# Patient Record
Sex: MALE | Race: BLACK OR AFRICAN AMERICAN | Employment: UNEMPLOYED | URBAN - METROPOLITAN AREA
[De-identification: names, ages, dates, MRNs, and addresses within clinical notes are randomized per-mention and may not be internally consistent; named-entity substitution may affect disease eponyms.]

---

## 2022-10-01 ENCOUNTER — ANESTHESIA EVENT (OUTPATIENT)
Dept: SURGERY | Age: 62
DRG: 710 | End: 2022-10-01
Payer: MEDICAID

## 2022-10-01 ENCOUNTER — APPOINTMENT (OUTPATIENT)
Dept: CT IMAGING | Age: 62
DRG: 710 | End: 2022-10-01
Attending: EMERGENCY MEDICINE
Payer: MEDICAID

## 2022-10-01 ENCOUNTER — HOSPITAL ENCOUNTER (INPATIENT)
Age: 62
LOS: 4 days | Discharge: HOME HEALTH | DRG: 710 | End: 2022-10-05
Attending: EMERGENCY MEDICINE | Admitting: INTERNAL MEDICINE
Payer: MEDICAID

## 2022-10-01 ENCOUNTER — APPOINTMENT (OUTPATIENT)
Dept: GENERAL RADIOLOGY | Age: 62
DRG: 710 | End: 2022-10-01
Attending: INTERNAL MEDICINE
Payer: MEDICAID

## 2022-10-01 ENCOUNTER — ANESTHESIA (OUTPATIENT)
Dept: SURGERY | Age: 62
DRG: 710 | End: 2022-10-01
Payer: MEDICAID

## 2022-10-01 DIAGNOSIS — I48.91 ATRIAL FIBRILLATION, UNSPECIFIED TYPE (HCC): ICD-10-CM

## 2022-10-01 DIAGNOSIS — L03.317 CELLULITIS OF RIGHT BUTTOCK: ICD-10-CM

## 2022-10-01 DIAGNOSIS — E87.1 HYPONATREMIA: ICD-10-CM

## 2022-10-01 DIAGNOSIS — K61.1 PERIRECTAL ABSCESS: Primary | ICD-10-CM

## 2022-10-01 PROBLEM — A41.9 SEPSIS (HCC): Status: ACTIVE | Noted: 2022-10-01

## 2022-10-01 PROBLEM — R79.89 ELEVATED LFTS: Status: ACTIVE | Noted: 2022-10-01

## 2022-10-01 PROBLEM — N17.9 AKI (ACUTE KIDNEY INJURY) (HCC): Status: ACTIVE | Noted: 2022-10-01

## 2022-10-01 PROBLEM — L02.31 CELLULITIS AND ABSCESS OF BUTTOCK: Status: ACTIVE | Noted: 2022-10-01

## 2022-10-01 LAB
ALBUMIN SERPL-MCNC: 2.4 G/DL (ref 3.4–5)
ALBUMIN/GLOB SERPL: 0.4 {RATIO} (ref 0.8–1.7)
ALP SERPL-CCNC: 76 U/L (ref 45–117)
ALT SERPL-CCNC: 69 U/L (ref 16–61)
ANION GAP SERPL CALC-SCNC: 6 MMOL/L (ref 3–18)
ANION GAP SERPL CALC-SCNC: 8 MMOL/L (ref 3–18)
APTT PPP: 29.9 SEC (ref 23–36.4)
AST SERPL-CCNC: 91 U/L (ref 10–38)
BASOPHILS # BLD: 0 K/UL (ref 0–0.1)
BASOPHILS NFR BLD: 0 % (ref 0–2)
BILIRUB SERPL-MCNC: 0.8 MG/DL (ref 0.2–1)
BUN SERPL-MCNC: 13 MG/DL (ref 7–18)
BUN SERPL-MCNC: 15 MG/DL (ref 7–18)
BUN/CREAT SERPL: 12 (ref 12–20)
BUN/CREAT SERPL: 9 (ref 12–20)
CALCIUM SERPL-MCNC: 7.6 MG/DL (ref 8.5–10.1)
CALCIUM SERPL-MCNC: 9.1 MG/DL (ref 8.5–10.1)
CHLORIDE SERPL-SCNC: 107 MMOL/L (ref 100–111)
CHLORIDE SERPL-SCNC: 96 MMOL/L (ref 100–111)
CO2 SERPL-SCNC: 22 MMOL/L (ref 21–32)
CO2 SERPL-SCNC: 25 MMOL/L (ref 21–32)
COVID-19 RAPID TEST, COVR: NOT DETECTED
CREAT SERPL-MCNC: 1.3 MG/DL (ref 0.6–1.3)
CREAT SERPL-MCNC: 1.4 MG/DL (ref 0.6–1.3)
DIFFERENTIAL METHOD BLD: ABNORMAL
EOSINOPHIL # BLD: 0 K/UL (ref 0–0.4)
EOSINOPHIL NFR BLD: 0 % (ref 0–5)
ERYTHROCYTE [DISTWIDTH] IN BLOOD BY AUTOMATED COUNT: 13.3 % (ref 11.6–14.5)
EST. AVERAGE GLUCOSE BLD GHB EST-MCNC: 120 MG/DL
GLOBULIN SER CALC-MCNC: 5.5 G/DL (ref 2–4)
GLUCOSE SERPL-MCNC: 110 MG/DL (ref 74–99)
GLUCOSE SERPL-MCNC: 115 MG/DL (ref 74–99)
HBA1C MFR BLD: 5.8 % (ref 4.2–5.6)
HCT VFR BLD AUTO: 41.1 % (ref 36–48)
HGB BLD-MCNC: 13.4 G/DL (ref 13–16)
IMM GRANULOCYTES # BLD AUTO: 0 K/UL (ref 0–0.04)
IMM GRANULOCYTES NFR BLD AUTO: 0 % (ref 0–0.5)
INR PPP: 1.2 (ref 0.8–1.2)
LACTATE BLD-SCNC: 1.37 MMOL/L (ref 0.4–2)
LYMPHOCYTES # BLD: 0.5 K/UL (ref 0.9–3.6)
LYMPHOCYTES NFR BLD: 3 % (ref 21–52)
MCH RBC QN AUTO: 26.5 PG (ref 24–34)
MCHC RBC AUTO-ENTMCNC: 32.6 G/DL (ref 31–37)
MCV RBC AUTO: 81.2 FL (ref 78–100)
MONOCYTES # BLD: 0.9 K/UL (ref 0.05–1.2)
MONOCYTES NFR BLD: 5 % (ref 3–10)
NEUTS BAND NFR BLD MANUAL: 5 %
NEUTS SEG # BLD: 15.9 K/UL (ref 1.8–8)
NEUTS SEG NFR BLD: 87 % (ref 40–73)
NRBC # BLD: 0 K/UL (ref 0–0.01)
NRBC BLD-RTO: 0 PER 100 WBC
PLATELET # BLD AUTO: 384 K/UL (ref 135–420)
PLATELET COMMENTS,PCOM: ABNORMAL
PMV BLD AUTO: 8.9 FL (ref 9.2–11.8)
POTASSIUM SERPL-SCNC: 3.6 MMOL/L (ref 3.5–5.5)
POTASSIUM SERPL-SCNC: 4.4 MMOL/L (ref 3.5–5.5)
PROCALCITONIN SERPL-MCNC: 2.61 NG/ML
PROT SERPL-MCNC: 7.9 G/DL (ref 6.4–8.2)
PROTHROMBIN TIME: 15.3 SEC (ref 11.5–15.2)
RBC # BLD AUTO: 5.06 M/UL (ref 4.35–5.65)
RBC MORPH BLD: ABNORMAL
SODIUM SERPL-SCNC: 129 MMOL/L (ref 136–145)
SODIUM SERPL-SCNC: 135 MMOL/L (ref 136–145)
SOURCE, COVRS: NORMAL
TSH SERPL DL<=0.05 MIU/L-ACNC: 0.77 UIU/ML (ref 0.36–3.74)
WBC # BLD AUTO: 17.3 K/UL (ref 4.6–13.2)

## 2022-10-01 PROCEDURE — 87635 SARS-COV-2 COVID-19 AMP PRB: CPT

## 2022-10-01 PROCEDURE — 87186 SC STD MICRODIL/AGAR DIL: CPT

## 2022-10-01 PROCEDURE — 74011250637 HC RX REV CODE- 250/637: Performed by: INTERNAL MEDICINE

## 2022-10-01 PROCEDURE — 74011000636 HC RX REV CODE- 636: Performed by: EMERGENCY MEDICINE

## 2022-10-01 PROCEDURE — 85610 PROTHROMBIN TIME: CPT

## 2022-10-01 PROCEDURE — 74011000258 HC RX REV CODE- 258: Performed by: INTERNAL MEDICINE

## 2022-10-01 PROCEDURE — 71046 X-RAY EXAM CHEST 2 VIEWS: CPT

## 2022-10-01 PROCEDURE — 85025 COMPLETE CBC W/AUTO DIFF WBC: CPT

## 2022-10-01 PROCEDURE — 99222 1ST HOSP IP/OBS MODERATE 55: CPT | Performed by: INTERNAL MEDICINE

## 2022-10-01 PROCEDURE — 83605 ASSAY OF LACTIC ACID: CPT

## 2022-10-01 PROCEDURE — 77030010509 HC AIRWY LMA MSK TELE -A: Performed by: ANESTHESIOLOGY

## 2022-10-01 PROCEDURE — 76210000016 HC OR PH I REC 1 TO 1.5 HR: Performed by: SURGERY

## 2022-10-01 PROCEDURE — 46040 I&D ISCHIORCT&/PERIRCT ABSC: CPT | Performed by: SURGERY

## 2022-10-01 PROCEDURE — 87150 DNA/RNA AMPLIFIED PROBE: CPT

## 2022-10-01 PROCEDURE — 87185 SC STD ENZYME DETCJ PER NZM: CPT

## 2022-10-01 PROCEDURE — 87075 CULTR BACTERIA EXCEPT BLOOD: CPT

## 2022-10-01 PROCEDURE — 74011000250 HC RX REV CODE- 250: Performed by: EMERGENCY MEDICINE

## 2022-10-01 PROCEDURE — 99285 EMERGENCY DEPT VISIT HI MDM: CPT

## 2022-10-01 PROCEDURE — 94762 N-INVAS EAR/PLS OXIMTRY CONT: CPT

## 2022-10-01 PROCEDURE — 74011000258 HC RX REV CODE- 258: Performed by: EMERGENCY MEDICINE

## 2022-10-01 PROCEDURE — 00902 ANES ANORECTAL PX: CPT | Performed by: NURSE ANESTHETIST, CERTIFIED REGISTERED

## 2022-10-01 PROCEDURE — 77030018836 HC SOL IRR NACL ICUM -A: Performed by: SURGERY

## 2022-10-01 PROCEDURE — 77030002916 HC SUT ETHLN J&J -A: Performed by: SURGERY

## 2022-10-01 PROCEDURE — 76060000032 HC ANESTHESIA 0.5 TO 1 HR: Performed by: SURGERY

## 2022-10-01 PROCEDURE — 85730 THROMBOPLASTIN TIME PARTIAL: CPT

## 2022-10-01 PROCEDURE — 74011250636 HC RX REV CODE- 250/636

## 2022-10-01 PROCEDURE — 00902 ANES ANORECTAL PX: CPT | Performed by: ANESTHESIOLOGY

## 2022-10-01 PROCEDURE — 36415 COLL VENOUS BLD VENIPUNCTURE: CPT

## 2022-10-01 PROCEDURE — 87040 BLOOD CULTURE FOR BACTERIA: CPT

## 2022-10-01 PROCEDURE — 83036 HEMOGLOBIN GLYCOSYLATED A1C: CPT

## 2022-10-01 PROCEDURE — 65270000046 HC RM TELEMETRY

## 2022-10-01 PROCEDURE — 76010000138 HC OR TIME 0.5 TO 1 HR: Performed by: SURGERY

## 2022-10-01 PROCEDURE — 2709999900 HC NON-CHARGEABLE SUPPLY: Performed by: SURGERY

## 2022-10-01 PROCEDURE — C1729 CATH, DRAINAGE: HCPCS | Performed by: SURGERY

## 2022-10-01 PROCEDURE — 0D9P0ZZ DRAINAGE OF RECTUM, OPEN APPROACH: ICD-10-PCS | Performed by: SURGERY

## 2022-10-01 PROCEDURE — 74011250636 HC RX REV CODE- 250/636: Performed by: INTERNAL MEDICINE

## 2022-10-01 PROCEDURE — 80053 COMPREHEN METABOLIC PANEL: CPT

## 2022-10-01 PROCEDURE — 74011000250 HC RX REV CODE- 250: Performed by: INTERNAL MEDICINE

## 2022-10-01 PROCEDURE — 84145 PROCALCITONIN (PCT): CPT

## 2022-10-01 PROCEDURE — 77030038692 HC WND DEB SYS IRMX -B: Performed by: SURGERY

## 2022-10-01 PROCEDURE — 74011250636 HC RX REV CODE- 250/636: Performed by: NURSE ANESTHETIST, CERTIFIED REGISTERED

## 2022-10-01 PROCEDURE — 77030010813: Performed by: SURGERY

## 2022-10-01 PROCEDURE — 99140 ANES COMP EMERGENCY COND: CPT | Performed by: NURSE ANESTHETIST, CERTIFIED REGISTERED

## 2022-10-01 PROCEDURE — 74011250636 HC RX REV CODE- 250/636: Performed by: EMERGENCY MEDICINE

## 2022-10-01 PROCEDURE — 87205 SMEAR GRAM STAIN: CPT

## 2022-10-01 PROCEDURE — 96375 TX/PRO/DX INJ NEW DRUG ADDON: CPT

## 2022-10-01 PROCEDURE — 2709999900 HC NON-CHARGEABLE SUPPLY

## 2022-10-01 PROCEDURE — 74011000250 HC RX REV CODE- 250: Performed by: NURSE ANESTHETIST, CERTIFIED REGISTERED

## 2022-10-01 PROCEDURE — 87077 CULTURE AEROBIC IDENTIFY: CPT

## 2022-10-01 PROCEDURE — 84443 ASSAY THYROID STIM HORMONE: CPT

## 2022-10-01 PROCEDURE — 99140 ANES COMP EMERGENCY COND: CPT | Performed by: ANESTHESIOLOGY

## 2022-10-01 PROCEDURE — 72193 CT PELVIS W/DYE: CPT

## 2022-10-01 PROCEDURE — 96374 THER/PROPH/DIAG INJ IV PUSH: CPT

## 2022-10-01 PROCEDURE — 99221 1ST HOSP IP/OBS SF/LOW 40: CPT | Performed by: SURGERY

## 2022-10-01 RX ORDER — SODIUM CHLORIDE 0.9 % (FLUSH) 0.9 %
5-40 SYRINGE (ML) INJECTION EVERY 8 HOURS
Status: DISCONTINUED | OUTPATIENT
Start: 2022-10-01 | End: 2022-10-05 | Stop reason: HOSPADM

## 2022-10-01 RX ORDER — PROPOFOL 10 MG/ML
INJECTION, EMULSION INTRAVENOUS AS NEEDED
Status: DISCONTINUED | OUTPATIENT
Start: 2022-10-01 | End: 2022-10-01 | Stop reason: HOSPADM

## 2022-10-01 RX ORDER — DEXAMETHASONE SODIUM PHOSPHATE 4 MG/ML
INJECTION, SOLUTION INTRA-ARTICULAR; INTRALESIONAL; INTRAMUSCULAR; INTRAVENOUS; SOFT TISSUE AS NEEDED
Status: DISCONTINUED | OUTPATIENT
Start: 2022-10-01 | End: 2022-10-01 | Stop reason: HOSPADM

## 2022-10-01 RX ORDER — OXYCODONE AND ACETAMINOPHEN 5; 325 MG/1; MG/1
1 TABLET ORAL
Status: DISCONTINUED | OUTPATIENT
Start: 2022-10-01 | End: 2022-10-05 | Stop reason: HOSPADM

## 2022-10-01 RX ORDER — HYDROMORPHONE HYDROCHLORIDE 1 MG/ML
1 INJECTION, SOLUTION INTRAMUSCULAR; INTRAVENOUS; SUBCUTANEOUS ONCE
Status: COMPLETED | OUTPATIENT
Start: 2022-10-01 | End: 2022-10-01

## 2022-10-01 RX ORDER — FENTANYL CITRATE 50 UG/ML
INJECTION, SOLUTION INTRAMUSCULAR; INTRAVENOUS AS NEEDED
Status: DISCONTINUED | OUTPATIENT
Start: 2022-10-01 | End: 2022-10-01 | Stop reason: HOSPADM

## 2022-10-01 RX ORDER — LIDOCAINE HYDROCHLORIDE 20 MG/ML
INJECTION, SOLUTION EPIDURAL; INFILTRATION; INTRACAUDAL; PERINEURAL AS NEEDED
Status: DISCONTINUED | OUTPATIENT
Start: 2022-10-01 | End: 2022-10-01 | Stop reason: HOSPADM

## 2022-10-01 RX ORDER — HYDROMORPHONE HYDROCHLORIDE 1 MG/ML
0.5 INJECTION, SOLUTION INTRAMUSCULAR; INTRAVENOUS; SUBCUTANEOUS AS NEEDED
Status: DISCONTINUED | OUTPATIENT
Start: 2022-10-01 | End: 2022-10-01 | Stop reason: HOSPADM

## 2022-10-01 RX ORDER — ROCURONIUM BROMIDE 10 MG/ML
INJECTION, SOLUTION INTRAVENOUS AS NEEDED
Status: DISCONTINUED | OUTPATIENT
Start: 2022-10-01 | End: 2022-10-01 | Stop reason: HOSPADM

## 2022-10-01 RX ORDER — ENOXAPARIN SODIUM 100 MG/ML
40 INJECTION SUBCUTANEOUS DAILY
Status: DISCONTINUED | OUTPATIENT
Start: 2022-10-02 | End: 2022-10-05

## 2022-10-01 RX ORDER — SODIUM CHLORIDE 0.9 % (FLUSH) 0.9 %
5-40 SYRINGE (ML) INJECTION AS NEEDED
Status: DISCONTINUED | OUTPATIENT
Start: 2022-10-01 | End: 2022-10-05 | Stop reason: HOSPADM

## 2022-10-01 RX ORDER — DIPHENHYDRAMINE HYDROCHLORIDE 50 MG/ML
12.5 INJECTION, SOLUTION INTRAMUSCULAR; INTRAVENOUS
Status: DISCONTINUED | OUTPATIENT
Start: 2022-10-01 | End: 2022-10-01 | Stop reason: HOSPADM

## 2022-10-01 RX ORDER — MIDAZOLAM HYDROCHLORIDE 1 MG/ML
INJECTION, SOLUTION INTRAMUSCULAR; INTRAVENOUS AS NEEDED
Status: DISCONTINUED | OUTPATIENT
Start: 2022-10-01 | End: 2022-10-01 | Stop reason: HOSPADM

## 2022-10-01 RX ORDER — HYDRALAZINE HYDROCHLORIDE 20 MG/ML
10 INJECTION INTRAMUSCULAR; INTRAVENOUS
Status: DISCONTINUED | OUTPATIENT
Start: 2022-10-01 | End: 2022-10-05 | Stop reason: HOSPADM

## 2022-10-01 RX ORDER — SUCCINYLCHOLINE CHLORIDE 20 MG/ML
INJECTION INTRAMUSCULAR; INTRAVENOUS AS NEEDED
Status: DISCONTINUED | OUTPATIENT
Start: 2022-10-01 | End: 2022-10-01 | Stop reason: HOSPADM

## 2022-10-01 RX ORDER — ACETAMINOPHEN 325 MG/1
650 TABLET ORAL
Status: DISCONTINUED | OUTPATIENT
Start: 2022-10-01 | End: 2022-10-05 | Stop reason: HOSPADM

## 2022-10-01 RX ORDER — PROMETHAZINE HYDROCHLORIDE 12.5 MG/1
12.5 TABLET ORAL
Status: DISCONTINUED | OUTPATIENT
Start: 2022-10-01 | End: 2022-10-05 | Stop reason: HOSPADM

## 2022-10-01 RX ORDER — ONDANSETRON 2 MG/ML
4 INJECTION INTRAMUSCULAR; INTRAVENOUS
Status: DISCONTINUED | OUTPATIENT
Start: 2022-10-01 | End: 2022-10-05 | Stop reason: HOSPADM

## 2022-10-01 RX ORDER — ONDANSETRON 2 MG/ML
4 INJECTION INTRAMUSCULAR; INTRAVENOUS ONCE
Status: DISCONTINUED | OUTPATIENT
Start: 2022-10-01 | End: 2022-10-01 | Stop reason: HOSPADM

## 2022-10-01 RX ORDER — ONDANSETRON 2 MG/ML
INJECTION INTRAMUSCULAR; INTRAVENOUS AS NEEDED
Status: DISCONTINUED | OUTPATIENT
Start: 2022-10-01 | End: 2022-10-01 | Stop reason: HOSPADM

## 2022-10-01 RX ORDER — SODIUM CHLORIDE, SODIUM LACTATE, POTASSIUM CHLORIDE, CALCIUM CHLORIDE 600; 310; 30; 20 MG/100ML; MG/100ML; MG/100ML; MG/100ML
75 INJECTION, SOLUTION INTRAVENOUS CONTINUOUS
Status: DISCONTINUED | OUTPATIENT
Start: 2022-10-01 | End: 2022-10-01 | Stop reason: HOSPADM

## 2022-10-01 RX ORDER — POLYETHYLENE GLYCOL 3350 17 G/17G
17 POWDER, FOR SOLUTION ORAL DAILY PRN
Status: DISCONTINUED | OUTPATIENT
Start: 2022-10-01 | End: 2022-10-05 | Stop reason: HOSPADM

## 2022-10-01 RX ORDER — VANCOMYCIN 2 GRAM/500 ML IN 0.9 % SODIUM CHLORIDE INTRAVENOUS
2000 ONCE
Status: COMPLETED | OUTPATIENT
Start: 2022-10-01 | End: 2022-10-01

## 2022-10-01 RX ORDER — VANCOMYCIN 2 GRAM/500 ML IN 0.9 % SODIUM CHLORIDE INTRAVENOUS
2000 ONCE
Status: DISCONTINUED | OUTPATIENT
Start: 2022-10-01 | End: 2022-10-01

## 2022-10-01 RX ORDER — ONDANSETRON 2 MG/ML
4 INJECTION INTRAMUSCULAR; INTRAVENOUS
Status: COMPLETED | OUTPATIENT
Start: 2022-10-01 | End: 2022-10-01

## 2022-10-01 RX ORDER — NALBUPHINE HYDROCHLORIDE 20 MG/ML
5 INJECTION, SOLUTION INTRAMUSCULAR; INTRAVENOUS; SUBCUTANEOUS
Status: DISCONTINUED | OUTPATIENT
Start: 2022-10-01 | End: 2022-10-01 | Stop reason: HOSPADM

## 2022-10-01 RX ORDER — SODIUM CHLORIDE 0.9 % (FLUSH) 0.9 %
5-40 SYRINGE (ML) INJECTION EVERY 8 HOURS
Status: DISCONTINUED | OUTPATIENT
Start: 2022-10-01 | End: 2022-10-01 | Stop reason: HOSPADM

## 2022-10-01 RX ORDER — SODIUM CHLORIDE 0.9 % (FLUSH) 0.9 %
5-40 SYRINGE (ML) INJECTION AS NEEDED
Status: DISCONTINUED | OUTPATIENT
Start: 2022-10-01 | End: 2022-10-01 | Stop reason: HOSPADM

## 2022-10-01 RX ADMIN — FAMOTIDINE 20 MG: 10 INJECTION, SOLUTION INTRAVENOUS at 21:18

## 2022-10-01 RX ADMIN — VANCOMYCIN HYDROCHLORIDE 2000 MG: 500 INJECTION, POWDER, LYOPHILIZED, FOR SOLUTION INTRAVENOUS at 20:28

## 2022-10-01 RX ADMIN — SODIUM CHLORIDE 1000 ML: 9 INJECTION, SOLUTION INTRAVENOUS at 10:59

## 2022-10-01 RX ADMIN — SODIUM CHLORIDE, PRESERVATIVE FREE 10 ML: 5 INJECTION INTRAVENOUS at 21:22

## 2022-10-01 RX ADMIN — SODIUM CHLORIDE, PRESERVATIVE FREE 10 ML: 5 INJECTION INTRAVENOUS at 15:33

## 2022-10-01 RX ADMIN — ONDANSETRON 4 MG: 2 INJECTION INTRAMUSCULAR; INTRAVENOUS at 21:18

## 2022-10-01 RX ADMIN — PROPOFOL 200 MG: 10 INJECTION, EMULSION INTRAVENOUS at 16:54

## 2022-10-01 RX ADMIN — PIPERACILLIN AND TAZOBACTAM 3.38 G: 3; .375 INJECTION, POWDER, FOR SOLUTION INTRAVENOUS at 21:17

## 2022-10-01 RX ADMIN — ONDANSETRON 4 MG: 2 INJECTION INTRAMUSCULAR; INTRAVENOUS at 10:59

## 2022-10-01 RX ADMIN — PIPERACILLIN AND TAZOBACTAM 4.5 G: 4; .5 INJECTION, POWDER, FOR SOLUTION INTRAVENOUS at 15:33

## 2022-10-01 RX ADMIN — HYDROMORPHONE HYDROCHLORIDE 1 MG: 1 INJECTION, SOLUTION INTRAMUSCULAR; INTRAVENOUS; SUBCUTANEOUS at 10:59

## 2022-10-01 RX ADMIN — IOPAMIDOL 80 ML: 612 INJECTION, SOLUTION INTRAVENOUS at 11:37

## 2022-10-01 RX ADMIN — LIDOCAINE HYDROCHLORIDE 20 MG: 20 INJECTION, SOLUTION EPIDURAL; INFILTRATION; INTRACAUDAL; PERINEURAL at 16:54

## 2022-10-01 RX ADMIN — MIDAZOLAM HYDROCHLORIDE 2 MG: 2 INJECTION, SOLUTION INTRAMUSCULAR; INTRAVENOUS at 16:48

## 2022-10-01 RX ADMIN — ACETAMINOPHEN 650 MG: 325 TABLET, FILM COATED ORAL at 18:06

## 2022-10-01 RX ADMIN — MEPERIDINE HYDROCHLORIDE 12.5 MG: 25 INJECTION INTRAMUSCULAR; INTRAVENOUS; SUBCUTANEOUS at 18:20

## 2022-10-01 RX ADMIN — SUCCINYLCHOLINE CHLORIDE 100 MG: 20 INJECTION, SOLUTION INTRAMUSCULAR; INTRAVENOUS at 16:54

## 2022-10-01 RX ADMIN — ONDANSETRON 4 MG: 2 INJECTION INTRAMUSCULAR; INTRAVENOUS at 16:54

## 2022-10-01 RX ADMIN — FENTANYL CITRATE 100 MCG: 50 INJECTION, SOLUTION INTRAMUSCULAR; INTRAVENOUS at 16:54

## 2022-10-01 RX ADMIN — CLINDAMYCIN PHOSPHATE 600 MG: 150 INJECTION, SOLUTION INTRAVENOUS at 12:15

## 2022-10-01 RX ADMIN — CLINDAMYCIN PHOSPHATE 600 MG: 150 INJECTION, SOLUTION INTRAVENOUS at 22:53

## 2022-10-01 RX ADMIN — SODIUM CHLORIDE 1000 ML: 9 INJECTION, SOLUTION INTRAVENOUS at 15:34

## 2022-10-01 RX ADMIN — DEXAMETHASONE SODIUM PHOSPHATE 4 MG: 4 INJECTION, SOLUTION INTRAMUSCULAR; INTRAVENOUS at 16:54

## 2022-10-01 RX ADMIN — FAMOTIDINE 20 MG: 10 INJECTION, SOLUTION INTRAVENOUS at 15:33

## 2022-10-01 RX ADMIN — ROCURONIUM BROMIDE 10 MG: 50 INJECTION INTRAVENOUS at 16:54

## 2022-10-01 NOTE — PROGRESS NOTES
TRANSFER - IN REPORT:    Verbal report received from Daniela RN(name) on Dimple Flatten  being received from PACU(unit) for routine progression of care      Report consisted of patients Situation, Background, Assessment and   Recommendations(SBAR). Information from the following report(s) SBAR, Kardex, Intake/Output, MAR, and Recent Results was reviewed with the receiving nurse. Opportunity for questions and clarification was provided. Assessment completed upon patients arrival to unit and care assumed.

## 2022-10-01 NOTE — CONSULTS
History & Physical    Date: 10/1/2022    Referring Physician: ER    Assessment:        Active Problems:    Cellulitis and abscess of buttock (10/1/2022)      Hyponatremia (10/1/2022)      Roma-rectal abscess (10/1/2022)      Sepsis ([facility] [address] (10/1/2022)      DANIA (acute kidney injury) ([facility] [address] (10/1/2022)      Elevated LFTs (10/1/2022)        Plan:   I have discussed the above findings with . Ortega Orellana and personally reviewed his CT scan of the abdomen and pelvis demonstrating a very large perirectal abscess. Recommend incision and drainage in the operating room today. Will obtain cultures, broad spectrum antibiotics. NPO. The risks and benefits of the procedure were reviewed with the patient including infection, bleeding, need for repeat procedure, injury to surrounding structures including sphincter muscles. Questions were answered and consent was obtained. History of Present Illness:  Mr. Tammy Che is a 63y.o. year old male who presented with pain and mass in the right buttock for the last week with associated fevers and chills. He states pain is 10/10 and denies any similar problems in the past. He is having difficulty moving his bowels secondary to pain. ER noted some cellulitis but the abscess was deeper and very large- unable to be drained in emergency room. History:  Past Medical History:   Diagnosis Date    History of tobacco abuse     Hypertension      No past surgical history on file.   Family History   Problem Relation Age of Onset    Heart Attack Mother     Heart Disease Mother     Hypertension Father     Heart Disease Sister     Heart Attack Sister      Social History     Socioeconomic History    Marital status: SINGLE     Spouse name: Not on file    Number of children: Not on file    Years of education: Not on file    Highest education level: Not on file   Occupational History    Not on file   Tobacco Use    Smoking status: Former     Types: Cigarettes    Smokeless tobacco: Never   Substance and Sexual Activity    Alcohol use: Not Currently    Drug use: Not Currently    Sexual activity: Not on file   Other Topics Concern    Not on file   Social History Narrative    Not on file     Social Determinants of Health     Financial Resource Strain: Not on file   Food Insecurity: Not on file   Transportation Needs: Not on file   Physical Activity: Not on file   Stress: Not on file   Social Connections: Not on file   Intimate Partner Violence: Not on file   Housing Stability: Not on file     No Known Allergies    Medications:  No current facility-administered medications on file prior to encounter. No current outpatient medications on file prior to encounter. Review of Systems:  Review of Systems   Constitutional:  Positive for chills and fever. Negative for fatigue. Gastrointestinal:  Positive for constipation and rectal pain. Negative for anal bleeding and blood in stool. Musculoskeletal:  Negative for arthralgias. Skin:  Positive for color change. Negative for rash and wound. Hematological:  Negative for adenopathy. Does not bruise/bleed easily. Physical Exam:  Patient Vitals for the past 24 hrs:   Temp Pulse Resp SpO2 Height Weight   10/01/22 1355 -- -- -- 99 % -- --   10/01/22 0933 98.9 °F (37.2 °C) (!) 101 18 98 % 5' 11\" (1.803 m) 102.1 kg (225 lb)       Physical Exam  Constitutional:       Appearance: Normal appearance. Cardiovascular:      Rate and Rhythm: Tachycardia present. Pulmonary:      Effort: Pulmonary effort is normal.   Genitourinary:     Comments: Rectal exam deferred- patient being taken to operating room for rectal exam under anesthesia and drainage of perirectal abscess  Neurological:      General: No focal deficit present. Mental Status: He is alert and oriented to person, place, and time. Mental status is at baseline. Psychiatric:         Mood and Affect: Mood normal.         Behavior: Behavior normal.         Thought Content:  Thought content normal. Judgment: Judgment normal.       Laboratory Data:  Recent Results (from the past 2016 hour(s))   PROCALCITONIN    Collection Time: 10/01/22 10:00 AM   Result Value Ref Range    Procalcitonin 2.61 ng/mL   HEMOGLOBIN A1C WITH EAG    Collection Time: 10/01/22 10:00 AM   Result Value Ref Range    Hemoglobin A1c 5.8 (H) 4.2 - 5.6 %    Est. average glucose 120 mg/dL   TSH 3RD GENERATION    Collection Time: 10/01/22 10:00 AM   Result Value Ref Range    TSH 0.77 0.36 - 3.74 uIU/mL   PROTHROMBIN TIME + INR    Collection Time: 10/01/22 10:00 AM   Result Value Ref Range    Prothrombin time 15.3 (H) 11.5 - 15.2 sec    INR 1.2 0.8 - 1.2     PTT    Collection Time: 10/01/22 10:00 AM   Result Value Ref Range    aPTT 29.9 23.0 - 50.3 SEC   METABOLIC PANEL, COMPREHENSIVE    Collection Time: 10/01/22 10:49 AM   Result Value Ref Range    Sodium 129 (L) 136 - 145 mmol/L    Potassium 3.6 3.5 - 5.5 mmol/L    Chloride 96 (L) 100 - 111 mmol/L    CO2 25 21 - 32 mmol/L    Anion gap 8 3.0 - 18 mmol/L    Glucose 110 (H) 74 - 99 mg/dL    BUN 13 7.0 - 18 MG/DL    Creatinine 1.40 (H) 0.6 - 1.3 MG/DL    BUN/Creatinine ratio 9 (L) 12 - 20      GFR est AA >60 >60 ml/min/1.73m2    GFR est non-AA 52 (L) >60 ml/min/1.73m2    Calcium 9.1 8.5 - 10.1 MG/DL    Bilirubin, total 0.8 0.2 - 1.0 MG/DL    ALT (SGPT) 69 (H) 16 - 61 U/L    AST (SGOT) 91 (H) 10 - 38 U/L    Alk.  phosphatase 76 45 - 117 U/L    Protein, total 7.9 6.4 - 8.2 g/dL    Albumin 2.4 (L) 3.4 - 5.0 g/dL    Globulin 5.5 (H) 2.0 - 4.0 g/dL    A-G Ratio 0.4 (L) 0.8 - 1.7     CBC WITH AUTOMATED DIFF    Collection Time: 10/01/22 10:49 AM   Result Value Ref Range    WBC 17.3 (H) 4.6 - 13.2 K/uL    RBC 5.06 4.35 - 5.65 M/uL    HGB 13.4 13.0 - 16.0 g/dL    HCT 41.1 36.0 - 48.0 %    MCV 81.2 78.0 - 100.0 FL    MCH 26.5 24.0 - 34.0 PG    MCHC 32.6 31.0 - 37.0 g/dL    RDW 13.3 11.6 - 14.5 %    PLATELET 627 501 - 695 K/uL    MPV 8.9 (L) 9.2 - 11.8 FL    NRBC 0.0 0  WBC    ABSOLUTE NRBC 0.00 0.00 - 0.01 K/uL    NEUTROPHILS 87 (H) 40 - 73 %    BAND NEUTROPHILS 5 %    LYMPHOCYTES 3 (L) 21 - 52 %    MONOCYTES 5 3 - 10 %    EOSINOPHILS 0 0 - 5 %    BASOPHILS 0 0 - 2 %    IMMATURE GRANULOCYTES 0 0.0 - 0.5 %    ABS. NEUTROPHILS 15.9 (H) 1.8 - 8.0 K/UL    ABS. LYMPHOCYTES 0.5 (L) 0.9 - 3.6 K/UL    ABS. MONOCYTES 0.9 0.05 - 1.2 K/UL    ABS. EOSINOPHILS 0.0 0.0 - 0.4 K/UL    ABS. BASOPHILS 0.0 0.0 - 0.1 K/UL    ABS. IMM. GRANS. 0.0 0.00 - 0.04 K/UL    DF MANUAL      PLATELET COMMENTS ADEQUATE PLATELETS      RBC COMMENTS NORMOCYTIC, NORMOCHROMIC     POC LACTIC ACID    Collection Time: 10/01/22 12:22 PM   Result Value Ref Range    Lactic Acid (POC) 1.37 0.40 - 2.00 mmol/L   COVID-19 RAPID TEST    Collection Time: 10/01/22  3:02 PM   Result Value Ref Range    Specimen source Nasopharyngeal      COVID-19 rapid test Not detected NOTD         Diagnostic Studies:  CT PELV W CONT: Patient Communication     Add Comments   Not seen     Addendum    Addendum: Findings communicated to Dr. Dolores Robles by telephone. Addended by Brett Morales MD on 10/1/2022  1:30 PM     Study Result    Narrative & Impression   CT PELVIS WITH INTRAVENOUS CONTRAST. COMPARISON: None available. INDICATIONS: Rectal pain. Concern for rectal abscess. TECHNIQUE: 5 mm axial cuts were performed through the chest, abdomen and pelvis  after administration of 100cc of Omnipaque 350 and oral contrast material.     All CT scans at this facility are performed using dose optimization technique as  appropriate to a performed exam, to include automated exposure control,  adjustment of the mA and/or KV according to patient's size (including  appropriate matching for site-specific examinations), or use of iterative  reconstruction technique.      FINDINGS:     CT PELVIS:  There is a large gas containing perianal and perirectal fluid collection (13 x 6  x 9 cm) extending from the right gluteal cleft superiorly into the right,  greater than left, levator ani muscle complex. Both superior components of the  collection appear to communicate with each other (400:53). The thickened rectum  and pelvic floor muscles are displaced to the right. No defect is visualized in  the rectal wall, although there is a possible tiny locule of gas in the right  perirectal fat on axial image 33. No extraluminal enteric contrast. Fat  stranding adjacent to the urinary bladder is likely reactive. Right inguinal and  pelvic lymphadenopathy, likely reactive. No definite osseous erosions. Bilateral  pars defects at L5-S1 with grade 1 anterolisthesis. IMPRESSION  1.  Large gas containing perianal and perirectal fluid collection/abscess  extending from the right gluteal cleft into the right, greater than left,  levator ani muscle complex. No defect is observed in the thickened rectal wall  and there is no enteric contrast leakage, but there is a questioned tiny locule  of gas in the right perirectal fat on axial image 33.   2.  Bilateral L5-S1 spondylolysis with spondylolisthesis. 3.  Thickening of the bladder wall and inguinal/pelvic lymphadenopathy, likely  reactive.          Cathy Heard, [address]

## 2022-10-01 NOTE — ED PROVIDER NOTES
HPI   82-year-old -American male presents with a chief complaint of tender indurated mass in his right medial gluteal area which has been increasing in size for 1 week. He reports past medical history of hypertension but denies any history of diabetes, STDs, cancer or homosexual contact. He currently rates the pain 10/10 in intensity. Past Medical History:   Diagnosis Date    History of tobacco abuse     Hypertension        History reviewed. No pertinent surgical history. Family History:   Problem Relation Age of Onset    Heart Attack Mother     Heart Disease Mother     Hypertension Father     Heart Disease Sister     Heart Attack Sister        Social History     Socioeconomic History    Marital status: SINGLE     Spouse name: Not on file    Number of children: Not on file    Years of education: Not on file    Highest education level: Not on file   Occupational History    Not on file   Tobacco Use    Smoking status: Former     Types: Cigarettes    Smokeless tobacco: Never   Substance and Sexual Activity    Alcohol use: Not Currently    Drug use: Not Currently    Sexual activity: Not on file   Other Topics Concern    Not on file   Social History Narrative    Not on file     Social Determinants of Health     Financial Resource Strain: Not on file   Food Insecurity: Not on file   Transportation Needs: Not on file   Physical Activity: Not on file   Stress: Not on file   Social Connections: Not on file   Intimate Partner Violence: Not on file   Housing Stability: Not on file         ALLERGIES: Patient has no known allergies. Review of Systems   Constitutional:  Negative for appetite change, chills, diaphoresis, fatigue and fever. HENT:  Negative for congestion, dental problem, ear discharge, ear pain, hearing loss, nosebleeds, rhinorrhea, sinus pressure, sore throat and tinnitus. Eyes:  Negative for photophobia, pain, discharge and redness.    Respiratory:  Negative for cough, choking, chest tightness, shortness of breath, wheezing and stridor. Cardiovascular:  Negative for chest pain, palpitations and leg swelling. Gastrointestinal:  Negative for abdominal distention, abdominal pain, anal bleeding, blood in stool, constipation, diarrhea, nausea and vomiting. Endocrine: Negative for polydipsia, polyphagia and polyuria. Genitourinary:  Negative for decreased urine volume, difficulty urinating, dysuria, flank pain, frequency, genital sores, hematuria, penile discharge, penile pain, penile swelling, scrotal swelling, testicular pain and urgency. Musculoskeletal:  Negative for back pain, neck pain and neck stiffness. Skin:  Positive for color change. Negative for pallor. Allergic/Immunologic: Negative for food allergies and immunocompromised state. Neurological:  Negative for tremors, seizures, syncope, speech difficulty, weakness, light-headedness and headaches. Hematological:  Negative for adenopathy. Does not bruise/bleed easily. Psychiatric/Behavioral:  Negative for agitation, behavioral problems, confusion, hallucinations, self-injury, sleep disturbance and suicidal ideas. The patient is not nervous/anxious and is not hyperactive. Vitals:    10/01/22 0933 10/01/22 1355   Pulse: (!) 101    Resp: 18    Temp: 98.9 °F (37.2 °C)    SpO2: 98% 99%   Weight: 102.1 kg (225 lb)    Height: 5' 11\" (1.803 m)             Physical Exam  Vitals and nursing note reviewed. Constitutional:       General: He is not in acute distress. Appearance: He is well-developed. He is not diaphoretic. HENT:      Head: Normocephalic and atraumatic. Right Ear: External ear normal.      Left Ear: External ear normal.      Nose: Nose normal.      Mouth/Throat:      Mouth: Mucous membranes are moist.      Pharynx: Oropharynx is clear. No oropharyngeal exudate. Eyes:      General: No scleral icterus. Right eye: No discharge. Left eye: No discharge.       Conjunctiva/sclera: Conjunctivae normal.      Pupils: Pupils are equal, round, and reactive to light. Neck:      Thyroid: No thyromegaly. Vascular: No JVD. Trachea: No tracheal deviation. Cardiovascular:      Rate and Rhythm: Normal rate and regular rhythm. Heart sounds: Normal heart sounds. No murmur heard. No friction rub. No gallop. Pulmonary:      Effort: Pulmonary effort is normal. No respiratory distress. Breath sounds: Normal breath sounds. No stridor. No wheezing or rales. Chest:      Chest wall: No tenderness. Abdominal:      General: Bowel sounds are normal. There is no distension. Palpations: Abdomen is soft. There is no mass. Tenderness: There is no abdominal tenderness. There is no guarding or rebound. Genitourinary:     Penis: Normal.       Comments: 5 cm x 5 cm tender indurated area in the right gluteal area. There is no area of fluctuance noted. Musculoskeletal:         General: No tenderness. Normal range of motion. Cervical back: Normal range of motion and neck supple. Lymphadenopathy:      Cervical: No cervical adenopathy. Skin:     General: Skin is warm and dry. Coloration: Skin is not pale. Findings: No erythema or rash. Neurological:      Mental Status: He is alert and oriented to person, place, and time. Cranial Nerves: No cranial nerve deficit. Motor: No abnormal muscle tone. Coordination: Coordination normal.      Deep Tendon Reflexes: Reflexes are normal and symmetric. Psychiatric:         Behavior: Behavior normal.         Thought Content:  Thought content normal.         Judgment: Judgment normal.        MDM  Number of Diagnoses or Management Options  Diagnosis management comments: Differential diagnosis includes: Cellulitis, abscess       Amount and/or Complexity of Data Reviewed  Clinical lab tests: ordered and reviewed  Tests in the radiology section of CPT®: ordered and reviewed  Tests in the medicine section of CPT®: ordered and reviewed  Review and summarize past medical records: yes  Independent visualization of images, tracings, or specimens: yes    Risk of Complications, Morbidity, and/or Mortality  Presenting problems: high  Diagnostic procedures: high  Management options: high           Procedures          Orders Placed This Encounter    MECHANICAL PROPHYLAXIS IS CONTRAINDICATED Other, please document Already on Anticoagulation     Already on Anticoagulation     Standing Status:   Standing     Number of Occurrences:   1     Order Specific Question:   Please Indicate Reason     Answer: Other, please document    CULTURE, BLOOD     Standing Status:   Standing     Number of Occurrences:   1    CULTURE, BLOOD     Standing Status:   Standing     Number of Occurrences:   1    COVID-19 WITH INFLUENZA A/B     Standing Status:   Standing     Number of Occurrences:   1     Order Specific Question:   Is this test for diagnosis or screening? Answer:   Diagnosis of ill patient     Order Specific Question:   Symptomatic for COVID-19 as defined by CDC? Answer:   Yes     Order Specific Question:   Date of Symptom Onset     Answer:   9/30/2022     Order Specific Question:   Hospitalized for COVID-19? Answer:   No     Order Specific Question:   Admitted to ICU for COVID-19? Answer:   No     Order Specific Question:   Employed in healthcare setting? Answer:   No     Order Specific Question:   Resident in a congregate (group) care setting? Answer:   No     Order Specific Question:   Previously tested for COVID-19? Answer:   No    COVID-19 RAPID TEST     Standing Status:   Standing     Number of Occurrences:   1     Order Specific Question:   Is this test for diagnosis or screening? Answer:   Screening     Order Specific Question:   Symptomatic for COVID-19 as defined by CDC? Answer:   No     Order Specific Question:   Hospitalized for COVID-19?      Answer:   No     Order Specific Question:   Admitted to ICU for COVID-19? Answer:   No     Order Specific Question:   Employed in healthcare setting? Answer:   Unknown     Order Specific Question:   Resident in a congregate (group) care setting? Answer:   Unknown     Order Specific Question:   Previously tested for COVID-19? Answer:   Unknown    CULTURE, MRSA     Standing Status:   Standing     Number of Occurrences:   1    CT PELV W CONT     Standing Status:   Standing     Number of Occurrences:   1     Order Specific Question:   Transport     Answer:   Stretcher [5]     Order Specific Question:   Reason for Exam     Answer:   right gluteal cellulitis, ? abscess     Order Specific Question: This order utilizes IV contrast.  What additional contrast is needed? Answer:   None     Order Specific Question:   Does patient have history of Renal Disease?      Answer:   No     Order Specific Question:   Decision Support Exception     Answer:   Emergency Medical Condition (MA) [1]    XR CHEST PA LAT     Standing Status:   Standing     Number of Occurrences:   1     Order Specific Question:   Transport     Answer:   BED [2]     Order Specific Question:   Reason for Exam     Answer:   sepsis    COMPREHENSIVE METABOLIC PANEL     Standing Status:   Standing     Number of Occurrences:   1    CBC WITH AUTOMATED DIFF     Standing Status:   Standing     Number of Occurrences:   1    METABOLIC PANEL, BASIC     Standing Status:   Standing     Number of Occurrences:   3    MAGNESIUM     Standing Status:   Standing     Number of Occurrences:   3    CBC W/O DIFF     Standing Status:   Standing     Number of Occurrences:   3    PROCALCITONIN     Standing Status:   Standing     Number of Occurrences:   1    HEMOGLOBIN A1C WITH EAG     Standing Status:   Standing     Number of Occurrences:   1    TSH 3RD GENERATION     Standing Status:   Standing     Number of Occurrences:   1    PROTHROMBIN TIME + INR     Standing Status:   Standing     Number of Occurrences:   1    PTT Standing Status:   Standing     Number of Occurrences:   1    METABOLIC PANEL, BASIC     Standing Status:   Standing     Number of Occurrences:   1    DIET NPO     Standing Status:   Standing     Number of Occurrences:   1    VITAL SIGNS     Per unit routine     Standing Status:   Standing     Number of Occurrences:   1    CARDIAC MONITORING     Standing Status:   Standing     Number of Occurrences:   1     Order Specific Question:   Type: Answer:   Bedside     Order Specific Question:   Patient may go off unit without monitor     Answer:   No    NOTIFY PROVIDER: SPECIFY Notify physician for pulse less than 50 or greater than 120, respiratory rate less than 12 or greater than 25, oral temperature greater than 101.3 F (55.1 C), systolic BP less than 90 or greater than 974, diastolic BP less. .. Notify physician for pulse less than 50 or greater than 120, respiratory rate less than 12 or greater than 25, oral temperature greater than 101.3 F (81.0 C), systolic BP less than 90 or greater than 271, diastolic BP less than 50 or greater than 100.      Standing Status:   Standing     Number of Occurrences:   1    UP AD NADIA     Standing Status:   Standing     Number of Occurrences:   1    WEIGH PATIENT     Standing Status:   Standing     Number of Occurrences:   1    INTAKE AND OUTPUT     Call for urine ouput less than 120ml in 4 hours     Standing Status:   Standing     Number of Occurrences:   1    NURSING-MISCELLANEOUS: Palpate, scan or straight cath and document bladder residual as needed CONTINUOUS     Standing Status:   Standing     Number of Occurrences:   1     Order Specific Question:   Description of Order:     Answer:   Palpate, scan or straight cath and document bladder residual as needed    INCENTIVE SPIROMETRY     Standing Status:   Standing     Number of Occurrences:   1    VERIFY CONSENT HAS BEEN OBTAINED incision and drainage of perirectal abscess ONE TIME STAT     Standing Status:   Standing Number of Occurrences:   1     Order Specific Question:   Please describe the test or procedure you would like to order. Answer:   incision and drainage of perirectal abscess    FULL CODE     Standing Status:   Standing     Number of Occurrences:   1    IP CONSULT TO INFECTIOUS DISEASES     Standing Status:   Standing     Number of Occurrences:   1     Order Specific Question:   Reason for Consult: Answer:   sepsis, rectal abscess     Order Specific Question:   Did you call or speak to the consulting provider?      Answer:   Yes    PT--EVAL, DEVISE PLAN OF CARE AND TREAT     Standing Status:   Standing     Number of Occurrences:   1    POC LACTIC ACID     Standing Status:   Standing     Number of Occurrences:   1    SALINE LOCK IV ONE TIME STAT     Standing Status:   Standing     Number of Occurrences:   1    sodium chloride 0.9 % bolus infusion 1,000 mL    clindamycin phosphate (CLEOCIN) 600 mg in 0.9% sodium chloride 50 mL IVPB     Order Specific Question:   Antibiotic Indications     Answer:   Skin and Soft Tissue Infection    HYDROmorphone (DILAUDID) injection 1 mg    ondansetron (ZOFRAN) injection 4 mg    iopamidoL (ISOVUE 300) 61 % contrast injection  mL    piperacillin-tazobactam (ZOSYN) 3.375 g in 0.9% sodium chloride (MBP/ADV) 100 mL MBP     Order Specific Question:   Antibiotic Indications     Answer:   Sepsis of Unknown Etiology    DISCONTD: clindamycin phosphate (CLEOCIN) 600 mg in 0.9% sodium chloride (MBP/ADV) 100 mL MBP     Order Specific Question:   Antibiotic Indications     Answer:   Skin and Soft Tissue Infection     Order Specific Question:   Antibiotic Indications     Answer:   Sepsis of Unknown Etiology     Order Specific Question:   Skin duration of therapy     Answer:   7 days    sodium chloride 0.9 % bolus infusion 1,000 mL    famotidine (PF) (PEPCID) 20 mg in 0.9% sodium chloride 10 mL injection     Order Specific Question:   H2RA INDICATION     Answer:   Symptomatic GERD piperacillin-tazobactam (ZOSYN) 4.5 g in 0.9% sodium chloride (MBP/ADV) 100 mL MBP     Order Specific Question:   Antibiotic Indications     Answer:   Sepsis of Unknown Etiology    sodium chloride (NS) flush 5-40 mL    sodium chloride (NS) flush 5-40 mL    OR Linked Order Group     acetaminophen (TYLENOL) tablet 650 mg     acetaminophen (TYLENOL) suppository 650 mg    polyethylene glycol (MIRALAX) packet 17 g    OR Linked Order Group     promethazine (PHENERGAN) tablet 12.5 mg     ondansetron (ZOFRAN) injection 4 mg    enoxaparin (LOVENOX) injection 40 mg    clindamycin phosphate 600 mg in 0.9% sodium chloride 50 mL IVPB    vancomycin (VANCOCIN) 2000 mg in  ml infusion     Order Specific Question:   Antibiotic Indications     Answer:   Sepsis of Unknown Etiology    vancomycin (VANCOCIN) 750 mg in 0.9% sodium chloride 250 mL (VIAL-MATE)     Order Specific Question:   Antibiotic Indications     Answer:   Sepsis of Unknown Etiology    hydrALAZINE (APRESOLINE) 20 mg/mL injection 10 mg    INITIAL PHYSICIAN ORDER: INPATIENT Surgical; 3. Patient receiving treatment that can only be provided in an inpatient setting (further clarification in H&P documentation)     Standing Status:   Standing     Number of Occurrences:   1     Order Specific Question:   Status: Answer:   INPATIENT [101]     Order Specific Question:   Type of Bed     Answer:   Surgical [18]     Order Specific Question:   Inpatient Hospitalization Certified Necessary for the Following Reasons     Answer:   3. Patient receiving treatment that can only be provided in an inpatient setting (further clarification in H&P documentation)     Order Specific Question:   Admitting Diagnosis     Answer:   Roma-rectal abscess [899851]     Order Specific Question:   Admitting Diagnosis     Answer:   Hyponatremia [244994]     Order Specific Question:   Admitting Diagnosis     Answer:   Cellulitis and abscess of buttock [555. 5. ICD-9-CM]     Order Specific Question: Admitting Physician     Answer:   Trever Troy     Order Specific Question:   Attending Physician     Answer:   Trever Troy     Order Specific Question:   Estimated Length of Stay     Answer:   2 Midnights     Order Specific Question:   Discharge Plan:     Answer:   Home with Office Follow-up    IP CONSULT TO Venu Lau Jeremy will order the necessary lab work, if needed, to complete the dosing and ongoing monitoring of requested drug therapy. Details will be updated within the patients Progress Notes.       Standing Status:   Standing     Number of Occurrences:   1     Order Specific Question:   Antibiotic Indications     Answer:   Sepsis of Unknown Etiology     Recent Results (from the past 12 hour(s))   PROCALCITONIN    Collection Time: 10/01/22 10:00 AM   Result Value Ref Range    Procalcitonin 2.61 ng/mL   HEMOGLOBIN A1C WITH EAG    Collection Time: 10/01/22 10:00 AM   Result Value Ref Range    Hemoglobin A1c 5.8 (H) 4.2 - 5.6 %    Est. average glucose 120 mg/dL   TSH 3RD GENERATION    Collection Time: 10/01/22 10:00 AM   Result Value Ref Range    TSH 0.77 0.36 - 3.74 uIU/mL   PROTHROMBIN TIME + INR    Collection Time: 10/01/22 10:00 AM   Result Value Ref Range    Prothrombin time 15.3 (H) 11.5 - 15.2 sec    INR 1.2 0.8 - 1.2     PTT    Collection Time: 10/01/22 10:00 AM   Result Value Ref Range    aPTT 29.9 23.0 - 55.7 SEC   METABOLIC PANEL, COMPREHENSIVE    Collection Time: 10/01/22 10:49 AM   Result Value Ref Range    Sodium 129 (L) 136 - 145 mmol/L    Potassium 3.6 3.5 - 5.5 mmol/L    Chloride 96 (L) 100 - 111 mmol/L    CO2 25 21 - 32 mmol/L    Anion gap 8 3.0 - 18 mmol/L    Glucose 110 (H) 74 - 99 mg/dL    BUN 13 7.0 - 18 MG/DL    Creatinine 1.40 (H) 0.6 - 1.3 MG/DL    BUN/Creatinine ratio 9 (L) 12 - 20      GFR est AA >60 >60 ml/min/1.73m2    GFR est non-AA 52 (L) >60 ml/min/1.73m2    Calcium 9.1 8.5 - 10.1 MG/DL    Bilirubin, total 0.8 0.2 - 1.0 MG/DL    ALT (SGPT) 69 (H) 16 - 61 U/L    AST (SGOT) 91 (H) 10 - 38 U/L    Alk. phosphatase 76 45 - 117 U/L    Protein, total 7.9 6.4 - 8.2 g/dL    Albumin 2.4 (L) 3.4 - 5.0 g/dL    Globulin 5.5 (H) 2.0 - 4.0 g/dL    A-G Ratio 0.4 (L) 0.8 - 1.7     CBC WITH AUTOMATED DIFF    Collection Time: 10/01/22 10:49 AM   Result Value Ref Range    WBC 17.3 (H) 4.6 - 13.2 K/uL    RBC 5.06 4.35 - 5.65 M/uL    HGB 13.4 13.0 - 16.0 g/dL    HCT 41.1 36.0 - 48.0 %    MCV 81.2 78.0 - 100.0 FL    MCH 26.5 24.0 - 34.0 PG    MCHC 32.6 31.0 - 37.0 g/dL    RDW 13.3 11.6 - 14.5 %    PLATELET 583 715 - 187 K/uL    MPV 8.9 (L) 9.2 - 11.8 FL    NRBC 0.0 0  WBC    ABSOLUTE NRBC 0.00 0.00 - 0.01 K/uL    NEUTROPHILS 87 (H) 40 - 73 %    BAND NEUTROPHILS 5 %    LYMPHOCYTES 3 (L) 21 - 52 %    MONOCYTES 5 3 - 10 %    EOSINOPHILS 0 0 - 5 %    BASOPHILS 0 0 - 2 %    IMMATURE GRANULOCYTES 0 0.0 - 0.5 %    ABS. NEUTROPHILS 15.9 (H) 1.8 - 8.0 K/UL    ABS. LYMPHOCYTES 0.5 (L) 0.9 - 3.6 K/UL    ABS. MONOCYTES 0.9 0.05 - 1.2 K/UL    ABS. EOSINOPHILS 0.0 0.0 - 0.4 K/UL    ABS. BASOPHILS 0.0 0.0 - 0.1 K/UL    ABS. IMM. GRANS. 0.0 0.00 - 0.04 K/UL    DF MANUAL      PLATELET COMMENTS ADEQUATE PLATELETS      RBC COMMENTS NORMOCYTIC, NORMOCHROMIC     POC LACTIC ACID    Collection Time: 10/01/22 12:22 PM   Result Value Ref Range    Lactic Acid (POC) 1.37 0.40 - 2.00 mmol/L   COVID-19 RAPID TEST    Collection Time: 10/01/22  3:02 PM   Result Value Ref Range    Specimen source Nasopharyngeal      COVID-19 rapid test Not detected NOTD       XR CHEST PA LAT   Final Result   No acute cardiopulmonary process. Small nodular opacity in the right midlung overlying the anterior right fifth   rib. Recommended nonemergent follow-up with dedicated chest CT. CT PELV W CONT   Final Result   Addendum (preliminary) 1 of 1   Addendum: Findings communicated to Dr. Nupur Juarez by telephone. Final   1.   Large gas containing perianal and perirectal fluid collection/abscess   extending from the right gluteal cleft into the right, greater than left,   levator ani muscle complex. No defect is observed in the thickened rectal wall   and there is no enteric contrast leakage, but there is a questioned tiny locule   of gas in the right perirectal fat on axial image 33.    2.  Bilateral L5-S1 spondylolysis with spondylolisthesis. 3.  Thickening of the bladder wall and inguinal/pelvic lymphadenopathy, likely   reactive. Case discussed with general surgery  Rafael Joseph who agrees with plans for admission and requests admission to the hospitalist service. She will see the patient in consultation. Case discussed with the hospitalist Dr. Nabil Mejia who agrees with plan for admission and will see the patient in consultation. Diagnosis:   1. Perirectal abscess    2. Cellulitis of right buttock    3. Hyponatremia          Disposition: Admit to Dr. Ugo Montanez service    Follow-up Information    None         There are no discharge medications for this patient.

## 2022-10-01 NOTE — Clinical Note
Status[de-identified] INPATIENT [101]   Type of Bed: Surgical [18]   Inpatient Hospitalization Certified Necessary for the Following Reasons: 3. Patient receiving treatment that can only be provided in an inpatient setting (further clarification in H&P documentation)   Admitting Diagnosis: Roma-rectal abscess [796192]   Admitting Diagnosis: Hyponatremia [603580]   Admitting Diagnosis: Cellulitis and abscess of buttock [755. 5. ICD-9-CM]   Admitting Physician: Claudeen Clark [[id number]]   Attending Physician: Julio C Stauffer   Estimated Length of Stay: 2 Midnights   Discharge Plan[de-identified] Home with Office Follow-up

## 2022-10-01 NOTE — PROGRESS NOTES
Pharmacy Note     Zosyn 3.375gm q8h ordered for treatment of sepsis. Per Loren Logan 10 will be changed to 4.5 gm x1 followed by 3.375gm q8h extended infusion. Estimated Creatinine Clearance: Estimated Creatinine Clearance: 68.3 mL/min (A) (based on SCr of 1.4 mg/dL (H)). Dialysis Status, DANIA, CKD: -    BMI:  Body mass index is 31.38 kg/m². Rationale for Adjustment:  [facility] B-Lactam extended infusion policy    Pharmacy will continue to monitor and adjust dose as necessary. Please call with any questions.     Thank you,  Herrera Morris, [facility]

## 2022-10-01 NOTE — PERIOP NOTES
TRANSFER - OUT REPORT:    Telephone given to [facility]) on Tasha Batista  being transferred to [facility](unit) for routine post - op       Report consisted of patients Situation, Background, Assessment and   Recommendations(SBAR). Information from the following report(s) MAR was reviewed with the receiving nurse. Lines:   Peripheral IV 10/01/22 Right Antecubital (Active)   Site Assessment Clean, dry, & intact 10/01/22 1615   Phlebitis Assessment 0 10/01/22 1615   Infiltration Assessment 0 10/01/22 1615   Dressing Status Clean, dry, & intact 10/01/22 1615   Dressing Type Tape;Transparent 10/01/22 1615   Hub Color/Line Status Green; Infusing 10/01/22 1615       Peripheral IV 10/01/22 Left Antecubital (Active)   Site Assessment Clean, dry, & intact 10/01/22 1615   Phlebitis Assessment 0 10/01/22 1615   Infiltration Assessment 0 10/01/22 1615   Dressing Status Clean, dry, & intact 10/01/22 1615   Dressing Type Tape;Transparent 10/01/22 1615   Hub Color/Line Status Pink; Infusing 10/01/22 1615        Opportunity for questions and clarification was provided.       Patient transported with:   O2 @ 3 liters  Tech

## 2022-10-01 NOTE — ED TRIAGE NOTES
Pt here for rectal abscesses that started one week ago. Hx noted, Pt reports he has been out of his BP medication for 90 days.  BP in triage 168/153

## 2022-10-01 NOTE — ED NOTES
Purposeful rounding completed:    Side rails up x 1:  YES  Bed in low position and wheels locked: YES  Call bell within reach: YES  Comfort addressed: YES    Toileting needs addressed: YES  Plan of care reviewed/updated with patient and or family members: YES  IV site assessed: YES  Pain assessed and addressed: YES, 8    Patient laying in bed, rectum has 8/10 pain, achy. Refuses pain medications at this time. No respiratory distress at this time.

## 2022-10-01 NOTE — ANESTHESIA PREPROCEDURE EVALUATION
Relevant Problems   RENAL FAILURE   (+) DANIA (acute kidney injury) ([address]       Anesthetic History   No history of anesthetic complications            Review of Systems / Medical History  Patient summary reviewed and pertinent labs reviewed    Pulmonary  Within defined limits                 Neuro/Psych   Within defined limits           Cardiovascular    Hypertension: well controlled              Exercise tolerance: >4 METS     GI/Hepatic/Renal  Within defined limits              Endo/Other  Within defined limits           Other Findings   Comments: Roma rectal abscess           Physical Exam    Airway  Mallampati: II  TM Distance: 4 - 6 cm  Neck ROM: normal range of motion   Mouth opening: Normal     Cardiovascular  Regular rate and rhythm,  S1 and S2 normal,  no murmur, click, rub, or gallop             Dental  No notable dental hx       Pulmonary  Breath sounds clear to auscultation               Abdominal  GI exam deferred       Other Findings            Anesthetic Plan    ASA: 2, emergent  Anesthesia type: general          Induction: Intravenous  Anesthetic plan and risks discussed with: Patient

## 2022-10-01 NOTE — PROGRESS NOTES
Bedside and Verbal shift change report given to Juan Antonio Artis (oncoming nurse) by Catrina Rocha RN (offgoing nurse). Report included the following information SBAR, Kardex, Intake/Output, MAR, and Recent Results.

## 2022-10-01 NOTE — H&P
History & Physical    Patient: Kristi Moffett MRN: [id number]  [facility]: [id number]    YOB: 1960  Age: 63 y.o. Sex: male      DOA: 10/1/2022  CC: butt pain    PCP: None       HPI:     Kristi Moffett is a 63 y.o. male with medical co-morbidities including Hypertension, who came to the ER with right butt pain and swelling. He reported that his right buttock was swollen, red, tender about 5 to 7 days ago. He denied any trauma to the area. He experienced fever and chill at home over the past few days. He experienced lack of appetite but has been taking fluid to keep himself hydrated. He has no bowel movement in the last few days. He denied pain in his testicle. 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 No other complaint. He has history of high blood pressure that he has not been taking medication. At baseline he is able to ambulate and do significant heavy duty work without any shortness of breath or chest pain. In the ED, he was found to have tachycardia , WBC 17.3, band neutrophils 5% with left shift, BMP with sodium of 129, chloride 96, creatinine 1.4 with GFR preserved. ALT 69, AST 91. Pro-Leonides 2.61. He was given clindamycin for right gluteal cellulitis. He was given IV fluid. CT of the pelvic with contrast showed large gas containing perianal and perirectal fluid collection/abscess extending from the right gluteal cleft into the right, greater than left levator anus muscle complex. .  He has thickening of the bladder wall with pelvic lymphadenopathy. Review of Systems  GENERAL: + Fever, + chill, + malaise   HEENT: No change in vision, no sore throat or sinus congestion. NECK: No pain or stiffness. PULMONARY: No shortness of breath, no cough or wheeze. Cardiovascular: no pnd / orthopnea, no Chest Pain  GASTROINTESTINAL: No abd pain, No nausea/vomiting, No diarrhea, No melena or bright red blood per rectum.    GENITOURINARY: No urinary frequency, No urgency or pain with urination. MUSCULOSKELETAL: No joint or muscle pain, no back pain, no recent trauma. DERMATOLOGIC: + Rash, no itching, + lesions. + Right buttocks pain and swelling  ENDOCRINE: No polyuria, polydipsia,  No recent change in weight. HEMATOLOGICAL: No easy bruising or bleeding. NEUROLOGIC: No headache, No seizures, + generalized weakness         Past Medical History:   Diagnosis Date    History of tobacco abuse     Hypertension        No past surgical history on file. H/o right axillary skin repair       Family History   Problem Relation Age of Onset    Heart Attack Mother     Heart Disease Mother     Hypertension Father     Heart Disease Sister     Heart Attack Sister        Social History     Socioeconomic History    Marital status: SINGLE   Tobacco Use    Smoking status: Former     Types: Cigarettes    Smokeless tobacco: Never   Substance and Sexual Activity    Alcohol use: Not Currently    Drug use: Not Currently       Prior to Admission medications    Not on File       No Known Allergies           Physical Exam:      Visit Vitals  Pulse (!) 101   Temp 98.9 °F (37.2 °C)   Resp 18   Ht 5' 11\" (1.803 m)   Wt 102.1 kg (225 lb)   SpO2 99%   BMI 31.38 kg/m²       Physical Exam:  Tele: Sinus tachycardia  General:  Cooperative, Not in acute distress, speaks in full sentence while in bed  HEENT: PERRL, EOMI, supple neck, no JVD, dry oral mucosa  Cardiovascular: S1S2 tachycardia, no rub/gallop   Pulmonary: air entry bilaterally, no wheezing, no crackle  GI:  Soft, non tender, non distended, +bs, no guarding   Extremities:  No pedal edema, +distal pulses appreciated   Neuro: AOx3, moving all extremities, no gross deficit. Skin: Right gluteal cellulitis with underlying abscess, indurated skin finding, warm/tender/red. No drainage. No testicular swelling.     Lab/Data Review:  Labs: Results:       Chemistry Recent Labs     10/01/22  1049   *   *   K 3.6   CL 96* CO2 25   BUN 13   CREA 1.40*   CA 9.1   AGAP 8   BUCR 9*   AP 76   TP 7.9   ALB 2.4*   GLOB 5.5*   AGRAT 0.4*      CBC w/Diff Recent Labs     10/01/22  1049   WBC 17.3*   RBC 5.06   HGB 13.4   HCT 41.1      GRANS 87*   LYMPH 3*   EOS 0      Coagulation Recent Labs     10/01/22  1000   PTP 15.3*   INR 1.2   APTT 29.9       Iron/Ferritin No results for input(s): IRON in the last 72 hours. No lab exists for component: TIBCCALC   BNP No results for input(s): BNPP in the last 72 hours. Cardiac Enzymes No results for input(s): CPK, CKND1, NATHALY in the last 72 hours. No lab exists for component: CKRMB, TROIP   Liver Enzymes Recent Labs     10/01/22  1049   TP 7.9   ALB 2.4*   AP 76      Thyroid Studies Lab Results   Component Value Date/Time    TSH 0.77 10/01/2022 10:00 AM          All Micro Results       Procedure Component Value Units Date/Time    COVID-19 RAPID TEST [[id number]]     Order Status: Sent     COVID-19 WITH INFLUENZA A/B [[id number]]     Order Status: Sent Specimen: Nasopharyngeal     CULTURE, BLOOD [[id number]] Collected: 10/01/22 1045    Order Status: Sent Specimen: Blood Updated: 10/01/22 1051    CULTURE, BLOOD [[id number]] Collected: 10/01/22 1000    Order Status: Sent Specimen: Blood Updated: 10/01/22 1049            Imaging Reviewed:  CT Results (most recent):  Results from Hospital Encounter encounter on 10/01/22    CT PELV W CONT    Addendum 10/1/2022  1:30 PM  Addendum: Findings communicated to Dr. Sukhdev Humphries by telephone. Narrative  CT PELVIS WITH INTRAVENOUS CONTRAST. COMPARISON: None available. INDICATIONS: Rectal pain. Concern for rectal abscess.     TECHNIQUE: 5 mm axial cuts were performed through the chest, abdomen and pelvis  after administration of 100cc of Omnipaque 350 and oral contrast material.    All CT scans at this facility are performed using dose optimization technique as  appropriate to a performed exam, to include automated exposure control,  adjustment of the mA and/or KV according to patient's size (including  appropriate matching for site-specific examinations), or use of iterative  reconstruction technique. FINDINGS:    CT PELVIS:  There is a large gas containing perianal and perirectal fluid collection (13 x 6  x 9 cm) extending from the right gluteal cleft superiorly into the right,  greater than left, levator ani muscle complex. Both superior components of the  collection appear to communicate with each other (400:53). The thickened rectum  and pelvic floor muscles are displaced to the right. No defect is visualized in  the rectal wall, although there is a possible tiny locule of gas in the right  perirectal fat on axial image 33. No extraluminal enteric contrast. Fat  stranding adjacent to the urinary bladder is likely reactive. Right inguinal and  pelvic lymphadenopathy, likely reactive. No definite osseous erosions. Bilateral  pars defects at L5-S1 with grade 1 anterolisthesis. Impression  1. Large gas containing perianal and perirectal fluid collection/abscess  extending from the right gluteal cleft into the right, greater than left,  levator ani muscle complex. No defect is observed in the thickened rectal wall  and there is no enteric contrast leakage, but there is a questioned tiny locule  of gas in the right perirectal fat on axial image 33.  2.  Bilateral L5-S1 spondylolysis with spondylolisthesis. 3.  Thickening of the bladder wall and inguinal/pelvic lymphadenopathy, likely  reactive. Assessment:   Active Problems:    Sepsis POA (, WBC 17, + cellulitis right buttock)  Right buttock cellulitis with underlying abscess, noting the perianal and perirectal fluid abscess.   Perianal, perirectal abscess per CT pelvic  DANIA associated with sepsis  Hyponatremia, hypovolemia  Elevated LFT  Obesity BMI 31  History of tobacco smoke, currently none  MRSA risk factor with recent incarceration  Functional status >4 METS, he presents class I RCRI risk factor. Plan:     Admit to [facility] floor to continue IV antibiotics including Zosyn, vancomycin, clindamycin. Blood culture follow-up for further risk assessment. On-call infectious disease specialist consulted. Surgical team has been consulted for further debridement of the infected area. We will follow-up on intraoperative wound culture for further antibiotic selection  Check COVID, check INR, check chest x-rays, check MRSA status  Check hemoglobin A1c  Continue IV fluid per sepsis protocol as tolerated. PPI  ICS  Hydralazine as needed for blood pressure greater than 165  Recheck BMP at 1600-hour to evaluate hyponatremia.         Risk of deterioration:  []Low    [x]Moderate  []High     Prophylaxis:  [x]Lovenox  []Coumadin  []Hep SQ  []SCDs  [x]H2B/PPI     Disposition:  []Home w/ Family   [x] PT,OT,RN   []SNF/LTC   []SAH/Rehab     Discussed Code Status:         []Full Code      []DNR         ___________________________________________________     Care Plan discussed with:    [x]Patient   []Family    []ED Care Manager  [x]ED Doc   [x]Specialist :  Total Time Coordinating Admission: 60   minutes    []Total Critical Care Time:       Davion Jasso MD  10/1/2022, 2:45 PM

## 2022-10-01 NOTE — ANESTHESIA POSTPROCEDURE EVALUATION
Procedure(s):  PERIRECTAL ABSCESS EXCISION AND DRAINAGE.     general    Anesthesia Post Evaluation      Multimodal analgesia: multimodal analgesia used between 6 hours prior to anesthesia start to PACU discharge  Patient location during evaluation: bedside  Patient participation: complete - patient participated  Level of consciousness: awake  Pain score: 4  Pain management: adequate  Airway patency: patent  Anesthetic complications: no  Cardiovascular status: stable  Respiratory status: acceptable  Hydration status: acceptable  Post anesthesia nausea and vomiting:  controlled  Final Post Anesthesia Temperature Assessment:  Normothermia (36.0-37.5 degrees C)      INITIAL Post-op Vital signs:   Vitals Value Taken Time   /62 10/01/22 1727   Temp 37.3 °C (99.2 °F) 10/01/22 1727   Pulse 99 10/01/22 1727   Resp 18 10/01/22 1727   SpO2 97 % 10/01/22 1727

## 2022-10-01 NOTE — PERIOP NOTES
Dr. Miller Carolina called and made aware of temp 102.5 and that patient received tylenol and he will receive Vancomycin on the floor.

## 2022-10-01 NOTE — OP NOTES
Incision and drainage of deep complicated perirectal abscess, wound irrigation , placement of mallencot drain    Patient Name: Rich García     SURGERY DATE: 10/01/22     : 1960     AGE: 63 y.o. Anesthesiologist: Anesthesiologist: Bismark Danielle MD  CRNA: Sukhdev Marley CRNA     Surgeon: Silverio Watkins DO    Anesthesia: General     Surgical assist: Circ-1: Litzy Diss  Scrub Tech-1: Rabia MUÑOZ  Surg Asst-1: Ivone Cole    PreOp DX: PERIRECTAL ABSCESS     PostOp DX: same    Procedure: Incision and drainage of deep complicated perirectal abscess, wound irrigation , placement of mallencot drain    Procedure Details: After informed consent was obtained the patient was taken to the operating room and placed in the supine position. General anesthesia was administered by the anesthetist to titrate to effect. The patient was placed in lithotomy position. The rectum and buttock were prepped and draped in the usual sterile fashion and a time out procedure was performed. Next bovie was used to make a vertical incision on the skin where it was most fluctuant. Significant foul smelling draining immediately exploded from this wound. Wound cultures were obtained. The abscess cavity was massive and extended above the levator muscles. It was copious irrigated and suctioned dry with irricept solution. The cavity was so deep there was concern for inadequate drainage with just wound care packing so a 26 f mallencot drain was placed in deepest portion of the cavity approximately 15cm from the skin and exited via a separate stab incision. This was secured to the skin with nylon suture. The wound was then packed open with betadine soaked kerlex. ABD pads were applied. The patient tolerated this procedure well and was extubated and sent to recovery in stable condition.      Implants: * No implants in log *    Estimated Blood Loss:  10cc    Specimens: wound cultures           Complications: None Disposition: extubated, tolerated procedure well            Condition: Stable    Vilma Rosas DO

## 2022-10-02 LAB
ANION GAP SERPL CALC-SCNC: 6 MMOL/L (ref 3–18)
BUN SERPL-MCNC: 17 MG/DL (ref 7–18)
BUN/CREAT SERPL: 14 (ref 12–20)
CALCIUM SERPL-MCNC: 8 MG/DL (ref 8.5–10.1)
CHLORIDE SERPL-SCNC: 106 MMOL/L (ref 100–111)
CO2 SERPL-SCNC: 22 MMOL/L (ref 21–32)
CREAT SERPL-MCNC: 1.22 MG/DL (ref 0.6–1.3)
ERYTHROCYTE [DISTWIDTH] IN BLOOD BY AUTOMATED COUNT: 13.6 % (ref 11.6–14.5)
FLUAV RNA SPEC QL NAA+PROBE: NOT DETECTED
FLUBV RNA SPEC QL NAA+PROBE: NOT DETECTED
GLUCOSE SERPL-MCNC: 118 MG/DL (ref 74–99)
HCT VFR BLD AUTO: 35.8 % (ref 36–48)
HGB BLD-MCNC: 11.4 G/DL (ref 13–16)
MAGNESIUM SERPL-MCNC: 2.4 MG/DL (ref 1.6–2.6)
MCH RBC QN AUTO: 26.6 PG (ref 24–34)
MCHC RBC AUTO-ENTMCNC: 31.8 G/DL (ref 31–37)
MCV RBC AUTO: 83.6 FL (ref 78–100)
NRBC # BLD: 0 K/UL (ref 0–0.01)
NRBC BLD-RTO: 0 PER 100 WBC
PLATELET # BLD AUTO: 359 K/UL (ref 135–420)
PMV BLD AUTO: 9 FL (ref 9.2–11.8)
POTASSIUM SERPL-SCNC: 4.1 MMOL/L (ref 3.5–5.5)
RBC # BLD AUTO: 4.28 M/UL (ref 4.35–5.65)
SARS-COV-2, COV2: NOT DETECTED
SODIUM SERPL-SCNC: 134 MMOL/L (ref 136–145)
WBC # BLD AUTO: 19.3 K/UL (ref 4.6–13.2)

## 2022-10-02 PROCEDURE — 83735 ASSAY OF MAGNESIUM: CPT

## 2022-10-02 PROCEDURE — 74011000258 HC RX REV CODE- 258: Performed by: INTERNAL MEDICINE

## 2022-10-02 PROCEDURE — 2709999900 HC NON-CHARGEABLE SUPPLY

## 2022-10-02 PROCEDURE — 74011000250 HC RX REV CODE- 250: Performed by: INTERNAL MEDICINE

## 2022-10-02 PROCEDURE — 74011250636 HC RX REV CODE- 250/636: Performed by: HOSPITALIST

## 2022-10-02 PROCEDURE — 74011250637 HC RX REV CODE- 250/637: Performed by: SURGERY

## 2022-10-02 PROCEDURE — 99232 SBSQ HOSP IP/OBS MODERATE 35: CPT | Performed by: HOSPITALIST

## 2022-10-02 PROCEDURE — 80048 BASIC METABOLIC PNL TOTAL CA: CPT

## 2022-10-02 PROCEDURE — 65270000046 HC RM TELEMETRY

## 2022-10-02 PROCEDURE — 85027 COMPLETE CBC AUTOMATED: CPT

## 2022-10-02 PROCEDURE — 87636 SARSCOV2 & INF A&B AMP PRB: CPT

## 2022-10-02 PROCEDURE — 36415 COLL VENOUS BLD VENIPUNCTURE: CPT

## 2022-10-02 PROCEDURE — 74011250636 HC RX REV CODE- 250/636: Performed by: INTERNAL MEDICINE

## 2022-10-02 RX ORDER — MORPHINE SULFATE 2 MG/ML
2 INJECTION, SOLUTION INTRAMUSCULAR; INTRAVENOUS
Status: DISCONTINUED | OUTPATIENT
Start: 2022-10-02 | End: 2022-10-05 | Stop reason: HOSPADM

## 2022-10-02 RX ADMIN — PIPERACILLIN AND TAZOBACTAM 3.38 G: 3; .375 INJECTION, POWDER, FOR SOLUTION INTRAVENOUS at 05:41

## 2022-10-02 RX ADMIN — SODIUM CHLORIDE, PRESERVATIVE FREE 10 ML: 5 INJECTION INTRAVENOUS at 06:05

## 2022-10-02 RX ADMIN — OXYCODONE HYDROCHLORIDE AND ACETAMINOPHEN 1 TABLET: 5; 325 TABLET ORAL at 22:33

## 2022-10-02 RX ADMIN — VANCOMYCIN HYDROCHLORIDE 750 MG: 750 INJECTION, POWDER, LYOPHILIZED, FOR SOLUTION INTRAVENOUS at 09:09

## 2022-10-02 RX ADMIN — PIPERACILLIN AND TAZOBACTAM 3.38 G: 3; .375 INJECTION, POWDER, FOR SOLUTION INTRAVENOUS at 22:14

## 2022-10-02 RX ADMIN — OXYCODONE HYDROCHLORIDE AND ACETAMINOPHEN 1 TABLET: 5; 325 TABLET ORAL at 05:41

## 2022-10-02 RX ADMIN — MORPHINE SULFATE 2 MG: 2 INJECTION, SOLUTION INTRAMUSCULAR; INTRAVENOUS at 11:51

## 2022-10-02 RX ADMIN — FAMOTIDINE 20 MG: 10 INJECTION, SOLUTION INTRAVENOUS at 22:13

## 2022-10-02 RX ADMIN — SODIUM CHLORIDE, PRESERVATIVE FREE 10 ML: 5 INJECTION INTRAVENOUS at 13:21

## 2022-10-02 RX ADMIN — PIPERACILLIN AND TAZOBACTAM 3.38 G: 3; .375 INJECTION, POWDER, FOR SOLUTION INTRAVENOUS at 13:21

## 2022-10-02 RX ADMIN — SODIUM CHLORIDE, PRESERVATIVE FREE 10 ML: 5 INJECTION INTRAVENOUS at 22:14

## 2022-10-02 RX ADMIN — VANCOMYCIN HYDROCHLORIDE 750 MG: 750 INJECTION, POWDER, LYOPHILIZED, FOR SOLUTION INTRAVENOUS at 22:13

## 2022-10-02 RX ADMIN — FAMOTIDINE 20 MG: 10 INJECTION, SOLUTION INTRAVENOUS at 09:10

## 2022-10-02 RX ADMIN — CLINDAMYCIN PHOSPHATE 600 MG: 150 INJECTION, SOLUTION INTRAVENOUS at 09:10

## 2022-10-02 RX ADMIN — OXYCODONE HYDROCHLORIDE AND ACETAMINOPHEN 1 TABLET: 5; 325 TABLET ORAL at 17:29

## 2022-10-02 RX ADMIN — CLINDAMYCIN PHOSPHATE 600 MG: 150 INJECTION, SOLUTION INTRAVENOUS at 05:01

## 2022-10-02 NOTE — PROGRESS NOTES
[facility] Hospitalist Group  Progress Note    Patient: Yesenia Villatoro Age: 63 y.o. : 1960 MR#: [id number] SSN: [id number]  Date/Time: 10/2/2022     Subjective:     Patient seen and evaluated, lying in bed, no acute distress. 29-year-old male with a history of hypertension presented to the emergency room secondary to pain in the right buttock with swelling. Patient noted to have right-sided perirectal abscess status post I&D by general surgery. Assessment/Plan:     Sepsis secondary to perirectal abscess right side status post I&D by general surgery-continue broad-spectrum IV antibiotics, local wound care. ID on board. Leukocytosis secondary to #1-continue IV antibiotics. Elevated LFTs-continue to monitor  DVT prophylaxis-SCDs  Full code                Lucy Tinsley MD  10/02/22      Case discussed with:  []Patient  []Family  []Nursing  []Case Management  DVT Prophylaxis:  []Lovenox  []Hep SQ  []SCDs  []Coumadin   []On Heparin gtt    Objective:   VS: Visit Vitals  /75 (BP 1 Location: Left upper arm, BP Patient Position: At rest)   Pulse 95   Temp 97.5 °F (36.4 °C)   Resp 18   Ht 5' 11\" (1.803 m)   Wt 102.1 kg (225 lb)   SpO2 93%   BMI 31.38 kg/m²      Tmax/24hrs: Temp (24hrs), Av.9 °F (37.7 °C), Min:97.5 °F (36.4 °C), Max:103 °F (39.4 °C)  IOBRIEF  Intake/Output Summary (Last 24 hours) at 10/2/2022 1254  Last data filed at 10/2/2022 0951  Gross per 24 hour   Intake 1020 ml   Output 1905 ml   Net -885 ml       General:  Alert, cooperative, no acute distress    Pulmonary:  CTA Bilaterally. No Wheezing/Rhonchi/Rales. Cardiovascular: Regular rate and Rhythm. GI:  Soft, Non distended, Non tender. + Bowel sounds. Extremities:  No edema, cyanosis, clubbing. No calf tenderness. Neurologic: Alert and oriented X 3. No acute neuro deficits.   Additional:    Medications:   Current Facility-Administered Medications   Medication Dose Route Frequency    morphine injection 2 mg  2 mg IntraVENous Q4H PRN    piperacillin-tazobactam (ZOSYN) 3.375 g in 0.9% sodium chloride (MBP/ADV) 100 mL MBP  3.375 g IntraVENous Q8H    famotidine (PF) (PEPCID) 20 mg in 0.9% sodium chloride 10 mL injection  20 mg IntraVENous Q12H    sodium chloride (NS) flush 5-40 mL  5-40 mL IntraVENous Q8H    sodium chloride (NS) flush 5-40 mL  5-40 mL IntraVENous PRN    acetaminophen (TYLENOL) tablet 650 mg  650 mg Oral Q6H PRN    Or    acetaminophen (TYLENOL) suppository 650 mg  650 mg Rectal Q6H PRN    polyethylene glycol (MIRALAX) packet 17 g  17 g Oral DAILY PRN    promethazine (PHENERGAN) tablet 12.5 mg  12.5 mg Oral Q6H PRN    Or    ondansetron (ZOFRAN) injection 4 mg  4 mg IntraVENous Q6H PRN    [Held by provider] enoxaparin (LOVENOX) injection 40 mg  40 mg SubCUTAneous DAILY    clindamycin phosphate 600 mg in 0.9% sodium chloride 50 mL IVPB  600 mg IntraVENous Q6H    vancomycin (VANCOCIN) 750 mg in 0.9% sodium chloride 250 mL (VIAL-MATE)  750 mg IntraVENous Q12H    hydrALAZINE (APRESOLINE) 20 mg/mL injection 10 mg  10 mg IntraVENous Q6H PRN    oxyCODONE-acetaminophen (PERCOCET) 5-325 mg per tablet 1 Tablet  1 Tablet Oral Q4H PRN       Imaging:   XR Results (most recent):  Results from Hospital Encounter encounter on 10/01/22    XR CHEST PA LAT    Narrative  EXAM:  XR CHEST PA LAT    INDICATION: sepsis    COMPARISON: none    FINDINGS:  Tiny nodular opacity in the right mid lung overlying the anterior right fifth  rib. The lungs are otherwise clear. No focal consolidation, pneumothorax, or  pulmonary edema. Costophrenic angles are sharp. Cardiomediastinal silhouettes within normal limits. No acute osseous findings. Impression  No acute cardiopulmonary process. Small nodular opacity in the right midlung overlying the anterior right fifth  rib. Recommended nonemergent follow-up with dedicated chest CT.        CT Results (most recent):  Results from Hospital Encounter encounter on 10/01/22    CT PELV W CONT    Addendum 10/1/2022  1:30 PM  Addendum: Findings communicated to Dr. Tima Tena by telephone. Narrative  CT PELVIS WITH INTRAVENOUS CONTRAST. COMPARISON: None available. INDICATIONS: Rectal pain. Concern for rectal abscess. TECHNIQUE: 5 mm axial cuts were performed through the chest, abdomen and pelvis  after administration of 100cc of Omnipaque 350 and oral contrast material.    All CT scans at this facility are performed using dose optimization technique as  appropriate to a performed exam, to include automated exposure control,  adjustment of the mA and/or KV according to patient's size (including  appropriate matching for site-specific examinations), or use of iterative  reconstruction technique. FINDINGS:    CT PELVIS:  There is a large gas containing perianal and perirectal fluid collection (13 x 6  x 9 cm) extending from the right gluteal cleft superiorly into the right,  greater than left, levator ani muscle complex. Both superior components of the  collection appear to communicate with each other (400:53). The thickened rectum  and pelvic floor muscles are displaced to the right. No defect is visualized in  the rectal wall, although there is a possible tiny locule of gas in the right  perirectal fat on axial image 33. No extraluminal enteric contrast. Fat  stranding adjacent to the urinary bladder is likely reactive. Right inguinal and  pelvic lymphadenopathy, likely reactive. No definite osseous erosions. Bilateral  pars defects at L5-S1 with grade 1 anterolisthesis. Impression  1. Large gas containing perianal and perirectal fluid collection/abscess  extending from the right gluteal cleft into the right, greater than left,  levator ani muscle complex.  No defect is observed in the thickened rectal wall  and there is no enteric contrast leakage, but there is a questioned tiny locule  of gas in the right perirectal fat on axial image 33.  2.  Bilateral L5-S1 spondylolysis with spondylolisthesis. 3.  Thickening of the bladder wall and inguinal/pelvic lymphadenopathy, likely  reactive. MRI Results (most recent):  No results found for this or any previous visit. Labs:    Recent Results (from the past 48 hour(s))   CULTURE, BLOOD    Collection Time: 10/01/22 10:00 AM    Specimen: Blood   Result Value Ref Range    Special Requests: RAC     Culture result: NO GROWTH AFTER 19 HOURS     PROCALCITONIN    Collection Time: 10/01/22 10:00 AM   Result Value Ref Range    Procalcitonin 2.61 ng/mL   HEMOGLOBIN A1C WITH EAG    Collection Time: 10/01/22 10:00 AM   Result Value Ref Range    Hemoglobin A1c 5.8 (H) 4.2 - 5.6 %    Est. average glucose 120 mg/dL   TSH 3RD GENERATION    Collection Time: 10/01/22 10:00 AM   Result Value Ref Range    TSH 0.77 0.36 - 3.74 uIU/mL   PROTHROMBIN TIME + INR    Collection Time: 10/01/22 10:00 AM   Result Value Ref Range    Prothrombin time 15.3 (H) 11.5 - 15.2 sec    INR 1.2 0.8 - 1.2     PTT    Collection Time: 10/01/22 10:00 AM   Result Value Ref Range    aPTT 29.9 23.0 - 36.4 SEC   CULTURE, BLOOD    Collection Time: 10/01/22 10:45 AM    Specimen: Blood   Result Value Ref Range    Special Requests: NO SPECIAL REQUESTS      Culture result: NO GROWTH AFTER 19 HOURS     METABOLIC PANEL, COMPREHENSIVE    Collection Time: 10/01/22 10:49 AM   Result Value Ref Range    Sodium 129 (L) 136 - 145 mmol/L    Potassium 3.6 3.5 - 5.5 mmol/L    Chloride 96 (L) 100 - 111 mmol/L    CO2 25 21 - 32 mmol/L    Anion gap 8 3.0 - 18 mmol/L    Glucose 110 (H) 74 - 99 mg/dL    BUN 13 7.0 - 18 MG/DL    Creatinine 1.40 (H) 0.6 - 1.3 MG/DL    BUN/Creatinine ratio 9 (L) 12 - 20      GFR est AA >60 >60 ml/min/1.73m2    GFR est non-AA 52 (L) >60 ml/min/1.73m2    Calcium 9.1 8.5 - 10.1 MG/DL    Bilirubin, total 0.8 0.2 - 1.0 MG/DL    ALT (SGPT) 69 (H) 16 - 61 U/L    AST (SGOT) 91 (H) 10 - 38 U/L    Alk.  phosphatase 76 45 - 117 U/L    Protein, total 7.9 6.4 - 8.2 g/dL    Albumin 2.4 (L) 3.4 - 5.0 g/dL    Globulin 5.5 (H) 2.0 - 4.0 g/dL    A-G Ratio 0.4 (L) 0.8 - 1.7     CBC WITH AUTOMATED DIFF    Collection Time: 10/01/22 10:49 AM   Result Value Ref Range    WBC 17.3 (H) 4.6 - 13.2 K/uL    RBC 5.06 4.35 - 5.65 M/uL    HGB 13.4 13.0 - 16.0 g/dL    HCT 41.1 36.0 - 48.0 %    MCV 81.2 78.0 - 100.0 FL    MCH 26.5 24.0 - 34.0 PG    MCHC 32.6 31.0 - 37.0 g/dL    RDW 13.3 11.6 - 14.5 %    PLATELET 821 557 - 143 K/uL    MPV 8.9 (L) 9.2 - 11.8 FL    NRBC 0.0 0  WBC    ABSOLUTE NRBC 0.00 0.00 - 0.01 K/uL    NEUTROPHILS 87 (H) 40 - 73 %    BAND NEUTROPHILS 5 %    LYMPHOCYTES 3 (L) 21 - 52 %    MONOCYTES 5 3 - 10 %    EOSINOPHILS 0 0 - 5 %    BASOPHILS 0 0 - 2 %    IMMATURE GRANULOCYTES 0 0.0 - 0.5 %    ABS. NEUTROPHILS 15.9 (H) 1.8 - 8.0 K/UL    ABS. LYMPHOCYTES 0.5 (L) 0.9 - 3.6 K/UL    ABS. MONOCYTES 0.9 0.05 - 1.2 K/UL    ABS. EOSINOPHILS 0.0 0.0 - 0.4 K/UL    ABS. BASOPHILS 0.0 0.0 - 0.1 K/UL    ABS. IMM.  GRANS. 0.0 0.00 - 0.04 K/UL    DF MANUAL      PLATELET COMMENTS ADEQUATE PLATELETS      RBC COMMENTS NORMOCYTIC, NORMOCHROMIC     POC LACTIC ACID    Collection Time: 10/01/22 12:22 PM   Result Value Ref Range    Lactic Acid (POC) 1.37 0.40 - 2.00 mmol/L   COVID-19 RAPID TEST    Collection Time: 10/01/22  3:02 PM   Result Value Ref Range    Specimen source Nasopharyngeal      COVID-19 rapid test Not detected NOTD     CULTURE, WOUND Nancy Hacking STAIN    Collection Time: 10/01/22  5:05 PM    Specimen: Rectal   Result Value Ref Range    Special Requests: TERESITA RECTAL     GRAM STAIN MODERATE GRAM POSITIVE COCCI IN PAIRS      GRAM STAIN MODERATE GRAM NEGATIVE RODS      Culture result: PENDING    METABOLIC PANEL, BASIC    Collection Time: 10/01/22  8:16 PM   Result Value Ref Range    Sodium 135 (L) 136 - 145 mmol/L    Potassium 4.4 3.5 - 5.5 mmol/L    Chloride 107 100 - 111 mmol/L    CO2 22 21 - 32 mmol/L    Anion gap 6 3.0 - 18 mmol/L    Glucose 115 (H) 74 - 99 mg/dL    BUN 15 7.0 - 18 MG/DL    Creatinine 1. 30 0.6 - 1.3 MG/DL    BUN/Creatinine ratio 12 12 - 20      GFR est AA >60 >60 ml/min/1.73m2    GFR est non-AA 56 (L) >60 ml/min/1.73m2    Calcium 7.6 (L) 8.5 - 30.9 MG/DL   METABOLIC PANEL, BASIC    Collection Time: 10/02/22 12:47 AM   Result Value Ref Range    Sodium 134 (L) 136 - 145 mmol/L    Potassium 4.1 3.5 - 5.5 mmol/L    Chloride 106 100 - 111 mmol/L    CO2 22 21 - 32 mmol/L    Anion gap 6 3.0 - 18 mmol/L    Glucose 118 (H) 74 - 99 mg/dL    BUN 17 7.0 - 18 MG/DL    Creatinine 1.22 0.6 - 1.3 MG/DL    BUN/Creatinine ratio 14 12 - 20      GFR est AA >60 >60 ml/min/1.73m2    GFR est non-AA >60 >60 ml/min/1.73m2    Calcium 8.0 (L) 8.5 - 10.1 MG/DL   MAGNESIUM    Collection Time: 10/02/22 12:47 AM   Result Value Ref Range    Magnesium 2.4 1.6 - 2.6 mg/dL   CBC W/O DIFF    Collection Time: 10/02/22 12:47 AM   Result Value Ref Range    WBC 19.3 (H) 4.6 - 13.2 K/uL    RBC 4.28 (L) 4.35 - 5.65 M/uL    HGB 11.4 (L) 13.0 - 16.0 g/dL    HCT 35.8 (L) 36.0 - 48.0 %    MCV 83.6 78.0 - 100.0 FL    MCH 26.6 24.0 - 34.0 PG    MCHC 31.8 31.0 - 37.0 g/dL    RDW 13.6 11.6 - 14.5 %    PLATELET 339 675 - 131 K/uL    MPV 9.0 (L) 9.2 - 11.8 FL    NRBC 0.0 0  WBC    ABSOLUTE NRBC 0.00 0.00 - 0.01 K/uL   COVID-19 WITH INFLUENZA A/B    Collection Time: 10/02/22 11:12 AM   Result Value Ref Range    SARS-CoV-2 by PCR Not detected NOTD      Influenza A by PCR Not detected NOTD      Influenza B by PCR Not detected NOTD         Signed By: Fran Kim MD     October 2, 2022      I spent 25 minutes with the patient in face-to-face consultation, of which greater than 50% was spent in counseling and coordination of care as described above    Disclaimer: Sections of this note are dictated using utilizing voice recognition software. Minor typographical errors may be present. If questions arise, please do not hesitate to contact me or call our department.

## 2022-10-02 NOTE — CONSULTS
Consult received. Clinical presentation c/w sepsis (POA) due to perirectal abscess  S/p I&D. Intra op findings noted. Intra op cultures pending  Rec:  Continue zosyn, vancomycin. D/c clindamycin  F/u intra op cultures, blood cultures   F/u surgery recommendations regarding wound care  D/w dr. Sumi Yoon. Full dictation to follow.  thanks

## 2022-10-02 NOTE — PROGRESS NOTES
POD# 1    Admit Date: 10/1/2022    Assessment    Ofelia Herrera is a 63 y.o. male POD 1 s/p incision and drainage of perirectal abscess     Patient Active Problem List   Diagnosis Code    Cellulitis and abscess of buttock L02.31, L03.317    Hyponatremia E87.1    Roma-rectal abscess K61.1    Sepsis ([facility] [address] A41.9    DANIA (acute kidney injury) ([facility] [address] N17.9    Elevated LFTs R79.89       Plan  -wound cultures pending  -wound care orders have been written for nursing to perform daily - patient will need home health- drain can be removed in one week. -diet as tolerated  -ongoing management per primary team    Subjective    Overnight events: Patient was febrile overnight. Patient reports significant improvement in pain. Objective    Physical Exam:   Visit Vitals  BP 99/65 (BP 1 Location: Left upper arm, BP Patient Position: Lying right side)   Pulse (!) 58   Temp 97.6 °F (36.4 °C)   Resp 18   Ht 5' 11\" (1.803 m)   Wt 102.1 kg (225 lb)   SpO2 100%   BMI 31.38 kg/m²       Intake/Output Summary (Last 24 hours) at 10/2/2022 0510  Last data filed at 10/1/2022 1727  Gross per 24 hour   Intake 800 ml   Output 330 ml   Net 470 ml     Physical Exam  Skin:     General: Skin is warm and dry.       Comments: Drain with bloody output in gravity bag, wound packed with betadine packing, moderate improvement in soft tissue swelling and pain             Sadaf Hill DO  Phone: [phone]

## 2022-10-02 NOTE — ROUTINE PROCESS
Bedside and verbal report received from [address] (offgoing nurse).  Report included the following information; SBAR, MAR, LABS, Intake/output, Kardex, and summary of care

## 2022-10-02 NOTE — PROGRESS NOTES
[address] [facility] Pharmacokinetic Monitoring Service - Vancomycin    Consulting Provider: Dr. Yulissa Rome   Indication: Sepsis of Unknown Etiology  Target Concentration: Goal AUC/JAZZY 400-600 mg*hr/L  Day of Therapy: 2  Additional Antimicrobials: Piperacillin/Tazobactam    Pertinent Laboratory Values:   Temp: 97.5 °F (36.4 °C)  Weight: 102.1 kg (225 lb)  Recent Labs     10/02/22  0047 10/01/22  2016 10/01/22  1049 10/01/22  1049 10/01/22  1000   CREA 1.22 1.30   < > 1.40*  --    BUN 17 15   < > 13  --    WBC 19.3*  --   --  17.3*  --    PCT  --   --   --   --  2.61    < > = values in this interval not displayed. Estimated Creatinine Clearance: 78.3 mL/min (based on SCr of 1.22 mg/dL). Pertinent Cultures:  Culture Date Source Results   10/1 Blood  NGTD   10/1 Wound (rectal) GPC, GNR   MRSA Nasal Swab: Ordered by provider, awaiting results    Plan:  Current dosing regimen is therapeutic  Continue current dose of Vancomycin 750 mg q12h  Renal labs as indicated   Vancomycin concentration ordered for 10/3 @ 0400  Pharmacy will continue to monitor patient and adjust therapy as indicated    Thank you for the consult,  Cristobal Davila.  HOANG Flores  10/2/2022

## 2022-10-02 NOTE — PROGRESS NOTES
Problem: Falls - Risk of  Goal: *Absence of Falls  Description: Document Damari Baldomero Fall Risk and appropriate interventions in the flowsheet.   Outcome: Progressing Towards Goal  Note: Fall Risk Interventions:  Mobility Interventions: Bed/chair exit alarm, Patient to call before getting OOB, Strengthening exercises (ROM-active/passive)         Medication Interventions: Bed/chair exit alarm, Evaluate medications/consider consulting pharmacy, Patient to call before getting OOB, Teach patient to arise slowly

## 2022-10-02 NOTE — PROGRESS NOTES
PT eval order received and chart reviewed. Spoke with patient who reports they are at functional baseline for mobility. Patient no decline with independence in functional mobility and and that they have no further need for assist or AD/DME. Will sign off per protocol and educated patient that if their functional mobility needs should change that they may have MD re-order PT at any time.  Thank you for this referral.     Angelina Sanabria, PT, DPT

## 2022-10-03 ENCOUNTER — APPOINTMENT (OUTPATIENT)
Dept: NON INVASIVE DIAGNOSTICS | Age: 62
DRG: 710 | End: 2022-10-03
Attending: HOSPITALIST
Payer: MEDICAID

## 2022-10-03 ENCOUNTER — APPOINTMENT (OUTPATIENT)
Dept: GENERAL RADIOLOGY | Age: 62
DRG: 710 | End: 2022-10-03
Attending: SURGERY
Payer: MEDICAID

## 2022-10-03 LAB
ACC. NO. FROM MICRO ORDER, ACCP: NORMAL
ACINETOBACTER CALCOACETICUS-BAUMANII COMPLEX, ACBCX: NOT DETECTED
ANION GAP SERPL CALC-SCNC: 4 MMOL/L (ref 3–18)
ATRIAL RATE: 129 BPM
BACTERIA SPEC CULT: NORMAL
BACTERIA SPEC CULT: NORMAL
BACTEROIDES FRAGILIS, BFRA: NOT DETECTED
BIOFIRE COMMENT, BCIDPF: NORMAL
BUN SERPL-MCNC: 29 MG/DL (ref 7–18)
BUN/CREAT SERPL: 24 (ref 12–20)
C GLABRATA DNA VAG QL NAA+PROBE: NOT DETECTED
CALCIUM SERPL-MCNC: 8.2 MG/DL (ref 8.5–10.1)
CALCULATED R AXIS, ECG10: 16 DEGREES
CALCULATED T AXIS, ECG11: 32 DEGREES
CANDIDA ALBICANS: NOT DETECTED
CANDIDA AURIS, CAAU: NOT DETECTED
CANDIDA KRUSEI, CKRP: NOT DETECTED
CANDIDA PARAPSILOSIS, CPAUP: NOT DETECTED
CANDIDA TROPICALIS, CTROP: NOT DETECTED
CHLORIDE SERPL-SCNC: 109 MMOL/L (ref 100–111)
CO2 SERPL-SCNC: 22 MMOL/L (ref 21–32)
CREAT SERPL-MCNC: 1.2 MG/DL (ref 0.6–1.3)
CRYPTO NEOFORMANS/GATTII, CRYNEG: NOT DETECTED
DIAGNOSIS, 93000: NORMAL
ECHO AO ASC DIAM: 3.7 CM
ECHO AO ASCENDING AORTA INDEX: 1.67 CM/M2
ECHO AO ROOT DIAM: 3.7 CM
ECHO AO ROOT INDEX: 1.67 CM/M2
ECHO LA VOL 2C: 55 ML (ref 18–58)
ECHO LA VOL 4C: 61 ML (ref 18–58)
ECHO LA VOLUME AREA LENGTH: 62 ML
ECHO LA VOLUME INDEX A2C: 25 ML/M2 (ref 16–34)
ECHO LA VOLUME INDEX A4C: 27 ML/M2 (ref 16–34)
ECHO LA VOLUME INDEX AREA LENGTH: 28 ML/M2 (ref 16–34)
ECHO LV FRACTIONAL SHORTENING: 39 % (ref 28–44)
ECHO LV INTERNAL DIMENSION DIASTOLE INDEX: 2.07 CM/M2
ECHO LV INTERNAL DIMENSION DIASTOLIC: 4.6 CM (ref 4.2–5.9)
ECHO LV INTERNAL DIMENSION SYSTOLIC INDEX: 1.26 CM/M2
ECHO LV INTERNAL DIMENSION SYSTOLIC: 2.8 CM
ECHO LV IVSD: 1 CM (ref 0.6–1)
ECHO LV MASS 2D: 181.2 G (ref 88–224)
ECHO LV MASS INDEX 2D: 81.6 G/M2 (ref 49–115)
ECHO LV POSTERIOR WALL DIASTOLIC: 1.2 CM (ref 0.6–1)
ECHO LV RELATIVE WALL THICKNESS RATIO: 0.52
ECHO RV FREE WALL PEAK S': 9 CM/S
ECHO RV TAPSE: 1.4 CM (ref 1.7–?)
ENTEROBACTER CLOACAE COMPLEX, ECCP: NOT DETECTED
ENTEROBACTERALES SP. , ENBLS: NOT DETECTED
ENTEROCOCCUS FAECALIS, ENFA: NOT DETECTED
ENTEROCOCCUS FAECIUM, ENFAM: NOT DETECTED
ERYTHROCYTE [DISTWIDTH] IN BLOOD BY AUTOMATED COUNT: 13.4 % (ref 11.6–14.5)
ESCHERICHIA COLI: NOT DETECTED
GLUCOSE SERPL-MCNC: 119 MG/DL (ref 74–99)
HAEMOPHILUS INFLUENZAE, HMI: NOT DETECTED
HCT VFR BLD AUTO: 38.8 % (ref 36–48)
HGB BLD-MCNC: 12.6 G/DL (ref 13–16)
KLEBSIELLA AEROGENES, KLAE: NOT DETECTED
KLEBSIELLA OXYTOCA: NOT DETECTED
KLEBSIELLA PNEUMONIAE GROUP, KPPG: NOT DETECTED
LISTERIA MONOCYTOGENES, LMONP: NOT DETECTED
MAGNESIUM SERPL-MCNC: 2.7 MG/DL (ref 1.6–2.6)
MCH RBC QN AUTO: 26.7 PG (ref 24–34)
MCHC RBC AUTO-ENTMCNC: 32.5 G/DL (ref 31–37)
MCV RBC AUTO: 82.2 FL (ref 78–100)
NEISSERIA MENINGITIDIS, NMNI: NOT DETECTED
NRBC # BLD: 0 K/UL (ref 0–0.01)
NRBC BLD-RTO: 0 PER 100 WBC
PLATELET # BLD AUTO: 371 K/UL (ref 135–420)
PMV BLD AUTO: 9.6 FL (ref 9.2–11.8)
POTASSIUM SERPL-SCNC: 4.2 MMOL/L (ref 3.5–5.5)
PROTEUS, PRP: NOT DETECTED
PSEUDOMONAS AERUGINOSA: NOT DETECTED
Q-T INTERVAL, ECG07: 278 MS
QRS DURATION, ECG06: 84 MS
QTC CALCULATION (BEZET), ECG08: 407 MS
RBC # BLD AUTO: 4.72 M/UL (ref 4.35–5.65)
RESISTANT GENE SPACE, REGENE: NORMAL
SALMONELLA, SALMO: NOT DETECTED
SERRATIA MARCESCENS: NOT DETECTED
SERVICE CMNT-IMP: NORMAL
SODIUM SERPL-SCNC: 135 MMOL/L (ref 136–145)
STAPH EPIDERMIDIS, STEP: NOT DETECTED
STAPH LUGDUNENSIS, STALUG: NOT DETECTED
STAPHYLOCOCCUS AUREUS: NOT DETECTED
STAPHYLOCOCCUS, STAPP: NOT DETECTED
STENO MALTOPHILIA, STMA: NOT DETECTED
STREPTOCOCCUS , STPSP: NOT DETECTED
STREPTOCOCCUS AGALACTIAE (GROUP B): NOT DETECTED
STREPTOCOCCUS PNEUMONIAE , SPNP: NOT DETECTED
STREPTOCOCCUS PYOGENES (GROUP A), SPYOP: NOT DETECTED
VANCOMYCIN SERPL-MCNC: 17.5 UG/ML (ref 5–40)
VENTRICULAR RATE, ECG03: 129 BPM
WBC # BLD AUTO: 19.5 K/UL (ref 4.6–13.2)

## 2022-10-03 PROCEDURE — 74011000250 HC RX REV CODE- 250: Performed by: INTERNAL MEDICINE

## 2022-10-03 PROCEDURE — 80048 BASIC METABOLIC PNL TOTAL CA: CPT

## 2022-10-03 PROCEDURE — 85027 COMPLETE CBC AUTOMATED: CPT

## 2022-10-03 PROCEDURE — 65270000046 HC RM TELEMETRY

## 2022-10-03 PROCEDURE — 83735 ASSAY OF MAGNESIUM: CPT

## 2022-10-03 PROCEDURE — 99232 SBSQ HOSP IP/OBS MODERATE 35: CPT | Performed by: HOSPITALIST

## 2022-10-03 PROCEDURE — 74011000636 HC RX REV CODE- 636: Performed by: SURGERY

## 2022-10-03 PROCEDURE — 93005 ELECTROCARDIOGRAM TRACING: CPT

## 2022-10-03 PROCEDURE — 74011000258 HC RX REV CODE- 258: Performed by: INTERNAL MEDICINE

## 2022-10-03 PROCEDURE — 93306 TTE W/DOPPLER COMPLETE: CPT

## 2022-10-03 PROCEDURE — 74011250637 HC RX REV CODE- 250/637: Performed by: SURGERY

## 2022-10-03 PROCEDURE — 36415 COLL VENOUS BLD VENIPUNCTURE: CPT

## 2022-10-03 PROCEDURE — 74011250636 HC RX REV CODE- 250/636: Performed by: INTERNAL MEDICINE

## 2022-10-03 PROCEDURE — 74011250636 HC RX REV CODE- 250/636: Performed by: HOSPITALIST

## 2022-10-03 PROCEDURE — 2709999900 HC NON-CHARGEABLE SUPPLY

## 2022-10-03 PROCEDURE — 80202 ASSAY OF VANCOMYCIN: CPT

## 2022-10-03 PROCEDURE — 74011000250 HC RX REV CODE- 250: Performed by: HOSPITALIST

## 2022-10-03 PROCEDURE — 74011250637 HC RX REV CODE- 250/637: Performed by: PHYSICIAN ASSISTANT

## 2022-10-03 PROCEDURE — 99223 1ST HOSP IP/OBS HIGH 75: CPT | Performed by: INTERNAL MEDICINE

## 2022-10-03 PROCEDURE — 74250 X-RAY XM SM INT 1CNTRST STD: CPT

## 2022-10-03 PROCEDURE — 74011250637 HC RX REV CODE- 250/637: Performed by: HOSPITALIST

## 2022-10-03 PROCEDURE — 87040 BLOOD CULTURE FOR BACTERIA: CPT

## 2022-10-03 RX ORDER — METOPROLOL TARTRATE 5 MG/5ML
2.5 INJECTION INTRAVENOUS
Status: COMPLETED | OUTPATIENT
Start: 2022-10-03 | End: 2022-10-03

## 2022-10-03 RX ORDER — METOPROLOL TARTRATE 25 MG/1
25 TABLET, FILM COATED ORAL 2 TIMES DAILY
Status: DISCONTINUED | OUTPATIENT
Start: 2022-10-03 | End: 2022-10-04

## 2022-10-03 RX ORDER — METOPROLOL TARTRATE 5 MG/5ML
INJECTION INTRAVENOUS
Status: DISPENSED
Start: 2022-10-03 | End: 2022-10-03

## 2022-10-03 RX ORDER — FAMOTIDINE 20 MG/1
20 TABLET, FILM COATED ORAL 2 TIMES DAILY
Status: DISCONTINUED | OUTPATIENT
Start: 2022-10-03 | End: 2022-10-05 | Stop reason: HOSPADM

## 2022-10-03 RX ADMIN — PIPERACILLIN AND TAZOBACTAM 3.38 G: 3; .375 INJECTION, POWDER, FOR SOLUTION INTRAVENOUS at 05:16

## 2022-10-03 RX ADMIN — MORPHINE SULFATE 2 MG: 2 INJECTION, SOLUTION INTRAMUSCULAR; INTRAVENOUS at 18:11

## 2022-10-03 RX ADMIN — PIPERACILLIN AND TAZOBACTAM 3.38 G: 3; .375 INJECTION, POWDER, FOR SOLUTION INTRAVENOUS at 13:04

## 2022-10-03 RX ADMIN — METOPROLOL TARTRATE 25 MG: 25 TABLET, FILM COATED ORAL at 17:29

## 2022-10-03 RX ADMIN — OXYCODONE HYDROCHLORIDE AND ACETAMINOPHEN 1 TABLET: 5; 325 TABLET ORAL at 09:33

## 2022-10-03 RX ADMIN — METOPROLOL TARTRATE 25 MG: 25 TABLET, FILM COATED ORAL at 13:02

## 2022-10-03 RX ADMIN — PIPERACILLIN AND TAZOBACTAM 3.38 G: 3; .375 INJECTION, POWDER, FOR SOLUTION INTRAVENOUS at 21:50

## 2022-10-03 RX ADMIN — FAMOTIDINE 20 MG: 10 INJECTION, SOLUTION INTRAVENOUS at 09:06

## 2022-10-03 RX ADMIN — VANCOMYCIN HYDROCHLORIDE 750 MG: 750 INJECTION, POWDER, LYOPHILIZED, FOR SOLUTION INTRAVENOUS at 09:32

## 2022-10-03 RX ADMIN — SODIUM CHLORIDE, PRESERVATIVE FREE 10 ML: 5 INJECTION INTRAVENOUS at 13:07

## 2022-10-03 RX ADMIN — DIATRIZOATE MEGLUMINE AND DIATRIZOATE SODIUM 240 ML: 660; 100 LIQUID ORAL; RECTAL at 07:59

## 2022-10-03 RX ADMIN — OXYCODONE HYDROCHLORIDE AND ACETAMINOPHEN 1 TABLET: 5; 325 TABLET ORAL at 14:53

## 2022-10-03 RX ADMIN — FAMOTIDINE 20 MG: 20 TABLET ORAL at 17:29

## 2022-10-03 RX ADMIN — METOPROLOL TARTRATE 2.5 MG: 5 INJECTION, SOLUTION INTRAVENOUS at 10:14

## 2022-10-03 RX ADMIN — SODIUM CHLORIDE, PRESERVATIVE FREE 10 ML: 5 INJECTION INTRAVENOUS at 06:09

## 2022-10-03 RX ADMIN — OXYCODONE HYDROCHLORIDE AND ACETAMINOPHEN 1 TABLET: 5; 325 TABLET ORAL at 21:50

## 2022-10-03 RX ADMIN — SODIUM CHLORIDE, PRESERVATIVE FREE 10 ML: 5 INJECTION INTRAVENOUS at 21:55

## 2022-10-03 NOTE — PROGRESS NOTES
[facility]  Progress Note    Patient: Jessie Eller Age: 63 y.o. : 1960 MR#: [id number] SSN: [id number]  Date/Time: 10/3/2022     Subjective:     Patient seen and evaluated, lying in bed, no acute distress. 45-year-old male with a history of hypertension presented to the emergency room secondary to pain in the right buttock with swelling. Patient noted to have right-sided perirectal abscess status post I&D by general surgery. 10/3-nurse paged to state that patient had an elevated heart rate, twelve-lead EKG done, shows new onset A. fib with RVR. IV Lopressor 2.5 mg given x1. Cardiology consulted. Per cardiology consult continue p.o. Lopressor. Continue aspirin 81 mg p.o. daily  Will follow        Assessment/Plan:     Sepsis secondary to perirectal abscess right side status post I&D by general surgery-continue broad-spectrum IV antibiotics, local wound care. ID on board. New onset A. fib with RVR-continue beta-blockers for rate control, OAC with aspirin 81 mg p.o. daily  Leukocytosis secondary to #1-continue IV antibiotics. Elevated LFTs-continue to monitor  DVT prophylaxis-SCDs  Full code    Anticipate discharge home in 1 to 2 days.           Isha Acevedo MD  10/03/22      Case discussed with:  []Patient  []Family  []Nursing  []Case Management  DVT Prophylaxis:  []Lovenox  []Hep SQ  []SCDs  []Coumadin   []On Heparin gtt    Objective:   VS: Visit Vitals  /80 (BP 1 Location: Right upper arm, BP Patient Position: At rest;Reclining)   Pulse 91   Temp 97.7 °F (36.5 °C)   Resp 18   Ht 5' 11\" (1.803 m)   Wt 102.1 kg (225 lb)   SpO2 96%   BMI 31.38 kg/m²        Tmax/24hrs: Temp (24hrs), Av.5 °F (36.4 °C), Min:97.4 °F (36.3 °C), Max:97.7 °F (36.5 °C)  IOBRIEF  Intake/Output Summary (Last 24 hours) at 10/3/2022 131  Last data filed at 10/3/2022 0516  Gross per 24 hour   Intake 540 ml   Output 1760 ml   Net -1220 ml         General:  Alert, cooperative, no acute distress    Pulmonary:  CTA Bilaterally. No Wheezing/Rhonchi/Rales. Cardiovascular: Regular rate and Rhythm. GI:  Soft, Non distended, Non tender. + Bowel sounds. Extremities:  No edema, cyanosis, clubbing. No calf tenderness. Neurologic: Alert and oriented X 3. No acute neuro deficits. Additional:    Medications:   Current Facility-Administered Medications   Medication Dose Route Frequency    metoprolol (LOPRESSOR) 5 mg/5 mL injection        metoprolol tartrate (LOPRESSOR) tablet 25 mg  25 mg Oral BID    morphine injection 2 mg  2 mg IntraVENous Q4H PRN    piperacillin-tazobactam (ZOSYN) 3.375 g in 0.9% sodium chloride (MBP/ADV) 100 mL MBP  3.375 g IntraVENous Q8H    famotidine (PF) (PEPCID) 20 mg in 0.9% sodium chloride 10 mL injection  20 mg IntraVENous Q12H    sodium chloride (NS) flush 5-40 mL  5-40 mL IntraVENous Q8H    sodium chloride (NS) flush 5-40 mL  5-40 mL IntraVENous PRN    acetaminophen (TYLENOL) tablet 650 mg  650 mg Oral Q6H PRN    Or    acetaminophen (TYLENOL) suppository 650 mg  650 mg Rectal Q6H PRN    polyethylene glycol (MIRALAX) packet 17 g  17 g Oral DAILY PRN    promethazine (PHENERGAN) tablet 12.5 mg  12.5 mg Oral Q6H PRN    Or    ondansetron (ZOFRAN) injection 4 mg  4 mg IntraVENous Q6H PRN    [Held by provider] enoxaparin (LOVENOX) injection 40 mg  40 mg SubCUTAneous DAILY    vancomycin (VANCOCIN) 750 mg in 0.9% sodium chloride 250 mL (VIAL-MATE)  750 mg IntraVENous Q12H    hydrALAZINE (APRESOLINE) 20 mg/mL injection 10 mg  10 mg IntraVENous Q6H PRN    oxyCODONE-acetaminophen (PERCOCET) 5-325 mg per tablet 1 Tablet  1 Tablet Oral Q4H PRN       Imaging:   XR Results (most recent):  Results from Hospital Encounter encounter on 10/01/22    XR GASTROGRAFFIN SMALL BOWEL    Narrative  EXAM: Small Bowel Follow Through    INDICATION: Evaluation for small bowel obstruction. Status post incision and  drainage of perirectal abscess.     TECHNIQUE: Gastrografin small bowel follow-through    COMPARISON: None    FINDINGS:    The  radiograph of the abdomen gaseous distention of the colon. There is  no evidence of free air or organomegaly. A drain is seen in the right pelvis. Gastrografin contrast was self-administered by the patient. Initial post film  obtained demonstrates: contrast in the antrum of the stomach and small bowel. Subsequent images were obtained until contrast reached the colon. Limited evaluation of mucosal detail secondary to use of water-soluble contrast.  Initial post film obtained demonstrates contrast in the stomach and small  bowel. Small bowel follow-through examination demonstrates contrast flowing  from the stomach to cecum between 20 minutes to 40 minutes. No focal suspicious  mucosal abnormality, narrowing, or outpouching identified within limits of exam.  Small bowel loops are within normal limits of caliber. No evidence of  obstruction. Fluoroscopy time: 0 minutes    Fluoroscopy Images: 0    Impression  No evidence for high-grade small bowel obstruction during exam.       CT Results (most recent):  Results from Hospital Encounter encounter on 10/01/22    CT PELV W CONT    Addendum 10/1/2022  1:30 PM  Addendum: Findings communicated to Dr. Patricia Monge by telephone. Narrative  CT PELVIS WITH INTRAVENOUS CONTRAST. COMPARISON: None available. INDICATIONS: Rectal pain. Concern for rectal abscess. TECHNIQUE: 5 mm axial cuts were performed through the chest, abdomen and pelvis  after administration of 100cc of Omnipaque 350 and oral contrast material.    All CT scans at this facility are performed using dose optimization technique as  appropriate to a performed exam, to include automated exposure control,  adjustment of the mA and/or KV according to patient's size (including  appropriate matching for site-specific examinations), or use of iterative  reconstruction technique.     FINDINGS:    CT PELVIS:  There is a large gas containing perianal and perirectal fluid collection (13 x 6  x 9 cm) extending from the right gluteal cleft superiorly into the right,  greater than left, levator ani muscle complex. Both superior components of the  collection appear to communicate with each other (400:53). The thickened rectum  and pelvic floor muscles are displaced to the right. No defect is visualized in  the rectal wall, although there is a possible tiny locule of gas in the right  perirectal fat on axial image 33. No extraluminal enteric contrast. Fat  stranding adjacent to the urinary bladder is likely reactive. Right inguinal and  pelvic lymphadenopathy, likely reactive. No definite osseous erosions. Bilateral  pars defects at L5-S1 with grade 1 anterolisthesis. Impression  1. Large gas containing perianal and perirectal fluid collection/abscess  extending from the right gluteal cleft into the right, greater than left,  levator ani muscle complex. No defect is observed in the thickened rectal wall  and there is no enteric contrast leakage, but there is a questioned tiny locule  of gas in the right perirectal fat on axial image 33.  2.  Bilateral L5-S1 spondylolysis with spondylolisthesis. 3.  Thickening of the bladder wall and inguinal/pelvic lymphadenopathy, likely  reactive. MRI Results (most recent):  No results found for this or any previous visit.         Labs:    Recent Results (from the past 48 hour(s))   COVID-19 RAPID TEST    Collection Time: 10/01/22  3:02 PM   Result Value Ref Range    Specimen source Nasopharyngeal      COVID-19 rapid test Not detected NOTD     CULTURE, WOUND Laurian Pierre STAIN    Collection Time: 10/01/22  5:05 PM    Specimen: Rectal   Result Value Ref Range    Special Requests: TERESITA RECTAL     GRAM STAIN MODERATE GRAM POSITIVE COCCI IN PAIRS      GRAM STAIN MODERATE GRAM NEGATIVE RODS      Culture result: MODERATE GRAM NEGATIVE RODS (A)      Culture result: LIGHT MIXED SKIN RHIANNA ISOLATED     CULTURE, ANAEROBIC Collection Time: 10/01/22  5:05 PM    Specimen: Rectal   Result Value Ref Range    Special Requests: TERESITA RECTAL     Culture result: NO ANAEROBES ISOLATED THUS FAR     METABOLIC PANEL, BASIC    Collection Time: 10/01/22  8:16 PM   Result Value Ref Range    Sodium 135 (L) 136 - 145 mmol/L    Potassium 4.4 3.5 - 5.5 mmol/L    Chloride 107 100 - 111 mmol/L    CO2 22 21 - 32 mmol/L    Anion gap 6 3.0 - 18 mmol/L    Glucose 115 (H) 74 - 99 mg/dL    BUN 15 7.0 - 18 MG/DL    Creatinine 1.30 0.6 - 1.3 MG/DL    BUN/Creatinine ratio 12 12 - 20      GFR est AA >60 >60 ml/min/1.73m2    GFR est non-AA 56 (L) >60 ml/min/1.73m2    Calcium 7.6 (L) 8.5 - 26.6 MG/DL   METABOLIC PANEL, BASIC    Collection Time: 10/02/22 12:47 AM   Result Value Ref Range    Sodium 134 (L) 136 - 145 mmol/L    Potassium 4.1 3.5 - 5.5 mmol/L    Chloride 106 100 - 111 mmol/L    CO2 22 21 - 32 mmol/L    Anion gap 6 3.0 - 18 mmol/L    Glucose 118 (H) 74 - 99 mg/dL    BUN 17 7.0 - 18 MG/DL    Creatinine 1.22 0.6 - 1.3 MG/DL    BUN/Creatinine ratio 14 12 - 20      GFR est AA >60 >60 ml/min/1.73m2    GFR est non-AA >60 >60 ml/min/1.73m2    Calcium 8.0 (L) 8.5 - 10.1 MG/DL   MAGNESIUM    Collection Time: 10/02/22 12:47 AM   Result Value Ref Range    Magnesium 2.4 1.6 - 2.6 mg/dL   CBC W/O DIFF    Collection Time: 10/02/22 12:47 AM   Result Value Ref Range    WBC 19.3 (H) 4.6 - 13.2 K/uL    RBC 4.28 (L) 4.35 - 5.65 M/uL    HGB 11.4 (L) 13.0 - 16.0 g/dL    HCT 35.8 (L) 36.0 - 48.0 %    MCV 83.6 78.0 - 100.0 FL    MCH 26.6 24.0 - 34.0 PG    MCHC 31.8 31.0 - 37.0 g/dL    RDW 13.6 11.6 - 14.5 %    PLATELET 001 045 - 386 K/uL    MPV 9.0 (L) 9.2 - 11.8 FL    NRBC 0.0 0  WBC    ABSOLUTE NRBC 0.00 0.00 - 0.01 K/uL   COVID-19 WITH INFLUENZA A/B    Collection Time: 10/02/22 11:12 AM   Result Value Ref Range    SARS-CoV-2 by PCR Not detected NOTD      Influenza A by PCR Not detected NOTD      Influenza B by PCR Not detected NOTD     METABOLIC PANEL, BASIC Collection Time: 10/03/22  1:08 AM   Result Value Ref Range    Sodium 135 (L) 136 - 145 mmol/L    Potassium 4.2 3.5 - 5.5 mmol/L    Chloride 109 100 - 111 mmol/L    CO2 22 21 - 32 mmol/L    Anion gap 4 3.0 - 18 mmol/L    Glucose 119 (H) 74 - 99 mg/dL    BUN 29 (H) 7.0 - 18 MG/DL    Creatinine 1.20 0.6 - 1.3 MG/DL    BUN/Creatinine ratio 24 (H) 12 - 20      GFR est AA >60 >60 ml/min/1.73m2    GFR est non-AA >60 >60 ml/min/1.73m2    Calcium 8.2 (L) 8.5 - 10.1 MG/DL   MAGNESIUM    Collection Time: 10/03/22  1:08 AM   Result Value Ref Range    Magnesium 2.7 (H) 1.6 - 2.6 mg/dL   CBC W/O DIFF    Collection Time: 10/03/22  1:08 AM   Result Value Ref Range    WBC 19.5 (H) 4.6 - 13.2 K/uL    RBC 4.72 4.35 - 5.65 M/uL    HGB 12.6 (L) 13.0 - 16.0 g/dL    HCT 38.8 36.0 - 48.0 %    MCV 82.2 78.0 - 100.0 FL    MCH 26.7 24.0 - 34.0 PG    MCHC 32.5 31.0 - 37.0 g/dL    RDW 13.4 11.6 - 14.5 %    PLATELET 658 460 - 384 K/uL    MPV 9.6 9.2 - 11.8 FL    NRBC 0.0 0  WBC    ABSOLUTE NRBC 0.00 0.00 - 0.01 K/uL   VANCOMYCIN, RANDOM    Collection Time: 10/03/22  1:08 AM   Result Value Ref Range    Vancomycin, random 17.5 5.0 - 40.0 UG/ML   EKG, 12 LEAD, SUBSEQUENT    Collection Time: 10/03/22 10:03 AM   Result Value Ref Range    Ventricular Rate 129 BPM    Atrial Rate 129 BPM    QRS Duration 84 ms    Q-T Interval 278 ms    QTC Calculation (Bezet) 407 ms    Calculated R Axis 16 degrees    Calculated T Axis 32 degrees    Diagnosis       Atrial fibrillation with rapid ventricular response  Abnormal ECG  No previous ECGs available  Confirmed by Maria Luz Irvin MD, [facility] ([id number]) on 10/3/2022 10:42:04 AM         Signed By: Isha Acevedo MD     October 3, 2022      I spent 25 minutes with the patient in face-to-face consultation, of which greater than 50% was spent in counseling and coordination of care as described above    Disclaimer: Sections of this note are dictated using utilizing voice recognition software.   Minor typographical errors may be present. If questions arise, please do not hesitate to contact me or call our department.

## 2022-10-03 NOTE — PROGRESS NOTES
[facility]

## 2022-10-03 NOTE — CONSULTS
Infectious Disease Consultation Note        Reason:sepsis, perirectal abscess    Current abx Prior abx   Zosyn, vancomycin since 10/1/2022 Clindamycin     Lines:       Assessment :  63 y.o. male with medical co-morbidities including hypertension, who came to the ER on 10/1/22 with right gluteal pain and swelling. Clinical presentation c/w sepsis (POA) due to gram-negative bloodstream infection, perirectal abscess s/p I&D on 10/1/22. Intra op findings noted. Wound cultures 10/1-moderate gram-negative rods, mixed skin vero    Most likely source of gram-negative bloodstream infection is perirectal abscess    Recommendations:    Continue Zosyn. Discontinue vancomycin  Follow-up identification and susceptibility of gram-negative zena in blood culture. Modify antibiotics as needed  Repeat blood culture today  Follow-up wound cultures 10/1  Follow-up surgery recommendations regarding wound care      Thank you for consultation request. Above plan was discussed in details with patient,  and dr Elfego Beyer. Please call me if any further questions or concerns. Will continue to participate in the care of this patient. HPI:    63 y.o. male with medical co-morbidities including hypertension, who came to the ER on 10/1/22 with right gluteal pain and swelling. He reported that his right buttock was swollen, red, tender about 5 to 7 days ago. He experienced fever and chill at home over the past few days PTA. In the ED, he was found to have tachycardia , WBC 17.3, band neutrophils 5% with left shift, BMP with sodium of 129, chloride 96, creatinine 1.4 with GFR preserved. ALT 69, AST 91. Pro-Leonides 2.61. He was given clindamycin for right gluteal cellulitis. CT of the pelvic with contrast showed large gas containing perianal and perirectal fluid collection/abscess extending from the right gluteal cleft into the right, greater than left levator anus muscle complex. .  He has thickening of the bladder wall with pelvic lymphadenopathy. s/p I&D perirectal abscess on 10/1/22. Patient was initiated on Zosyn, vancomycin, clindamycin on admission. I was consulted for further recommendations. I recommended to discontinue clindamycin on 10/2 since no evidence of necrotizing infection. Blood cultures now positive for gram-negative rods. Patient states that he feels better today compared to day of admission. He denies prior history of perirectal abscess, MRSA infection. Past Medical History:   Diagnosis Date    History of tobacco abuse     Hypertension        History reviewed. No pertinent surgical history. There are no discharge medications for this patient. Current Facility-Administered Medications   Medication Dose Route Frequency    morphine injection 2 mg  2 mg IntraVENous Q4H PRN    piperacillin-tazobactam (ZOSYN) 3.375 g in 0.9% sodium chloride (MBP/ADV) 100 mL MBP  3.375 g IntraVENous Q8H    famotidine (PF) (PEPCID) 20 mg in 0.9% sodium chloride 10 mL injection  20 mg IntraVENous Q12H    sodium chloride (NS) flush 5-40 mL  5-40 mL IntraVENous Q8H    sodium chloride (NS) flush 5-40 mL  5-40 mL IntraVENous PRN    acetaminophen (TYLENOL) tablet 650 mg  650 mg Oral Q6H PRN    Or    acetaminophen (TYLENOL) suppository 650 mg  650 mg Rectal Q6H PRN    polyethylene glycol (MIRALAX) packet 17 g  17 g Oral DAILY PRN    promethazine (PHENERGAN) tablet 12.5 mg  12.5 mg Oral Q6H PRN    Or    ondansetron (ZOFRAN) injection 4 mg  4 mg IntraVENous Q6H PRN    [Held by provider] enoxaparin (LOVENOX) injection 40 mg  40 mg SubCUTAneous DAILY    vancomycin (VANCOCIN) 750 mg in 0.9% sodium chloride 250 mL (VIAL-MATE)  750 mg IntraVENous Q12H    hydrALAZINE (APRESOLINE) 20 mg/mL injection 10 mg  10 mg IntraVENous Q6H PRN    oxyCODONE-acetaminophen (PERCOCET) 5-325 mg per tablet 1 Tablet  1 Tablet Oral Q4H PRN       Allergies: Patient has no known allergies.     Family History   Problem Relation Age of Onset    Heart Attack Mother     Heart Disease Mother     Hypertension Father     Heart Disease Sister     Heart Attack Sister      Social History     Socioeconomic History    Marital status: SINGLE     Spouse name: Not on file    Number of children: Not on file    Years of education: Not on file    Highest education level: Not on file   Occupational History    Not on file   Tobacco Use    Smoking status: Former     Types: Cigarettes    Smokeless tobacco: Never   Substance and Sexual Activity    Alcohol use: Not Currently    Drug use: Not Currently    Sexual activity: Not on file   Other Topics Concern    Not on file   Social History Narrative    Not on file     Social Determinants of Health     Financial Resource Strain: Not on file   Food Insecurity: Not on file   Transportation Needs: Not on file   Physical Activity: Not on file   Stress: Not on file   Social Connections: Not on file   Intimate Partner Violence: Not on file   Housing Stability: Not on file     Social History     Tobacco Use   Smoking Status Former    Types: Cigarettes   Smokeless Tobacco Never        Temp (24hrs), Av.5 °F (36.4 °C), Min:97.4 °F (36.3 °C), Max:97.6 °F (36.4 °C)    Visit Vitals  /76 (BP 1 Location: Left upper arm, BP Patient Position: Lying)   Pulse 62   Temp 97.4 °F (36.3 °C)   Resp 18   Ht 5' 11\" (1.803 m)   Wt 102.1 kg (225 lb)   SpO2 99%   BMI 31.38 kg/m²       ROS: 12 point ROS obtained in details. Pertinent positives as mentioned in HPI,   otherwise negative    Physical Exam:    General:  Cooperative, Not in acute distress  HEENT: EOMI, supple neck, no JVD, dry oral mucosa  Cardiovascular: S1S2 regular, no rub/gallop   Pulmonary: air entry bilaterally, no wheezing, no crackle  GI:  Soft, non tender, non distended, +bs, no guarding   Extremities:  No pedal edema, +distal pulses appreciated   Neuro: AOx3, moving all extremities, no gross deficit.     Skin: surgical dressing right gluteal region not opened  Psychiatry: Appropriate mood and affect    Labs: Results:   Chemistry Recent Labs     10/03/22  0108 10/02/22  0047 10/01/22  2016 10/01/22  1049   * 118* 115* 110*   * 134* 135* 129*   K 4.2 4.1 4.4 3.6    106 107 96*   CO2 22 22 22 25   BUN 29* 17 15 13   CREA 1.20 1.22 1.30 1.40*   CA 8.2* 8.0* 7.6* 9.1   AGAP 4 6 6 8   BUCR 24* 14 12 9*   AP  --   --   --  76   TP  --   --   --  7.9   ALB  --   --   --  2.4*   GLOB  --   --   --  5.5*   AGRAT  --   --   --  0.4*      CBC w/Diff Recent Labs     10/03/22  0108 10/02/22  0047 10/01/22  1049   WBC 19.5* 19.3* 17.3*   RBC 4.72 4.28* 5.06   HGB 12.6* 11.4* 13.4   HCT 38.8 35.8* 41.1    359 384   GRANS  --   --  87*   LYMPH  --   --  3*   EOS  --   --  0      Microbiology Recent Labs     10/01/22  1705 10/01/22  1045 10/01/22  1000   CULT PENDING CULTURE IN PROGRESS,FURTHER UPDATES TO FOLLOW  Sent to [facility] for ID/Susceptibility if indicated. NO GROWTH AFTER 19 HOURS          RADIOLOGY:    All available imaging studies/reports in [facility] for this admission were reviewed      Disclaimer: Sections of this note are dictated utilizing voice recognition software, which may have resulted in some phonetic based errors in grammar and contents. Even though attempts were made to correct all the mistakes, some may have been missed, and remained in the body of the document. If questions arise, please contact our department.     Dr. Jenny Ceja, Infectious Disease Specialist  [phone]  October 3, 2022  7:10 AM

## 2022-10-03 NOTE — CONSULTS
Cardiology Initial Patient Referral Note    Cardiology referral request from Dr. Eh Guthrie for evaluation and management/treatment of AF RVR    Date of  Admission: 10/1/2022  9:36 AM   Primary Care Physician:  None    Attending Cardiologist: Dr. Cervantes Patch:     -Sepsis, 2/2 perirectal abscess, s/p I&D  -AF with RVR, new dx this admission. -HTN, no outpatient medications. Atrial fibrillation CHADSVASC2 score stroke risk:   63 y.o.                                        <65        + 0   male                                         Male     [de-identified]  CHF hx                                           No    + 0  HTN hx                                           Yes    +1  Stroke/TIA/Thromboembolism       No    +0  Vascular disease hx                       No    + 0  Diabetes Mellitus                            No    + 0   CHADSVASC2 score                    1      Annual Stroke Risk 0.6% - low-moderate risk        No Primary cardiologist, consult by Dr. Tyson Mon:     POD #2   New AF RVR today, AF rates improved s/p IV Lopressor. Would continue po lopressor and titrate as needed. Echo pending. Given chadsvasc score of 1, would not start patient on [address] at this time. Would continue ASA only if okay with all teams. This was d/w the patient who agrees to the plan. Further recommendations pending Echo results. ---------------------------  Patient has new atrial fibrillation with RVR in the setting of sepsis. [id number] score of 1. Plan beta-blocker, follow-up echocardiogram.    I saw, examined, and evaluated this patient and performed the substantive portion of the encounter for > 50% of the time including extensive history, physical exam, and medical decision making as discussed with patient and next-of-kin as needed. I personally reviewed the patient's labs, tests, vitals, orders, medications, updated history, and other providers assessments as well.   I personally agree with the findings as stated and the plan as documented. Yordan Holloway MD       History of Present Illness: This is a 63 y.o. male admitted for Roma-rectal abscess [K61.1]  Hyponatremia [E87.1]  Cellulitis and abscess of buttock [L02.31, L03.317]. Patient complains of: perirectal abscess  Yesenia Villatoro is a 63 y.o. male, pmhx as stated above, who we are seeing for new AF. Patient was admitted for sepsis 2/2 perirectal abscess, s/p I&D. He was found to be in new AF with RVR on 10/3. Patient reports slight palpitations. Denies prior h/o AF, CP, SOB, dizziness, near syncope or syncope. Cardiac risk factors: male gender, hypertension      Review of Symptoms:  Except as stated above include:  Constitutional:  negative  Respiratory:  negative  Cardiovascular:  negative  Gastrointestinal: negative  Genitourinary:  negative  Musculoskeletal:  Negative  Neurological:  Negative  Dermatological:  Negative  Endocrinological: Negative  Psychological:  Negative    A comprehensive review of systems was negative except for that written in the HPI.      Past Medical History:     Past Medical History:   Diagnosis Date    History of tobacco abuse     Hypertension          Social History:     Social History     Socioeconomic History    Marital status: SINGLE   Tobacco Use    Smoking status: Former     Types: Cigarettes    Smokeless tobacco: Never   Substance and Sexual Activity    Alcohol use: Not Currently    Drug use: Not Currently        Family History:     Family History   Problem Relation Age of Onset    Heart Attack Mother     Heart Disease Mother     Hypertension Father     Heart Disease Sister     Heart Attack Sister         Medications:   No Known Allergies     Current Facility-Administered Medications   Medication Dose Route Frequency    metoprolol (LOPRESSOR) 5 mg/5 mL injection        dilTIAZem (CARDIZEM) 100 mg in 0.9% sodium chloride (MBP/ADV) 100 mL infusion  5 mg/hr IntraVENous CONTINUOUS    morphine injection 2 mg 2 mg IntraVENous Q4H PRN    piperacillin-tazobactam (ZOSYN) 3.375 g in 0.9% sodium chloride (MBP/ADV) 100 mL MBP  3.375 g IntraVENous Q8H    famotidine (PF) (PEPCID) 20 mg in 0.9% sodium chloride 10 mL injection  20 mg IntraVENous Q12H    sodium chloride (NS) flush 5-40 mL  5-40 mL IntraVENous Q8H    sodium chloride (NS) flush 5-40 mL  5-40 mL IntraVENous PRN    acetaminophen (TYLENOL) tablet 650 mg  650 mg Oral Q6H PRN    Or    acetaminophen (TYLENOL) suppository 650 mg  650 mg Rectal Q6H PRN    polyethylene glycol (MIRALAX) packet 17 g  17 g Oral DAILY PRN    promethazine (PHENERGAN) tablet 12.5 mg  12.5 mg Oral Q6H PRN    Or    ondansetron (ZOFRAN) injection 4 mg  4 mg IntraVENous Q6H PRN    [Held by provider] enoxaparin (LOVENOX) injection 40 mg  40 mg SubCUTAneous DAILY    vancomycin (VANCOCIN) 750 mg in 0.9% sodium chloride 250 mL (VIAL-MATE)  750 mg IntraVENous Q12H    hydrALAZINE (APRESOLINE) 20 mg/mL injection 10 mg  10 mg IntraVENous Q6H PRN    oxyCODONE-acetaminophen (PERCOCET) 5-325 mg per tablet 1 Tablet  1 Tablet Oral Q4H PRN         Physical Exam:   Visit Vitals  /80 (BP 1 Location: Right upper arm, BP Patient Position: At rest;Reclining)   Pulse 91   Temp 97.7 °F (36.5 °C)   Resp 18   Ht 5' 11\" (1.803 m)   Wt 102.1 kg (225 lb)   SpO2 96%   BMI 31.38 kg/m²       TELE: AFIB    BP Readings from Last 3 Encounters:   10/03/22 124/80     Pulse Readings from Last 3 Encounters:   10/03/22 91     Wt Readings from Last 3 Encounters:   10/01/22 102.1 kg (225 lb)       General:  alert, cooperative, no distress, appears stated age  Neck:  no JVD  Lungs:  clear to auscultation bilaterally  Heart:  irregularly irregular rhythm  Abdomen:  abdomen is soft without significant tenderness, masses, organomegaly or guarding  Extremities:  extremities normal, atraumatic, no cyanosis or edema  Skin: Warm and dry.  no hyperpigmentation, vitiligo, or suspicious lesions  Neuro: alert, oriented x3, affect appropriate  Psych: non focal     Data Review:     Recent Labs     10/03/22  0108 10/02/22  0047 10/01/22  1049   WBC 19.5* 19.3* 17.3*   HGB 12.6* 11.4* 13.4   HCT 38.8 35.8* 41.1    359 384     Recent Labs     10/03/22  0108 10/02/22  0047 10/01/22  2016 10/01/22  1049 10/01/22  1049 10/01/22  1000   * 134* 135*   < > 129*  --    K 4.2 4.1 4.4   < > 3.6  --     106 107   < > 96*  --    CO2 22 22 22   < > 25  --    * 118* 115*   < > 110*  --    BUN 29* 17 15   < > 13  --    CREA 1.20 1.22 1.30   < > 1.40*  --    CA 8.2* 8.0* 7.6*   < > 9.1  --    MG 2.7* 2.4  --   --   --   --    ALB  --   --   --   --  2.4*  --    ALT  --   --   --   --  69*  --    INR  --   --   --   --   --  1.2    < > = values in this interval not displayed. No results found for this or any previous visit. All Cardiac Markers in the last 24 hours:  No results found for: CPK, CK, CKMMB, CKMB, RCK3, CKMBT, CKNDX, CKND1, NATHALY, TROPT, TROIQ, MANJULA, TROPT, TNIPOC, BNP, BNPP    Last Lipid:  No results found for: CHOL, CHOLX, CHLST, CHOLV, HDL, HDLP, LDL, LDLC, DLDLP, TGLX, TRIGL, TRIGP, CHHD, CHHDX    Cardiographics:     EKG Results       Procedure 720 Value Units Date/Time    EKG, 12 LEAD, SUBSEQUENT [[id number]] Collected: 10/03/22 1003    Order Status: Completed Updated: 10/03/22 1042     Ventricular Rate 129 BPM      Atrial Rate 129 BPM      QRS Duration 84 ms      Q-T Interval 278 ms      QTC Calculation (Bezet) 407 ms      Calculated R Axis 16 degrees      Calculated T Axis 32 degrees      Diagnosis --     Atrial fibrillation with rapid ventricular response  Abnormal ECG  No previous ECGs available  Confirmed by Chio Horn MD, Presley Mccrary ([id number]) on 10/3/2022 10:42:04 AM                      XR Results (most recent):  Results from Hospital Encounter encounter on 10/01/22    XR GASTROGRAFFIN SMALL BOWEL    Narrative  EXAM: Small Bowel Follow Through    INDICATION: Evaluation for small bowel obstruction.   Status post incision and  drainage of perirectal abscess. TECHNIQUE: Gastrografin small bowel follow-through    COMPARISON: None    FINDINGS:    The  radiograph of the abdomen gaseous distention of the colon. There is  no evidence of free air or organomegaly. A drain is seen in the right pelvis. Gastrografin contrast was self-administered by the patient. Initial post film  obtained demonstrates: contrast in the antrum of the stomach and small bowel. Subsequent images were obtained until contrast reached the colon. Limited evaluation of mucosal detail secondary to use of water-soluble contrast.  Initial post film obtained demonstrates contrast in the stomach and small  bowel. Small bowel follow-through examination demonstrates contrast flowing  from the stomach to cecum between 20 minutes to 40 minutes. No focal suspicious  mucosal abnormality, narrowing, or outpouching identified within limits of exam.  Small bowel loops are within normal limits of caliber. No evidence of  obstruction.     Fluoroscopy time: 0 minutes    Fluoroscopy Images: 0    Impression  No evidence for high-grade small bowel obstruction during exam.        Signed By: Sloan Ruby PA-C     October 3, 2022

## 2022-10-03 NOTE — PROGRESS NOTES
conducted an initial consultation and Spiritual Assessment for Krista Shi, who is a 63 y.o.,male. Patients Primary Language is: [place]. According to the patients EMR Adventism Affiliation is: No preference. The reason the Patient came to the hospital is:   Patient Active Problem List    Diagnosis Date Noted    Cellulitis and abscess of buttock 10/01/2022    Hyponatremia 10/01/2022    Roma-rectal abscess 10/01/2022    Sepsis ([facility] [address] 10/01/2022    DANIA (acute kidney injury) ([facility] [address] 10/01/2022    Elevated LFTs 10/01/2022        The  provided the following Interventions:  Initiated a relationship of care and support. Explored issues of jose, belief, spirituality and Shinto/ritual needs while hospitalized. Listened empathically. Provided information about Spiritual Care Services. Chart reviewed. The following outcomes where achieved:  Patient is not interested at this time in completing an Advance Medical Directive.  confirmed Patient's Adventism Affiliation. Patient expressed gratitude for 's visit. Assessment:  Strong support from family. Patient does not have any Shinto/cultural needs that will affect patients preferences in health care. There are no spiritual or Shinto issues which require intervention at this time. Plan:  Chaplains will continue to follow and will provide pastoral care on an as needed/requested basis.  recommends bedside caregivers page  on duty if patient shows signs of acute spiritual or emotional distress.     [address]  [phone])

## 2022-10-03 NOTE — PROGRESS NOTES
GNR on anaerobic bottle collected [email or web address] reported by [facility], lab. Dr. Juan F Estrada made aware of the result. No orders at this time. Primary RN made aware.

## 2022-10-03 NOTE — PROGRESS NOTES
[address] [facility] Pharmacokinetic Monitoring Service - Vancomycin    Consulting Provider: Calin Maharaj MD  Indication: Sepsis of Unknown Etiology  Target Concentration: Goal AUC/JAZZY 400-600 mg*hr/L  Day of Therapy: 3  Additional Antimicrobials: Piperacillin/Tazobactam    Pertinent Laboratory Values: Wt Readings from Last 1 Encounters:   10/01/22 102.1 kg (225 lb)     Temp Readings from Last 1 Encounters:   10/03/22 97.6 °F (36.4 °C)     Recent Labs     10/03/22  0108 10/02/22  0047 10/01/22  2016 10/01/22  1049   CREA 1.20 1.22 1.30 1.40*   BUN 29* 17 15 13       No components found for: PROCAL  Estimated Creatinine Clearance: 79.6 mL/min (based on SCr of 1.2 mg/dL). Recent Labs     10/03/22  0108 10/02/22  0047   WBC 19.5* 19.3*       Pertinent Cultures:  Culture Date Source Results   10/1 Blood  ANAEROBIC BOTTLE GRAM NEGATIVE RODS   10/1 Wound (rectal) GPC, GNR   MRSA Nasal Swab: N/A. Non-respiratory infection. .        Assessment:  Date/Time Current Dose Concentration Timing of Concentration (h) AUC   10/01 2028 2 gm IV x 1 - - -   10/02 2213 750 mg q12h - - -   10/03 0108 - 17.5 3 409          Note: Serum concentrations collected for AUC dosing may appear elevated if collected in close proximity to the dose administered, this is not necessarily an indication of toxicity    Plan:  Continue Vancomycin 750 mg IV q12h  Pharmacy will continue to monitor patient and adjust therapy as indicated    Thank you for the consult,  HOANG Bynum  10/3/2022 3:23 AM

## 2022-10-03 NOTE — PROGRESS NOTES
POD# 2    Admit Date: 10/1/2022    Assessment    Gerson Morse is a 63 y.o. male POD 2 s/p incision and drainage of perirectal abscess     Patient Active Problem List   Diagnosis Code    Cellulitis and abscess of buttock L02.31, L03.317    Hyponatremia E87.1    Roma-rectal abscess K61.1    Sepsis ([facility] [address] A41.9    DANIA (acute kidney injury) ([facility] [address] N17.9    Elevated LFTs R79.89       Plan  -continue daily wound care  -antibiotics per ID recommendations  -no undrained collections noted on physical exam- will continue to follow along and monitor leukocytosis    Subjective    Overnight events: Reports moderate pain this morning with dressing change but feeling better. Objective    Physical Exam:  Visit Vitals  /80 (BP 1 Location: Right upper arm, BP Patient Position: At rest;Reclining)   Pulse 91   Temp 97.7 °F (36.5 °C)   Resp 18   Ht 5' 11\" (1.803 m)   Wt 102.1 kg (225 lb)   SpO2 96%   BMI 31.38 kg/m²       Intake/Output Summary (Last 24 hours) at 10/3/2022 1235  Last data filed at 10/3/2022 0516  Gross per 24 hour   Intake 540 ml   Output 1760 ml   Net -1220 ml     Physical Exam  Skin:     General: Skin is warm and dry.       Comments: Moderate induration buttock- packing removed and wound probed- no undrained fluid collection, mallencot with minimal bloody drainage in bag             Melvin Chester DO  Phone: [phone]

## 2022-10-03 NOTE — PROGRESS NOTES
Nurse paged to state that patient's heart rates in the 140s to 150s. Patient is not complaining of any chest pain. Patient's chart reviewed, no history of atrial fibrillation. EKG reviewed, shows A. fib with RVR. Last known blood pressure 120/80, will give 2.5 of Lopressor IV x1, cardiology consulted.   Will order echocardiogram.

## 2022-10-03 NOTE — ROUTINE PROCESS
Bedside and Verbal shift change report given to [address] Lui Rosenbaum (oncoming nurse) by Leonides Lay RN (offgoing nurse). Report included the following information SBAR, Kardex, Intake/Output, MAR, and Recent Results. Patient alert resting quietly in bed. Call bell and phone in reach. 0600 Wound Dressing changed. Patient tolerated well.

## 2022-10-04 LAB
ANION GAP SERPL CALC-SCNC: 5 MMOL/L (ref 3–18)
BACTERIA SPEC CULT: ABNORMAL
BUN SERPL-MCNC: 19 MG/DL (ref 7–18)
BUN/CREAT SERPL: 17 (ref 12–20)
CALCIUM SERPL-MCNC: 8.3 MG/DL (ref 8.5–10.1)
CHLORIDE SERPL-SCNC: 113 MMOL/L (ref 100–111)
CO2 SERPL-SCNC: 24 MMOL/L (ref 21–32)
CREAT SERPL-MCNC: 1.13 MG/DL (ref 0.6–1.3)
ERYTHROCYTE [DISTWIDTH] IN BLOOD BY AUTOMATED COUNT: 13.9 % (ref 11.6–14.5)
GLUCOSE SERPL-MCNC: 94 MG/DL (ref 74–99)
GRAM STN SPEC: ABNORMAL
HCT VFR BLD AUTO: 40.1 % (ref 36–48)
HGB BLD-MCNC: 13.1 G/DL (ref 13–16)
MAGNESIUM SERPL-MCNC: 2.5 MG/DL (ref 1.6–2.6)
MCH RBC QN AUTO: 26.7 PG (ref 24–34)
MCHC RBC AUTO-ENTMCNC: 32.7 G/DL (ref 31–37)
MCV RBC AUTO: 81.7 FL (ref 78–100)
NRBC # BLD: 0 K/UL (ref 0–0.01)
NRBC BLD-RTO: 0 PER 100 WBC
PLATELET # BLD AUTO: 436 K/UL (ref 135–420)
PMV BLD AUTO: 9.3 FL (ref 9.2–11.8)
POTASSIUM SERPL-SCNC: 3.9 MMOL/L (ref 3.5–5.5)
RBC # BLD AUTO: 4.91 M/UL (ref 4.35–5.65)
SERVICE CMNT-IMP: ABNORMAL
SODIUM SERPL-SCNC: 142 MMOL/L (ref 136–145)
WBC # BLD AUTO: 6.6 K/UL (ref 4.6–13.2)

## 2022-10-04 PROCEDURE — 74011250637 HC RX REV CODE- 250/637: Performed by: HOSPITALIST

## 2022-10-04 PROCEDURE — 83735 ASSAY OF MAGNESIUM: CPT

## 2022-10-04 PROCEDURE — 74011250637 HC RX REV CODE- 250/637: Performed by: PHYSICIAN ASSISTANT

## 2022-10-04 PROCEDURE — 65270000046 HC RM TELEMETRY

## 2022-10-04 PROCEDURE — 74011250637 HC RX REV CODE- 250/637: Performed by: SURGERY

## 2022-10-04 PROCEDURE — 85027 COMPLETE CBC AUTOMATED: CPT

## 2022-10-04 PROCEDURE — 80048 BASIC METABOLIC PNL TOTAL CA: CPT

## 2022-10-04 PROCEDURE — 74011000258 HC RX REV CODE- 258: Performed by: INTERNAL MEDICINE

## 2022-10-04 PROCEDURE — 36415 COLL VENOUS BLD VENIPUNCTURE: CPT

## 2022-10-04 PROCEDURE — 99232 SBSQ HOSP IP/OBS MODERATE 35: CPT | Performed by: HOSPITALIST

## 2022-10-04 PROCEDURE — 99232 SBSQ HOSP IP/OBS MODERATE 35: CPT | Performed by: INTERNAL MEDICINE

## 2022-10-04 PROCEDURE — 74011250636 HC RX REV CODE- 250/636: Performed by: INTERNAL MEDICINE

## 2022-10-04 PROCEDURE — 74011000250 HC RX REV CODE- 250: Performed by: INTERNAL MEDICINE

## 2022-10-04 RX ORDER — METOPROLOL TARTRATE 50 MG/1
50 TABLET ORAL 2 TIMES DAILY
Status: DISCONTINUED | OUTPATIENT
Start: 2022-10-04 | End: 2022-10-05 | Stop reason: HOSPADM

## 2022-10-04 RX ADMIN — FAMOTIDINE 20 MG: 20 TABLET ORAL at 17:46

## 2022-10-04 RX ADMIN — PIPERACILLIN AND TAZOBACTAM 3.38 G: 3; .375 INJECTION, POWDER, FOR SOLUTION INTRAVENOUS at 05:31

## 2022-10-04 RX ADMIN — ENOXAPARIN SODIUM 40 MG: 100 INJECTION SUBCUTANEOUS at 09:12

## 2022-10-04 RX ADMIN — SODIUM CHLORIDE, PRESERVATIVE FREE 10 ML: 5 INJECTION INTRAVENOUS at 21:20

## 2022-10-04 RX ADMIN — OXYCODONE HYDROCHLORIDE AND ACETAMINOPHEN 1 TABLET: 5; 325 TABLET ORAL at 20:47

## 2022-10-04 RX ADMIN — PIPERACILLIN AND TAZOBACTAM 3.38 G: 3; .375 INJECTION, POWDER, FOR SOLUTION INTRAVENOUS at 15:23

## 2022-10-04 RX ADMIN — METOPROLOL TARTRATE 50 MG: 50 TABLET, FILM COATED ORAL at 17:46

## 2022-10-04 RX ADMIN — OXYCODONE HYDROCHLORIDE AND ACETAMINOPHEN 1 TABLET: 5; 325 TABLET ORAL at 05:33

## 2022-10-04 RX ADMIN — METOPROLOL TARTRATE 25 MG: 25 TABLET, FILM COATED ORAL at 09:12

## 2022-10-04 RX ADMIN — FAMOTIDINE 20 MG: 20 TABLET ORAL at 09:12

## 2022-10-04 RX ADMIN — PIPERACILLIN AND TAZOBACTAM 3.38 G: 3; .375 INJECTION, POWDER, FOR SOLUTION INTRAVENOUS at 20:47

## 2022-10-04 RX ADMIN — SODIUM CHLORIDE, PRESERVATIVE FREE 10 ML: 5 INJECTION INTRAVENOUS at 15:24

## 2022-10-04 RX ADMIN — SODIUM CHLORIDE, PRESERVATIVE FREE 10 ML: 5 INJECTION INTRAVENOUS at 05:34

## 2022-10-04 NOTE — PROGRESS NOTES
POD# 3    Admit Date: 10/1/2022    Assessment    Mati Franco is a 63 y.o. male POD 3 s/p incision and drainage of perirectal abscess    Patient Active Problem List   Diagnosis Code    Cellulitis and abscess of buttock L02.31, L03.317    Hyponatremia E87.1    Roma-rectal abscess K61.1    Sepsis ([facility] [address] A41.9    DANIA (acute kidney injury) ([facility] [address] N17.9    Elevated LFTs R79.89       Plan  -continue daily wound care  -if leukocytosis remains elevated would repeat CT scan of the pelvis as this collection was extremely large and deep with drain in deep recess of cavity  -continue antibiotics by medical team    Subjective    Overnight events: No acute events overnight. Pain is 3/10.      Objective    Physical Exam:   Visit Vitals  /72 (BP 1 Location: Left upper arm, BP Patient Position: Lying)   Pulse 80   Temp 97.7 °F (36.5 °C)   Resp 18   Ht 5' 11\" (1.803 m)   Wt 102.1 kg (225 lb)   SpO2 100%   BMI 31.38 kg/m²       Intake/Output Summary (Last 24 hours) at 10/4/2022 0711  Last data filed at 10/4/2022 0535  Gross per 24 hour   Intake 500 ml   Output 1220 ml   Net -720 ml     Physical Exam  Skin:     Comments: Moderate induration of the buttock- mallencot drain with minimal output             Negra George DO  Phone: [phone]

## 2022-10-04 NOTE — PROGRESS NOTES
Problem: Falls - Risk of  Goal: *Absence of Falls  Description: Document Nasra Rizo Fall Risk and appropriate interventions in the flowsheet. Outcome: Progressing Towards Goal  Note: Fall Risk Interventions:  Mobility Interventions: Patient to call before getting OOB, Communicate number of staff needed for ambulation/transfer, Strengthening exercises (ROM-active/passive)         Medication Interventions: Evaluate medications/consider consulting pharmacy, Patient to call before getting OOB, Teach patient to arise slowly                   Problem: Pressure Injury - Risk of  Goal: *Prevention of pressure injury  Description: Document Griffin Scale and appropriate interventions in the flowsheet.   Outcome: Progressing Towards Goal  Note: Pressure Injury Interventions:  Sensory Interventions: Monitor skin under medical devices, Pressure redistribution bed/mattress (bed type), Assess changes in LOC, Maintain/enhance activity level    Moisture Interventions: Absorbent underpads, Contain wound drainage    Activity Interventions: Pressure redistribution bed/mattress(bed type), Increase time out of bed    Mobility Interventions: Pressure redistribution bed/mattress (bed type), HOB 30 degrees or less    Nutrition Interventions: Document food/fluid/supplement intake, Discuss nutritional consult with provider, Offer support with meals,snacks and hydration

## 2022-10-04 NOTE — PROGRESS NOTES
[facility]  Progress Note    Patient: Tony Kwok Age: 63 y.o. : 1960 MR#: [id number] SSN: [id number]  Date/Time: 10/4/2022     Subjective:     Patient seen and evaluated, lying in bed, no acute distress. 71-year-old male with a history of hypertension presented to the emergency room secondary to pain in the right buttock with swelling. Patient noted to have right-sided perirectal abscess status post I&D by general surgery. 10/3-nurse paged to state that patient had an elevated heart rate, twelve-lead EKG done, shows new onset A. fib with RVR. IV Lopressor 2.5 mg given x1. Cardiology consulted. Per cardiology consult continue p.o. Lopressor. Continue aspirin 81 mg p.o. daily  Will follow    10/4-patient remains in A. fib, cardiology increased dose of Lopressor to 50 mg twice daily, leukocytosis has improved. Discussed with ID, anticipate discharge home in a.m. with p.o. antibiotics. Await surgery input regarding drain and when it can be removed, will need to consult wound care prior to discharge. Assessment/Plan:     Sepsis secondary to perirectal abscess right side status post I&D by general surgery-continue broad-spectrum IV antibiotics, local wound care. ID on board. New onset A. fib with RVR-continue beta-blockers for rate control, OAC with aspirin 81 mg p.o. daily  Leukocytosis secondary to #1-continue IV antibiotics. DVT prophylaxis-SCDs  Full code    Anticipate discharge home in 1 to 2 days.           Chasity Wagner MD  10/04/22      Case discussed with:  []Patient  []Family  []Nursing  []Case Management  DVT Prophylaxis:  []Lovenox  []Hep SQ  []SCDs  []Coumadin   []On Heparin gtt    Objective:   VS: Visit Vitals  BP (!) 134/94 (BP 1 Location: Right upper arm, BP Patient Position: At rest)   Pulse 79   Temp 97.4 °F (36.3 °C)   Resp 18   Ht 5' 11\" (1.803 m)   Wt 102.1 kg (225 lb)   SpO2 100%   BMI 31.38 kg/m²        Tmax/24hrs: Temp (24hrs), Av.9 °F (36.6 °C), Min:97.4 °F (36.3 °C), Max:98.8 °F (37.1 °C)  IOBRIEF  Intake/Output Summary (Last 24 hours) at 10/4/2022 1419  Last data filed at 10/4/2022 0535  Gross per 24 hour   Intake 280 ml   Output 1220 ml   Net -940 ml         General:  Alert, cooperative, no acute distress    Pulmonary:  CTA Bilaterally. No Wheezing/Rhonchi/Rales. Cardiovascular: Regular rate and Rhythm. GI:  Soft, Non distended, Non tender. + Bowel sounds. Extremities:  No edema, cyanosis, clubbing. No calf tenderness. Neurologic: Alert and oriented X 3. No acute neuro deficits. Additional:    Medications:   Current Facility-Administered Medications   Medication Dose Route Frequency    metoprolol tartrate (LOPRESSOR) tablet 50 mg  50 mg Oral BID    famotidine (PEPCID) tablet 20 mg  20 mg Oral BID    morphine injection 2 mg  2 mg IntraVENous Q4H PRN    piperacillin-tazobactam (ZOSYN) 3.375 g in 0.9% sodium chloride (MBP/ADV) 100 mL MBP  3.375 g IntraVENous Q8H    sodium chloride (NS) flush 5-40 mL  5-40 mL IntraVENous Q8H    sodium chloride (NS) flush 5-40 mL  5-40 mL IntraVENous PRN    acetaminophen (TYLENOL) tablet 650 mg  650 mg Oral Q6H PRN    Or    acetaminophen (TYLENOL) suppository 650 mg  650 mg Rectal Q6H PRN    polyethylene glycol (MIRALAX) packet 17 g  17 g Oral DAILY PRN    promethazine (PHENERGAN) tablet 12.5 mg  12.5 mg Oral Q6H PRN    Or    ondansetron (ZOFRAN) injection 4 mg  4 mg IntraVENous Q6H PRN    enoxaparin (LOVENOX) injection 40 mg  40 mg SubCUTAneous DAILY    hydrALAZINE (APRESOLINE) 20 mg/mL injection 10 mg  10 mg IntraVENous Q6H PRN    oxyCODONE-acetaminophen (PERCOCET) 5-325 mg per tablet 1 Tablet  1 Tablet Oral Q4H PRN       Imaging:   XR Results (most recent):  Results from Hospital Encounter encounter on 10/01/22    XR GASTROGRAFFIN SMALL BOWEL    Narrative  EXAM: Small Bowel Follow Through    INDICATION: Evaluation for small bowel obstruction.   Status post incision and  drainage of perirectal abscess. TECHNIQUE: Gastrografin small bowel follow-through    COMPARISON: None    FINDINGS:    The  radiograph of the abdomen gaseous distention of the colon. There is  no evidence of free air or organomegaly. A drain is seen in the right pelvis. Gastrografin contrast was self-administered by the patient. Initial post film  obtained demonstrates: contrast in the antrum of the stomach and small bowel. Subsequent images were obtained until contrast reached the colon. Limited evaluation of mucosal detail secondary to use of water-soluble contrast.  Initial post film obtained demonstrates contrast in the stomach and small  bowel. Small bowel follow-through examination demonstrates contrast flowing  from the stomach to cecum between 20 minutes to 40 minutes. No focal suspicious  mucosal abnormality, narrowing, or outpouching identified within limits of exam.  Small bowel loops are within normal limits of caliber. No evidence of  obstruction. Fluoroscopy time: 0 minutes    Fluoroscopy Images: 0    Impression  No evidence for high-grade small bowel obstruction during exam.       CT Results (most recent):  Results from Hospital Encounter encounter on 10/01/22    CT PELV W CONT    Addendum 10/1/2022  1:30 PM  Addendum: Findings communicated to Dr. Francis Ag by telephone. Narrative  CT PELVIS WITH INTRAVENOUS CONTRAST. COMPARISON: None available. INDICATIONS: Rectal pain. Concern for rectal abscess.     TECHNIQUE: 5 mm axial cuts were performed through the chest, abdomen and pelvis  after administration of 100cc of Omnipaque 350 and oral contrast material.    All CT scans at this facility are performed using dose optimization technique as  appropriate to a performed exam, to include automated exposure control,  adjustment of the mA and/or KV according to patient's size (including  appropriate matching for site-specific examinations), or use of iterative  reconstruction technique. FINDINGS:    CT PELVIS:  There is a large gas containing perianal and perirectal fluid collection (13 x 6  x 9 cm) extending from the right gluteal cleft superiorly into the right,  greater than left, levator ani muscle complex. Both superior components of the  collection appear to communicate with each other (400:53). The thickened rectum  and pelvic floor muscles are displaced to the right. No defect is visualized in  the rectal wall, although there is a possible tiny locule of gas in the right  perirectal fat on axial image 33. No extraluminal enteric contrast. Fat  stranding adjacent to the urinary bladder is likely reactive. Right inguinal and  pelvic lymphadenopathy, likely reactive. No definite osseous erosions. Bilateral  pars defects at L5-S1 with grade 1 anterolisthesis. Impression  1. Large gas containing perianal and perirectal fluid collection/abscess  extending from the right gluteal cleft into the right, greater than left,  levator ani muscle complex. No defect is observed in the thickened rectal wall  and there is no enteric contrast leakage, but there is a questioned tiny locule  of gas in the right perirectal fat on axial image 33.  2.  Bilateral L5-S1 spondylolysis with spondylolisthesis. 3.  Thickening of the bladder wall and inguinal/pelvic lymphadenopathy, likely  reactive. MRI Results (most recent):  No results found for this or any previous visit.         Labs:    Recent Results (from the past 48 hour(s))   METABOLIC PANEL, BASIC    Collection Time: 10/03/22  1:08 AM   Result Value Ref Range    Sodium 135 (L) 136 - 145 mmol/L    Potassium 4.2 3.5 - 5.5 mmol/L    Chloride 109 100 - 111 mmol/L    CO2 22 21 - 32 mmol/L    Anion gap 4 3.0 - 18 mmol/L    Glucose 119 (H) 74 - 99 mg/dL    BUN 29 (H) 7.0 - 18 MG/DL    Creatinine 1.20 0.6 - 1.3 MG/DL    BUN/Creatinine ratio 24 (H) 12 - 20      GFR est AA >60 >60 ml/min/1.73m2    GFR est non-AA >60 >60 ml/min/1.73m2 Calcium 8.2 (L) 8.5 - 10.1 MG/DL   MAGNESIUM    Collection Time: 10/03/22  1:08 AM   Result Value Ref Range    Magnesium 2.7 (H) 1.6 - 2.6 mg/dL   CBC W/O DIFF    Collection Time: 10/03/22  1:08 AM   Result Value Ref Range    WBC 19.5 (H) 4.6 - 13.2 K/uL    RBC 4.72 4.35 - 5.65 M/uL    HGB 12.6 (L) 13.0 - 16.0 g/dL    HCT 38.8 36.0 - 48.0 %    MCV 82.2 78.0 - 100.0 FL    MCH 26.7 24.0 - 34.0 PG    MCHC 32.5 31.0 - 37.0 g/dL    RDW 13.4 11.6 - 14.5 %    PLATELET 604 390 - 510 K/uL    MPV 9.6 9.2 - 11.8 FL    NRBC 0.0 0  WBC    ABSOLUTE NRBC 0.00 0.00 - 0.01 K/uL   VANCOMYCIN, RANDOM    Collection Time: 10/03/22  1:08 AM   Result Value Ref Range    Vancomycin, random 17.5 5.0 - 40.0 UG/ML   EKG, 12 LEAD, SUBSEQUENT    Collection Time: 10/03/22 10:03 AM   Result Value Ref Range    Ventricular Rate 129 BPM    Atrial Rate 129 BPM    QRS Duration 84 ms    Q-T Interval 278 ms    QTC Calculation (Bezet) 407 ms    Calculated R Axis 16 degrees    Calculated T Axis 32 degrees    Diagnosis       Atrial fibrillation with rapid ventricular response  Abnormal ECG  No previous ECGs available  Confirmed by Steph Hill MD, Maritza Ignacio ([id number]) on 10/3/2022 10:42:04 AM     ECHO ADULT COMPLETE    Collection Time: 10/03/22  5:00 PM   Result Value Ref Range    IVSd 1.0 0.6 - 1.0 cm    LVIDd 4.6 4.2 - 5.9 cm    LVIDs 2.8 cm    LVPWd 1.2 (A) 0.6 - 1.0 cm    LA Volume A/L 62 mL    LA Volume 2C 55 18 - 58 mL    LA Volume 4C 61 (A) 18 - 58 mL    Ascending Aorta 3.7 cm    Aortic Root 3.7 cm    Fractional Shortening 2D 39 28 - 44 %    LVIDd Index 2.07 cm/m2    LVIDs Index 1.26 cm/m2    LV RWT Ratio 0.52     LV Mass 2D 181.2 88 - 224 g    LV Mass 2D Index 81.6 49 - 115 g/m2    LA Volume Index A/L 28 16 - 34 mL/m2    LA Volume Index 2C 25 16 - 34 mL/m2    LA Volume Index 4C 27 16 - 34 mL/m2    Ao Root Index 1.67 cm/m2    Ascending Aorta Index 1.67 cm/m2    TAPSE 1.4 (A) 1.7 cm    RV Free Wall Peak S' 9 cm/s   CULTURE, BLOOD    Collection Time: 10/03/22  7:02 PM    Specimen: Blood   Result Value Ref Range    Special Requests: NO SPECIAL REQUESTS      Culture result: NO GROWTH AFTER 12 HOURS     MAGNESIUM    Collection Time: 10/04/22  7:22 AM   Result Value Ref Range    Magnesium 2.5 1.6 - 2.6 mg/dL   CBC W/O DIFF    Collection Time: 10/04/22  7:22 AM   Result Value Ref Range    WBC 6.6 4.6 - 13.2 K/uL    RBC 4.91 4.35 - 5.65 M/uL    HGB 13.1 13.0 - 16.0 g/dL    HCT 40.1 36.0 - 48.0 %    MCV 81.7 78.0 - 100.0 FL    MCH 26.7 24.0 - 34.0 PG    MCHC 32.7 31.0 - 37.0 g/dL    RDW 13.9 11.6 - 14.5 %    PLATELET 565 (H) 991 - 420 K/uL    MPV 9.3 9.2 - 11.8 FL    NRBC 0.0 0  WBC    ABSOLUTE NRBC 0.00 0.00 - 7.02 K/uL   METABOLIC PANEL, BASIC    Collection Time: 10/04/22  7:22 AM   Result Value Ref Range    Sodium 142 136 - 145 mmol/L    Potassium 3.9 3.5 - 5.5 mmol/L    Chloride 113 (H) 100 - 111 mmol/L    CO2 24 21 - 32 mmol/L    Anion gap 5 3.0 - 18 mmol/L    Glucose 94 74 - 99 mg/dL    BUN 19 (H) 7.0 - 18 MG/DL    Creatinine 1.13 0.6 - 1.3 MG/DL    BUN/Creatinine ratio 17 12 - 20      eGFR >60 >60 ml/min/1.73m2    Calcium 8.3 (L) 8.5 - 10.1 MG/DL       Signed By: Julia Orona MD     October 4, 2022      I spent 25 minutes with the patient in face-to-face consultation, of which greater than 50% was spent in counseling and coordination of care as described above    Disclaimer: Sections of this note are dictated using utilizing voice recognition software. Minor typographical errors may be present. If questions arise, please do not hesitate to contact me or call our department.

## 2022-10-04 NOTE — PROGRESS NOTES
Infectious Disease progress Note        Reason:sepsis, perirectal abscess    Current abx Prior abx   Zosyn since 10/1/2022 Clindamycin  Vancomycin 10/1-10/3     Lines:       Assessment :  63 y.o. male with medical co-morbidities including hypertension, who came to the ER on 10/1/22 with right gluteal pain and swelling. Clinical presentation c/w sepsis (POA) due to Bacteroides bloodstream infection, perirectal abscess s/p I&D on 10/1/22. Intra op findings noted. Wound cultures 10/1-moderate E. coli, anaerobic gram-negative zena, mixed skin vero    Most likely source of gram-negative bloodstream infection is perirectal abscess    Surgery follow-up appreciated    Improved leukocytosis. Still with significant pain/induration/tenderness surrounding the surgical site  Surgery recommendations regarding repeat imaging studies based on the clinical course noted    Recommendations:    Continue Zosyn. Follow-up Repeat blood culture 10/3  Follow-up surgery recommendations regarding wound care  Agree with surgery recommendations regarding repeat imaging study if persistent pain/induration noted around the surgical site or worsening leukocytosis      Above plan was discussed in details with patient,  and dr Tiffanie Frazier. Please call me if any further questions or concerns. Will continue to participate in the care of this patient. HPI:    Complains of significant pain surrounding the surgical site. States that he does not wish to go home with pain like this. Denies subjective fever/chills/nausea        Past Medical History:   Diagnosis Date    History of tobacco abuse     Hypertension        History reviewed. No pertinent surgical history. There are no discharge medications for this patient.       Current Facility-Administered Medications   Medication Dose Route Frequency    metoprolol tartrate (LOPRESSOR) tablet 25 mg  25 mg Oral BID    famotidine (PEPCID) tablet 20 mg  20 mg Oral BID    morphine injection 2 mg  2 mg IntraVENous Q4H PRN    piperacillin-tazobactam (ZOSYN) 3.375 g in 0.9% sodium chloride (MBP/ADV) 100 mL MBP  3.375 g IntraVENous Q8H    sodium chloride (NS) flush 5-40 mL  5-40 mL IntraVENous Q8H    sodium chloride (NS) flush 5-40 mL  5-40 mL IntraVENous PRN    acetaminophen (TYLENOL) tablet 650 mg  650 mg Oral Q6H PRN    Or    acetaminophen (TYLENOL) suppository 650 mg  650 mg Rectal Q6H PRN    polyethylene glycol (MIRALAX) packet 17 g  17 g Oral DAILY PRN    promethazine (PHENERGAN) tablet 12.5 mg  12.5 mg Oral Q6H PRN    Or    ondansetron (ZOFRAN) injection 4 mg  4 mg IntraVENous Q6H PRN    enoxaparin (LOVENOX) injection 40 mg  40 mg SubCUTAneous DAILY    hydrALAZINE (APRESOLINE) 20 mg/mL injection 10 mg  10 mg IntraVENous Q6H PRN    oxyCODONE-acetaminophen (PERCOCET) 5-325 mg per tablet 1 Tablet  1 Tablet Oral Q4H PRN       Allergies: Patient has no known allergies.     Family History   Problem Relation Age of Onset    Heart Attack Mother     Heart Disease Mother     Hypertension Father     Heart Disease Sister     Heart Attack Sister      Social History     Socioeconomic History    Marital status: SINGLE     Spouse name: Not on file    Number of children: Not on file    Years of education: Not on file    Highest education level: Not on file   Occupational History    Not on file   Tobacco Use    Smoking status: Former     Types: Cigarettes    Smokeless tobacco: Never   Substance and Sexual Activity    Alcohol use: Not Currently    Drug use: Not Currently    Sexual activity: Not on file   Other Topics Concern    Not on file   Social History Narrative    Not on file     Social Determinants of Health     Financial Resource Strain: Not on file   Food Insecurity: Not on file   Transportation Needs: Not on file   Physical Activity: Not on file   Stress: Not on file   Social Connections: Not on file   Intimate Partner Violence: Not on file   Housing Stability: Not on file     Social History     Tobacco Use   Smoking Status Former    Types: Cigarettes   Smokeless Tobacco Never        Temp (24hrs), Av.7 °F (36.5 °C), Min:97.5 °F (36.4 °C), Max:98 °F (36.7 °C)    Visit Vitals  /72 (BP 1 Location: Left upper arm, BP Patient Position: Lying)   Pulse 80   Temp 97.7 °F (36.5 °C)   Resp 18   Ht 5' 11\" (1.803 m)   Wt 102.1 kg (225 lb)   SpO2 100%   BMI 31.38 kg/m²       ROS: 12 point ROS obtained in details. Pertinent positives as mentioned in HPI,   otherwise negative    Physical Exam:    General:  Cooperative, Not in acute distress  HEENT: EOMI, supple neck, no JVD, dry oral mucosa  Cardiovascular: S1S2 regular, no rub/gallop   Pulmonary: air entry bilaterally, no wheezing, no crackle  GI:  Soft, non tender, non distended, +bs, no guarding   Extremities:  No pedal edema, +distal pulses appreciated   Neuro: AOx3, moving all extremities, no gross deficit. Skin: surgical dressing right gluteal region not opened, mallencot drain in place with minimal drainage, surrounding induration/tenderness  Psychiatry: Appropriate mood and affect    Labs: Results:   Chemistry Recent Labs     10/04/22  0722 10/03/22  0108 10/02/22  0047 10/01/22  2016 10/01/22  1049   GLU 94 119* 118*   < > 110*    135* 134*   < > 129*   K 3.9 4.2 4.1   < > 3.6   * 109 106   < > 96*   CO2 24 22 22   < > 25   BUN 19* 29* 17   < > 13   CREA 1.13 1.20 1.22   < > 1.40*   CA 8.3* 8.2* 8.0*   < > 9.1   AGAP 5 4 6   < > 8   BUCR 17 24* 14   < > 9*   AP  --   --   --   --  76   TP  --   --   --   --  7.9   ALB  --   --   --   --  2.4*   GLOB  --   --   --   --  5.5*   AGRAT  --   --   --   --  0.4*    < > = values in this interval not displayed.         CBC w/Diff Recent Labs     10/04/22  0722 10/03/22  0108 10/02/22  0047 10/01/22  1049   WBC 6.6 19.5* 19.3* 17.3*   RBC 4.91 4.72 4.28* 5.06   HGB 13.1 12.6* 11.4* 13.4   HCT 40.1 38.8 35.8* 41.1   * 371 359 384   GRANS  --   --   --  87*   LYMPH  --   --   --  3*   EOS  --   --   --  0 Microbiology Recent Labs     10/03/22  1902 10/02/22  1116 10/01/22  1705 10/01/22  1045 10/01/22  1000   CULT NO GROWTH AFTER 12 HOURS MRSA NOT PRESENT  Screening of patient nares for MRSA is for surveillance purposes and, if positive, to facilitate isolation considerations in high risk settings. It is not intended for automatic decolonization interventions per se as regimens are not sufficiently effective to warrant routine use. NO ANAEROBES ISOLATED THUS FAR  MODERATE GRAM NEGATIVE RODS*  LIGHT MIXED SKIN RHIANNA ISOLATED GRAM NEGATIVE RODS GROWING IN 1 OF 2 BOTTLES DRAWN No Site Indicated*  REMAINING BOTTLE(S) HAS/HAVE NO GROWTH SO FAR NO GROWTH 3 DAYS            RADIOLOGY:    All available imaging studies/reports in [facility] for this admission were reviewed    Total time spent >35 minutes. High complexity decision making was performed during the evaluation of this patient at high risk for decompensation      Above mentioned total time spent on reviewing the case/medical record/data/notes/EMR/patient examination/documentation/coordinating care with nurse/consultants, exclusive of procedures with complex decision making performed and > 50% time spent in face to face evaluation. Disclaimer: Sections of this note are dictated utilizing voice recognition software, which may have resulted in some phonetic based errors in grammar and contents. Even though attempts were made to correct all the mistakes, some may have been missed, and remained in the body of the document. If questions arise, please contact our department.     Dr. Sulema Xiao, Infectious Disease Specialist  [phone]  October 4, 2022  7:10 AM

## 2022-10-04 NOTE — PROGRESS NOTES
Cardiology Progress Note    Admit Date: 10/1/2022  Attending Cardiologist: Dr. Purnima Spencer:     -Sepsis, 2/2 perirectal abscess, s/p I&D  -AF with RVR, new dx this admission. Chasdsvasc 1, continue ASA only. -Echo this admission, EF 50-55%, no WMA. -HTN, no outpatient medications. Atrial fibrillation CHADSVASC2 score stroke risk:   63 y.o.                                        <65        + 0   male                                         Male     [de-identified]  CHF hx                                           No    + 0  HTN hx                                           Yes    +1  Stroke/TIA/Thromboembolism       No    +0  Vascular disease hx                       No    + 0  Diabetes Mellitus                            No    + 0   CHADSVASC2 score                    1      Annual Stroke Risk 0.6% - low-moderate risk          No Primary cardiologist, consult by Dr. Ruma Liriano:     Remains in AF with stable rates in the 90s-low 100s. Will increase po BB to 50 mg BID. Continued on ASA only. No further recommendations from a CV standpoint. Patient can follow up with Dr. Cherelle Bobo as an outpatient once discharged. ------------------------------------------------  Patient remains in atrial fibrillation with stable rates about 90 to 100 bpm.  We will increase beta-blocker today. No further interventions required. Aspirin only for stroke prevention. Please call if questions. I saw, examined, and evaluated this patient and performed the substantive portion of the encounter for > 50% of the time including extensive history, physical exam, and medical decision making as discussed with patient and next-of-kin as needed. I personally reviewed the patient's labs, tests, vitals, orders, medications, updated history, and other providers assessments as well. I personally agree with the findings as stated and the plan as documented. Odilia Ellis MD    Subjective:     No new complaints.  Denies SOB or palpitations.      Objective:      Patient Vitals for the past 8 hrs:   Temp Pulse Resp BP SpO2   10/04/22 0837 98.8 °F (37.1 °C) 99 18 (!) 142/86 98 %   10/04/22 0311 97.7 °F (36.5 °C) 80 18 112/72 100 %         Patient Vitals for the past 96 hrs:   Weight   10/03/22 1748 102.1 kg (225 lb)   10/01/22 0933 102.1 kg (225 lb)       TELE: AFIB               Current Facility-Administered Medications   Medication Dose Route Frequency Last Admin    metoprolol tartrate (LOPRESSOR) tablet 25 mg  25 mg Oral BID 25 mg at 10/04/22 0912    famotidine (PEPCID) tablet 20 mg  20 mg Oral BID 20 mg at 10/04/22 0912    morphine injection 2 mg  2 mg IntraVENous Q4H PRN 2 mg at 10/03/22 1811    piperacillin-tazobactam (ZOSYN) 3.375 g in 0.9% sodium chloride (MBP/ADV) 100 mL MBP  3.375 g IntraVENous Q8H 3.375 g at 10/04/22 0531    sodium chloride (NS) flush 5-40 mL  5-40 mL IntraVENous Q8H 10 mL at 10/04/22 0534    sodium chloride (NS) flush 5-40 mL  5-40 mL IntraVENous PRN      acetaminophen (TYLENOL) tablet 650 mg  650 mg Oral Q6H  mg at 10/01/22 1806    Or    acetaminophen (TYLENOL) suppository 650 mg  650 mg Rectal Q6H PRN      polyethylene glycol (MIRALAX) packet 17 g  17 g Oral DAILY PRN      promethazine (PHENERGAN) tablet 12.5 mg  12.5 mg Oral Q6H PRN      Or    ondansetron (ZOFRAN) injection 4 mg  4 mg IntraVENous Q6H PRN 4 mg at 10/01/22 2118    enoxaparin (LOVENOX) injection 40 mg  40 mg SubCUTAneous DAILY 40 mg at 10/04/22 0912    hydrALAZINE (APRESOLINE) 20 mg/mL injection 10 mg  10 mg IntraVENous Q6H PRN      oxyCODONE-acetaminophen (PERCOCET) 5-325 mg per tablet 1 Tablet  1 Tablet Oral Q4H PRN 1 Tablet at 10/04/22 0533         Intake/Output Summary (Last 24 hours) at 10/4/2022 1107  Last data filed at 10/4/2022 0535  Gross per 24 hour   Intake 500 ml   Output 1220 ml   Net -720 ml       Physical Exam:  General:  alert, cooperative, no distress, appears stated age  Neck:  no JVD  Lungs:  clear to auscultation bilaterally  Heart:  irregularly irregular rhythm  Abdomen:  abdomen is soft without significant tenderness, masses, organomegaly or guarding  Extremities:  extremities normal, atraumatic, no cyanosis or edema    Visit Vitals  BP (!) 142/86 (BP 1 Location: Right upper arm, BP Patient Position: Lying)   Pulse 99   Temp 98.8 °F (37.1 °C)   Resp 18   Ht 5' 11\" (1.803 m)   Wt 102.1 kg (225 lb)   SpO2 98%   BMI 31.38 kg/m²       Data Review:     Labs: Results:       Chemistry Recent Labs     10/04/22  0722 10/03/22  0108 10/02/22  0047   GLU 94 119* 118*    135* 134*   K 3.9 4.2 4.1   * 109 106   CO2 24 22 22   BUN 19* 29* 17   CREA 1.13 1.20 1.22   CA 8.3* 8.2* 8.0*   MG 2.5 2.7* 2.4   AGAP 5 4 6   BUCR 17 24* 14      CBC w/Diff Recent Labs     10/04/22  0722 10/03/22  0108 10/02/22  0047   WBC 6.6 19.5* 19.3*   RBC 4.91 4.72 4.28*   HGB 13.1 12.6* 11.4*   HCT 40.1 38.8 35.8*   * 371 359      Cardiac Enzymes No results found for: CPK, CK, CKMMB, CKMB, RCK3, CKMBT, CKNDX, CKND1, NATHALY, TROPT, TROIQ, MANJULA, TROPT, TNIPOC, BNP, BNPP   Coagulation No results for input(s): PTP, INR, APTT, INREXT in the last 72 hours. Lipid Panel No results found for: CHOL, CHOLPOCT, CHOLX, CHLST, CHOLV, 836242, HDL, HDLP, LDL, LDLC, DLDLP, 603724, VLDLC, VLDL, TGLX, TRIGL, TRIGP, TGLPOCT, CHHD, CHHDX   BNP No results found for: BNP, BNPP, XBNPT   Liver Enzymes No results for input(s): TP, ALB, TBIL, AP in the last 72 hours.     No lab exists for component: SGOT, GPT, DBIL   Thyroid Studies Lab Results   Component Value Date/Time    TSH 0.77 10/01/2022 10:00 AM          Signed By: Vanita Lyons PA-C     October 4, 2022

## 2022-10-05 ENCOUNTER — HOME HEALTH ADMISSION (OUTPATIENT)
Dept: HOME HEALTH SERVICES | Facility: HOME HEALTH | Age: 62
End: 2022-10-05
Payer: MEDICAID

## 2022-10-05 VITALS
RESPIRATION RATE: 18 BRPM | TEMPERATURE: 98.1 F | SYSTOLIC BLOOD PRESSURE: 143 MMHG | DIASTOLIC BLOOD PRESSURE: 85 MMHG | HEIGHT: 71 IN | HEART RATE: 61 BPM | WEIGHT: 225 LBS | BODY MASS INDEX: 31.5 KG/M2 | OXYGEN SATURATION: 98 %

## 2022-10-05 LAB
ANION GAP SERPL CALC-SCNC: 4 MMOL/L (ref 3–18)
BASOPHILS # BLD: 0.1 K/UL (ref 0–0.1)
BASOPHILS NFR BLD: 1 % (ref 0–2)
BUN SERPL-MCNC: 13 MG/DL (ref 7–18)
BUN/CREAT SERPL: 12 (ref 12–20)
CALCIUM SERPL-MCNC: 8.4 MG/DL (ref 8.5–10.1)
CHLORIDE SERPL-SCNC: 111 MMOL/L (ref 100–111)
CO2 SERPL-SCNC: 25 MMOL/L (ref 21–32)
CREAT SERPL-MCNC: 1.11 MG/DL (ref 0.6–1.3)
DIFFERENTIAL METHOD BLD: ABNORMAL
EOSINOPHIL # BLD: 0.2 K/UL (ref 0–0.4)
EOSINOPHIL NFR BLD: 4 % (ref 0–5)
ERYTHROCYTE [DISTWIDTH] IN BLOOD BY AUTOMATED COUNT: 13.9 % (ref 11.6–14.5)
GLUCOSE SERPL-MCNC: 80 MG/DL (ref 74–99)
HCT VFR BLD AUTO: 35 % (ref 36–48)
HGB BLD-MCNC: 11.5 G/DL (ref 13–16)
IMM GRANULOCYTES # BLD AUTO: 0.2 K/UL (ref 0–0.04)
IMM GRANULOCYTES NFR BLD AUTO: 3 % (ref 0–0.5)
LYMPHOCYTES # BLD: 2.5 K/UL (ref 0.9–3.6)
LYMPHOCYTES NFR BLD: 43 % (ref 21–52)
MCH RBC QN AUTO: 27.3 PG (ref 24–34)
MCHC RBC AUTO-ENTMCNC: 32.9 G/DL (ref 31–37)
MCV RBC AUTO: 83.1 FL (ref 78–100)
MONOCYTES # BLD: 0.7 K/UL (ref 0.05–1.2)
MONOCYTES NFR BLD: 12 % (ref 3–10)
NEUTS SEG # BLD: 2.2 K/UL (ref 1.8–8)
NEUTS SEG NFR BLD: 37 % (ref 40–73)
NRBC # BLD: 0 K/UL (ref 0–0.01)
NRBC BLD-RTO: 0 PER 100 WBC
PLATELET # BLD AUTO: 399 K/UL (ref 135–420)
PMV BLD AUTO: 9 FL (ref 9.2–11.8)
POTASSIUM SERPL-SCNC: 3.8 MMOL/L (ref 3.5–5.5)
RBC # BLD AUTO: 4.21 M/UL (ref 4.35–5.65)
SODIUM SERPL-SCNC: 140 MMOL/L (ref 136–145)
WBC # BLD AUTO: 5.8 K/UL (ref 4.6–13.2)

## 2022-10-05 PROCEDURE — 74011250636 HC RX REV CODE- 250/636: Performed by: INTERNAL MEDICINE

## 2022-10-05 PROCEDURE — 77030041076 HC DRSG AG OPTICELL MDII -A

## 2022-10-05 PROCEDURE — 74011250636 HC RX REV CODE- 250/636: Performed by: HOSPITALIST

## 2022-10-05 PROCEDURE — 36415 COLL VENOUS BLD VENIPUNCTURE: CPT

## 2022-10-05 PROCEDURE — 74011000250 HC RX REV CODE- 250: Performed by: INTERNAL MEDICINE

## 2022-10-05 PROCEDURE — 74011250637 HC RX REV CODE- 250/637: Performed by: HOSPITALIST

## 2022-10-05 PROCEDURE — 74011250637 HC RX REV CODE- 250/637: Performed by: SURGERY

## 2022-10-05 PROCEDURE — 74011250637 HC RX REV CODE- 250/637: Performed by: PHYSICIAN ASSISTANT

## 2022-10-05 PROCEDURE — 2709999900 HC NON-CHARGEABLE SUPPLY

## 2022-10-05 PROCEDURE — 74011000258 HC RX REV CODE- 258: Performed by: INTERNAL MEDICINE

## 2022-10-05 PROCEDURE — 80048 BASIC METABOLIC PNL TOTAL CA: CPT

## 2022-10-05 PROCEDURE — 99239 HOSP IP/OBS DSCHRG MGMT >30: CPT | Performed by: HOSPITALIST

## 2022-10-05 PROCEDURE — 85025 COMPLETE CBC W/AUTO DIFF WBC: CPT

## 2022-10-05 RX ORDER — OXYCODONE AND ACETAMINOPHEN 5; 325 MG/1; MG/1
1 TABLET ORAL
Qty: 12 TABLET | Refills: 0 | Status: SHIPPED | OUTPATIENT
Start: 2022-10-05 | End: 2022-10-08

## 2022-10-05 RX ORDER — LEVOFLOXACIN 750 MG/1
750 TABLET ORAL DAILY
Qty: 12 TABLET | Refills: 0 | Status: SHIPPED | OUTPATIENT
Start: 2022-10-05 | End: 2022-10-17

## 2022-10-05 RX ORDER — ENOXAPARIN SODIUM 100 MG/ML
30 INJECTION SUBCUTANEOUS EVERY 12 HOURS
Status: DISCONTINUED | OUTPATIENT
Start: 2022-10-05 | End: 2022-10-05 | Stop reason: HOSPADM

## 2022-10-05 RX ORDER — METOPROLOL TARTRATE 50 MG/1
50 TABLET ORAL 2 TIMES DAILY
Qty: 60 TABLET | Refills: 0 | Status: SHIPPED | OUTPATIENT
Start: 2022-10-05

## 2022-10-05 RX ORDER — METRONIDAZOLE 500 MG/1
500 TABLET ORAL 3 TIMES DAILY
Qty: 36 TABLET | Refills: 0 | Status: SHIPPED | OUTPATIENT
Start: 2022-10-05 | End: 2022-10-17

## 2022-10-05 RX ADMIN — ENOXAPARIN SODIUM 40 MG: 100 INJECTION SUBCUTANEOUS at 08:40

## 2022-10-05 RX ADMIN — PIPERACILLIN AND TAZOBACTAM 3.38 G: 3; .375 INJECTION, POWDER, FOR SOLUTION INTRAVENOUS at 04:37

## 2022-10-05 RX ADMIN — FAMOTIDINE 20 MG: 20 TABLET ORAL at 08:40

## 2022-10-05 RX ADMIN — OXYCODONE HYDROCHLORIDE AND ACETAMINOPHEN 1 TABLET: 5; 325 TABLET ORAL at 04:37

## 2022-10-05 RX ADMIN — METOPROLOL TARTRATE 50 MG: 50 TABLET, FILM COATED ORAL at 08:40

## 2022-10-05 RX ADMIN — SODIUM CHLORIDE, PRESERVATIVE FREE 10 ML: 5 INJECTION INTRAVENOUS at 06:29

## 2022-10-05 RX ADMIN — MORPHINE SULFATE 2 MG: 2 INJECTION, SOLUTION INTRAMUSCULAR; INTRAVENOUS at 11:48

## 2022-10-05 NOTE — PROGRESS NOTES
Bedside shift change report given to [facility], [address] [facility] (oncoming nurse) by Desi Hollingsworth RN (offgoing nurse). Report included the following information SBAR, Kardex, Intake/Output, MAR, and Recent Results.

## 2022-10-05 NOTE — WOUND CARE
Physical Exam  Room 204: focused wound assess  Discussed with primary nurse Tera RN & pt was premedicated for pain prior to starting. Right medial buttock, open surgical incision, [id number], undermining 8 cm at 12 o'clock position. Right lower buttock Mal[place]ot tube site: stitches clipped, mushroom tube removed without issues,   0.7x1. 2x 9cm deep at 12 o'clock position & communicates with medial buttock wound. Irrigated with saline syringes, gentle packing using silver hydrofiber dressing, 2 pieces, knotted at the ends to create one piece, covered with gauze, ABD pad, mesh pants. Discussed with Dr. Clearance Lundborg in person & with Dr. Singletary via phone. Pt Tolerated well with premedication with pain medication. Wound care orders right medial buttocks & right lower buttocks for hospital & home health care: Unit nursing staff to cleanse & irrigate with saline, apply [employer] gelling fiber dressing, rope/ribbon, knotted at the end to create one piece as needed, cover with gauze, ABD pad, mesh pants, change every 3 days & prn soilage. Will turn over care to nursing staff at this time. Discussed with Betty Ball.    Emery THOMASN, RN, [facility], [address]

## 2022-10-05 NOTE — PROGRESS NOTES
Infectious Disease progress Note        Reason:sepsis, perirectal abscess    Current abx Prior abx   Zosyn since 10/1/2022 Clindamycin  Vancomycin 10/1-10/3     Lines:       Assessment :  63 y.o. male with medical co-morbidities including hypertension, who came to the ER on 10/1/22 with right gluteal pain and swelling. Clinical presentation c/w sepsis (POA) due to Bacteroides bloodstream infection-positive blood culture 10/1, negative blood culture 10/3, perirectal abscess s/p I&D on 10/1/22. Intra op findings noted. Wound cultures 10/1-moderate E. coli, anaerobic gram-negative zena, mixed skin vero    Most likely source of gram-negative bloodstream infection is perirectal abscess    Surgery follow-up appreciated    Improved leukocytosis. Improved pain/induration/tenderness surrounding the surgical site on today's exam  Surgery follow-up appreciated    Recommendations:    Discontinue Zosyn. Start p.o. levofloxacin, metronidazole till 10/17/2022  Follow-up surgery recommendations regarding wound care/drain removal  Discharge planning per primary team      Above plan was discussed in details with patient,  and dr Ciara Whitfield. Please call me if any further questions or concerns. Will continue to participate in the care of this patient. HPI:    Feels better. Improved pain surrounding the surgical site.   eager to go home denies subjective fever/chills/nausea        Past Medical History:   Diagnosis Date    History of tobacco abuse     Hypertension        History reviewed. No pertinent surgical history. There are no discharge medications for this patient.       Current Facility-Administered Medications   Medication Dose Route Frequency    metoprolol tartrate (LOPRESSOR) tablet 50 mg  50 mg Oral BID    famotidine (PEPCID) tablet 20 mg  20 mg Oral BID    morphine injection 2 mg  2 mg IntraVENous Q4H PRN    piperacillin-tazobactam (ZOSYN) 3.375 g in 0.9% sodium chloride (MBP/ADV) 100 mL MBP  3.375 g IntraVENous Q8H    sodium chloride (NS) flush 5-40 mL  5-40 mL IntraVENous Q8H    sodium chloride (NS) flush 5-40 mL  5-40 mL IntraVENous PRN    acetaminophen (TYLENOL) tablet 650 mg  650 mg Oral Q6H PRN    Or    acetaminophen (TYLENOL) suppository 650 mg  650 mg Rectal Q6H PRN    polyethylene glycol (MIRALAX) packet 17 g  17 g Oral DAILY PRN    promethazine (PHENERGAN) tablet 12.5 mg  12.5 mg Oral Q6H PRN    Or    ondansetron (ZOFRAN) injection 4 mg  4 mg IntraVENous Q6H PRN    enoxaparin (LOVENOX) injection 40 mg  40 mg SubCUTAneous DAILY    hydrALAZINE (APRESOLINE) 20 mg/mL injection 10 mg  10 mg IntraVENous Q6H PRN    oxyCODONE-acetaminophen (PERCOCET) 5-325 mg per tablet 1 Tablet  1 Tablet Oral Q4H PRN       Allergies: Patient has no known allergies.     Family History   Problem Relation Age of Onset    Heart Attack Mother     Heart Disease Mother     Hypertension Father     Heart Disease Sister     Heart Attack Sister      Social History     Socioeconomic History    Marital status: SINGLE     Spouse name: Not on file    Number of children: Not on file    Years of education: Not on file    Highest education level: Not on file   Occupational History    Not on file   Tobacco Use    Smoking status: Former     Types: Cigarettes    Smokeless tobacco: Never   Substance and Sexual Activity    Alcohol use: Not Currently    Drug use: Not Currently    Sexual activity: Not on file   Other Topics Concern    Not on file   Social History Narrative    Not on file     Social Determinants of Health     Financial Resource Strain: Not on file   Food Insecurity: Not on file   Transportation Needs: Not on file   Physical Activity: Not on file   Stress: Not on file   Social Connections: Not on file   Intimate Partner Violence: Not on file   Housing Stability: Not on file     Social History     Tobacco Use   Smoking Status Former    Types: Cigarettes   Smokeless Tobacco Never        Temp (24hrs), Av °F (36.7 °C), Min:97.4 °F (36.3 °C), Max:98.8 °F (37.1 °C)    Visit Vitals  BP (!) 150/83 (BP 1 Location: Right upper arm, BP Patient Position: At rest)   Pulse (!) 55   Temp 97.6 °F (36.4 °C)   Resp 17   Ht 5' 11\" (1.803 m)   Wt 102.1 kg (225 lb)   SpO2 98%   BMI 31.38 kg/m²       ROS: 12 point ROS obtained in details. Pertinent positives as mentioned in HPI,   otherwise negative    Physical Exam:    General:  Cooperative, Not in acute distress  HEENT: EOMI, supple neck, no JVD, dry oral mucosa  Cardiovascular: S1S2 regular, no rub/gallop   Pulmonary: air entry bilaterally, no wheezing, no crackle  GI:  Soft, non tender, non distended, +bs, no guarding   Extremities:  No pedal edema, +distal pulses appreciated   Neuro: AOx3, moving all extremities, no gross deficit. Skin:  mallencot drain in place with minimal drainage, decreased induration/tenderness surrounding recent surgical site  Psychiatry: Appropriate mood and affect    Labs: Results:   Chemistry Recent Labs     10/05/22  0217 10/04/22  0722 10/03/22  0108   GLU 80 94 119*    142 135*   K 3.8 3.9 4.2    113* 109   CO2 25 24 22   BUN 13 19* 29*   CREA 1.11 1.13 1.20   CA 8.4* 8.3* 8.2*   AGAP 4 5 4   BUCR 12 17 24*        CBC w/Diff Recent Labs     10/05/22  0217 10/04/22  0722 10/03/22  0108   WBC 5.8 6.6 19.5*   RBC 4.21* 4.91 4.72   HGB 11.5* 13.1 12.6*   HCT 35.0* 40.1 38.8    436* 371   GRANS 37*  --   --    LYMPH 43  --   --    EOS 4  --   --         Microbiology Recent Labs     10/03/22  1902 10/02/22  1116   CULT NO GROWTH 2 DAYS MRSA NOT PRESENT  Screening of patient nares for MRSA is for surveillance purposes and, if positive, to facilitate isolation considerations in high risk settings. It is not intended for automatic decolonization interventions per se as regimens are not sufficiently effective to warrant routine use.               RADIOLOGY:    All available imaging studies/reports in [facility] for this admission were reviewed        Disclaimer: Sections of this note are dictated utilizing voice recognition software, which may have resulted in some phonetic based errors in grammar and contents. Even though attempts were made to correct all the mistakes, some may have been missed, and remained in the body of the document. If questions arise, please contact our department.     Dr. Zulma Alonso, Infectious Disease Specialist  [phone]  October 5, 2022  7:10 AM

## 2022-10-05 NOTE — PROGRESS NOTES
Spoke with Curly Landaverde with [facility] who asked to make sure dressing changed today, go home with extra supplies. Lauren Camejo will take the orders from wound care for the home health . PCP appt in.     Home Health referral placed in work que

## 2022-10-05 NOTE — DISCHARGE INSTRUCTIONS
DISCHARGE SUMMARY from Nurse    PATIENT INSTRUCTIONS:    After general anesthesia or intravenous sedation, for 24 hours or while taking prescription Narcotics:  Limit your activities  Do not drive and operate hazardous machinery  Do not make important personal or business decisions  Do  not drink alcoholic beverages  If you have not urinated within 8 hours after discharge, please contact your surgeon on call. Report the following to your surgeon:  Excessive pain, swelling, redness or odor of or around the surgical area  Temperature over 100.5  Nausea and vomiting lasting longer than 4 hours or if unable to take medications  Any signs of decreased circulation or nerve impairment to extremity: change in color, persistent  numbness, tingling, coldness or increase pain  Any questions    What to do at Home:  Recommended activity: Activity as tolerated, rest as needed no heavy lifting     If you experience any of the following symptoms fever, foul smell from wound, chills, chest pain, shortness of breath, please follow up with Primary Care Provider or go to the nearest Emergency Room. *  Please give a list of your current medications to your Primary Care Provider. *  Please update this list whenever your medications are discontinued, doses are      changed, or new medications (including over-the-counter products) are added. *  Please carry medication information at all times in case of emergency situations. These are general instructions for a healthy lifestyle:    No smoking/ No tobacco products/ Avoid exposure to second hand smoke  Surgeon General's Warning:  Quitting smoking now greatly reduces serious risk to your health.     Obesity, smoking, and sedentary lifestyle greatly increases your risk for illness    A healthy diet, regular physical exercise & weight monitoring are important for maintaining a healthy lifestyle    You may be retaining fluid if you have a history of heart failure or if you experience any of the following symptoms:  Weight gain of 3 pounds or more overnight or 5 pounds in a week, increased swelling in our hands or feet or shortness of breath while lying flat in bed. Please call your doctor as soon as you notice any of these symptoms; do not wait until your next office visit. The discharge information has been reviewed with the patient. The patient verbalized understanding. Discharge medications reviewed with the patient and appropriate educational materials and side effects teaching were provided.   _Patient armband removed and shredded  __________________________________________________________________________________________________________________________________

## 2022-10-05 NOTE — PROGRESS NOTES
General surgery update:    Ok to pull drain prior to discharge. Continue with daily dressing packing with Dakins solution.  Follow up in general surgery clinic in 2 weeks    Ilene Every

## 2022-10-05 NOTE — WOUND CARE
Physical Exam  Room 204    Right perianal, open surgical incision, wound care orders are updated on the chart by Dr. Sherron Odonnell. Will turn over care to nursing staff at this time.   Adolfo THOMASN, RN, [facility], [address]

## 2022-10-05 NOTE — PROGRESS NOTES
[address] October 5, 2022       RE: Jessie Eller      To Whom It May Concern,    This is to certify that Jessie Eller may may return to work on 10/24/22. Please feel free to contact my office if you have any questions or concerns. Thank you for your assistance in this matter.       Sincerely,  Isha Acevedo MD

## 2022-10-05 NOTE — PROGRESS NOTES
Patient seen and evaluated this morning, per ID patient can be discharged home on oral antibiotics. Spoke with general surgery, drain can be removed and wound care orders placed. Discussed with wound care who will remove drain and continue daily dressing packing with Dakin solution. Case management on board for discharge planning. Patient will need home health and wound care on discharge. Once that has been arranged patient will be discharged.

## 2022-10-05 NOTE — DISCHARGE SUMMARY
Hospitalist Discharge Summary    Patient: Zeinab Velarde MRN: [id number]  CSN: [id number]    YOB: 1960  Age: 63 y.o. Sex: male    DOA: 10/1/2022 LOS:  LOS: 4 days   Discharge Date:     Admission Diagnoses: Roma-rectal abscess [K61.1]  Hyponatremia [E87.1]  Cellulitis and abscess of buttock [L02.31, L03.317]    Discharge Diagnoses:    Sepsis  Right perirectal abscess status post I&D  New onset A. fib with RVR  History of hypertension    Discharge Condition: [address]    Discharge To: Home    CODE STATUS: Full Code      PHYSICAL EXAM  Visit Vitals  BP (!) 143/85 (BP 1 Location: Right upper arm, BP Patient Position: At rest)   Pulse 61   Temp 98.1 °F (36.7 °C)   Resp 18   Ht 5' 11\" (1.803 m)   Wt 102.1 kg (225 lb)   SpO2 98%   BMI 31.38 kg/m²       General: Alert, cooperative, no acute distress    Lungs:  CTA Bilaterally. No Wheezing/Rhonchi/Rales. Heart:  Regular rate and Rhythm. Abdomen: Soft, Non distended, Non tender. + Bowel sounds. Extremities: No edema/ cyanosis/ clubbing  Neurologic:  AA oriented X 3. Moves all extremities. Zeinab Velarde is a 63 y.o. male with medical co-morbidities including Hypertension, who came to the ER with right butt pain and swelling. He reported that his right buttock was swollen, red, tender about 5 to 7 days ago. He denied any trauma to the area. He experienced fever and chill at home over the past few days. He experienced lack of appetite but has been taking fluid to keep himself hydrated. He has no bowel movement in the last few days. He denied pain in his testicle. Otherwise, he just came home from skilled nursing about a month ago. No other complaint. He has history of high blood pressure that he has not been taking medication. At baseline he is able to ambulate and do significant heavy duty work without any shortness of breath or chest pain.      In the ED, he was found to have tachycardia , WBC 17.3, band neutrophils 5% with left shift, BMP with sodium of 129, chloride 96, creatinine 1.4 with GFR preserved. ALT 69, AST 91. Pro-Leonides 2.61. He was given clindamycin for right gluteal cellulitis. He was given IV fluid. CT of the pelvic with contrast showed large gas containing perianal and perirectal fluid collection/abscess extending from the right gluteal cleft into the right, greater than left levator anus muscle complex. .  He has thickening of the bladder wall with pelvic lymphadenopathy. Hospital Course:     82-year-old male with a history of hypertension presented to the emergency room secondary to pain in the right buttock with swelling. ER evaluation patient noted to have sepsis with right perirectal abscess. General surgery consulted, patient underwent I&D with drain placement. ID consulted for antibiotic management. Post procedure patient developed A. fib with RVR. Patient does not have a known history of A. fib. Patient started on beta-blockers with rate control. Cardiology consulted, patient underwent echocardiogram and recommended that patient be started on aspirin 81 mg p.o. daily secondary to a [id number]-[id number] score of 1. Patient seen by wound care, underwent removal of drain and will continue regular dressing changes of the perirectal abscess. Wound care recommended that patient undergo daily dressing changes. Patient will be seen by home health on discharge. Patient is currently being discharged home and is advised to closely follow-up with PCP, general surgery in 2 weeks. He will continue antibiotics per ID recommendations. Follow up Care:      With PCP and general surgery in 2 weeks    Consults: Cardiology, General Surgery, and Infectious Disease    Significant Diagnostic Studies:     Imaging:  XR Results (most recent):  Results from Hospital Encounter encounter on 10/01/22    XR GASTROGRAFFIN SMALL BOWEL    Narrative  EXAM: Small Bowel Follow Through    INDICATION: Evaluation for small bowel obstruction. Status post incision and  drainage of perirectal abscess. TECHNIQUE: Gastrografin small bowel follow-through    COMPARISON: None    FINDINGS:    The  radiograph of the abdomen gaseous distention of the colon. There is  no evidence of free air or organomegaly. A drain is seen in the right pelvis. Gastrografin contrast was self-administered by the patient. Initial post film  obtained demonstrates: contrast in the antrum of the stomach and small bowel. Subsequent images were obtained until contrast reached the colon. Limited evaluation of mucosal detail secondary to use of water-soluble contrast.  Initial post film obtained demonstrates contrast in the stomach and small  bowel. Small bowel follow-through examination demonstrates contrast flowing  from the stomach to cecum between 20 minutes to 40 minutes. No focal suspicious  mucosal abnormality, narrowing, or outpouching identified within limits of exam.  Small bowel loops are within normal limits of caliber. No evidence of  obstruction. Fluoroscopy time: 0 minutes    Fluoroscopy Images: 0    Impression  No evidence for high-grade small bowel obstruction during exam.       CT Results (most recent):  Results from Hospital Encounter encounter on 10/01/22    CT PELV W CONT    Addendum 10/1/2022  1:30 PM  Addendum: Findings communicated to Dr. Tonya Ramesh by telephone. Narrative  CT PELVIS WITH INTRAVENOUS CONTRAST. COMPARISON: None available. INDICATIONS: Rectal pain. Concern for rectal abscess.     TECHNIQUE: 5 mm axial cuts were performed through the chest, abdomen and pelvis  after administration of 100cc of Omnipaque 350 and oral contrast material.    All CT scans at this facility are performed using dose optimization technique as  appropriate to a performed exam, to include automated exposure control,  adjustment of the mA and/or KV according to patient's size (including  appropriate matching for site-specific examinations), or use of iterative  reconstruction technique. FINDINGS:    CT PELVIS:  There is a large gas containing perianal and perirectal fluid collection (13 x 6  x 9 cm) extending from the right gluteal cleft superiorly into the right,  greater than left, levator ani muscle complex. Both superior components of the  collection appear to communicate with each other (400:53). The thickened rectum  and pelvic floor muscles are displaced to the right. No defect is visualized in  the rectal wall, although there is a possible tiny locule of gas in the right  perirectal fat on axial image 33. No extraluminal enteric contrast. Fat  stranding adjacent to the urinary bladder is likely reactive. Right inguinal and  pelvic lymphadenopathy, likely reactive. No definite osseous erosions. Bilateral  pars defects at L5-S1 with grade 1 anterolisthesis. Impression  1. Large gas containing perianal and perirectal fluid collection/abscess  extending from the right gluteal cleft into the right, greater than left,  levator ani muscle complex. No defect is observed in the thickened rectal wall  and there is no enteric contrast leakage, but there is a questioned tiny locule  of gas in the right perirectal fat on axial image 33.  2.  Bilateral L5-S1 spondylolysis with spondylolisthesis. 3.  Thickening of the bladder wall and inguinal/pelvic lymphadenopathy, likely  reactive. 10/01/22    ECHO ADULT COMPLETE 10/03/2022 10/3/2022    Interpretation Summary    Left Ventricle: Normal left ventricular systolic function with a visually estimated EF of 50 - 55%. Left ventricle size is normal. Mildly increased wall thickness. Normal wall motion.     Normal right venticular size and function    No significant valvular pathology    Insufficient TR to estimate pulmonary artery pressure    Signed by: Fredy Madison MD on 10/3/2022  6:18 PM       MRI Results (most recent):  No results found for this or any previous visit.        Procedures:     I&D of right perirectal abscess       Current Discharge Medication List        START taking these medications    Details   metoprolol tartrate (LOPRESSOR) 50 mg tablet Take 1 Tablet by mouth two (2) times a day. Qty: 60 Tablet, Refills: 0  Start date: 10/5/2022      oxyCODONE-acetaminophen (PERCOCET) 5-325 mg per tablet Take 1 Tablet by mouth every six (6) hours as needed for Pain for up to 3 days. Max Daily Amount: 4 Tablets. Qty: 12 Tablet, Refills: 0  Start date: 10/5/2022, End date: 10/8/2022    Associated Diagnoses: Perirectal abscess      levoFLOXacin (Levaquin) 750 mg tablet Take 1 Tablet by mouth daily for 12 days. Qty: 12 Tablet, Refills: 0  Start date: 10/5/2022, End date: 10/17/2022      metroNIDAZOLE (FlagyL) 500 mg tablet Take 1 Tablet by mouth three (3) times daily for 12 days. Qty: 36 Tablet, Refills: 0  Start date: 10/5/2022, End date: 10/17/2022               Activity: Activity as tolerated    Diet: Cardiac Diet    Wound Care: As directed      Layton Perez MD  10/5/2022, 1:58 PM    Total time spent 36 mins  Disclaimer: Sections of this note are dictated using utilizing voice recognition software. Minor typographical errors may be present. If questions arise, please do not hesitate to contact me or call our department.

## 2022-10-05 NOTE — PROGRESS NOTES
Problem: Falls - Risk of  Goal: *Absence of Falls  Description: Document [place] Fail Fall Risk and appropriate interventions in the flowsheet. Outcome: Progressing Towards Goal  Note: Fall Risk Interventions:  Mobility Interventions: Patient to call before getting OOB, Strengthening exercises (ROM-active/passive), Communicate number of staff needed for ambulation/transfer         Medication Interventions: Evaluate medications/consider consulting pharmacy, Patient to call before getting OOB, Teach patient to arise slowly                   Problem: Pressure Injury - Risk of  Goal: *Prevention of pressure injury  Description: Document Griffin Scale and appropriate interventions in the flowsheet.   Outcome: Progressing Towards Goal  Note: Pressure Injury Interventions:  Sensory Interventions: Assess changes in LOC, Check visual cues for pain, Maintain/enhance activity level, Pressure redistribution bed/mattress (bed type)    Moisture Interventions: Absorbent underpads, Apply protective barrier, creams and emollients, Contain wound drainage    Activity Interventions: Pressure redistribution bed/mattress(bed type), Increase time out of bed    Mobility Interventions: HOB 30 degrees or less, Pressure redistribution bed/mattress (bed type), PT/OT evaluation    Nutrition Interventions: Document food/fluid/supplement intake, Discuss nutritional consult with provider, Offer support with meals,snacks and hydration    Friction and Shear Interventions: Apply protective barrier, creams and emollients, HOB 30 degrees or less, Minimize layers

## 2022-10-05 NOTE — PROGRESS NOTES
Patient has transitional care follow up with [address], MAVERICK Keith on 10/12/2022 arrive at 9:00 am fasting for labs.

## 2022-10-05 NOTE — PROGRESS NOTES
Asked CM office for assist with PCP. Notified patient home health coming every 3rd day but he has got to keep area clean and follow instructions for care completely to avoid infection and worsening wound. Asked if daughter would assist and he stated no, he cant let her do that. Suggested he ask son in law . Reason for Admission:  Roma-rectal abscess [K61.1]  Hyponatremia [E87.1]  Cellulitis and abscess of buttock [L02.31, L03.317]                 RUR Score:    12            Plan for utilizing home health:    yes                      Likelihood of Readmission:   LOW                         Transition of Care Plan:              Initial assessment completed with patient. Cognitive status of patient: oriented to time, place, person and situation. Face sheet information confirmed:  yes. The patient designates daughter to participate in his discharge plan and to receive any needed information. This patient lives in a single family home with daughter and other:  daughter shen. Patient is able to navigate steps as needed. Prior to hospitalization, patient was considered to be independent with ADLs/IADLS : yes . Patient has a current ACP document on file: no      Healthcare Decision Maker:     Click here to complete [address] including selection of the Healthcare Decision Maker Relationship (ie \"Primary\")    The other:  daughter shen  will be available to transport patient home upon discharge. The patient already has none reported, and  medical equipment available in the home. Patient is not currently active with home health. Patient has not stayed in a skilled nursing facility or rehab. Was  stay within last 60 days : no. This patient is on dialysis :no        List of available Home Health agencies were provided and reviewed with the patient prior to discharge. Freedom of choice signed: yes, for [facility] . Currently, the discharge plan is Home with [facility].     The patient states that he can obtain his medications from the pharmacy, and take his medications as directed. Patient's current insurance is Medicaid        Care Management Interventions  Mode of Transport at Discharge:  Other (see comment) (son in law)  Transition of Care Consult (CM Consult): [address]: Child(gabriela)  Confirm Follow Up Transport: Self  The Plan for Transition of Care is Related to the Following Treatment Goals : home health  The Patient and/or Patient Representative was Provided with a Choice of Provider and Agrees with the Discharge Plan?: Yes  Freedom of Choice List was Provided with Basic Dialogue that Supports the Patient's Individualized Plan of Care/Goals, Treatment Preferences and Shares the Quality Data Associated with the Providers?: Yes  Discharge Location  Patient Expects to be Discharged to[de-identified] Home with home health

## 2022-10-05 NOTE — HOME CARE
Received home health referral for [facility] for (SN - wound care). Spoke with patient in room;  patient identifiers verified. Explained home care services and routines. Demographics verified including insurance, phone and address confirmed. Caregivers available lives in same residence as daughter and son-in-law available. Orders noted and arranged to be processed to central intake.       ---   Deepa Pérez LPN  [facility] [place] - INPATIENT Liaison

## 2022-10-07 ENCOUNTER — HOME CARE VISIT (OUTPATIENT)
Dept: SCHEDULING | Facility: HOME HEALTH | Age: 62
End: 2022-10-07
Payer: MEDICAID

## 2022-10-07 ENCOUNTER — HOME CARE VISIT (OUTPATIENT)
Dept: HOME HEALTH SERVICES | Facility: HOME HEALTH | Age: 62
End: 2022-10-07

## 2022-10-07 LAB
BACTERIA SPEC CULT: NORMAL
SERVICE CMNT-IMP: NORMAL

## 2022-10-07 PROCEDURE — G0299 HHS/HOSPICE OF RN EA 15 MIN: HCPCS

## 2022-10-07 PROCEDURE — 400013 HH SOC

## 2022-10-07 NOTE — Clinical Note
[facility] AT [facility] admission completed 10/07/22 for disease and medication management, R buttock wound care. SN freq 1d1, 3w5, 2 prn.

## 2022-10-09 VITALS
RESPIRATION RATE: 18 BRPM | HEART RATE: 78 BPM | OXYGEN SATURATION: 98 % | DIASTOLIC BLOOD PRESSURE: 86 MMHG | SYSTOLIC BLOOD PRESSURE: 158 MMHG | TEMPERATURE: 97 F

## 2022-10-09 LAB
BACTERIA SPEC CULT: NORMAL
SERVICE CMNT-IMP: NORMAL

## 2022-10-10 ENCOUNTER — HOME CARE VISIT (OUTPATIENT)
Dept: SCHEDULING | Facility: HOME HEALTH | Age: 62
End: 2022-10-10
Payer: MEDICAID

## 2022-10-10 ENCOUNTER — HOME CARE VISIT (OUTPATIENT)
Dept: HOME HEALTH SERVICES | Facility: HOME HEALTH | Age: 62
End: 2022-10-10
Payer: MEDICAID

## 2022-10-10 PROCEDURE — G0299 HHS/HOSPICE OF RN EA 15 MIN: HCPCS

## 2022-10-10 NOTE — HOME HEALTH
Summary of clinical health condition: 63 y.o. male with medical co-morbidities including Hypertension, who came to the ER with right butt pain and swelling. He reported that his right buttock was swollen, red, tender about 5 to 7 days ago. He denied any trauma to the area. He experienced fever and chill at home over the past few days. He experienced lack of appetite but has been taking fluid to keep himself hydrated. He has no bowel movement in the last few days. He denied pain in his testicle. Otherwise, he just came home from [facility] about a month ago. No other complaint. He has history of high blood pressure that he has not been taking medication. At baseline he is able to ambulate and do significant heavy duty work without any shortness of breath or chest pain. In the ED, he was found to have tachycardia , WBC 17.3, band neutrophils 5% with left shift, BMP with sodium of 129, chloride 96, creatinine 1.4 with GFR preserved. ALT 69, AST 91. Pro-Leonides 2.61. He was given clindamycin for right gluteal cellulitis. He was given IV fluid. CT of the pelvic with contrast showed large gas containing perianal and perirectal fluid collection/abscess extending from the right gluteal cleft into the right, greater than left levator anus muscle complex. He has thickening of the bladder wall with pelvic lymphadenopathy. Medications reconciled, all medications are at home. The following education was provided regarding medications, medication interactions, and look a like medications: N/A, all meds reviewed. Discussed importance of compliance, timely taking all prescribed meds, proper dosage and freq. Teaching provided with high risk medication; Oxycodone S/E: constipation, drowsiness, dizziness, N/V, hallucination and fast HR. Pt to call MD/PCP if any of this S/S present. Discussed importance of completing abx (Flagyl and Levofloxacin) and avoid double dosing if missed dosage.  Medications are somewhat effective at this time. Caregiver:caregiver/daughter is available to assist with daily meals, provide reminders with daily medications, run errands, groceries, accompany to MD appt prn. (Pt advised to inform daughter to assist with wound care due pt unable reach and see the wound site. CG will be taught how to perform wound care during [facility]. Pt verbalized understanding). Skilled care provided: Teaching disease and medication and assessment. R buttock wound dressing changed; cleansed with NS, pat dry, measured, photograph and continued current treatment Aquacel Ag covered with ABD then secured with clean boxer short. Completed [facility] admission, explained POC, SNV freq, next SNV and D/C plan to pt/CG with good understanding. Patient education provided this visit to include: Discussed intervention to prevent infection; hand washing and avoiding sick person. Discussed keeping R buttock wound dressing dry, clean, taught how to secure dressing during defecation, change dressing when soiled and reinforce dressing when starts coming off. Increasing protein intake, avoid skipping meals and good hydration. Repositioning q2 hrs, may used dunot cushion to prevent pressure. Monitor for S/S of infection; fever 100.4, increase pain, redness, swelling, coughing with yellow thick sputum,  cloudy urine with strong odor, not feeling well 2-3 days, SOB, and to call [facility] or MD for assistance if experiencing any of these S/S. To call 911 with chest pains, facial drooping, difficulty talking, non arousable/unconscious and uncontrollable bleeding. Patient/caregiver degree of understanding: Pt/CG has good understanding of the teaching provided during visit. Home health supplies by type and quantity ordered/delivered this visit include: WC/NS, gauze pads, Aquacel Ag, NS, ABD pads. Pt./CG instructed on plan of care, SN freq visit; 1d1, 3w5, 2 prn and next SNV.     Home exercise program/Homework provided: Ambulation, HEP deep breathing exercises 10x when having SOB, pain and anxiety. Plan of care and admission to home health status called to attending physician: Dr. Mali Lowe, DO was informed POC and admission completed 10/07/22    Discharge planning discussed with patient and caregiver. Discharge planning as follows: Pt/CG will be able to manage disease and medication independently, R buttock wound is healed and health condition stable. Pt/Caregiver did verbalize understanding of discharge planning. Patient/caregiver encouraged/instructed to keep appointment as lack of follow through with physician appointment could result in discontinuation of home care services for non-compliance. The [address] of Rights was verbally reviewed and signed by pt on this visit. The patient or representative was given the opportunity to ask pertinent questions regarding the bill of rights. COVID - 23 Screening completed before visit:     Denies and no family member  has any of these S/S:   Fever, dry cough, sore throat diarrhea, chills, body aches, not feeling well and loss of taste.

## 2022-10-11 VITALS
OXYGEN SATURATION: 99 % | HEART RATE: 80 BPM | DIASTOLIC BLOOD PRESSURE: 78 MMHG | RESPIRATION RATE: 18 BRPM | SYSTOLIC BLOOD PRESSURE: 138 MMHG | TEMPERATURE: 98.2 F

## 2022-10-11 NOTE — HOME HEALTH
Skilled reason for visit: wound care, medication teaching     Caregiver involvement: family available upone pt request per pt . Medications reviewed and all medications are available in the home this visit. The following education was provided regarding medications:  pt verbalizes understanding of all education provided during visit  . MD notified of any discrepancies/look a-like medications/medication interactions: na  Medications are effective at this time. Home health supplies by type and quantity ordered/delivered this visit include: supplies on order     Patient education provided this visit: discussed fall precautions in detail- having lighted hallways, removing throw rugs, monitoring medication that may alter mental status. perform skin inspections looking for any skin breakdown or redness. monitor for edema. frequent position changes. Watch for signs and symptoms of infection which include; fever, drainage, foul smell, lethargy. High risk medication teaching regarding anticoagulants, hyperglycemic agents or opiod narcotics performed (specify) Controlled substance OXYCODONE  High risk for addiction and dependence. Can cause respiratory distress and death when taken in high doses or when combined with other substances, especially alcohol or other illicit drugs such as heroin or cocaine.         Sharps education provided: na    Patient level of understanding of education provided: pt verbalizes understanding of all education provided during visit      Skilled Care Performed this visit: same as reason for visit     Patient response to procedure performed:  pt tolerated without complaints of pain      Agency Progress toward goals: progressing    Patient's Progress towards personal goals: progressing    Home exercise program: patient made aware to monitor for s/s of infection [increased swelling, increased redness around site, increased pain, foul smelling drainage, fever] aware who to report to/when. Continued need for the following skills: Nursing    Plan for next visit: wound care and medication management     Patient and/or caregiver notified and agrees to changes in the Plan of Care YES/NO/NA: N/A      The following discharge planning was discussed with the pt/caregiver:  Patient will be discharged once education has completed, patient is medically stable and pt is able to independently manage wound care/has healed or no longer requires skilled care.

## 2022-10-12 ENCOUNTER — HOME CARE VISIT (OUTPATIENT)
Dept: SCHEDULING | Facility: HOME HEALTH | Age: 62
End: 2022-10-12
Payer: MEDICAID

## 2022-10-12 PROCEDURE — G0299 HHS/HOSPICE OF RN EA 15 MIN: HCPCS

## 2022-10-14 ENCOUNTER — HOME CARE VISIT (OUTPATIENT)
Dept: SCHEDULING | Facility: HOME HEALTH | Age: 62
End: 2022-10-14
Payer: MEDICAID

## 2022-10-14 VITALS
OXYGEN SATURATION: 99 % | HEART RATE: 58 BPM | TEMPERATURE: 97 F | SYSTOLIC BLOOD PRESSURE: 120 MMHG | RESPIRATION RATE: 18 BRPM | DIASTOLIC BLOOD PRESSURE: 70 MMHG

## 2022-10-14 VITALS
TEMPERATURE: 97.7 F | SYSTOLIC BLOOD PRESSURE: 110 MMHG | RESPIRATION RATE: 18 BRPM | DIASTOLIC BLOOD PRESSURE: 68 MMHG | HEART RATE: 71 BPM | OXYGEN SATURATION: 98 %

## 2022-10-14 PROCEDURE — G0299 HHS/HOSPICE OF RN EA 15 MIN: HCPCS

## 2022-10-14 NOTE — HOME HEALTH
Skilled reason for visit: R buttocks wound care, disease and medication management, assessment. Caregiver: CG/daughter assist with daily meals, ADL's,  provide reminders with daily medication, run errands, groceries and accompany to MD appt.prn.     Medications reviewed and all medications are available in the home this visit. The following education was provided regarding medications, medication interactions, and look alike medications (specify): N/A. Pt to continue to take daily prescribed medications following proper dosage and freq. Medications  are effective at this time. No added new medication. Skilled Care Performed this visit: Completed assessment, Performed R buttocks wound care (see wound addendum). Wound is improved, pain decreased and no S/S of infection noted. Patient response to procedure performed: Pt tolerated well during the procedure with no C/O pain. Patient education provided this visit: Reviewed intervention to prevent infection; hand washing and avoiding sick person. Keeping wound dressing dry and clean. Reinforce wound dressing when dislodge. Increasing protein and fluid intake, avoid skipping meals and continue to follow  heart healthy diet. Continue to monitor S/S of infection; fever 100.4, increase pain, redness, swelling, increase wound drainage, purulent drainage,  bleeding, coughing with yellow thick sputum, cloudy urine with strong, not feeling well 2-3 days, SOB, and to call [facility] or MD for assistance if experiencing any of these S/S. To call 911 with chest pains, facial drooping, difficulty talking, non arousable/unconscious and uncontrollable bleeding. Patient level of understanding of education provided: Pt has good understanding of the teaching during visit provided. Home health supplies by type and quantity ordered/delivered this visit included: Waiting for supplies to arrived. Agency Progress toward goals: On tract to be met.   Patient's Progress towards personal goals: Partially met. Pt adheres POC, good understanding with all daily prescribed medications. Home exercise program/Homework provided: HEP deep breathing exercises 10x when having SOB, pain and anxiety. Continued need for the following skills: SN     Plan for next visit: Assessment, wound care, disease and medication management. Patient and/or caregiver notified and agrees to changes in the Plan of Care: N/A    Discharge planning discussed with patient and caregiver. Discharge planning as follows: Pt/CG will be able to manage disease and medication independently, R  buttocks wound is healed,  health condition stable and goals met. COVID - 23 Screening completed before visit:     Denies and no family member  has any of these S/S:  Fever, dry cough, sore throat diarrhea, chills, body aches, not feeling well, chills and lost of taste.

## 2022-10-14 NOTE — HOME HEALTH
Skilled reason for visit: skilled assessment, education, medication management, wound care    Caregiver involvement:  Family assists ADL's, medications, meals, transportation, errands. Medications reviewed and all medications are available in the home this visit. The following education was provided regarding medications:  SN instructed patient / caregiver that Percocet is used to treat moderate to moderately severe pain - containing two drugs: acetaminophen and oxycodone. Explained that Acetaminophen is used to reduce both pain and fever & Oxycodone is used for its calming effect and for pain. Explained that the common side effects include dizziness, light - headedness, nausea, sedation, vomiting. SN instructed that side effects may be alleviated by lying down and if they persist or worsen to notify Physician. SN instructed to call physician immediately if the patient experiences slow / irregular breathing, slow / irregular heartbeat, change in the amount of urine or any allergic reactions. SN instructed patient to drink at least 8 glasses of water and eat foods that are high in fiber such as apples, peaches, oranges and oats if constipation is experienced. MD notified of any discrepancies/look a-like medications/medication interactions: na  Medications are effective at this time. Home health supplies by type and quantity ordered/delivered this visit include: na    Patient education provided this visit: Skilled nurse instructed patient on safety measures to avoid injuries and falls such as keeping adequate lighting during the day and night and was educated on proper use of assistive device to prevent falls. Sharps education provided: na    Patient level of understanding of education provided: patient/caregiver verbalized 100% understanding.     Skilled Care Performed this visit: skilled assessment, education, medication management, wound care    Patient response to procedure performed:  patient denied pain and discomfort during procedure/visit    Agency Progress toward goals: progressing    Patient's Progress towards personal goals: progressing    Home exercise program: Sn instructed patient on pursed lip breathing. Pursed lip breathing is one of the simplest ways to control shortness of breath. It provides a quick and easy way to slow your pace of breathing, making each breath more effective. Pursed lip breathing: Improves ventilation, releases trapped air in the lungs, keeps the airways open longer and decreases the work of breathing, prolongs exhalation to slow the breathing rate, improves breathing patterns by moving old air out of the lungs and allowing for new air to enter the lungs, relieves shortness of breath, causes general relaxation. Practice this technique 4 - 5 times a day at first so you can get the correct breathing pattern. Pursed lip breathing technique: Relax your neck and shoulder muscles, breathe in ( inhale ) slowly through your nose for two counts, keeping your mouth closed. Don't take a deep breath; a normal breath will do. It may help to count to yourself: inhale, one, two. Pucker or purse your lips as if you were going to whistle or gently flicker the flame of a candle. Breathe out ( exhale ) slowly and gently through your pursed lips while counting to four. It may help to count to yourself: exhale, one, two, three, four.     Continued need for the following skills: Nursing    Plan for next visit: skilled assessment, education, medication management, wound care    Patient and/or caregiver notified and agrees to changes in the Plan of Care YES/NO/NA: N/A      The following discharge planning was discussed with the pt/caregiver:  Discharge planning as follows: when goals met, patient/caregiver able to manage disease process, medications and pain

## 2022-10-17 ENCOUNTER — HOME CARE VISIT (OUTPATIENT)
Dept: SCHEDULING | Facility: HOME HEALTH | Age: 62
End: 2022-10-17
Payer: MEDICAID

## 2022-10-17 VITALS
DIASTOLIC BLOOD PRESSURE: 72 MMHG | OXYGEN SATURATION: 99 % | RESPIRATION RATE: 18 BRPM | HEART RATE: 78 BPM | TEMPERATURE: 97.8 F | SYSTOLIC BLOOD PRESSURE: 120 MMHG

## 2022-10-17 PROCEDURE — G0299 HHS/HOSPICE OF RN EA 15 MIN: HCPCS

## 2022-10-18 NOTE — HOME HEALTH
Skilled reason for visit: wound care, medication teaching          Caregiver involvement: family available upone pt request per pt . Medications reviewed and all medications are available in the home this visit. The following education was provided regarding medications:  pt verbalizes understanding of all education provided during visit    . MD notified of any discrepancies/look a-like medications/medication interactions: na    Medications are effective at this time. Home health supplies by type and quantity ordered/delivered this visit include: adequate supplies         Patient education provided this visit: discussed fall precautions in detail- having lighted hallways, removing throw rugs, monitoring medication that may alter mental status. perform skin inspections looking for any skin breakdown or redness. monitor for edema. frequent position changes. Watch for signs and symptoms of infection which include; fever, drainage, foul smell, lethargy. High risk medication teaching regarding anticoagulants, hyperglycemic agents or opiod narcotics performed (specify) Controlled substance OXYCODONE    High risk for addiction and dependence. Can cause respiratory distress and death when taken in high doses or when combined with other substances, especially alcohol or other illicit drugs such as heroin or cocaine.                    Sharps education provided: na         Patient level of understanding of education provided: pt verbalizes understanding of all education provided during visit              Skilled Care Performed this visit: same as reason for visit          Patient response to procedure performed:  pt tolerated without complaints of pain              Agency Progress toward goals: progressing         Patient's Progress towards personal goals: progressing         Home exercise program: patient made aware to monitor for s/s of infection [increased swelling, increased redness around site, increased pain, foul smelling drainage, fever] aware who to report to/when. Continued need for the following skills: Nursing         Plan for next visit: wound care and medication management          Patient and/or caregiver notified and agrees to changes in the Plan of Care YES/NO/NA: N/A           The following discharge planning was discussed with the pt/caregiver:  Patient will be discharged once education has completed, patient is medically stable and pt is able to independently manage wound care/has healed or no longer requires skilled care.

## 2022-10-19 ENCOUNTER — HOME CARE VISIT (OUTPATIENT)
Dept: SCHEDULING | Facility: HOME HEALTH | Age: 62
End: 2022-10-19
Payer: MEDICAID

## 2022-10-19 PROCEDURE — G0299 HHS/HOSPICE OF RN EA 15 MIN: HCPCS

## 2022-10-20 NOTE — HOME HEALTH
Skilled reason for visit: wound care, medication teaching       Caregiver involvement: family available upone pt request per pt . Medications reviewed and all medications are available in the home this visit. The following education was provided regarding medications:  pt verbalizes understanding of all education provided during visit         . MD notified of any discrepancies/look a-like medications/medication interactions: na         Medications are effective at this time. Home health supplies by type and quantity ordered/delivered this visit include: adequate supplies                   Patient education provided this visit: discussed fall precautions in detail- having lighted hallways, removing throw rugs, monitoring medication that may alter mental status. perform skin inspections looking for any skin breakdown or redness. monitor for edema. frequent position changes. Watch for signs and symptoms of infection which include; fever, drainage, foul smell, lethargy. High risk medication teaching regarding anticoagulants, hyperglycemic agents or opiod narcotics performed (specify) Controlled substance OXYCODONE         High risk for addiction and dependence. Can cause respiratory distress and death when taken in high doses or when combined with other substances, especially alcohol or other illicit drugs such as heroin or cocaine.          Sharps education provided: na       Patient level of understanding of education provided: pt verbalizes understanding of all education provided during visit          Skilled Care Performed this visit: same as reason for visit        Patient response to procedure performed:  pt tolerated without increased complaints of pain                             Agency Progress toward goals: progressing                   Patient's Progress towards personal goals: progressing                   Home exercise program: patient made aware to monitor for s/s of infection [increased swelling, increased redness around site, increased pain, foul smelling drainage, fever] aware who to report to/when. Continued need for the following skills: Nursing                   Plan for next visit: wound care and medication management                    Patient and/or caregiver notified and agrees to changes in the Plan of Care YES/NO/NA: N/A                     The following discharge planning was discussed with the pt/caregiver:  Patient will be discharged once education has completed, patient is medically stable and pt is able to independently manage wound care/has healed or no longer requires skilled care.

## 2022-10-21 ENCOUNTER — HOME CARE VISIT (OUTPATIENT)
Dept: SCHEDULING | Facility: HOME HEALTH | Age: 62
End: 2022-10-21
Payer: MEDICAID

## 2022-10-21 PROCEDURE — G0299 HHS/HOSPICE OF RN EA 15 MIN: HCPCS

## 2022-10-22 VITALS
TEMPERATURE: 98.8 F | HEART RATE: 70 BPM | SYSTOLIC BLOOD PRESSURE: 122 MMHG | DIASTOLIC BLOOD PRESSURE: 64 MMHG | OXYGEN SATURATION: 98 % | RESPIRATION RATE: 20 BRPM

## 2022-10-24 ENCOUNTER — HOME CARE VISIT (OUTPATIENT)
Dept: SCHEDULING | Facility: HOME HEALTH | Age: 62
End: 2022-10-24
Payer: MEDICAID

## 2022-10-24 PROCEDURE — G0299 HHS/HOSPICE OF RN EA 15 MIN: HCPCS

## 2022-10-25 VITALS
SYSTOLIC BLOOD PRESSURE: 130 MMHG | TEMPERATURE: 97.8 F | OXYGEN SATURATION: 99 % | HEART RATE: 78 BPM | DIASTOLIC BLOOD PRESSURE: 74 MMHG | RESPIRATION RATE: 18 BRPM

## 2022-10-26 ENCOUNTER — HOME CARE VISIT (OUTPATIENT)
Dept: SCHEDULING | Facility: HOME HEALTH | Age: 62
End: 2022-10-26
Payer: MEDICAID

## 2022-10-26 PROCEDURE — G0299 HHS/HOSPICE OF RN EA 15 MIN: HCPCS

## 2022-10-28 ENCOUNTER — HOME CARE VISIT (OUTPATIENT)
Dept: SCHEDULING | Facility: HOME HEALTH | Age: 62
End: 2022-10-28
Payer: MEDICAID

## 2022-10-28 PROCEDURE — G0300 HHS/HOSPICE OF LPN EA 15 MIN: HCPCS

## 2022-10-29 VITALS
TEMPERATURE: 98.4 F | OXYGEN SATURATION: 99 % | DIASTOLIC BLOOD PRESSURE: 66 MMHG | SYSTOLIC BLOOD PRESSURE: 132 MMHG | RESPIRATION RATE: 20 BRPM | HEART RATE: 72 BPM

## 2022-10-29 NOTE — HOME HEALTH
Skilled reason for visit: perirectal abcess drainage site  Caregiver involvement: family in home to assist as needed. Medications reviewed and all medications are available in the home this visit. The following education was provided regarding medications, medication interactions, and look alike medications (specify): no med changes noted or reported. Medications  are effective at this time. Home health supplies by type and quantity ordered/delivered this visit include: none  Patient education provided this visit:patient made aware to monitor for s/s of infection [increased swelling, increased redness around site, increased pain, foul smelling drainage, fever] aware who to report to/when.  reviewed cardiac diet- monitoring sodium intake, cholesterol and fat intake. patient aware to limit sodium, no added sodium to diet. reviewed foods to avoid, how to order foods when eating out, how to read nutrition labels and measure sodium, cholesterol and fat intake. Progress toward goals: Pt wound noted with epithalizing tissue on edges with granulation in wound bed. pt tolerated wound care well, with no c/o pain or distress.   Home exercise program: as tolerated  Continued need for the following skills: Nursing  Patient and/or caregiver notified and agrees to changes in the Plan of Care YES/NO/NA: N/A    Patient will be discharged once education and wounds if applicable has been completed, patient is medically stable, and all goals met

## 2022-10-30 VITALS
RESPIRATION RATE: 20 BRPM | OXYGEN SATURATION: 99 % | HEART RATE: 78 BPM | SYSTOLIC BLOOD PRESSURE: 130 MMHG | TEMPERATURE: 97.7 F | DIASTOLIC BLOOD PRESSURE: 78 MMHG

## 2022-10-31 ENCOUNTER — HOME CARE VISIT (OUTPATIENT)
Dept: SCHEDULING | Facility: HOME HEALTH | Age: 62
End: 2022-10-31
Payer: MEDICAID

## 2022-10-31 PROCEDURE — G0299 HHS/HOSPICE OF RN EA 15 MIN: HCPCS

## 2022-11-01 VITALS
TEMPERATURE: 97.7 F | SYSTOLIC BLOOD PRESSURE: 140 MMHG | HEART RATE: 78 BPM | DIASTOLIC BLOOD PRESSURE: 78 MMHG | RESPIRATION RATE: 18 BRPM | OXYGEN SATURATION: 99 %

## 2022-11-01 NOTE — HOME HEALTH
Skilled reason for visit: wound care, medication teaching                       Caregiver involvement: family available if pt requests          Medications reviewed and all medications are available in the home this visit. The following education was provided regarding medications:  pt verbalizes understanding of all education provided during visit                 MD notified of any discrepancies/look a-like medications/medication interactions: na       Medications are effective at this time. Home health supplies by type and quantity ordered/delivered this visit include: adequate supplies       Patient education provided this visit: discussed fall precautions in detail- having lighted hallways, removing throw rugs, monitoring medication that may alter mental status. perform skin inspections looking for any skin breakdown or redness. monitor for edema. frequent position changes. Watch for signs and symptoms of infection which include; fever, drainage, foul smell, lethargy. High risk medication teaching regarding anticoagulants, hyperglycemic agents or opiod narcotics performed (specify) Controlled substance OXYCODONE                   High risk for addiction and dependence. Can cause respiratory distress and death when taken in high doses or when combined with other substances, especially alcohol or other illicit drugs such as heroin or cocaine.                       Sharps education provided: na            Patient level of understanding of education provided: pt verbalizes understanding of all education provided during visit       Skilled Care Performed this visit: same as reason for visit        Patient response to procedure performed:  pt tolerated without complaints of pain       Agency Progress toward goals: progressing          Patient's Progress towards personal goals: progressing       Home exercise program: patient made aware to monitor for s/s of infection [increased swelling, increased redness around site, increased pain, foul smelling drainage, fever] aware who to report to/when. Continued need for the following skills: Nursing     plan for next visit: wound care           Patient and/or caregiver notified and agrees to changes in the Plan of Care YES/NO/NA: N/A            The following discharge planning was discussed with the pt/caregiver:  Patient will be discharged once education has completed, patient is medically stable and pt is able to independently manage wound care/has healed or no longer requires skilled care.

## 2022-11-02 ENCOUNTER — HOME CARE VISIT (OUTPATIENT)
Dept: SCHEDULING | Facility: HOME HEALTH | Age: 62
End: 2022-11-02
Payer: MEDICAID

## 2022-11-02 PROCEDURE — G0299 HHS/HOSPICE OF RN EA 15 MIN: HCPCS

## 2022-11-03 VITALS
RESPIRATION RATE: 18 BRPM | HEART RATE: 80 BPM | DIASTOLIC BLOOD PRESSURE: 74 MMHG | TEMPERATURE: 97.8 F | SYSTOLIC BLOOD PRESSURE: 120 MMHG | OXYGEN SATURATION: 99 %

## 2022-11-04 ENCOUNTER — HOME CARE VISIT (OUTPATIENT)
Dept: SCHEDULING | Facility: HOME HEALTH | Age: 62
End: 2022-11-04
Payer: MEDICAID

## 2022-11-04 PROCEDURE — G0299 HHS/HOSPICE OF RN EA 15 MIN: HCPCS

## 2022-11-07 ENCOUNTER — HOME CARE VISIT (OUTPATIENT)
Dept: SCHEDULING | Facility: HOME HEALTH | Age: 62
End: 2022-11-07
Payer: MEDICAID

## 2022-11-07 VITALS
RESPIRATION RATE: 18 BRPM | OXYGEN SATURATION: 99 % | TEMPERATURE: 97.7 F | DIASTOLIC BLOOD PRESSURE: 78 MMHG | HEART RATE: 78 BPM | SYSTOLIC BLOOD PRESSURE: 128 MMHG

## 2022-11-07 PROCEDURE — 400013 HH SOC

## 2022-11-07 PROCEDURE — G0299 HHS/HOSPICE OF RN EA 15 MIN: HCPCS

## 2022-11-07 NOTE — HOME HEALTH
Skilled reason for visit: wound care, medication teaching       Caregiver involvement: pt independent in home, family available if pt requests    Medications reviewed and all medications are available in the home this visit. The following education was provided regarding medications:  pt verbalizes understanding of all education provided during visit       MD notified of any discrepancies/look a-like medications/medication interactions: na    Medications are effective at this time. Home health supplies by type and quantity ordered/delivered this visit include: adequate supplies         Patient education provided this visit: discussed fall precautions in detail- having lighted hallways, removing throw rugs, monitoring medication that may alter mental status. perform skin inspections looking for any skin breakdown or redness. monitor for edema. frequent position changes. Watch for signs and symptoms of infection which include; fever, drainage, foul smell, lethargy. High risk medication teaching regarding anticoagulants, hyperglycemic agents or opiod narcotics performed (specify) Controlled substance OXYCODONE  High risk for addiction and dependence. Can cause respiratory distress and death when taken in high doses or when combined with other substances, especially alcohol or other illicit drugs such as heroin or cocaine.        Sharps education provided: na         Patient level of understanding of education provided: pt verbalizes understanding of all education provided during visit       Skilled Care Performed this visit: same as reason for visit        Patient response to procedure performed:  pt tolerated without complaints of pain         Agency Progress toward goals: progressing    Patient's Progress towards personal goals: progressing          Home exercise program: patient made aware to monitor for s/s of infection [increased swelling, increased redness around site, increased pain, foul smelling drainage, fever] aware who to report to/when. Continued need for the following skills: Nursing        plan for next visit: wound care           Patient and/or caregiver notified and agrees to changes in the Plan of Care YES/NO/NA: N/A          The following discharge planning was discussed with the pt/caregiver:  Patient will be discharged once education has completed, patient is medically stable and pt is able to independently manage wound care/has healed or no longer requires skilled care.

## 2022-11-08 VITALS
OXYGEN SATURATION: 99 % | RESPIRATION RATE: 18 BRPM | TEMPERATURE: 97.7 F | DIASTOLIC BLOOD PRESSURE: 72 MMHG | HEART RATE: 78 BPM | SYSTOLIC BLOOD PRESSURE: 120 MMHG

## 2022-11-09 ENCOUNTER — HOME CARE VISIT (OUTPATIENT)
Dept: SCHEDULING | Facility: HOME HEALTH | Age: 62
End: 2022-11-09
Payer: MEDICAID

## 2022-11-09 PROCEDURE — G0299 HHS/HOSPICE OF RN EA 15 MIN: HCPCS

## 2022-11-10 ENCOUNTER — HOME CARE VISIT (OUTPATIENT)
Dept: SCHEDULING | Facility: HOME HEALTH | Age: 62
End: 2022-11-10
Payer: MEDICAID

## 2022-11-10 VITALS
TEMPERATURE: 97.7 F | OXYGEN SATURATION: 99 % | DIASTOLIC BLOOD PRESSURE: 72 MMHG | SYSTOLIC BLOOD PRESSURE: 130 MMHG | HEART RATE: 78 BPM | RESPIRATION RATE: 20 BRPM

## 2022-11-10 VITALS
SYSTOLIC BLOOD PRESSURE: 122 MMHG | TEMPERATURE: 97.7 F | HEART RATE: 78 BPM | RESPIRATION RATE: 18 BRPM | DIASTOLIC BLOOD PRESSURE: 74 MMHG | OXYGEN SATURATION: 99 %

## 2022-11-10 PROCEDURE — G0299 HHS/HOSPICE OF RN EA 15 MIN: HCPCS

## 2022-11-11 NOTE — HOME HEALTH
Skilled reason for visit: SN reassessment , wound care, medication teaching                    Caregiver involvement: pt independent in home, family available if pt requests              Medications reviewed and all medications are available in the home this visit. The following education was provided regarding medications:  pt verbalizes understanding of all education provided during visit          MD notified of any discrepancies/look a-like medications/medication interactions: na         Medications are effective at this time. Home health supplies by type and quantity ordered/delivered this visit include: adequate supplies          Patient education provided this visit: discussed fall precautions in detail- having lighted hallways, removing throw rugs, monitoring medication that may alter mental status. perform skin inspections looking for any skin breakdown or redness. monitor for edema. frequent position changes. Watch for signs and symptoms of infection which include; fever, drainage, foul smell, lethargy. High risk medication teaching regarding anticoagulants, hyperglycemic agents or opiod narcotics performed (specify) Controlled substance OXYCODONE  High risk for addiction and dependence. Can cause respiratory distress and death when taken in high doses or when combined with other substances, especially alcohol or other illicit drugs such as heroin or cocaine.        Sharps education provided: na     Patient level of understanding of education provided: pt verbalizes understanding of all education provided during visit       Skilled Care Performed this visit: same as reason for visit         Patient response to procedure performed:  pt tolerated without complaints of pain       Agency Progress toward goals: progressing        Patient's Progress towards personal goals: progressing    Home exercise program: patient made aware to monitor for s/s of infection [increased swelling, increased redness around site, increased pain, foul smelling drainage, fever] aware who to report to/when. Continued need for the following skills: Nursing          plan for next visit: wound care       Patient and/or caregiver notified and agrees to changes in the Plan of Care YES/NO/NA: YES , SN Visits extended 3wk1 for continued wound care           The following discharge planning was discussed with the pt/caregiver:  Patient will be discharged once education has completed, patient is medically stable and pt is able to independently manage wound care/has healed or no longer requires skilled care.

## 2022-11-13 ENCOUNTER — HOME CARE VISIT (OUTPATIENT)
Dept: SCHEDULING | Facility: HOME HEALTH | Age: 62
End: 2022-11-13
Payer: MEDICAID

## 2022-11-14 ENCOUNTER — HOME CARE VISIT (OUTPATIENT)
Dept: SCHEDULING | Facility: HOME HEALTH | Age: 62
End: 2022-11-14
Payer: MEDICAID

## 2022-11-14 PROCEDURE — G0299 HHS/HOSPICE OF RN EA 15 MIN: HCPCS

## 2022-11-15 VITALS
DIASTOLIC BLOOD PRESSURE: 74 MMHG | HEART RATE: 78 BPM | SYSTOLIC BLOOD PRESSURE: 130 MMHG | RESPIRATION RATE: 18 BRPM | TEMPERATURE: 97.8 F | OXYGEN SATURATION: 99 %

## 2022-11-15 NOTE — HOME HEALTH
Skilled reason for visit: wound care                Caregiver involvement: paid caregiver available to  assist with adls meal prep and transport to md as needed. Medications reviewed and all medications are available in the home this visit. The following education was provided regarding medications:   patient to continue to take medications as prescribed. patient aware to monitor for effectiveness and to notify staff of any adverse reactions to medications/any changes to medication regimen. MD notified of any discrepancies/look a-like medications/medication interactions: this nurse spoke with nurse at [facility] office and received verbal order to change order for unstageable heel pressure ulcer dressing from santyl  to Aquacel AG with abd and roll guaze. care plan updated               Medications are effective at this time. Home health supplies by type and quantity ordered/delivered this visit include: adequate supplies              Patient education provided this visit:  . Instructed patient you can do many things to help control your blood pressure at home, including: Eat a heart-healthy diet, including potassium and fiber, and drink plenty of water, exercise regularly at least 30 minutes of aerobic exercise a day, limit the amount of sodium (salt) you eat and aim for less than 1,500 mg per day. Reduce stress by trying to avoid things that cause you stress. Patient is a fall risk. Educated pateint to sit on the side of the chair/bed, take a slow deep breaths, have feet firmly planted before standing up, use cane/walker if available, or have someone to assist              discussed fall precautions in detail- having lighted hallways, removing throw rugs, monitoring medication that may alter mental status. perform skin inspections looking for any skin breakdown or redness. monitor for edema. frequent position changes. Watch for signs and symptoms of infection which include; fever, drainage, foul smell, lethargy. Sharps education provided: na                    Patient level of understanding of education provided: pt verbalizes understanding of all education provided during visit                        Skilled Care Performed this visit: same as reason for visit                Patient response to procedure performed:  pt tolerated without complaints of pain              Agency Progress toward goals: progressing                   Patient's Progress towards personal goals: progressing                   Home exercise program: sn instructed patient to elevate bilateral lower extremities when at rest to promote venous return and prevent swelling                   pt instructed to follow a high protein diet for healing- to try to get 90g protein daily.                    Continued need for the following skills: Nursing              Plan for next visit: wound care                    Patient and/or caregiver notified and agrees to changes in the Plan of Care YES/NO/NA: N/A                      The following discharge planning was discussed with the pt/caregiver:  Patient will be discharged once education has completed, patient is medically stable and pt is able to independently manage wound care/has healed or no longer requires skilled care

## 2022-11-16 ENCOUNTER — HOME CARE VISIT (OUTPATIENT)
Dept: SCHEDULING | Facility: HOME HEALTH | Age: 62
End: 2022-11-16
Payer: MEDICAID

## 2022-11-16 PROCEDURE — G0299 HHS/HOSPICE OF RN EA 15 MIN: HCPCS

## 2022-11-18 ENCOUNTER — HOME CARE VISIT (OUTPATIENT)
Dept: SCHEDULING | Facility: HOME HEALTH | Age: 62
End: 2022-11-18
Payer: MEDICAID

## 2022-11-18 VITALS
HEART RATE: 78 BPM | RESPIRATION RATE: 20 BRPM | OXYGEN SATURATION: 99 % | SYSTOLIC BLOOD PRESSURE: 128 MMHG | TEMPERATURE: 97.7 F | DIASTOLIC BLOOD PRESSURE: 74 MMHG

## 2022-11-18 PROCEDURE — G0299 HHS/HOSPICE OF RN EA 15 MIN: HCPCS

## 2022-11-18 NOTE — HOME HEALTH
Skilled reason for visit: wound care              Caregiver involvement: paid caregiver available to  assist with adls meal prep and transport to md as needed. Medications reviewed and all medications are available in the home this visit. The following education was provided regarding medications:   patient to continue to take medications as prescribed. patient aware to monitor for effectiveness and to notify staff of any adverse reactions to medications/any changes to medication regimen. MD notified of any discrepancies/look a-like medications/medication interactions: this nurse spoke with nurse at [facility] office and received verbal order to change order for unstageable heel pressure ulcer dressing from santyl  to Aquacel AG with abd and roll guaze. care plan updated             Medications are effective at this time. Home health supplies by type and quantity ordered/delivered this visit include: adequate supplies                             Patient education provided this visit:  . Instructed patient you can do many things to help control your blood pressure at home, including: Eat a heart-healthy diet, including potassium and fiber, and drink plenty of water, exercise regularly at least 30 minutes of aerobic exercise a day, limit the amount of sodium (salt) you eat and aim for less than 1,500 mg per day. Reduce stress by trying to avoid things that cause you stress. Patient is a fall risk. Educated pateint to sit on the side of the chair/bed, take a slow deep breaths, have feet firmly planted before standing up, use cane/walker if available, or have someone to assist     discussed fall precautions in detail- having lighted hallways, removing throw rugs, monitoring medication that may alter mental status. perform skin inspections looking for any skin breakdown or redness. monitor for edema. frequent position changes.  Watch for signs and symptoms of infection which include; fever, drainage, foul smell, lethargy. Sharps education provided: na         Patient level of understanding of education provided: pt verbalizes understanding of all education provided during visit            Skilled Care Performed this visit: same as reason for visit      Patient response to procedure performed:  pt tolerated without complaints of pain       Agency Progress toward goals: progressing          Patient's Progress towards personal goals: progressing               Home exercise program: sn instructed patient to elevate bilateral lower extremities when at rest to promote venous return and prevent swelling              pt instructed to follow a high protein diet for healing- to try to get 90g protein daily.        Continued need for the following skills: Nursing       Plan for next visit: wound care   Patient and/or caregiver notified and agrees to changes in the Plan of Care YES/NO/NA: N/A             The following discharge planning was discussed with the pt/caregiver:  Patient will be discharged once education has completed, patient is medically stable and pt is able to independently manage wound care/has healed or no longer requires skilled care

## 2022-11-18 NOTE — Clinical Note
[address] DISCHARGED FROM [facility] 11/18/22 SKILLED NURSING FOR WOUND CARE. WOUND HAS HEALED, PT NO LONGER REQUIRED SKILLED CARE. THANK YOU FOR REFERRAL !     Albino Jolley RN

## 2022-11-22 VITALS
HEART RATE: 78 BPM | DIASTOLIC BLOOD PRESSURE: 74 MMHG | TEMPERATURE: 98.7 F | SYSTOLIC BLOOD PRESSURE: 132 MMHG | OXYGEN SATURATION: 99 % | RESPIRATION RATE: 20 BRPM

## 2022-11-22 NOTE — HOME HEALTH
Skilled reason for visit: wound care and [facility] discharge         Caregiver involvement: paid caregiver available to  assist with adls meal prep and transport to md as needed. Medications reviewed and all medications are available in the home this visit. The following education was provided regarding medications:   patient to continue to take medications as prescribed. patient aware to monitor for effectiveness and to notify staff of any adverse reactions to medications/any changes to medication regimen. MD notified of any discrepancies/look a-like medications/medication interactions: na     Medications are effective at this time. Home health supplies by type and quantity ordered/delivered this visit include: na       Patient education provided this visit:  . Instructed patient you can do many things to help control your blood pressure at home, including: Eat a heart-healthy diet, including potassium and fiber, and drink plenty of water, exercise regularly at least 30 minutes of aerobic exercise a day, limit the amount of sodium (salt) you eat and aim for less than 1,500 mg per day. Reduce stress by trying to avoid things that cause you stress. Patient is a fall risk. Educated pateint to sit on the side of the chair/bed, take a slow deep breaths, have feet firmly planted before standing up,  or have someone to assist          discussed fall precautions in detail- having lighted hallways, removing throw rugs, monitoring medication that may alter mental status. perform skin inspections looking for any skin breakdown or redness. monitor for edema. frequent position changes. Watch for signs and symptoms of infection which include; fever, drainage, foul smell, lethargy.        Sharps education provided: na       Patient level of understanding of education provided: pt verbalizes understanding of all education provided during visit       Skilled Care Performed this visit: same as reason for visit           Patient response to procedure performed:  pt tolerated without complaints of pain              Agency Progress toward goals: goals met                   Patient's Progress towards personal goals: goals met                               Home exercise program: sn instructed patient to elevate bilateral lower extremities when at rest to promote venous return and prevent swelling                             pt instructed to follow a high protein diet for healing- to try to get 90g protein daily. Continued need for the following skills: Nursing              Plan for next visit: wound care     Patient and/or caregiver notified and agrees to changes in the Plan of Care YES/NO/NA:yes     The following discharge planning was discussed with the pt/caregiver: Patient  discharged 11/18/22 education has been completed, patient is medically stable and pt is able to independently medication regimen and disease process wound has healed and pt  no longer requires skilled care.

## 2024-08-20 ENCOUNTER — APPOINTMENT (OUTPATIENT)
Facility: HOSPITAL | Age: 64
End: 2024-08-20

## 2024-08-20 ENCOUNTER — HOSPITAL ENCOUNTER (EMERGENCY)
Facility: HOSPITAL | Age: 64
Discharge: HOME | End: 2024-08-20

## 2024-08-20 VITALS
HEART RATE: 60 BPM | TEMPERATURE: 98.2 F | SYSTOLIC BLOOD PRESSURE: 112 MMHG | WEIGHT: 185 LBS | RESPIRATION RATE: 18 BRPM | OXYGEN SATURATION: 97 % | DIASTOLIC BLOOD PRESSURE: 66 MMHG | HEIGHT: 70 IN | BODY MASS INDEX: 26.48 KG/M2

## 2024-08-20 DIAGNOSIS — S91.011A LACERATION OF RIGHT ANKLE, INITIAL ENCOUNTER: Primary | ICD-10-CM

## 2024-08-20 PROCEDURE — 12002 RPR S/N/AX/GEN/TRNK2.6-7.5CM: CPT

## 2024-08-20 PROCEDURE — 73610 X-RAY EXAM OF ANKLE: CPT

## 2024-08-20 PROCEDURE — 99283 EMERGENCY DEPT VISIT LOW MDM: CPT

## 2024-08-20 RX ORDER — BACITRACIN ZINC 500 [USP'U]/G
OINTMENT TOPICAL
Status: DISCONTINUED | OUTPATIENT
Start: 2024-08-20 | End: 2024-08-20 | Stop reason: HOSPADM

## 2024-08-20 ASSESSMENT — PAIN - FUNCTIONAL ASSESSMENT: PAIN_FUNCTIONAL_ASSESSMENT: 0-10

## 2024-08-20 ASSESSMENT — PAIN SCALES - GENERAL: PAINLEVEL_OUTOF10: 6

## 2024-08-20 ASSESSMENT — PAIN DESCRIPTION - ORIENTATION: ORIENTATION: RIGHT

## 2024-08-20 ASSESSMENT — PAIN DESCRIPTION - LOCATION: LOCATION: FOOT

## 2024-08-20 NOTE — ED PROVIDER NOTES
Contaminated: no    Treatment:     Area cleansed with:  Silke    Amount of cleaning:  Standard    Irrigation solution:  Sterile saline    Irrigation volume:  50cc    Irrigation method:  Syringe  Skin repair:     Repair method:  Sutures    Suture size:  3-0    Suture material:  Nylon    Suture technique:  Simple interrupted    Number of sutures:  12  Repair type:     Repair type:  Intermediate  Post-procedure details:     Dressing:  Antibiotic ointment, bulky dressing and splint for protection    Procedure completion:  Tolerated well, no immediate complications      FINAL IMPRESSION      1. Laceration of right ankle, initial encounter          DISPOSITION/PLAN   DISPOSITION Decision To Discharge 08/20/2024 09:50:10 AM  Condition at Disposition: Stable      PATIENT REFERRED TO:  Mandeep Rodriguez Jr., APRN - NP  [address]  [address]  [place] [address]  [phone]          [facility] EMERGENCY DEPT  [address] [address]  [phone]    If symptoms worsen or unable to obtain follow up, return immediately    Redd Bowser, JUANAM  [address] [address]  [phone]            DISCHARGE MEDICATIONS:  New Prescriptions    No medications on file     Controlled Substances Monitoring:          No data to display                (Please note that portions of this note were completed with a voice recognition program.  Efforts were made to edit the dictations but occasionally words are mis-transcribed.)    KALE Manzanares (electronically signed)  Attending Emergency Physician           Fanny Tan PA  08/20/24 1016       Fanny Tan PA  08/20/24 [id number]

## 2024-08-20 NOTE — ED NOTES
Pt laceration cleaned with sterile water and dressed with non-adherent guaze and wrapped with guaze wrap. Medical Boot placed on pt right foot for suture comfort.

## 2024-08-20 NOTE — DISCHARGE INSTRUCTIONS
Return in 2 days to the emergency department for wound check.    You must wear your walking boot at all times.    Sutures to be removed in 2 weeks.

## 2024-08-20 NOTE — ED TRIAGE NOTES
Pt in ED with c/o a LAC to right foot. Pt states a washing machine fell on foot. No blood thinners, bleeding controlled in triage. Unknown last tetanus

## 2024-09-06 ENCOUNTER — HOSPITAL ENCOUNTER (EMERGENCY)
Facility: HOSPITAL | Age: 64
Discharge: HOME | End: 2024-09-06

## 2024-09-06 VITALS
TEMPERATURE: 98.3 F | BODY MASS INDEX: 26.48 KG/M2 | SYSTOLIC BLOOD PRESSURE: 139 MMHG | WEIGHT: 185 LBS | OXYGEN SATURATION: 99 % | HEIGHT: 70 IN | RESPIRATION RATE: 18 BRPM | DIASTOLIC BLOOD PRESSURE: 79 MMHG | HEART RATE: 52 BPM

## 2024-09-06 ASSESSMENT — PAIN - FUNCTIONAL ASSESSMENT: PAIN_FUNCTIONAL_ASSESSMENT: 0-10

## 2024-09-06 ASSESSMENT — PAIN SCALES - GENERAL: PAINLEVEL_OUTOF10: 5

## 2024-09-06 ASSESSMENT — PAIN DESCRIPTION - LOCATION: LOCATION: ANKLE;FOOT

## 2024-09-06 NOTE — ED TRIAGE NOTES
Pt states she had a laceration repaired on his right ankle on 8/20 and 2 days ago the sutures came out.

## 2024-09-07 ENCOUNTER — APPOINTMENT (OUTPATIENT)
Facility: HOSPITAL | Age: 64
DRG: 571 | End: 2024-09-07

## 2024-09-07 ENCOUNTER — HOSPITAL ENCOUNTER (INPATIENT)
Facility: HOSPITAL | Age: 64
LOS: 1 days | Discharge: HOME | DRG: 571 | End: 2024-09-11
Attending: STUDENT IN AN ORGANIZED HEALTH CARE EDUCATION/TRAINING PROGRAM | Admitting: HOSPITALIST

## 2024-09-07 DIAGNOSIS — S91.309A OPEN WOUND OF FOOT EXCLUDING TOES: ICD-10-CM

## 2024-09-07 DIAGNOSIS — L03.115 CELLULITIS OF RIGHT LOWER EXTREMITY: ICD-10-CM

## 2024-09-07 DIAGNOSIS — L02.611 ABSCESS OF RIGHT FOOT: Primary | ICD-10-CM

## 2024-09-07 PROBLEM — T81.30XA WOUND DEHISCENCE: Chronic | Status: ACTIVE | Noted: 2024-09-07

## 2024-09-07 PROBLEM — L03.90 CELLULITIS: Status: ACTIVE | Noted: 2024-09-07

## 2024-09-07 PROBLEM — I10 PRIMARY HYPERTENSION: Chronic | Status: ACTIVE | Noted: 2024-09-07

## 2024-09-07 PROBLEM — L08.9 INFECTED WOUND: Status: ACTIVE | Noted: 2024-09-07

## 2024-09-07 PROBLEM — T14.8XXA INFECTED WOUND: Status: ACTIVE | Noted: 2024-09-07

## 2024-09-07 PROBLEM — T81.30XA WOUND DEHISCENCE: Status: ACTIVE | Noted: 2024-09-07

## 2024-09-07 PROBLEM — I10 PRIMARY HYPERTENSION: Status: ACTIVE | Noted: 2024-09-07

## 2024-09-07 LAB
ALBUMIN SERPL-MCNC: 3 G/DL (ref 3.4–5)
ALBUMIN/GLOB SERPL: 0.6 (ref 0.8–1.7)
ALP SERPL-CCNC: 96 U/L (ref 45–117)
ALT SERPL-CCNC: 65 U/L (ref 16–61)
ANION GAP SERPL CALC-SCNC: 3 MMOL/L (ref 3–18)
AST SERPL-CCNC: 67 U/L (ref 10–38)
BASOPHILS # BLD: 0 K/UL (ref 0–0.1)
BASOPHILS NFR BLD: 1 % (ref 0–2)
BILIRUB SERPL-MCNC: 0.2 MG/DL (ref 0.2–1)
BUN SERPL-MCNC: 10 MG/DL (ref 7–18)
BUN/CREAT SERPL: 10 (ref 12–20)
CALCIUM SERPL-MCNC: 9 MG/DL (ref 8.5–10.1)
CHLORIDE SERPL-SCNC: 106 MMOL/L (ref 100–111)
CO2 SERPL-SCNC: 27 MMOL/L (ref 21–32)
CREAT SERPL-MCNC: 0.97 MG/DL (ref 0.6–1.3)
DIFFERENTIAL METHOD BLD: ABNORMAL
EOSINOPHIL # BLD: 0.2 K/UL (ref 0–0.4)
EOSINOPHIL NFR BLD: 4 % (ref 0–5)
ERYTHROCYTE [DISTWIDTH] IN BLOOD BY AUTOMATED COUNT: 15 % (ref 11.6–14.5)
GLOBULIN SER CALC-MCNC: 4.8 G/DL (ref 2–4)
GLUCOSE SERPL-MCNC: 95 MG/DL (ref 74–99)
HCT VFR BLD AUTO: 39.3 % (ref 36–48)
HGB BLD-MCNC: 12.3 G/DL (ref 13–16)
IMM GRANULOCYTES # BLD AUTO: 0.1 K/UL (ref 0–0.04)
IMM GRANULOCYTES NFR BLD AUTO: 1 % (ref 0–0.5)
LACTATE BLD-SCNC: 1.12 MMOL/L (ref 0.4–2)
LYMPHOCYTES # BLD: 1.9 K/UL (ref 0.9–3.6)
LYMPHOCYTES NFR BLD: 46 % (ref 21–52)
MCH RBC QN AUTO: 28 PG (ref 24–34)
MCHC RBC AUTO-ENTMCNC: 31.3 G/DL (ref 31–37)
MCV RBC AUTO: 89.5 FL (ref 78–100)
MONOCYTES # BLD: 0.5 K/UL (ref 0.05–1.2)
MONOCYTES NFR BLD: 12 % (ref 3–10)
NEUTS SEG # BLD: 1.5 K/UL (ref 1.8–8)
NEUTS SEG NFR BLD: 36 % (ref 40–73)
NRBC # BLD: 0 K/UL (ref 0–0.01)
NRBC BLD-RTO: 0 PER 100 WBC
PLATELET # BLD AUTO: 308 K/UL (ref 135–420)
PMV BLD AUTO: 8.2 FL (ref 9.2–11.8)
POTASSIUM SERPL-SCNC: 4.4 MMOL/L (ref 3.5–5.5)
PROT SERPL-MCNC: 7.8 G/DL (ref 6.4–8.2)
RBC # BLD AUTO: 4.39 M/UL (ref 4.35–5.65)
SODIUM SERPL-SCNC: 136 MMOL/L (ref 136–145)
WBC # BLD AUTO: 4.1 K/UL (ref 4.6–13.2)

## 2024-09-07 PROCEDURE — G0378 HOSPITAL OBSERVATION PER HR: HCPCS

## 2024-09-07 PROCEDURE — 85025 COMPLETE CBC W/AUTO DIFF WBC: CPT

## 2024-09-07 PROCEDURE — 96374 THER/PROPH/DIAG INJ IV PUSH: CPT

## 2024-09-07 PROCEDURE — 6360000002 HC RX W HCPCS

## 2024-09-07 PROCEDURE — 99285 EMERGENCY DEPT VISIT HI MDM: CPT

## 2024-09-07 PROCEDURE — 73610 X-RAY EXAM OF ANKLE: CPT

## 2024-09-07 PROCEDURE — 2580000003 HC RX 258

## 2024-09-07 PROCEDURE — 2580000003 HC RX 258: Performed by: INTERNAL MEDICINE

## 2024-09-07 PROCEDURE — 73701 CT LOWER EXTREMITY W/DYE: CPT

## 2024-09-07 PROCEDURE — 6360000004 HC RX CONTRAST MEDICATION

## 2024-09-07 PROCEDURE — 83605 ASSAY OF LACTIC ACID: CPT

## 2024-09-07 PROCEDURE — 80053 COMPREHEN METABOLIC PANEL: CPT

## 2024-09-07 RX ORDER — ACETAMINOPHEN 325 MG/1
650 TABLET ORAL EVERY 6 HOURS PRN
Status: DISCONTINUED | OUTPATIENT
Start: 2024-09-07 | End: 2024-09-11 | Stop reason: HOSPADM

## 2024-09-07 RX ORDER — NALOXONE HYDROCHLORIDE 0.4 MG/ML
0.4 INJECTION, SOLUTION INTRAMUSCULAR; INTRAVENOUS; SUBCUTANEOUS PRN
Status: DISCONTINUED | OUTPATIENT
Start: 2024-09-07 | End: 2024-09-11 | Stop reason: HOSPADM

## 2024-09-07 RX ORDER — METOPROLOL TARTRATE 50 MG
50 TABLET ORAL 2 TIMES DAILY
Status: DISCONTINUED | OUTPATIENT
Start: 2024-09-08 | End: 2024-09-10

## 2024-09-07 RX ORDER — ONDANSETRON 4 MG/1
4 TABLET, ORALLY DISINTEGRATING ORAL EVERY 8 HOURS PRN
Status: DISCONTINUED | OUTPATIENT
Start: 2024-09-07 | End: 2024-09-11 | Stop reason: HOSPADM

## 2024-09-07 RX ORDER — POTASSIUM CHLORIDE 1500 MG/1
40 TABLET, EXTENDED RELEASE ORAL PRN
Status: DISCONTINUED | OUTPATIENT
Start: 2024-09-07 | End: 2024-09-11 | Stop reason: HOSPADM

## 2024-09-07 RX ORDER — POTASSIUM CHLORIDE 7.45 MG/ML
10 INJECTION INTRAVENOUS PRN
Status: DISCONTINUED | OUTPATIENT
Start: 2024-09-07 | End: 2024-09-11 | Stop reason: HOSPADM

## 2024-09-07 RX ORDER — SODIUM CHLORIDE 0.9 % (FLUSH) 0.9 %
5-40 SYRINGE (ML) INJECTION PRN
Status: DISCONTINUED | OUTPATIENT
Start: 2024-09-07 | End: 2024-09-11 | Stop reason: HOSPADM

## 2024-09-07 RX ORDER — MAGNESIUM SULFATE IN WATER 40 MG/ML
2000 INJECTION, SOLUTION INTRAVENOUS PRN
Status: DISCONTINUED | OUTPATIENT
Start: 2024-09-07 | End: 2024-09-11 | Stop reason: HOSPADM

## 2024-09-07 RX ORDER — OXYCODONE AND ACETAMINOPHEN 5; 325 MG/1; MG/1
1 TABLET ORAL EVERY 4 HOURS PRN
Status: DISCONTINUED | OUTPATIENT
Start: 2024-09-07 | End: 2024-09-11 | Stop reason: HOSPADM

## 2024-09-07 RX ORDER — IOPAMIDOL 612 MG/ML
100 INJECTION, SOLUTION INTRAVASCULAR
Status: COMPLETED | OUTPATIENT
Start: 2024-09-07 | End: 2024-09-07

## 2024-09-07 RX ORDER — DOXYCYCLINE HYCLATE 100 MG
100 TABLET ORAL 2 TIMES DAILY
Qty: 14 TABLET | Refills: 0 | Status: SHIPPED | OUTPATIENT
Start: 2024-09-07 | End: 2024-09-11 | Stop reason: HOSPADM

## 2024-09-07 RX ORDER — SODIUM CHLORIDE 0.9 % (FLUSH) 0.9 %
5-40 SYRINGE (ML) INJECTION EVERY 12 HOURS SCHEDULED
Status: DISCONTINUED | OUTPATIENT
Start: 2024-09-07 | End: 2024-09-11 | Stop reason: HOSPADM

## 2024-09-07 RX ORDER — SODIUM CHLORIDE 9 MG/ML
INJECTION, SOLUTION INTRAVENOUS PRN
Status: DISCONTINUED | OUTPATIENT
Start: 2024-09-07 | End: 2024-09-11 | Stop reason: HOSPADM

## 2024-09-07 RX ORDER — ONDANSETRON 2 MG/ML
4 INJECTION INTRAMUSCULAR; INTRAVENOUS EVERY 6 HOURS PRN
Status: DISCONTINUED | OUTPATIENT
Start: 2024-09-07 | End: 2024-09-11 | Stop reason: HOSPADM

## 2024-09-07 RX ORDER — POLYETHYLENE GLYCOL 3350 17 G/17G
17 POWDER, FOR SOLUTION ORAL DAILY PRN
Status: DISCONTINUED | OUTPATIENT
Start: 2024-09-07 | End: 2024-09-11 | Stop reason: HOSPADM

## 2024-09-07 RX ORDER — ACETAMINOPHEN 650 MG/1
650 SUPPOSITORY RECTAL EVERY 6 HOURS PRN
Status: DISCONTINUED | OUTPATIENT
Start: 2024-09-07 | End: 2024-09-11 | Stop reason: HOSPADM

## 2024-09-07 RX ADMIN — IOPAMIDOL 100 ML: 612 INJECTION, SOLUTION INTRAVENOUS at 18:45

## 2024-09-07 RX ADMIN — SODIUM CHLORIDE, PRESERVATIVE FREE 10 ML: 5 INJECTION INTRAVENOUS at 23:00

## 2024-09-07 RX ADMIN — WATER 1000 MG: 1 INJECTION INTRAMUSCULAR; INTRAVENOUS; SUBCUTANEOUS at 20:53

## 2024-09-07 ASSESSMENT — PAIN DESCRIPTION - LOCATION: LOCATION: ANKLE

## 2024-09-07 ASSESSMENT — PAIN SCALES - GENERAL
PAINLEVEL_OUTOF10: 0
PAINLEVEL_OUTOF10: 6

## 2024-09-07 ASSESSMENT — PAIN DESCRIPTION - ORIENTATION: ORIENTATION: RIGHT

## 2024-09-07 ASSESSMENT — PAIN - FUNCTIONAL ASSESSMENT: PAIN_FUNCTIONAL_ASSESSMENT: 0-10

## 2024-09-08 ENCOUNTER — ANESTHESIA (OUTPATIENT)
Facility: HOSPITAL | Age: 64
End: 2024-09-08

## 2024-09-08 ENCOUNTER — ANESTHESIA EVENT (OUTPATIENT)
Facility: HOSPITAL | Age: 64
End: 2024-09-08

## 2024-09-08 LAB
ALBUMIN SERPL-MCNC: 2.6 G/DL (ref 3.4–5)
ALBUMIN/GLOB SERPL: 0.6 (ref 0.8–1.7)
ALP SERPL-CCNC: 78 U/L (ref 45–117)
ALT SERPL-CCNC: 59 U/L (ref 16–61)
ANION GAP SERPL CALC-SCNC: 3 MMOL/L (ref 3–18)
AST SERPL-CCNC: 60 U/L (ref 10–38)
BASOPHILS # BLD: 0.1 K/UL (ref 0–0.1)
BASOPHILS NFR BLD: 1 % (ref 0–2)
BILIRUB SERPL-MCNC: 0.3 MG/DL (ref 0.2–1)
BUN SERPL-MCNC: 9 MG/DL (ref 7–18)
BUN/CREAT SERPL: 9 (ref 12–20)
CALCIUM SERPL-MCNC: 9.2 MG/DL (ref 8.5–10.1)
CHLORIDE SERPL-SCNC: 107 MMOL/L (ref 100–111)
CO2 SERPL-SCNC: 27 MMOL/L (ref 21–32)
CREAT SERPL-MCNC: 0.99 MG/DL (ref 0.6–1.3)
CRP SERPL-MCNC: <0.3 MG/DL (ref 0–0.3)
DIFFERENTIAL METHOD BLD: ABNORMAL
EOSINOPHIL # BLD: 0.1 K/UL (ref 0–0.4)
EOSINOPHIL NFR BLD: 4 % (ref 0–5)
ERYTHROCYTE [DISTWIDTH] IN BLOOD BY AUTOMATED COUNT: 15.3 % (ref 11.6–14.5)
ERYTHROCYTE [SEDIMENTATION RATE] IN BLOOD: 63 MM/HR (ref 0–20)
EST. AVERAGE GLUCOSE BLD GHB EST-MCNC: 105 MG/DL
GLOBULIN SER CALC-MCNC: 4.6 G/DL (ref 2–4)
GLUCOSE SERPL-MCNC: 88 MG/DL (ref 74–99)
HBA1C MFR BLD: 5.3 % (ref 4.2–5.6)
HCT VFR BLD AUTO: 36.9 % (ref 36–48)
HGB BLD-MCNC: 11.7 G/DL (ref 13–16)
IMM GRANULOCYTES # BLD AUTO: 0.1 K/UL (ref 0–0.04)
IMM GRANULOCYTES NFR BLD AUTO: 2 % (ref 0–0.5)
LYMPHOCYTES # BLD: 1.9 K/UL (ref 0.9–3.6)
LYMPHOCYTES NFR BLD: 52 % (ref 21–52)
MCH RBC QN AUTO: 28.6 PG (ref 24–34)
MCHC RBC AUTO-ENTMCNC: 31.7 G/DL (ref 31–37)
MCV RBC AUTO: 90.2 FL (ref 78–100)
MONOCYTES # BLD: 0.5 K/UL (ref 0.05–1.2)
MONOCYTES NFR BLD: 13 % (ref 3–10)
NEUTS SEG # BLD: 1.1 K/UL (ref 1.8–8)
NEUTS SEG NFR BLD: 29 % (ref 40–73)
NRBC # BLD: 0 K/UL (ref 0–0.01)
NRBC BLD-RTO: 0 PER 100 WBC
PLATELET # BLD AUTO: 288 K/UL (ref 135–420)
PMV BLD AUTO: 8.3 FL (ref 9.2–11.8)
POTASSIUM SERPL-SCNC: 3.8 MMOL/L (ref 3.5–5.5)
PROT SERPL-MCNC: 7.2 G/DL (ref 6.4–8.2)
RBC # BLD AUTO: 4.09 M/UL (ref 4.35–5.65)
SODIUM SERPL-SCNC: 137 MMOL/L (ref 136–145)
WBC # BLD AUTO: 3.7 K/UL (ref 4.6–13.2)

## 2024-09-08 PROCEDURE — 7100000001 HC PACU RECOVERY - ADDTL 15 MIN: Performed by: PODIATRIST

## 2024-09-08 PROCEDURE — 87077 CULTURE AEROBIC IDENTIFY: CPT

## 2024-09-08 PROCEDURE — 87205 SMEAR GRAM STAIN: CPT

## 2024-09-08 PROCEDURE — 2580000003 HC RX 258: Performed by: INTERNAL MEDICINE

## 2024-09-08 PROCEDURE — 80053 COMPREHEN METABOLIC PANEL: CPT

## 2024-09-08 PROCEDURE — 87186 SC STD MICRODIL/AGAR DIL: CPT

## 2024-09-08 PROCEDURE — 3600000012 HC SURGERY LEVEL 2 ADDTL 15MIN: Performed by: PODIATRIST

## 2024-09-08 PROCEDURE — 36415 COLL VENOUS BLD VENIPUNCTURE: CPT

## 2024-09-08 PROCEDURE — G0378 HOSPITAL OBSERVATION PER HR: HCPCS

## 2024-09-08 PROCEDURE — 0JBN0ZZ EXCISION OF RIGHT LOWER LEG SUBCUTANEOUS TISSUE AND FASCIA, OPEN APPROACH: ICD-10-PCS | Performed by: PODIATRIST

## 2024-09-08 PROCEDURE — 85025 COMPLETE CBC W/AUTO DIFF WBC: CPT

## 2024-09-08 PROCEDURE — 6360000002 HC RX W HCPCS: Performed by: PODIATRIST

## 2024-09-08 PROCEDURE — 87147 CULTURE TYPE IMMUNOLOGIC: CPT

## 2024-09-08 PROCEDURE — 85652 RBC SED RATE AUTOMATED: CPT

## 2024-09-08 PROCEDURE — 6360000002 HC RX W HCPCS: Performed by: NURSE ANESTHETIST, CERTIFIED REGISTERED

## 2024-09-08 PROCEDURE — 99232 SBSQ HOSP IP/OBS MODERATE 35: CPT | Performed by: HOSPITALIST

## 2024-09-08 PROCEDURE — 87070 CULTURE OTHR SPECIMN AEROBIC: CPT

## 2024-09-08 PROCEDURE — 83036 HEMOGLOBIN GLYCOSYLATED A1C: CPT

## 2024-09-08 PROCEDURE — 87075 CULTR BACTERIA EXCEPT BLOOD: CPT

## 2024-09-08 PROCEDURE — 6360000002 HC RX W HCPCS: Performed by: INTERNAL MEDICINE

## 2024-09-08 PROCEDURE — 3700000000 HC ANESTHESIA ATTENDED CARE: Performed by: PODIATRIST

## 2024-09-08 PROCEDURE — 2500000003 HC RX 250 WO HCPCS: Performed by: PODIATRIST

## 2024-09-08 PROCEDURE — 6360000002 HC RX W HCPCS: Performed by: HOSPITALIST

## 2024-09-08 PROCEDURE — 2580000003 HC RX 258: Performed by: NURSE ANESTHETIST, CERTIFIED REGISTERED

## 2024-09-08 PROCEDURE — 7100000000 HC PACU RECOVERY - FIRST 15 MIN: Performed by: PODIATRIST

## 2024-09-08 PROCEDURE — 86140 C-REACTIVE PROTEIN: CPT

## 2024-09-08 PROCEDURE — 6370000000 HC RX 637 (ALT 250 FOR IP): Performed by: INTERNAL MEDICINE

## 2024-09-08 PROCEDURE — 3700000001 HC ADD 15 MINUTES (ANESTHESIA): Performed by: PODIATRIST

## 2024-09-08 PROCEDURE — 2709999900 HC NON-CHARGEABLE SUPPLY: Performed by: PODIATRIST

## 2024-09-08 PROCEDURE — 3600000002 HC SURGERY LEVEL 2 BASE: Performed by: PODIATRIST

## 2024-09-08 RX ORDER — PROPOFOL 10 MG/ML
INJECTION, EMULSION INTRAVENOUS CONTINUOUS PRN
Status: DISCONTINUED | OUTPATIENT
Start: 2024-09-08 | End: 2024-09-08 | Stop reason: SDUPTHER

## 2024-09-08 RX ORDER — DIPHENHYDRAMINE HYDROCHLORIDE 50 MG/ML
12.5 INJECTION INTRAMUSCULAR; INTRAVENOUS
Status: DISCONTINUED | OUTPATIENT
Start: 2024-09-08 | End: 2024-09-08

## 2024-09-08 RX ORDER — MIDAZOLAM HYDROCHLORIDE 1 MG/ML
INJECTION INTRAMUSCULAR; INTRAVENOUS PRN
Status: DISCONTINUED | OUTPATIENT
Start: 2024-09-08 | End: 2024-09-08 | Stop reason: SDUPTHER

## 2024-09-08 RX ORDER — NALOXONE HYDROCHLORIDE 0.4 MG/ML
INJECTION, SOLUTION INTRAMUSCULAR; INTRAVENOUS; SUBCUTANEOUS PRN
Status: DISCONTINUED | OUTPATIENT
Start: 2024-09-08 | End: 2024-09-08

## 2024-09-08 RX ORDER — CEFAZOLIN SODIUM 1 G/3ML
INJECTION, POWDER, FOR SOLUTION INTRAMUSCULAR; INTRAVENOUS PRN
Status: DISCONTINUED | OUTPATIENT
Start: 2024-09-08 | End: 2024-09-08 | Stop reason: SDUPTHER

## 2024-09-08 RX ORDER — GLUCAGON 1 MG
1 KIT INJECTION PRN
Status: DISCONTINUED | OUTPATIENT
Start: 2024-09-08 | End: 2024-09-08

## 2024-09-08 RX ORDER — ENOXAPARIN SODIUM 100 MG/ML
40 INJECTION SUBCUTANEOUS DAILY
Status: DISCONTINUED | OUTPATIENT
Start: 2024-09-08 | End: 2024-09-11 | Stop reason: HOSPADM

## 2024-09-08 RX ORDER — FENTANYL CITRATE 50 UG/ML
INJECTION, SOLUTION INTRAMUSCULAR; INTRAVENOUS PRN
Status: DISCONTINUED | OUTPATIENT
Start: 2024-09-08 | End: 2024-09-08 | Stop reason: SDUPTHER

## 2024-09-08 RX ORDER — ONDANSETRON 2 MG/ML
4 INJECTION INTRAMUSCULAR; INTRAVENOUS
Status: DISCONTINUED | OUTPATIENT
Start: 2024-09-08 | End: 2024-09-08

## 2024-09-08 RX ORDER — SODIUM CHLORIDE, SODIUM LACTATE, POTASSIUM CHLORIDE, CALCIUM CHLORIDE 600; 310; 30; 20 MG/100ML; MG/100ML; MG/100ML; MG/100ML
INJECTION, SOLUTION INTRAVENOUS CONTINUOUS PRN
Status: DISCONTINUED | OUTPATIENT
Start: 2024-09-08 | End: 2024-09-08 | Stop reason: SDUPTHER

## 2024-09-08 RX ORDER — FENTANYL CITRATE 50 UG/ML
50 INJECTION, SOLUTION INTRAMUSCULAR; INTRAVENOUS EVERY 5 MIN PRN
Status: DISCONTINUED | OUTPATIENT
Start: 2024-09-08 | End: 2024-09-08

## 2024-09-08 RX ORDER — SODIUM CHLORIDE 0.9 % (FLUSH) 0.9 %
5-40 SYRINGE (ML) INJECTION PRN
Status: DISCONTINUED | OUTPATIENT
Start: 2024-09-08 | End: 2024-09-08

## 2024-09-08 RX ORDER — DEXTROSE MONOHYDRATE 100 MG/ML
INJECTION, SOLUTION INTRAVENOUS CONTINUOUS PRN
Status: DISCONTINUED | OUTPATIENT
Start: 2024-09-08 | End: 2024-09-08

## 2024-09-08 RX ORDER — SODIUM CHLORIDE 9 MG/ML
INJECTION, SOLUTION INTRAVENOUS CONTINUOUS
Status: DISCONTINUED | OUTPATIENT
Start: 2024-09-08 | End: 2024-09-10

## 2024-09-08 RX ADMIN — CEFAZOLIN 2 G: 330 INJECTION, POWDER, FOR SOLUTION INTRAMUSCULAR; INTRAVENOUS at 12:03

## 2024-09-08 RX ADMIN — METOPROLOL TARTRATE 50 MG: 50 TABLET, FILM COATED ORAL at 08:33

## 2024-09-08 RX ADMIN — METOPROLOL TARTRATE 50 MG: 50 TABLET, FILM COATED ORAL at 00:37

## 2024-09-08 RX ADMIN — WATER 1000 MG: 1 INJECTION INTRAMUSCULAR; INTRAVENOUS; SUBCUTANEOUS at 20:48

## 2024-09-08 RX ADMIN — ENOXAPARIN SODIUM 40 MG: 100 INJECTION SUBCUTANEOUS at 18:27

## 2024-09-08 RX ADMIN — PROPOFOL 20 MG: 10 INJECTION, EMULSION INTRAVENOUS at 11:56

## 2024-09-08 RX ADMIN — FENTANYL CITRATE 50 MCG: 50 INJECTION INTRAMUSCULAR; INTRAVENOUS at 12:16

## 2024-09-08 RX ADMIN — SODIUM CHLORIDE, SODIUM LACTATE, POTASSIUM CHLORIDE, AND CALCIUM CHLORIDE: 600; 310; 30; 20 INJECTION, SOLUTION INTRAVENOUS at 11:51

## 2024-09-08 RX ADMIN — PROPOFOL 100 MCG/KG/MIN: 10 INJECTION, EMULSION INTRAVENOUS at 11:53

## 2024-09-08 RX ADMIN — METOPROLOL TARTRATE 50 MG: 50 TABLET, FILM COATED ORAL at 20:48

## 2024-09-08 RX ADMIN — FENTANYL CITRATE 50 MCG: 50 INJECTION INTRAMUSCULAR; INTRAVENOUS at 11:51

## 2024-09-08 RX ADMIN — SODIUM CHLORIDE, PRESERVATIVE FREE 10 ML: 5 INJECTION INTRAVENOUS at 20:49

## 2024-09-08 RX ADMIN — MIDAZOLAM 2 MG: 1 INJECTION, SOLUTION INTRAMUSCULAR; INTRAVENOUS at 11:51

## 2024-09-08 RX ADMIN — SODIUM CHLORIDE: 9 INJECTION, SOLUTION INTRAVENOUS at 02:43

## 2024-09-08 RX ADMIN — SODIUM CHLORIDE: 9 INJECTION, SOLUTION INTRAVENOUS at 18:24

## 2024-09-08 ASSESSMENT — PAIN DESCRIPTION - LOCATION
LOCATION: ANKLE;FOOT

## 2024-09-08 ASSESSMENT — PAIN SCALES - GENERAL
PAINLEVEL_OUTOF10: 0
PAINLEVEL_OUTOF10: 3
PAINLEVEL_OUTOF10: 3
PAINLEVEL_OUTOF10: 0
PAINLEVEL_OUTOF10: 3
PAINLEVEL_OUTOF10: 3

## 2024-09-08 ASSESSMENT — PAIN DESCRIPTION - ORIENTATION
ORIENTATION: RIGHT

## 2024-09-08 ASSESSMENT — PAIN - FUNCTIONAL ASSESSMENT
PAIN_FUNCTIONAL_ASSESSMENT: ACTIVITIES ARE NOT PREVENTED

## 2024-09-08 ASSESSMENT — PAIN DESCRIPTION - FREQUENCY
FREQUENCY: INTERMITTENT

## 2024-09-08 ASSESSMENT — PAIN DESCRIPTION - PAIN TYPE
TYPE: SURGICAL PAIN

## 2024-09-08 ASSESSMENT — PAIN DESCRIPTION - ONSET
ONSET: ON-GOING

## 2024-09-08 ASSESSMENT — PAIN DESCRIPTION - DESCRIPTORS
DESCRIPTORS: ACHING

## 2024-09-08 ASSESSMENT — PAIN DESCRIPTION - DIRECTION
RADIATING_TOWARDS: NO
RADIATING_TOWARDS: NO

## 2024-09-09 LAB
ANION GAP SERPL CALC-SCNC: 3 MMOL/L (ref 3–18)
BASOPHILS # BLD: 0 K/UL (ref 0–0.1)
BASOPHILS NFR BLD: 1 % (ref 0–2)
BUN SERPL-MCNC: 17 MG/DL (ref 7–18)
BUN/CREAT SERPL: 16 (ref 12–20)
CALCIUM SERPL-MCNC: 8.5 MG/DL (ref 8.5–10.1)
CHLORIDE SERPL-SCNC: 109 MMOL/L (ref 100–111)
CO2 SERPL-SCNC: 25 MMOL/L (ref 21–32)
CREAT SERPL-MCNC: 1.05 MG/DL (ref 0.6–1.3)
DIFFERENTIAL METHOD BLD: ABNORMAL
EOSINOPHIL # BLD: 0.2 K/UL (ref 0–0.4)
EOSINOPHIL NFR BLD: 3 % (ref 0–5)
ERYTHROCYTE [DISTWIDTH] IN BLOOD BY AUTOMATED COUNT: 15.2 % (ref 11.6–14.5)
GLUCOSE SERPL-MCNC: 91 MG/DL (ref 74–99)
HCT VFR BLD AUTO: 35 % (ref 36–48)
HGB BLD-MCNC: 10.7 G/DL (ref 13–16)
IMM GRANULOCYTES # BLD AUTO: 0 K/UL (ref 0–0.04)
IMM GRANULOCYTES NFR BLD AUTO: 1 % (ref 0–0.5)
LYMPHOCYTES # BLD: 2.2 K/UL (ref 0.9–3.6)
LYMPHOCYTES NFR BLD: 51 % (ref 21–52)
MCH RBC QN AUTO: 28.4 PG (ref 24–34)
MCHC RBC AUTO-ENTMCNC: 30.6 G/DL (ref 31–37)
MCV RBC AUTO: 92.8 FL (ref 78–100)
MONOCYTES # BLD: 0.6 K/UL (ref 0.05–1.2)
MONOCYTES NFR BLD: 13 % (ref 3–10)
NEUTS SEG # BLD: 1.4 K/UL (ref 1.8–8)
NEUTS SEG NFR BLD: 31 % (ref 40–73)
NRBC # BLD: 0 K/UL (ref 0–0.01)
NRBC BLD-RTO: 0 PER 100 WBC
PLATELET # BLD AUTO: 270 K/UL (ref 135–420)
PMV BLD AUTO: 9.2 FL (ref 9.2–11.8)
POTASSIUM SERPL-SCNC: 4.3 MMOL/L (ref 3.5–5.5)
RBC # BLD AUTO: 3.77 M/UL (ref 4.35–5.65)
SODIUM SERPL-SCNC: 137 MMOL/L (ref 136–145)
WBC # BLD AUTO: 4.4 K/UL (ref 4.6–13.2)

## 2024-09-09 PROCEDURE — 99232 SBSQ HOSP IP/OBS MODERATE 35: CPT | Performed by: HOSPITALIST

## 2024-09-09 PROCEDURE — 36415 COLL VENOUS BLD VENIPUNCTURE: CPT

## 2024-09-09 PROCEDURE — 2580000003 HC RX 258: Performed by: INTERNAL MEDICINE

## 2024-09-09 PROCEDURE — 97165 OT EVAL LOW COMPLEX 30 MIN: CPT

## 2024-09-09 PROCEDURE — G0378 HOSPITAL OBSERVATION PER HR: HCPCS

## 2024-09-09 PROCEDURE — 85025 COMPLETE CBC W/AUTO DIFF WBC: CPT

## 2024-09-09 PROCEDURE — 6360000002 HC RX W HCPCS: Performed by: INTERNAL MEDICINE

## 2024-09-09 PROCEDURE — 97162 PT EVAL MOD COMPLEX 30 MIN: CPT

## 2024-09-09 PROCEDURE — 6360000002 HC RX W HCPCS: Performed by: HOSPITALIST

## 2024-09-09 PROCEDURE — 80048 BASIC METABOLIC PNL TOTAL CA: CPT

## 2024-09-09 RX ADMIN — SODIUM CHLORIDE, PRESERVATIVE FREE 10 ML: 5 INJECTION INTRAVENOUS at 20:43

## 2024-09-09 RX ADMIN — VANCOMYCIN HYDROCHLORIDE 1000 MG: 1 INJECTION, POWDER, LYOPHILIZED, FOR SOLUTION INTRAVENOUS at 18:11

## 2024-09-09 RX ADMIN — ENOXAPARIN SODIUM 40 MG: 100 INJECTION SUBCUTANEOUS at 09:06

## 2024-09-09 RX ADMIN — VANCOMYCIN HYDROCHLORIDE 2000 MG: 10 INJECTION, POWDER, LYOPHILIZED, FOR SOLUTION INTRAVENOUS at 09:13

## 2024-09-09 RX ADMIN — WATER 1000 MG: 1 INJECTION INTRAMUSCULAR; INTRAVENOUS; SUBCUTANEOUS at 20:41

## 2024-09-09 RX ADMIN — SODIUM CHLORIDE, PRESERVATIVE FREE 10 ML: 5 INJECTION INTRAVENOUS at 09:07

## 2024-09-09 ASSESSMENT — PAIN DESCRIPTION - FREQUENCY: FREQUENCY: INTERMITTENT

## 2024-09-09 ASSESSMENT — PAIN SCALES - GENERAL
PAINLEVEL_OUTOF10: 0
PAINLEVEL_OUTOF10: 3
PAINLEVEL_OUTOF10: 0

## 2024-09-09 ASSESSMENT — PAIN DESCRIPTION - DESCRIPTORS: DESCRIPTORS: ACHING

## 2024-09-09 ASSESSMENT — PAIN - FUNCTIONAL ASSESSMENT: PAIN_FUNCTIONAL_ASSESSMENT: ACTIVITIES ARE NOT PREVENTED

## 2024-09-09 ASSESSMENT — PAIN DESCRIPTION - ORIENTATION: ORIENTATION: RIGHT

## 2024-09-09 ASSESSMENT — PAIN DESCRIPTION - DIRECTION: RADIATING_TOWARDS: NO

## 2024-09-09 ASSESSMENT — PAIN DESCRIPTION - ONSET: ONSET: ON-GOING

## 2024-09-09 ASSESSMENT — PAIN DESCRIPTION - LOCATION: LOCATION: ANKLE;FOOT

## 2024-09-09 ASSESSMENT — PAIN DESCRIPTION - PAIN TYPE: TYPE: SURGICAL PAIN

## 2024-09-10 PROBLEM — L02.611 ABSCESS OF RIGHT FOOT: Status: ACTIVE | Noted: 2024-09-10

## 2024-09-10 LAB
ANION GAP SERPL CALC-SCNC: 3 MMOL/L (ref 3–18)
BASOPHILS # BLD: 0 K/UL (ref 0–0.1)
BASOPHILS NFR BLD: 1 % (ref 0–2)
BUN SERPL-MCNC: 12 MG/DL (ref 7–18)
BUN/CREAT SERPL: 11 (ref 12–20)
CALCIUM SERPL-MCNC: 8.8 MG/DL (ref 8.5–10.1)
CHLORIDE SERPL-SCNC: 109 MMOL/L (ref 100–111)
CO2 SERPL-SCNC: 28 MMOL/L (ref 21–32)
CREAT SERPL-MCNC: 1.05 MG/DL (ref 0.6–1.3)
DIFFERENTIAL METHOD BLD: ABNORMAL
EKG ATRIAL RATE: 47 BPM
EKG DIAGNOSIS: NORMAL
EKG P AXIS: 80 DEGREES
EKG P-R INTERVAL: 180 MS
EKG Q-T INTERVAL: 468 MS
EKG QRS DURATION: 92 MS
EKG QTC CALCULATION (BAZETT): 414 MS
EKG R AXIS: 26 DEGREES
EKG T AXIS: 31 DEGREES
EKG VENTRICULAR RATE: 47 BPM
EOSINOPHIL # BLD: 0.1 K/UL (ref 0–0.4)
EOSINOPHIL NFR BLD: 3 % (ref 0–5)
ERYTHROCYTE [DISTWIDTH] IN BLOOD BY AUTOMATED COUNT: 15.1 % (ref 11.6–14.5)
GLUCOSE SERPL-MCNC: 92 MG/DL (ref 74–99)
HCT VFR BLD AUTO: 35.9 % (ref 36–48)
HGB BLD-MCNC: 11.1 G/DL (ref 13–16)
IMM GRANULOCYTES # BLD AUTO: 0 K/UL (ref 0–0.04)
IMM GRANULOCYTES NFR BLD AUTO: 1 % (ref 0–0.5)
LYMPHOCYTES # BLD: 2.1 K/UL (ref 0.9–3.6)
LYMPHOCYTES NFR BLD: 55 % (ref 21–52)
MCH RBC QN AUTO: 28.5 PG (ref 24–34)
MCHC RBC AUTO-ENTMCNC: 30.9 G/DL (ref 31–37)
MCV RBC AUTO: 92.1 FL (ref 78–100)
MONOCYTES # BLD: 0.5 K/UL (ref 0.05–1.2)
MONOCYTES NFR BLD: 13 % (ref 3–10)
NEUTS SEG # BLD: 1.1 K/UL (ref 1.8–8)
NEUTS SEG NFR BLD: 28 % (ref 40–73)
NRBC # BLD: 0 K/UL (ref 0–0.01)
NRBC BLD-RTO: 0 PER 100 WBC
PLATELET # BLD AUTO: 248 K/UL (ref 135–420)
PMV BLD AUTO: 8.9 FL (ref 9.2–11.8)
POTASSIUM SERPL-SCNC: 4.2 MMOL/L (ref 3.5–5.5)
RBC # BLD AUTO: 3.9 M/UL (ref 4.35–5.65)
SODIUM SERPL-SCNC: 140 MMOL/L (ref 136–145)
VANCOMYCIN SERPL-MCNC: 10.7 UG/ML (ref 5–40)
WBC # BLD AUTO: 3.8 K/UL (ref 4.6–13.2)

## 2024-09-10 PROCEDURE — 36415 COLL VENOUS BLD VENIPUNCTURE: CPT

## 2024-09-10 PROCEDURE — 6370000000 HC RX 637 (ALT 250 FOR IP): Performed by: INTERNAL MEDICINE

## 2024-09-10 PROCEDURE — G0378 HOSPITAL OBSERVATION PER HR: HCPCS

## 2024-09-10 PROCEDURE — 1100000000 HC RM PRIVATE

## 2024-09-10 PROCEDURE — 6360000002 HC RX W HCPCS: Performed by: INTERNAL MEDICINE

## 2024-09-10 PROCEDURE — 94760 N-INVAS EAR/PLS OXIMETRY 1: CPT

## 2024-09-10 PROCEDURE — 80048 BASIC METABOLIC PNL TOTAL CA: CPT

## 2024-09-10 PROCEDURE — 2580000003 HC RX 258: Performed by: INTERNAL MEDICINE

## 2024-09-10 PROCEDURE — 99232 SBSQ HOSP IP/OBS MODERATE 35: CPT | Performed by: HOSPITALIST

## 2024-09-10 PROCEDURE — 93010 ELECTROCARDIOGRAM REPORT: CPT | Performed by: INTERNAL MEDICINE

## 2024-09-10 PROCEDURE — 6370000000 HC RX 637 (ALT 250 FOR IP): Performed by: HOSPITALIST

## 2024-09-10 PROCEDURE — 85025 COMPLETE CBC W/AUTO DIFF WBC: CPT

## 2024-09-10 PROCEDURE — 80202 ASSAY OF VANCOMYCIN: CPT

## 2024-09-10 PROCEDURE — 93005 ELECTROCARDIOGRAM TRACING: CPT | Performed by: HOSPITALIST

## 2024-09-10 PROCEDURE — 6360000002 HC RX W HCPCS: Performed by: HOSPITALIST

## 2024-09-10 RX ORDER — VANCOMYCIN 1.75 G/350ML
1250 INJECTION, SOLUTION INTRAVENOUS EVERY 12 HOURS
Status: DISCONTINUED | OUTPATIENT
Start: 2024-09-10 | End: 2024-09-11 | Stop reason: HOSPADM

## 2024-09-10 RX ORDER — AMLODIPINE BESYLATE 10 MG/1
10 TABLET ORAL DAILY
Status: DISCONTINUED | OUTPATIENT
Start: 2024-09-10 | End: 2024-09-11 | Stop reason: HOSPADM

## 2024-09-10 RX ORDER — HYDRALAZINE HYDROCHLORIDE 20 MG/ML
10 INJECTION INTRAMUSCULAR; INTRAVENOUS EVERY 6 HOURS PRN
Status: DISCONTINUED | OUTPATIENT
Start: 2024-09-10 | End: 2024-09-11 | Stop reason: HOSPADM

## 2024-09-10 RX ADMIN — OXYCODONE HYDROCHLORIDE AND ACETAMINOPHEN 1 TABLET: 5; 325 TABLET ORAL at 05:47

## 2024-09-10 RX ADMIN — OXYCODONE HYDROCHLORIDE AND ACETAMINOPHEN 1 TABLET: 5; 325 TABLET ORAL at 10:15

## 2024-09-10 RX ADMIN — VANCOMYCIN 1250 MG: 1.75 INJECTION, SOLUTION INTRAVENOUS at 18:10

## 2024-09-10 RX ADMIN — SODIUM CHLORIDE, PRESERVATIVE FREE 10 ML: 5 INJECTION INTRAVENOUS at 22:00

## 2024-09-10 RX ADMIN — ENOXAPARIN SODIUM 40 MG: 100 INJECTION SUBCUTANEOUS at 08:14

## 2024-09-10 RX ADMIN — WATER 1000 MG: 1 INJECTION INTRAMUSCULAR; INTRAVENOUS; SUBCUTANEOUS at 20:30

## 2024-09-10 RX ADMIN — SODIUM CHLORIDE, PRESERVATIVE FREE 10 ML: 5 INJECTION INTRAVENOUS at 08:13

## 2024-09-10 RX ADMIN — OXYCODONE HYDROCHLORIDE AND ACETAMINOPHEN 1 TABLET: 5; 325 TABLET ORAL at 14:58

## 2024-09-10 RX ADMIN — VANCOMYCIN HYDROCHLORIDE 1000 MG: 1 INJECTION, POWDER, LYOPHILIZED, FOR SOLUTION INTRAVENOUS at 05:43

## 2024-09-10 RX ADMIN — AMLODIPINE BESYLATE 10 MG: 10 TABLET ORAL at 14:58

## 2024-09-10 ASSESSMENT — PAIN DESCRIPTION - DESCRIPTORS
DESCRIPTORS: ACHING;SHARP
DESCRIPTORS: ACHING

## 2024-09-10 ASSESSMENT — PAIN - FUNCTIONAL ASSESSMENT
PAIN_FUNCTIONAL_ASSESSMENT: ACTIVITIES ARE NOT PREVENTED
PAIN_FUNCTIONAL_ASSESSMENT: PREVENTS OR INTERFERES SOME ACTIVE ACTIVITIES AND ADLS
PAIN_FUNCTIONAL_ASSESSMENT: ACTIVITIES ARE NOT PREVENTED
PAIN_FUNCTIONAL_ASSESSMENT: ACTIVITIES ARE NOT PREVENTED

## 2024-09-10 ASSESSMENT — PAIN DESCRIPTION - ONSET
ONSET: GRADUAL

## 2024-09-10 ASSESSMENT — PAIN DESCRIPTION - LOCATION
LOCATION: LEG
LOCATION: ANKLE
LOCATION: ANKLE
LOCATION: FOOT

## 2024-09-10 ASSESSMENT — PAIN DESCRIPTION - DIRECTION: RADIATING_TOWARDS: RIGHT

## 2024-09-10 ASSESSMENT — PAIN SCALES - GENERAL
PAINLEVEL_OUTOF10: 0
PAINLEVEL_OUTOF10: 0
PAINLEVEL_OUTOF10: 3
PAINLEVEL_OUTOF10: 8
PAINLEVEL_OUTOF10: 6
PAINLEVEL_OUTOF10: 0
PAINLEVEL_OUTOF10: 8

## 2024-09-10 ASSESSMENT — PAIN DESCRIPTION - FREQUENCY
FREQUENCY: INTERMITTENT

## 2024-09-10 ASSESSMENT — PAIN DESCRIPTION - ORIENTATION
ORIENTATION: RIGHT

## 2024-09-10 ASSESSMENT — PAIN DESCRIPTION - PAIN TYPE
TYPE: SURGICAL PAIN

## 2024-09-11 VITALS
DIASTOLIC BLOOD PRESSURE: 73 MMHG | BODY MASS INDEX: 27.33 KG/M2 | WEIGHT: 190.92 LBS | SYSTOLIC BLOOD PRESSURE: 133 MMHG | OXYGEN SATURATION: 99 % | TEMPERATURE: 97.1 F | RESPIRATION RATE: 18 BRPM | HEIGHT: 70 IN | HEART RATE: 49 BPM

## 2024-09-11 LAB
ANION GAP SERPL CALC-SCNC: 3 MMOL/L (ref 3–18)
BACTERIA SPEC CULT: ABNORMAL
BASOPHILS # BLD: 0 K/UL (ref 0–0.1)
BASOPHILS NFR BLD: 1 % (ref 0–2)
BUN SERPL-MCNC: 10 MG/DL (ref 7–18)
BUN/CREAT SERPL: 11 (ref 12–20)
CALCIUM SERPL-MCNC: 8.8 MG/DL (ref 8.5–10.1)
CHLORIDE SERPL-SCNC: 108 MMOL/L (ref 100–111)
CO2 SERPL-SCNC: 27 MMOL/L (ref 21–32)
CREAT SERPL-MCNC: 0.91 MG/DL (ref 0.6–1.3)
DIFFERENTIAL METHOD BLD: ABNORMAL
EOSINOPHIL # BLD: 0.2 K/UL (ref 0–0.4)
EOSINOPHIL NFR BLD: 4 % (ref 0–5)
ERYTHROCYTE [DISTWIDTH] IN BLOOD BY AUTOMATED COUNT: 15 % (ref 11.6–14.5)
GLUCOSE SERPL-MCNC: 95 MG/DL (ref 74–99)
GRAM STN SPEC: ABNORMAL
HCT VFR BLD AUTO: 36.9 % (ref 36–48)
HGB BLD-MCNC: 11.5 G/DL (ref 13–16)
IMM GRANULOCYTES # BLD AUTO: 0 K/UL (ref 0–0.04)
IMM GRANULOCYTES NFR BLD AUTO: 1 % (ref 0–0.5)
LYMPHOCYTES # BLD: 1.8 K/UL (ref 0.9–3.6)
LYMPHOCYTES NFR BLD: 47 % (ref 21–52)
MCH RBC QN AUTO: 28.5 PG (ref 24–34)
MCHC RBC AUTO-ENTMCNC: 31.2 G/DL (ref 31–37)
MCV RBC AUTO: 91.6 FL (ref 78–100)
MONOCYTES # BLD: 0.4 K/UL (ref 0.05–1.2)
MONOCYTES NFR BLD: 11 % (ref 3–10)
NEUTS SEG # BLD: 1.4 K/UL (ref 1.8–8)
NEUTS SEG NFR BLD: 36 % (ref 40–73)
NRBC # BLD: 0 K/UL (ref 0–0.01)
NRBC BLD-RTO: 0 PER 100 WBC
PLATELET # BLD AUTO: 243 K/UL (ref 135–420)
PMV BLD AUTO: 9.4 FL (ref 9.2–11.8)
POTASSIUM SERPL-SCNC: 3.9 MMOL/L (ref 3.5–5.5)
RBC # BLD AUTO: 4.03 M/UL (ref 4.35–5.65)
SERVICE CMNT-IMP: ABNORMAL
SODIUM SERPL-SCNC: 138 MMOL/L (ref 136–145)
WBC # BLD AUTO: 3.9 K/UL (ref 4.6–13.2)

## 2024-09-11 PROCEDURE — 36415 COLL VENOUS BLD VENIPUNCTURE: CPT

## 2024-09-11 PROCEDURE — 6360000002 HC RX W HCPCS: Performed by: HOSPITALIST

## 2024-09-11 PROCEDURE — 6370000000 HC RX 637 (ALT 250 FOR IP): Performed by: INTERNAL MEDICINE

## 2024-09-11 PROCEDURE — 99239 HOSP IP/OBS DSCHRG MGMT >30: CPT | Performed by: HOSPITALIST

## 2024-09-11 PROCEDURE — 80048 BASIC METABOLIC PNL TOTAL CA: CPT

## 2024-09-11 PROCEDURE — 85025 COMPLETE CBC W/AUTO DIFF WBC: CPT

## 2024-09-11 PROCEDURE — 94761 N-INVAS EAR/PLS OXIMETRY MLT: CPT

## 2024-09-11 PROCEDURE — 6360000002 HC RX W HCPCS: Performed by: INTERNAL MEDICINE

## 2024-09-11 PROCEDURE — 6370000000 HC RX 637 (ALT 250 FOR IP): Performed by: HOSPITALIST

## 2024-09-11 RX ORDER — OXYCODONE AND ACETAMINOPHEN 5; 325 MG/1; MG/1
1 TABLET ORAL EVERY 6 HOURS PRN
Qty: 20 TABLET | Refills: 0 | Status: SHIPPED | OUTPATIENT
Start: 2024-09-11 | End: 2024-09-11

## 2024-09-11 RX ORDER — AMLODIPINE BESYLATE 10 MG/1
10 TABLET ORAL DAILY
Qty: 30 TABLET | Refills: 3 | Status: SHIPPED | OUTPATIENT
Start: 2024-09-12 | End: 2024-09-11

## 2024-09-11 RX ORDER — SULFAMETHOXAZOLE/TRIMETHOPRIM 800-160 MG
1 TABLET ORAL 2 TIMES DAILY
Qty: 14 TABLET | Refills: 0 | Status: SHIPPED | OUTPATIENT
Start: 2024-09-11 | End: 2024-09-11

## 2024-09-11 RX ORDER — SULFAMETHOXAZOLE/TRIMETHOPRIM 800-160 MG
1 TABLET ORAL 2 TIMES DAILY
Qty: 14 TABLET | Refills: 0 | Status: SHIPPED | OUTPATIENT
Start: 2024-09-11 | End: 2024-09-18

## 2024-09-11 RX ORDER — AMLODIPINE BESYLATE 10 MG/1
10 TABLET ORAL DAILY
Qty: 30 TABLET | Refills: 3 | Status: SHIPPED | OUTPATIENT
Start: 2024-09-12

## 2024-09-11 RX ORDER — OXYCODONE AND ACETAMINOPHEN 5; 325 MG/1; MG/1
1 TABLET ORAL EVERY 6 HOURS PRN
Qty: 20 TABLET | Refills: 0 | Status: SHIPPED | OUTPATIENT
Start: 2024-09-11 | End: 2024-09-16

## 2024-09-11 RX ADMIN — OXYCODONE HYDROCHLORIDE AND ACETAMINOPHEN 1 TABLET: 5; 325 TABLET ORAL at 14:06

## 2024-09-11 RX ADMIN — VANCOMYCIN 1250 MG: 1.75 INJECTION, SOLUTION INTRAVENOUS at 05:54

## 2024-09-11 RX ADMIN — ENOXAPARIN SODIUM 40 MG: 100 INJECTION SUBCUTANEOUS at 08:03

## 2024-09-11 RX ADMIN — AMLODIPINE BESYLATE 10 MG: 10 TABLET ORAL at 08:02

## 2024-09-11 RX ADMIN — OXYCODONE HYDROCHLORIDE AND ACETAMINOPHEN 1 TABLET: 5; 325 TABLET ORAL at 08:02

## 2024-09-11 ASSESSMENT — PAIN DESCRIPTION - LOCATION
LOCATION: FOOT

## 2024-09-11 ASSESSMENT — PAIN SCALES - GENERAL
PAINLEVEL_OUTOF10: 0
PAINLEVEL_OUTOF10: 4
PAINLEVEL_OUTOF10: 3
PAINLEVEL_OUTOF10: 0
PAINLEVEL_OUTOF10: 4

## 2024-09-11 ASSESSMENT — PAIN DESCRIPTION - FREQUENCY: FREQUENCY: INTERMITTENT

## 2024-09-11 ASSESSMENT — PAIN DESCRIPTION - ORIENTATION
ORIENTATION: RIGHT
ORIENTATION: RIGHT

## 2024-09-11 ASSESSMENT — PAIN DESCRIPTION - PAIN TYPE: TYPE: SURGICAL PAIN

## 2024-09-11 ASSESSMENT — PAIN DESCRIPTION - DESCRIPTORS: DESCRIPTORS: SHARP;TINGLING

## 2024-09-11 ASSESSMENT — PAIN DESCRIPTION - ONSET: ONSET: ON-GOING

## 2024-09-11 ASSESSMENT — PAIN - FUNCTIONAL ASSESSMENT: PAIN_FUNCTIONAL_ASSESSMENT: PREVENTS OR INTERFERES SOME ACTIVE ACTIVITIES AND ADLS

## 2024-09-12 LAB
BACTERIA SPEC CULT: ABNORMAL
SERVICE CMNT-IMP: ABNORMAL

## 2024-09-16 PROBLEM — S91.309A OPEN WOUND OF FOOT EXCLUDING TOES: Status: ACTIVE | Noted: 2024-09-16

## 2024-11-12 ENCOUNTER — APPOINTMENT (OUTPATIENT)
Facility: HOSPITAL | Age: 64
End: 2024-11-12

## 2024-11-12 ENCOUNTER — HOSPITAL ENCOUNTER (INPATIENT)
Facility: HOSPITAL | Age: 64
LOS: 9 days | Discharge: OTHER INSTITUTION | End: 2024-11-22
Attending: EMERGENCY MEDICINE | Admitting: STUDENT IN AN ORGANIZED HEALTH CARE EDUCATION/TRAINING PROGRAM

## 2024-11-12 ENCOUNTER — HOSPITAL ENCOUNTER (EMERGENCY)
Facility: HOSPITAL | Age: 64
Discharge: HOME | End: 2024-11-12
Attending: STUDENT IN AN ORGANIZED HEALTH CARE EDUCATION/TRAINING PROGRAM

## 2024-11-12 VITALS
HEART RATE: 66 BPM | HEIGHT: 64 IN | RESPIRATION RATE: 20 BRPM | BODY MASS INDEX: 31.58 KG/M2 | DIASTOLIC BLOOD PRESSURE: 109 MMHG | TEMPERATURE: 99.2 F | WEIGHT: 185 LBS | OXYGEN SATURATION: 96 % | SYSTOLIC BLOOD PRESSURE: 139 MMHG

## 2024-11-12 DIAGNOSIS — R79.89 ELEVATED LFTS: ICD-10-CM

## 2024-11-12 DIAGNOSIS — R07.9 CHEST PAIN, UNSPECIFIED TYPE: Primary | ICD-10-CM

## 2024-11-12 DIAGNOSIS — R07.9 CHEST PAIN, UNSPECIFIED TYPE: ICD-10-CM

## 2024-11-12 DIAGNOSIS — E83.42 HYPOMAGNESEMIA: ICD-10-CM

## 2024-11-12 DIAGNOSIS — A41.9 SEPTICEMIA (HCC): Primary | ICD-10-CM

## 2024-11-12 LAB
ALBUMIN SERPL-MCNC: 3.1 G/DL (ref 3.4–5)
ALBUMIN SERPL-MCNC: 3.2 G/DL (ref 3.4–5)
ALBUMIN/GLOB SERPL: 0.6 (ref 0.8–1.7)
ALBUMIN/GLOB SERPL: 0.7 (ref 0.8–1.7)
ALP SERPL-CCNC: 87 U/L (ref 45–117)
ALP SERPL-CCNC: 91 U/L (ref 45–117)
ALT SERPL-CCNC: 61 U/L (ref 16–61)
ALT SERPL-CCNC: 71 U/L (ref 16–61)
ANION GAP SERPL CALC-SCNC: 7 MMOL/L (ref 3–18)
ANION GAP SERPL CALC-SCNC: 7 MMOL/L (ref 3–18)
APPEARANCE UR: CLEAR
AST SERPL-CCNC: 55 U/L (ref 10–38)
AST SERPL-CCNC: 68 U/L (ref 10–38)
BACTERIA URNS QL MICRO: NEGATIVE /HPF
BASOPHILS # BLD: 0 K/UL (ref 0–0.1)
BASOPHILS # BLD: 0 K/UL (ref 0–0.1)
BASOPHILS NFR BLD: 0 % (ref 0–2)
BASOPHILS NFR BLD: 0 % (ref 0–2)
BILIRUB SERPL-MCNC: 0.7 MG/DL (ref 0.2–1)
BILIRUB SERPL-MCNC: 1.1 MG/DL (ref 0.2–1)
BILIRUB UR QL: NEGATIVE
BUN SERPL-MCNC: 10 MG/DL (ref 7–18)
BUN SERPL-MCNC: 9 MG/DL (ref 7–18)
BUN/CREAT SERPL: 8 (ref 12–20)
BUN/CREAT SERPL: 8 (ref 12–20)
CALCIUM SERPL-MCNC: 9.4 MG/DL (ref 8.5–10.1)
CALCIUM SERPL-MCNC: 9.4 MG/DL (ref 8.5–10.1)
CHLORIDE SERPL-SCNC: 103 MMOL/L (ref 100–111)
CHLORIDE SERPL-SCNC: 104 MMOL/L (ref 100–111)
CO2 SERPL-SCNC: 23 MMOL/L (ref 21–32)
CO2 SERPL-SCNC: 25 MMOL/L (ref 21–32)
COLOR UR: YELLOW
CREAT SERPL-MCNC: 1.2 MG/DL (ref 0.6–1.3)
CREAT SERPL-MCNC: 1.2 MG/DL (ref 0.6–1.3)
D DIMER PPP FEU-MCNC: 0.67 UG/ML(FEU)
DIFFERENTIAL METHOD BLD: ABNORMAL
DIFFERENTIAL METHOD BLD: ABNORMAL
EKG ATRIAL RATE: 65 BPM
EKG DIAGNOSIS: NORMAL
EKG P-R INTERVAL: 156 MS
EKG Q-T INTERVAL: 382 MS
EKG QRS DURATION: 88 MS
EKG QTC CALCULATION (BAZETT): 397 MS
EKG R AXIS: 95 DEGREES
EKG T AXIS: 93 DEGREES
EKG VENTRICULAR RATE: 65 BPM
EOSINOPHIL # BLD: 0 K/UL (ref 0–0.4)
EOSINOPHIL # BLD: 0.1 K/UL (ref 0–0.4)
EOSINOPHIL NFR BLD: 0 % (ref 0–5)
EOSINOPHIL NFR BLD: 1 % (ref 0–5)
EPITH CASTS URNS QL MICRO: NORMAL /LPF (ref 0–5)
ERYTHROCYTE [DISTWIDTH] IN BLOOD BY AUTOMATED COUNT: 14.3 % (ref 11.6–14.5)
ERYTHROCYTE [DISTWIDTH] IN BLOOD BY AUTOMATED COUNT: 14.5 % (ref 11.6–14.5)
FLUAV RNA SPEC QL NAA+PROBE: NOT DETECTED
FLUBV RNA SPEC QL NAA+PROBE: NOT DETECTED
GLOBULIN SER CALC-MCNC: 4.6 G/DL (ref 2–4)
GLOBULIN SER CALC-MCNC: 5.2 G/DL (ref 2–4)
GLUCOSE SERPL-MCNC: 115 MG/DL (ref 74–99)
GLUCOSE SERPL-MCNC: 117 MG/DL (ref 74–99)
GLUCOSE UR STRIP.AUTO-MCNC: NEGATIVE MG/DL
HCT VFR BLD AUTO: 40.3 % (ref 36–48)
HCT VFR BLD AUTO: 44.8 % (ref 36–48)
HGB BLD-MCNC: 13 G/DL (ref 13–16)
HGB BLD-MCNC: 13.9 G/DL (ref 13–16)
HGB UR QL STRIP: NEGATIVE
IMM GRANULOCYTES # BLD AUTO: 0 K/UL (ref 0–0.04)
IMM GRANULOCYTES # BLD AUTO: 0.1 K/UL (ref 0–0.04)
IMM GRANULOCYTES NFR BLD AUTO: 1 % (ref 0–0.5)
IMM GRANULOCYTES NFR BLD AUTO: 1 % (ref 0–0.5)
KETONES UR QL STRIP.AUTO: NEGATIVE MG/DL
LACTATE SERPL-SCNC: 1.4 MMOL/L (ref 0.4–2)
LACTATE SERPL-SCNC: 1.5 MMOL/L (ref 0.4–2)
LEUKOCYTE ESTERASE UR QL STRIP.AUTO: NEGATIVE
LYMPHOCYTES # BLD: 1.6 K/UL (ref 0.9–3.6)
LYMPHOCYTES # BLD: 1.7 K/UL (ref 0.9–3.6)
LYMPHOCYTES NFR BLD: 13 % (ref 21–52)
LYMPHOCYTES NFR BLD: 26 % (ref 21–52)
MCH RBC QN AUTO: 27.9 PG (ref 24–34)
MCH RBC QN AUTO: 28.4 PG (ref 24–34)
MCHC RBC AUTO-ENTMCNC: 31 G/DL (ref 31–37)
MCHC RBC AUTO-ENTMCNC: 32.3 G/DL (ref 31–37)
MCV RBC AUTO: 88.2 FL (ref 78–100)
MCV RBC AUTO: 90 FL (ref 78–100)
MONOCYTES # BLD: 0.8 K/UL (ref 0.05–1.2)
MONOCYTES # BLD: 0.9 K/UL (ref 0.05–1.2)
MONOCYTES NFR BLD: 13 % (ref 3–10)
MONOCYTES NFR BLD: 8 % (ref 3–10)
NEUTS SEG # BLD: 3.9 K/UL (ref 1.8–8)
NEUTS SEG # BLD: 9.4 K/UL (ref 1.8–8)
NEUTS SEG NFR BLD: 60 % (ref 40–73)
NEUTS SEG NFR BLD: 78 % (ref 40–73)
NITRITE UR QL STRIP.AUTO: NEGATIVE
NRBC # BLD: 0 K/UL (ref 0–0.01)
NRBC # BLD: 0 K/UL (ref 0–0.01)
NRBC BLD-RTO: 0 PER 100 WBC
NRBC BLD-RTO: 0 PER 100 WBC
NT PRO BNP: 249 PG/ML (ref 0–900)
PH UR STRIP: 6 (ref 5–8)
PLATELET # BLD AUTO: 194 K/UL (ref 135–420)
PLATELET # BLD AUTO: 199 K/UL (ref 135–420)
PMV BLD AUTO: 8.4 FL (ref 9.2–11.8)
PMV BLD AUTO: 9.1 FL (ref 9.2–11.8)
POTASSIUM SERPL-SCNC: 3.9 MMOL/L (ref 3.5–5.5)
POTASSIUM SERPL-SCNC: 4.1 MMOL/L (ref 3.5–5.5)
PROT SERPL-MCNC: 7.7 G/DL (ref 6.4–8.2)
PROT SERPL-MCNC: 8.4 G/DL (ref 6.4–8.2)
PROT UR STRIP-MCNC: 30 MG/DL
RBC # BLD AUTO: 4.57 M/UL (ref 4.35–5.65)
RBC # BLD AUTO: 4.98 M/UL (ref 4.35–5.65)
RBC #/AREA URNS HPF: NORMAL /HPF (ref 0–5)
SARS-COV-2 RNA RESP QL NAA+PROBE: NOT DETECTED
SODIUM SERPL-SCNC: 133 MMOL/L (ref 136–145)
SODIUM SERPL-SCNC: 136 MMOL/L (ref 136–145)
SOURCE: NORMAL
SP GR UR REFRACTOMETRY: 1.01 (ref 1–1.03)
TROPONIN I SERPL HS-MCNC: 12 NG/L (ref 0–78)
TROPONIN I SERPL HS-MCNC: 13 NG/L (ref 0–78)
TROPONIN I SERPL HS-MCNC: 13 NG/L (ref 0–78)
UROBILINOGEN UR QL STRIP.AUTO: 1 EU/DL (ref 0.2–1)
WBC # BLD AUTO: 12 K/UL (ref 4.6–13.2)
WBC # BLD AUTO: 6.5 K/UL (ref 4.6–13.2)
WBC URNS QL MICRO: NORMAL /HPF (ref 0–5)

## 2024-11-12 PROCEDURE — 80053 COMPREHEN METABOLIC PANEL: CPT

## 2024-11-12 PROCEDURE — 81001 URINALYSIS AUTO W/SCOPE: CPT

## 2024-11-12 PROCEDURE — 84484 ASSAY OF TROPONIN QUANT: CPT

## 2024-11-12 PROCEDURE — 2580000003 HC RX 258: Performed by: EMERGENCY MEDICINE

## 2024-11-12 PROCEDURE — 96374 THER/PROPH/DIAG INJ IV PUSH: CPT

## 2024-11-12 PROCEDURE — 93005 ELECTROCARDIOGRAM TRACING: CPT | Performed by: STUDENT IN AN ORGANIZED HEALTH CARE EDUCATION/TRAINING PROGRAM

## 2024-11-12 PROCEDURE — 83605 ASSAY OF LACTIC ACID: CPT

## 2024-11-12 PROCEDURE — 71045 X-RAY EXAM CHEST 1 VIEW: CPT

## 2024-11-12 PROCEDURE — 80307 DRUG TEST PRSMV CHEM ANLYZR: CPT

## 2024-11-12 PROCEDURE — 6360000002 HC RX W HCPCS: Performed by: STUDENT IN AN ORGANIZED HEALTH CARE EDUCATION/TRAINING PROGRAM

## 2024-11-12 PROCEDURE — 6370000000 HC RX 637 (ALT 250 FOR IP): Performed by: STUDENT IN AN ORGANIZED HEALTH CARE EDUCATION/TRAINING PROGRAM

## 2024-11-12 PROCEDURE — 71275 CT ANGIOGRAPHY CHEST: CPT

## 2024-11-12 PROCEDURE — 6360000002 HC RX W HCPCS: Performed by: EMERGENCY MEDICINE

## 2024-11-12 PROCEDURE — 96375 TX/PRO/DX INJ NEW DRUG ADDON: CPT

## 2024-11-12 PROCEDURE — 85379 FIBRIN DEGRADATION QUANT: CPT

## 2024-11-12 PROCEDURE — 84443 ASSAY THYROID STIM HORMONE: CPT

## 2024-11-12 PROCEDURE — 83880 ASSAY OF NATRIURETIC PEPTIDE: CPT

## 2024-11-12 PROCEDURE — 94761 N-INVAS EAR/PLS OXIMETRY MLT: CPT

## 2024-11-12 PROCEDURE — 6360000004 HC RX CONTRAST MEDICATION: Performed by: STUDENT IN AN ORGANIZED HEALTH CARE EDUCATION/TRAINING PROGRAM

## 2024-11-12 PROCEDURE — 71046 X-RAY EXAM CHEST 2 VIEWS: CPT

## 2024-11-12 PROCEDURE — 85025 COMPLETE CBC W/AUTO DIFF WBC: CPT

## 2024-11-12 PROCEDURE — 87186 SC STD MICRODIL/AGAR DIL: CPT

## 2024-11-12 PROCEDURE — 87040 BLOOD CULTURE FOR BACTERIA: CPT

## 2024-11-12 PROCEDURE — 93010 ELECTROCARDIOGRAM REPORT: CPT | Performed by: INTERNAL MEDICINE

## 2024-11-12 PROCEDURE — 87636 SARSCOV2 & INF A&B AMP PRB: CPT

## 2024-11-12 PROCEDURE — 99285 EMERGENCY DEPT VISIT HI MDM: CPT

## 2024-11-12 PROCEDURE — 87077 CULTURE AEROBIC IDENTIFY: CPT

## 2024-11-12 PROCEDURE — 84100 ASSAY OF PHOSPHORUS: CPT

## 2024-11-12 PROCEDURE — 83735 ASSAY OF MAGNESIUM: CPT

## 2024-11-12 PROCEDURE — 86140 C-REACTIVE PROTEIN: CPT

## 2024-11-12 PROCEDURE — 93005 ELECTROCARDIOGRAM TRACING: CPT | Performed by: EMERGENCY MEDICINE

## 2024-11-12 PROCEDURE — 87154 CUL TYP ID BLD PTHGN 6+ TRGT: CPT

## 2024-11-12 RX ORDER — MORPHINE SULFATE 10 MG/ML
6 INJECTION, SOLUTION INTRAMUSCULAR; INTRAVENOUS
Status: COMPLETED | OUTPATIENT
Start: 2024-11-12 | End: 2024-11-12

## 2024-11-12 RX ORDER — IOPAMIDOL 755 MG/ML
100 INJECTION, SOLUTION INTRAVASCULAR
Status: COMPLETED | OUTPATIENT
Start: 2024-11-12 | End: 2024-11-12

## 2024-11-12 RX ORDER — KETOROLAC TROMETHAMINE 10 MG/1
10 TABLET, FILM COATED ORAL EVERY 6 HOURS PRN
Qty: 20 TABLET | Refills: 0 | Status: ON HOLD | OUTPATIENT
Start: 2024-11-12 | End: 2024-11-22 | Stop reason: HOSPADM

## 2024-11-12 RX ORDER — ASPIRIN 325 MG
325 TABLET ORAL
Status: COMPLETED | OUTPATIENT
Start: 2024-11-12 | End: 2024-11-12

## 2024-11-12 RX ORDER — ACETAMINOPHEN 325 MG/1
650 TABLET ORAL
Status: COMPLETED | OUTPATIENT
Start: 2024-11-12 | End: 2024-11-12

## 2024-11-12 RX ORDER — KETOROLAC TROMETHAMINE 15 MG/ML
15 INJECTION, SOLUTION INTRAMUSCULAR; INTRAVENOUS
Status: COMPLETED | OUTPATIENT
Start: 2024-11-12 | End: 2024-11-12

## 2024-11-12 RX ADMIN — ASPIRIN 325 MG: 325 TABLET ORAL at 06:54

## 2024-11-12 RX ADMIN — KETOROLAC TROMETHAMINE 15 MG: 15 INJECTION, SOLUTION INTRAMUSCULAR; INTRAVENOUS at 06:56

## 2024-11-12 RX ADMIN — IOPAMIDOL 64 ML: 755 INJECTION, SOLUTION INTRAVENOUS at 08:02

## 2024-11-12 RX ADMIN — WATER 1000 MG: 1 INJECTION INTRAMUSCULAR; INTRAVENOUS; SUBCUTANEOUS at 20:06

## 2024-11-12 RX ADMIN — ACETAMINOPHEN 325MG 650 MG: 325 TABLET ORAL at 06:54

## 2024-11-12 RX ADMIN — MORPHINE SULFATE 6 MG: 10 INJECTION, SOLUTION INTRAMUSCULAR; INTRAVENOUS at 06:55

## 2024-11-12 ASSESSMENT — PAIN SCALES - GENERAL
PAINLEVEL_OUTOF10: 10
PAINLEVEL_OUTOF10: 10

## 2024-11-12 ASSESSMENT — PAIN DESCRIPTION - DESCRIPTORS: DESCRIPTORS: SHARP

## 2024-11-12 ASSESSMENT — PAIN DESCRIPTION - LOCATION
LOCATION: CHEST
LOCATION: CHEST

## 2024-11-12 ASSESSMENT — PAIN - FUNCTIONAL ASSESSMENT
PAIN_FUNCTIONAL_ASSESSMENT: 0-10
PAIN_FUNCTIONAL_ASSESSMENT: PREVENTS OR INTERFERES SOME ACTIVE ACTIVITIES AND ADLS

## 2024-11-12 NOTE — ED NOTES
Patient given AVS and discharge instructions. Patient verbalized understanding and had no further concerns. Patient discharged in no distress.

## 2024-11-12 NOTE — ED PROVIDER NOTES
EMERGENCY DEPARTMENT HISTORY AND PHYSICAL EXAM    7:42 AM      Date: 11/12/2024  Patient Name: Dora Frazier    History of Presenting Illness     Chief Complaint   Patient presents with    Chest Pain    Shortness of Breath       History From: Patient    Dora Frazier is a 64 y.o. male   HPI  64-year-old male with history of hypertension, smoking, polysubstance abuse who presents with chest pain.  Present for the past day has been having worsening chest pain shortness of breath.  Movement aggravates symptoms.  No alleviating factors.  Denies any change in meds, sick contacts, or any other changes.  When assessing ROS he denies any nausea, vomiting, fevers, abdominal pain, change in bowel movement, lower extremity swelling, or any other changes.    Nursing Notes were all reviewed and agreed with or any disagreements were addressed in the HPI.    PCP: No primary care provider on file.    No current facility-administered medications for this encounter.     No current outpatient medications on file.     Facility-Administered Medications Ordered in Other Encounters   Medication Dose Route Frequency Provider Last Rate Last Admin    acetaminophen (TYLENOL) tablet 650 mg  650 mg Oral Q4H PRN Stalin Jimenez, DO   650 mg at 11/15/24 0959    sennosides-docusate sodium (SENOKOT-S) 8.6-50 MG tablet 1 tablet  1 tablet Oral Daily IsStalin madrigalh, DO   1 tablet at 11/14/24 1030    nitroGLYCERIN (NITROSTAT) SL tablet 0.4 mg  0.4 mg SubLINGual Q5 Min PRN IsStalin madrigalh, DO   0.4 mg at 11/14/24 1018    [Held by provider] aspirin chewable tablet 81 mg  81 mg Oral Daily IsStalin madrigalh, DO   81 mg at 11/14/24 1030    lidocaine 4 % external patch 1 patch  1 patch TransDERmal Daily IsmaStalin briceno, DO   1 patch at 11/15/24 1000    oxyCODONE (ROXICODONE) immediate release tablet 10 mg  10 mg Oral Q4H PRN IsStalin madrigal, DO   10 mg at 11/15/24 1004    nicotine (NICODERM CQ) 14 MG/24HR 1 patch  1 patch   SpO2 96%   BMI 31.76 kg/m²       Physical Exam  Constitutional:       Appearance: Normal appearance. He is not toxic-appearing.   HENT:      Head: Normocephalic and atraumatic.   Eyes:      Extraocular Movements: Extraocular movements intact.      Conjunctiva/sclera: Conjunctivae normal.      Pupils: Pupils are equal, round, and reactive to light.   Cardiovascular:      Rate and Rhythm: Normal rate and regular rhythm.   Pulmonary:      Effort: Pulmonary effort is normal.      Breath sounds: Normal breath sounds.   Abdominal:      General: Abdomen is flat. Bowel sounds are normal.      Palpations: Abdomen is soft.   Musculoskeletal:         General: Normal range of motion.      Cervical back: Normal range of motion and neck supple.   Skin:     General: Skin is warm.      Capillary Refill: Capillary refill takes less than 2 seconds.   Neurological:      General: No focal deficit present.      Mental Status: He is alert. Mental status is at baseline.           Diagnostic Study Results     Labs -  Recent Results (from the past 12 hour(s))   CBC with Auto Differential    Collection Time: 11/16/24  1:37 AM   Result Value Ref Range    WBC 6.5 4.6 - 13.2 K/uL    RBC 4.27 (L) 4.35 - 5.65 M/uL    Hemoglobin 12.2 (L) 13.0 - 16.0 g/dL    Hematocrit 38.7 36.0 - 48.0 %    MCV 90.6 78.0 - 100.0 FL    MCH 28.6 24.0 - 34.0 PG    MCHC 31.5 31.0 - 37.0 g/dL    RDW 13.8 11.6 - 14.5 %    Platelets 241 135 - 420 K/uL    MPV 8.9 (L) 9.2 - 11.8 FL    Nucleated RBCs 0.0 0  WBC    nRBC 0.00 0.00 - 0.01 K/uL    Neutrophils % 56 40 - 73 %    Lymphocytes % 28 21 - 52 %    Monocytes % 13 (H) 3 - 10 %    Eosinophils % 2 0 - 5 %    Basophils % 0 0 - 2 %    Immature Granulocytes % 0 0.0 - 0.5 %    Neutrophils Absolute 3.7 1.8 - 8.0 K/UL    Lymphocytes Absolute 1.9 0.9 - 3.6 K/UL    Monocytes Absolute 0.9 0.05 - 1.2 K/UL    Eosinophils Absolute 0.1 0.0 - 0.4 K/UL    Basophils Absolute 0.0 0.0 - 0.1 K/UL    Immature Granulocytes Absolute

## 2024-11-12 NOTE — DISCHARGE INSTRUCTIONS
You were evaluated for chest pain .  Based on your work-up it was deemed that she was stable for discharge.  Please  your medication of toradol which was prescribed to you.  Please follow-up with your primary care physician if you have any further concerns and go over your work-up.  If you experience any chest pain, shortness of breath, worsening abdominal pain, vomiting blood, worsening headache, seizures, or any worsening of your symptoms please return to the emergency department immediately.  If you have any pending results or any further questions please contact the emergency department at [phone].

## 2024-11-12 NOTE — ED TRIAGE NOTES
Pt states he started having chest pain with left arm numbness yesterday about 1:30.  Pt states it is hard for him to breath

## 2024-11-13 ENCOUNTER — APPOINTMENT (OUTPATIENT)
Facility: HOSPITAL | Age: 64
End: 2024-11-13

## 2024-11-13 ENCOUNTER — HOSPITAL ENCOUNTER (INPATIENT)
Facility: HOSPITAL | Age: 64
Discharge: HOME | End: 2024-11-16

## 2024-11-13 PROBLEM — A41.9 SEPTICEMIA (HCC): Status: ACTIVE | Noted: 2024-11-13

## 2024-11-13 PROBLEM — E83.39 HYPOPHOSPHATEMIA: Status: ACTIVE | Noted: 2024-11-13

## 2024-11-13 PROBLEM — R78.81 BACTEREMIA DUE TO GROUP B STREPTOCOCCUS: Status: ACTIVE | Noted: 2024-11-13

## 2024-11-13 PROBLEM — E83.42 HYPOMAGNESEMIA: Status: ACTIVE | Noted: 2024-11-13

## 2024-11-13 PROBLEM — B95.1 BACTEREMIA DUE TO GROUP B STREPTOCOCCUS: Status: ACTIVE | Noted: 2024-11-13

## 2024-11-13 PROBLEM — A41.9 SEPTICEMIA (HCC): Status: RESOLVED | Noted: 2024-11-13 | Resolved: 2024-11-13

## 2024-11-13 LAB
ACB COMPLEX DNA BLD POS QL NAA+NON-PROBE: NOT DETECTED
ACCESSION NUMBER, LLC1M: ABNORMAL
ALBUMIN SERPL-MCNC: 2.7 G/DL (ref 3.4–5)
ALBUMIN/GLOB SERPL: 0.6 (ref 0.8–1.7)
ALP SERPL-CCNC: 73 U/L (ref 45–117)
ALT SERPL-CCNC: 49 U/L (ref 16–61)
AMPHET UR QL SCN: NEGATIVE
ANION GAP SERPL CALC-SCNC: 4 MMOL/L (ref 3–18)
AST SERPL-CCNC: 40 U/L (ref 10–38)
B FRAGILIS DNA BLD POS QL NAA+NON-PROBE: NOT DETECTED
BARBITURATES UR QL SCN: NEGATIVE
BASOPHILS # BLD: 0 K/UL (ref 0–0.1)
BASOPHILS NFR BLD: 0 % (ref 0–2)
BENZODIAZ UR QL: NEGATIVE
BILIRUB SERPL-MCNC: 0.8 MG/DL (ref 0.2–1)
BIOFIRE TEST COMMENT: ABNORMAL
BUN SERPL-MCNC: 10 MG/DL (ref 7–18)
BUN/CREAT SERPL: 9 (ref 12–20)
C ALBICANS DNA BLD POS QL NAA+NON-PROBE: NOT DETECTED
C AURIS DNA BLD POS QL NAA+NON-PROBE: NOT DETECTED
C GATTII+NEOFOR DNA BLD POS QL NAA+N-PRB: NOT DETECTED
C GLABRATA DNA BLD POS QL NAA+NON-PROBE: NOT DETECTED
C KRUSEI DNA BLD POS QL NAA+NON-PROBE: NOT DETECTED
C PARAP DNA BLD POS QL NAA+NON-PROBE: NOT DETECTED
C TROPICLS DNA BLD POS QL NAA+NON-PROBE: NOT DETECTED
CA-I SERPL-SCNC: 1.24 MMOL/L (ref 1.15–1.33)
CALCIUM SERPL-MCNC: 9 MG/DL (ref 8.5–10.1)
CANNABINOIDS UR QL SCN: NEGATIVE
CHLORIDE SERPL-SCNC: 106 MMOL/L (ref 100–111)
CO2 SERPL-SCNC: 27 MMOL/L (ref 21–32)
COCAINE UR QL SCN: NEGATIVE
CREAT SERPL-MCNC: 1.06 MG/DL (ref 0.6–1.3)
CRP SERPL-MCNC: 10.7 MG/DL (ref 0–0.3)
DIFFERENTIAL METHOD BLD: ABNORMAL
E CLOAC COMP DNA BLD POS NAA+NON-PROBE: NOT DETECTED
E COLI DNA BLD POS QL NAA+NON-PROBE: NOT DETECTED
E FAECALIS DNA BLD POS QL NAA+NON-PROBE: NOT DETECTED
E FAECIUM DNA BLD POS QL NAA+NON-PROBE: NOT DETECTED
EKG ATRIAL RATE: 78 BPM
EKG DIAGNOSIS: NORMAL
EKG P AXIS: 79 DEGREES
EKG P-R INTERVAL: 162 MS
EKG Q-T INTERVAL: 352 MS
EKG QRS DURATION: 90 MS
EKG QTC CALCULATION (BAZETT): 401 MS
EKG R AXIS: 11 DEGREES
EKG T AXIS: 52 DEGREES
EKG VENTRICULAR RATE: 78 BPM
ENTEROBACTERALES DNA BLD POS NAA+N-PRB: NOT DETECTED
EOSINOPHIL # BLD: 0 K/UL (ref 0–0.4)
EOSINOPHIL NFR BLD: 0 % (ref 0–5)
ERYTHROCYTE [DISTWIDTH] IN BLOOD BY AUTOMATED COUNT: 14.5 % (ref 11.6–14.5)
ERYTHROCYTE [SEDIMENTATION RATE] IN BLOOD: 56 MM/HR (ref 0–20)
EST. AVERAGE GLUCOSE BLD GHB EST-MCNC: 97 MG/DL
GLOBULIN SER CALC-MCNC: 4.5 G/DL (ref 2–4)
GLUCOSE SERPL-MCNC: 124 MG/DL (ref 74–99)
GP B STREP DNA BLD POS QL NAA+NON-PROBE: DETECTED
HAEM INFLU DNA BLD POS QL NAA+NON-PROBE: NOT DETECTED
HBA1C MFR BLD: 5 % (ref 4.2–5.6)
HCT VFR BLD AUTO: 39.7 % (ref 36–48)
HGB BLD-MCNC: 12.5 G/DL (ref 13–16)
IMM GRANULOCYTES # BLD AUTO: 0.1 K/UL (ref 0–0.04)
IMM GRANULOCYTES NFR BLD AUTO: 1 % (ref 0–0.5)
K OXYTOCA DNA BLD POS QL NAA+NON-PROBE: NOT DETECTED
KLEBSIELLA SP DNA BLD POS QL NAA+NON-PRB: NOT DETECTED
KLEBSIELLA SP DNA BLD POS QL NAA+NON-PRB: NOT DETECTED
L MONOCYTOG DNA BLD POS QL NAA+NON-PROBE: NOT DETECTED
LYMPHOCYTES # BLD: 1.7 K/UL (ref 0.9–3.6)
LYMPHOCYTES NFR BLD: 15 % (ref 21–52)
Lab: ABNORMAL
MAGNESIUM SERPL-MCNC: 0.8 MG/DL (ref 1.6–2.6)
MAGNESIUM SERPL-MCNC: 2.7 MG/DL (ref 1.6–2.6)
MCH RBC QN AUTO: 28.1 PG (ref 24–34)
MCHC RBC AUTO-ENTMCNC: 31.5 G/DL (ref 31–37)
MCV RBC AUTO: 89.2 FL (ref 78–100)
METHADONE UR QL: NEGATIVE
MONOCYTES # BLD: 0.9 K/UL (ref 0.05–1.2)
MONOCYTES NFR BLD: 8 % (ref 3–10)
N MEN DNA BLD POS QL NAA+NON-PROBE: NOT DETECTED
NEUTS SEG # BLD: 9 K/UL (ref 1.8–8)
NEUTS SEG NFR BLD: 76 % (ref 40–73)
NRBC # BLD: 0 K/UL (ref 0–0.01)
NRBC BLD-RTO: 0 PER 100 WBC
OPIATES UR QL: POSITIVE
P AERUGINOSA DNA BLD POS NAA+NON-PROBE: NOT DETECTED
PCP UR QL: NEGATIVE
PHOSPHATE SERPL-MCNC: 1.2 MG/DL (ref 2.5–4.9)
PHOSPHATE SERPL-MCNC: 2.3 MG/DL (ref 2.5–4.9)
PLATELET # BLD AUTO: 178 K/UL (ref 135–420)
PMV BLD AUTO: 8.9 FL (ref 9.2–11.8)
POTASSIUM SERPL-SCNC: 4.1 MMOL/L (ref 3.5–5.5)
PROT SERPL-MCNC: 7.2 G/DL (ref 6.4–8.2)
PROTEUS SP DNA BLD POS QL NAA+NON-PROBE: NOT DETECTED
RBC # BLD AUTO: 4.45 M/UL (ref 4.35–5.65)
RESISTANT GENE TARGETS: ABNORMAL
S AUREUS DNA BLD POS QL NAA+NON-PROBE: NOT DETECTED
S AUREUS+CONS DNA BLD POS NAA+NON-PROBE: NOT DETECTED
S EPIDERMIDIS DNA BLD POS QL NAA+NON-PRB: NOT DETECTED
S LUGDUNENSIS DNA BLD POS QL NAA+NON-PRB: NOT DETECTED
S MALTOPHILIA DNA BLD POS QL NAA+NON-PRB: NOT DETECTED
S MARCESCENS DNA BLD POS NAA+NON-PROBE: NOT DETECTED
S PNEUM DNA BLD POS QL NAA+NON-PROBE: NOT DETECTED
S PYO DNA BLD POS QL NAA+NON-PROBE: NOT DETECTED
SALMONELLA DNA BLD POS QL NAA+NON-PROBE: NOT DETECTED
SODIUM SERPL-SCNC: 137 MMOL/L (ref 136–145)
STREPTOCOCCUS DNA BLD POS NAA+NON-PROBE: DETECTED
T3FREE SERPL-MCNC: 1.4 PG/ML (ref 2.18–3.98)
T4 FREE SERPL-MCNC: 0.9 NG/DL (ref 0.7–1.5)
TROPONIN I SERPL HS-MCNC: 8 NG/L (ref 0–78)
TSH SERPL DL<=0.05 MIU/L-ACNC: 0.28 UIU/ML (ref 0.36–3.74)
WBC # BLD AUTO: 11.7 K/UL (ref 4.6–13.2)

## 2024-11-13 PROCEDURE — 72156 MRI NECK SPINE W/O & W/DYE: CPT

## 2024-11-13 PROCEDURE — 6360000002 HC RX W HCPCS: Performed by: PHYSICIAN ASSISTANT

## 2024-11-13 PROCEDURE — 84439 ASSAY OF FREE THYROXINE: CPT

## 2024-11-13 PROCEDURE — 2580000003 HC RX 258: Performed by: INTERNAL MEDICINE

## 2024-11-13 PROCEDURE — 94761 N-INVAS EAR/PLS OXIMETRY MLT: CPT

## 2024-11-13 PROCEDURE — 2500000003 HC RX 250 WO HCPCS: Performed by: PHYSICIAN ASSISTANT

## 2024-11-13 PROCEDURE — 6360000004 HC RX CONTRAST MEDICATION: Performed by: EMERGENCY MEDICINE

## 2024-11-13 PROCEDURE — 85652 RBC SED RATE AUTOMATED: CPT

## 2024-11-13 PROCEDURE — 2580000003 HC RX 258: Performed by: PHYSICIAN ASSISTANT

## 2024-11-13 PROCEDURE — 72129 CT CHEST SPINE W/DYE: CPT

## 2024-11-13 PROCEDURE — 6360000002 HC RX W HCPCS: Performed by: EMERGENCY MEDICINE

## 2024-11-13 PROCEDURE — 83036 HEMOGLOBIN GLYCOSYLATED A1C: CPT

## 2024-11-13 PROCEDURE — 87185 SC STD ENZYME DETCJ PER NZM: CPT

## 2024-11-13 PROCEDURE — 93010 ELECTROCARDIOGRAM REPORT: CPT | Performed by: INTERNAL MEDICINE

## 2024-11-13 PROCEDURE — 87070 CULTURE OTHR SPECIMN AEROBIC: CPT

## 2024-11-13 PROCEDURE — 83735 ASSAY OF MAGNESIUM: CPT

## 2024-11-13 PROCEDURE — 96365 THER/PROPH/DIAG IV INF INIT: CPT

## 2024-11-13 PROCEDURE — 96367 TX/PROPH/DG ADDL SEQ IV INF: CPT

## 2024-11-13 PROCEDURE — 85025 COMPLETE CBC W/AUTO DIFF WBC: CPT

## 2024-11-13 PROCEDURE — 80053 COMPREHEN METABOLIC PANEL: CPT

## 2024-11-13 PROCEDURE — 72132 CT LUMBAR SPINE W/DYE: CPT

## 2024-11-13 PROCEDURE — A9577 INJ MULTIHANCE: HCPCS | Performed by: EMERGENCY MEDICINE

## 2024-11-13 PROCEDURE — 6360000002 HC RX W HCPCS: Performed by: INTERNAL MEDICINE

## 2024-11-13 PROCEDURE — 1100000003 HC PRIVATE W/ TELEMETRY

## 2024-11-13 PROCEDURE — 96366 THER/PROPH/DIAG IV INF ADDON: CPT

## 2024-11-13 PROCEDURE — 82330 ASSAY OF CALCIUM: CPT

## 2024-11-13 PROCEDURE — 87205 SMEAR GRAM STAIN: CPT

## 2024-11-13 PROCEDURE — 2580000003 HC RX 258: Performed by: EMERGENCY MEDICINE

## 2024-11-13 PROCEDURE — 6370000000 HC RX 637 (ALT 250 FOR IP): Performed by: PHYSICIAN ASSISTANT

## 2024-11-13 PROCEDURE — 99254 IP/OBS CNSLTJ NEW/EST MOD 60: CPT | Performed by: PHYSICIAN ASSISTANT

## 2024-11-13 PROCEDURE — 87076 CULTURE ANAEROBE IDENT EACH: CPT

## 2024-11-13 PROCEDURE — 96376 TX/PRO/DX INJ SAME DRUG ADON: CPT

## 2024-11-13 PROCEDURE — 84481 FREE ASSAY (FT-3): CPT

## 2024-11-13 PROCEDURE — 96375 TX/PRO/DX INJ NEW DRUG ADDON: CPT

## 2024-11-13 PROCEDURE — 6360000004 HC RX CONTRAST MEDICATION: Performed by: INTERNAL MEDICINE

## 2024-11-13 RX ORDER — AMLODIPINE BESYLATE 10 MG/1
10 TABLET ORAL DAILY
Status: DISCONTINUED | OUTPATIENT
Start: 2024-11-14 | End: 2024-11-22 | Stop reason: HOSPADM

## 2024-11-13 RX ORDER — VANCOMYCIN 1.75 G/350ML
1250 INJECTION, SOLUTION INTRAVENOUS
Status: DISCONTINUED | OUTPATIENT
Start: 2024-11-13 | End: 2024-11-14

## 2024-11-13 RX ORDER — OXYCODONE HYDROCHLORIDE 5 MG/1
10 TABLET ORAL EVERY 4 HOURS PRN
Status: DISCONTINUED | OUTPATIENT
Start: 2024-11-13 | End: 2024-11-14

## 2024-11-13 RX ORDER — IOPAMIDOL 612 MG/ML
100 INJECTION, SOLUTION INTRAVASCULAR
Status: COMPLETED | OUTPATIENT
Start: 2024-11-13 | End: 2024-11-13

## 2024-11-13 RX ORDER — HYDRALAZINE HYDROCHLORIDE 20 MG/ML
10 INJECTION INTRAMUSCULAR; INTRAVENOUS EVERY 6 HOURS PRN
Status: DISCONTINUED | OUTPATIENT
Start: 2024-11-13 | End: 2024-11-22 | Stop reason: HOSPADM

## 2024-11-13 RX ORDER — 0.9 % SODIUM CHLORIDE 0.9 %
500 INTRAVENOUS SOLUTION INTRAVENOUS ONCE
Status: COMPLETED | OUTPATIENT
Start: 2024-11-13 | End: 2024-11-13

## 2024-11-13 RX ORDER — MAGNESIUM SULFATE 1 G/100ML
1000 INJECTION INTRAVENOUS ONCE
Status: COMPLETED | OUTPATIENT
Start: 2024-11-13 | End: 2024-11-13

## 2024-11-13 RX ORDER — NALOXONE HYDROCHLORIDE 0.4 MG/ML
0.4 INJECTION, SOLUTION INTRAMUSCULAR; INTRAVENOUS; SUBCUTANEOUS PRN
Status: DISCONTINUED | OUTPATIENT
Start: 2024-11-13 | End: 2024-11-22 | Stop reason: HOSPADM

## 2024-11-13 RX ORDER — VANCOMYCIN 2 GRAM/500 ML IN 0.9 % SODIUM CHLORIDE INTRAVENOUS
2000 ONCE
Status: COMPLETED | OUTPATIENT
Start: 2024-11-13 | End: 2024-11-13

## 2024-11-13 RX ORDER — LORAZEPAM 2 MG/ML
2 INJECTION INTRAMUSCULAR ONCE
Status: COMPLETED | OUTPATIENT
Start: 2024-11-13 | End: 2024-11-13

## 2024-11-13 RX ORDER — HYDROMORPHONE HYDROCHLORIDE 1 MG/ML
1 INJECTION, SOLUTION INTRAMUSCULAR; INTRAVENOUS; SUBCUTANEOUS ONCE
Status: COMPLETED | OUTPATIENT
Start: 2024-11-13 | End: 2024-11-13

## 2024-11-13 RX ORDER — FOLIC ACID 5 MG/ML
1 INJECTION, SOLUTION INTRAMUSCULAR; INTRAVENOUS; SUBCUTANEOUS DAILY
Status: DISCONTINUED | OUTPATIENT
Start: 2024-11-13 | End: 2024-11-19

## 2024-11-13 RX ORDER — HEPARIN SODIUM 5000 [USP'U]/ML
5000 INJECTION, SOLUTION INTRAVENOUS; SUBCUTANEOUS EVERY 8 HOURS SCHEDULED
Status: COMPLETED | OUTPATIENT
Start: 2024-11-13 | End: 2024-11-13

## 2024-11-13 RX ORDER — ACETAMINOPHEN 325 MG/1
650 TABLET ORAL EVERY 4 HOURS PRN
Status: DISCONTINUED | OUTPATIENT
Start: 2024-11-13 | End: 2024-11-14

## 2024-11-13 RX ORDER — ONDANSETRON 2 MG/ML
4 INJECTION INTRAMUSCULAR; INTRAVENOUS EVERY 6 HOURS PRN
Status: DISCONTINUED | OUTPATIENT
Start: 2024-11-13 | End: 2024-11-22 | Stop reason: HOSPADM

## 2024-11-13 RX ORDER — HYDROMORPHONE HYDROCHLORIDE 1 MG/ML
1 INJECTION, SOLUTION INTRAMUSCULAR; INTRAVENOUS; SUBCUTANEOUS EVERY 4 HOURS PRN
Status: DISCONTINUED | OUTPATIENT
Start: 2024-11-13 | End: 2024-11-16

## 2024-11-13 RX ORDER — THIAMINE HYDROCHLORIDE 100 MG/ML
200 INJECTION, SOLUTION INTRAMUSCULAR; INTRAVENOUS DAILY
Status: DISCONTINUED | OUTPATIENT
Start: 2024-11-13 | End: 2024-11-17

## 2024-11-13 RX ADMIN — HYDROMORPHONE HYDROCHLORIDE 1 MG: 1 INJECTION, SOLUTION INTRAMUSCULAR; INTRAVENOUS; SUBCUTANEOUS at 01:45

## 2024-11-13 RX ADMIN — GADOBENATE DIMEGLUMINE 15 ML: 529 INJECTION, SOLUTION INTRAVENOUS at 12:00

## 2024-11-13 RX ADMIN — HEPARIN SODIUM 5000 UNITS: 5000 INJECTION INTRAVENOUS; SUBCUTANEOUS at 20:48

## 2024-11-13 RX ADMIN — WATER 2000 MG: 1 INJECTION INTRAMUSCULAR; INTRAVENOUS; SUBCUTANEOUS at 16:45

## 2024-11-13 RX ADMIN — OXYCODONE 10 MG: 5 TABLET ORAL at 15:15

## 2024-11-13 RX ADMIN — VANCOMYCIN 2 GRAM/500 ML IN 0.9 % SODIUM CHLORIDE INTRAVENOUS 2000 MG: at 01:21

## 2024-11-13 RX ADMIN — MAGNESIUM SULFATE HEPTAHYDRATE 1000 MG: 1 INJECTION, SOLUTION INTRAVENOUS at 07:45

## 2024-11-13 RX ADMIN — LORAZEPAM 2 MG: 2 INJECTION INTRAMUSCULAR; INTRAVENOUS at 08:50

## 2024-11-13 RX ADMIN — THIAMINE HYDROCHLORIDE 200 MG: 100 INJECTION, SOLUTION INTRAMUSCULAR; INTRAVENOUS at 16:51

## 2024-11-13 RX ADMIN — SODIUM CHLORIDE 500 ML: 9 INJECTION, SOLUTION INTRAVENOUS at 01:23

## 2024-11-13 RX ADMIN — POTASSIUM PHOSPHATE, MONOBASIC POTASSIUM PHOSPHATE, DIBASIC 15 MMOL: 224; 236 INJECTION, SOLUTION, CONCENTRATE INTRAVENOUS at 20:55

## 2024-11-13 RX ADMIN — HYDROMORPHONE HYDROCHLORIDE 1 MG: 1 INJECTION, SOLUTION INTRAMUSCULAR; INTRAVENOUS; SUBCUTANEOUS at 06:16

## 2024-11-13 RX ADMIN — ACETAMINOPHEN 325MG 650 MG: 325 TABLET ORAL at 20:49

## 2024-11-13 RX ADMIN — IOPAMIDOL 100 ML: 612 INJECTION, SOLUTION INTRAVENOUS at 18:47

## 2024-11-13 ASSESSMENT — PAIN DESCRIPTION - LOCATION
LOCATION: BACK;CHEST
LOCATION: BACK;CHEST
LOCATION: CHEST
LOCATION: BACK
LOCATION: CHEST;BACK

## 2024-11-13 ASSESSMENT — PAIN DESCRIPTION - DESCRIPTORS
DESCRIPTORS: ACHING
DESCRIPTORS: SHARP
DESCRIPTORS: SHARP
DESCRIPTORS: ACHING
DESCRIPTORS: ACHING;PRESSURE;SORE

## 2024-11-13 ASSESSMENT — PAIN SCALES - GENERAL
PAINLEVEL_OUTOF10: 10
PAINLEVEL_OUTOF10: 3
PAINLEVEL_OUTOF10: 10
PAINLEVEL_OUTOF10: 4
PAINLEVEL_OUTOF10: 10
PAINLEVEL_OUTOF10: 4
PAINLEVEL_OUTOF10: 0
PAINLEVEL_OUTOF10: 0
PAINLEVEL_OUTOF10: 10

## 2024-11-13 ASSESSMENT — PAIN - FUNCTIONAL ASSESSMENT
PAIN_FUNCTIONAL_ASSESSMENT: PREVENTS OR INTERFERES SOME ACTIVE ACTIVITIES AND ADLS
PAIN_FUNCTIONAL_ASSESSMENT: PREVENTS OR INTERFERES SOME ACTIVE ACTIVITIES AND ADLS

## 2024-11-13 ASSESSMENT — PAIN DESCRIPTION - FREQUENCY: FREQUENCY: INTERMITTENT

## 2024-11-13 ASSESSMENT — PAIN DESCRIPTION - ORIENTATION
ORIENTATION: RIGHT;LEFT;UPPER
ORIENTATION: RIGHT;LEFT;MID
ORIENTATION: LEFT;RIGHT;UPPER
ORIENTATION: RIGHT;LEFT;UPPER

## 2024-11-13 ASSESSMENT — PAIN DESCRIPTION - PAIN TYPE: TYPE: CHRONIC PAIN

## 2024-11-13 ASSESSMENT — PAIN DESCRIPTION - ONSET: ONSET: GRADUAL

## 2024-11-13 NOTE — ED NOTES
Bedside and Verbal shift change report given to Melissa RN (oncoming nurse) by Sam RN (offgoing nurse). Report included the following information Nurse Handoff Report, ED Encounter Summary, and ED SBAR.

## 2024-11-13 NOTE — ED NOTES
Received report from ANDREA Desai     Pt awake and alert in stretcher attached to cardiac monitor. NAD att.

## 2024-11-13 NOTE — ED NOTES
Cardiac monitor in place. Pt voiced pain 10/10 at this time. Pt awake, alert and orientated. Allergies verified. Medicated as per MAR. Resperations even and unlabored. Call bell in reach

## 2024-11-13 NOTE — PROGRESS NOTES
Patient will need to wait for the MRI of the Thoracic and Lumbar spine w and wo contrast 24 hours from 12:07 pm 11/13/2024 s/p contrast (prior Cervical spine w wo contrast). Dr Castillo was made aware.

## 2024-11-13 NOTE — ED NOTES
6:35 AM:Pt care assumed from Dr. Donaldson , ED provider. Pt complaint(s), current treatment plan, progression and available diagnostic results have been discussed thoroughly. The patient was seen and evaluated on my shift.   Rounding occurred: Yes  Intended Disposition: pending  Pending diagnostic reports and/or labs (please list): MRI spine        Patient evaluated bedside.  He reports pain in his chest wall area which is exacerbated whenever he moves his arm.  He also reports pain in the upper thoracic spine area.  Patient states that he has a history of alcohol    8:38 AM patient was taken to MRI but he was having difficulty holding still.  Ativan 2 mg IV ordered.    I received a call from  reports that the patient has positive blood cultures.    10 AM the patient was brought back from MRI because they were unable to complete the study.    10:30 AM I instructed nursing staff to the patient taken back to MRI for the MRI of the cervical spine which was ordered by Dr. Donaldson    2:20 PM I reviewed the MRI results which revealed that the patient has an abnormal finding which consisted of a posterior strip and upper thoracic spine which had enhancement.  Recommendation to perform a MRI of the thoracic spine.    ED Course as of 11/13/24 1439   Wed Nov 13, 2024   1434 Case discussed with the hospitalist Dr. Suarez who agreed the plans for admission and request admission to telemetry unit [CC]      ED Course User Index  [CC] Efren Castillo DO       Labs Reviewed   CULTURE, BLOOD, PCR ID PANEL RESULTS REPORT - Abnormal; Notable for the following components:       Result Value    STREPTOCOCCUS Detected (*)     Streptococcus agalactiae (Group B) Detected (*)     All other components within normal limits   CBC WITH AUTO DIFFERENTIAL - Abnormal; Notable for the following components:    MPV 9.1 (*)     Neutrophils % 78 (*)     Lymphocytes % 13 (*)     Immature Granulocytes % 1 (*)     Neutrophils Absolute 9.4 (*)

## 2024-11-13 NOTE — ED NOTES
Labs drawn and taken to lab  Pt coached on the need for urine. Pt verbalized understanding.   Pt requested some blankets. Provided for pt care.

## 2024-11-13 NOTE — PROGRESS NOTES
Patient was unable to hold still long enough for the MRI of the cervical spine despite being medicated. Notified Melissa MENDEZ.

## 2024-11-13 NOTE — H&P
History & Physical    Patient: Dora Frazier MRN: [id number]  CSN: [id number]    YOB: 1960  Age: 64 y.o.  Sex: male      DOA: 11/12/2024  CC: shoulder pain    PCP: No primary care provider on file.       HPI:     Dora Frazier is a 64 y.o. male w/ a PMH of perirectal abscess, ankle abscess, HTN, tobacco abuse who presented to [facility] on 11/12 w/ co chest pain and left arm numbess x 1 day. Chest pain is worth with movement and radiates into the thoracic area of the spine.  Patient denies drug abuse.  We did discuss that this is typically found in someone who snorts or uses IV drugs, diabetes, or another abscess such as dental abscess.  Patient states that his abscess on his perirectal area still leaks fluid and he has to cover it up.  His right ankle abscess has cleared up and he has no residual issues . per ID physician, patient's girlfriend stated that she does think that patient uses drugs even though he denies.  His BC in aerobic and anaerobic bottles were positive for GPC in pairs and chains w/ the PCR noting strep group B.     Past Medical History:   Diagnosis Date    Primary hypertension        Past Surgical History:   Procedure Laterality Date    BACK SURGERY      FOOT DEBRIDEMENT Right 9/8/2024    RIGHT ANKLE WOUND  INCISION AND DRAINAGE performed by Redd Bowser DPM at [facility] MAIN OR       Family History   Problem Relation Age of Onset    Heart Attack Mother     Coronary Art Dis Mother        Social History     Socioeconomic History    Marital status: Single   Tobacco Use    Smoking status: Every Day     Current packs/day: 1.00     Average packs/day: 1 pack/day for 2.2 years (2.2 ttl pk-yrs)     Types: Cigarettes     Start date: 9/7/2022   Vaping Use    Vaping status: Every Day    Substances: Nicotine    Devices: Disposable     Social Determinants of Health     Food Insecurity: No Food Insecurity (9/7/2024)    Hunger Vital Sign     Worried About Running Out of Food in the Last Year: Never  CHAINS                  SMEAR CALLED TO AND CORRECTLY REPEATED BY: [facility] AT 1628 HRS TO 1396.           Culture       CULTURE IN PROGRESS,FURTHER UPDATES TO FOLLOW                  Sent to [facility] for ID/Susceptibility if indicated.                  Imaging Reviewed:  MRI CERVICAL SPINE W WO CONTRAST  Narrative: MRI CERVICAL SPINE W WO CONTRAST    INDICATION: Fever, chest pain, numbness, concern for epidural abscess.    TECHNIQUE:   Sagittal axial multisequence MR images of the spine were obtained  without and with IV contrast.    FINDINGS:  Reversal of the cervical lordosis, centered at C5-C6. No significant  retrolisthesis. Nonspecific marrow edema in C5, C6 and C7 with mild endplate  concavity. Mild loss of the body height in C6 and C5. No retropulsion. No  abnormal discal enhancement.    Bilateral mastoid effusion    No epidural abscess in the cervical segment.    There is a thin strip, 1.6 mm, of mild posterior epidural enhancing tissue from  T1-T2 down, seen on sagittal images. Axial images do not extend to the thoracic  segment on routine cervical spine studies. Mild facet inflammation in the upper  thoracic segment. No significant  joint effusion.    No Chiari I malformation. No prevertebral soft tissue abnormalities. No definite  intrinsic spinal cord lesion. No abnormal cord enhancement.    C2-C3: Small central disc protrusion, contacting without compressing spinal  cord. Mild central stenosis. Mild foraminal stenosis with facet hypertrophy.    C3-C4: Similar central disc protrusion, contacting without compressing spinal  cord. Mild central stenosis. No foraminal stenosis.    C4-C5: Central disc protrusion, slightly indenting spinal cord. Mild central  stenosis. Mild foraminal narrowing with facet hypertrophy.    C5-C6: Posterior disc bulge, contacting without compressing spinal cord. Mild to  moderate central stenosis. Moderate bilateral foraminal stenosis.    C6-C7: Posterior

## 2024-11-13 NOTE — PROGRESS NOTES
[facility]   Pharmacy Pharmacokinetic Monitoring Service - Vancomycin    Indication: sepsis  Goal AUC/ALEC: 400-600  Day of Therapy: 1  Additional Antimicrobials: CAX    Pertinent Laboratory Values:   Wt Readings from Last 1 Encounters:   11/12/24 83.9 kg (185 lb)     Temp Readings from Last 1 Encounters:   11/12/24 99.6 °F (37.6 °C) (Oral)     Estimated Creatinine Clearance: 68 mL/min (based on SCr of 1.2 mg/dL).    Recent Labs     11/12/24  0430 11/12/24  1910   CREATININE 1.20 1.20   BUN 9 10   WBC 6.5 12.0     Pertinent Cultures:  Date Source Results   11/12 blood pending   MRSA Nasal Swab: NA    Assessment:  Date Current Dose Level (mg/L) Timing of Level (h) AUC/ALEC   11/13 2,000 mg x1  1,250 mg q18h - - -   Note: Serum concentrations collected for AUC dosing may appear elevated if collected in close proximity to the dose administered, this is not necessarily an indication of toxicity    Plan:  Give 2,000 mg then start a dose of 1,250 mg q18h  Ordered a level for 11/14 with AM labs  Pharmacy will continue to monitor patient and adjust therapy as indicated    Thank you for the consult,  Jacob Echeverria RPH  11/13/2024

## 2024-11-13 NOTE — ED NOTES
Pt in room 14 at this time. Pt vitals retaken. Pt not in distress at this time. Pt breathing even and unlabored.

## 2024-11-13 NOTE — ED NOTES
TRANSFER - OUT REPORT:    Verbal report given to ANDREA Avila on Dora Frazier  being transferred to  for routine progression of patient care       Report consisted of patient's Situation, Background, Assessment and   Recommendations(SBAR).     Information from the following report(s) Nurse Handoff Report, ED Encounter Summary, ED SBAR, and Adult Overview was reviewed with the receiving nurse.    Kinder Fall Assessment:                           Lines:   Peripheral IV 11/12/24 Distal;Right Forearm (Active)   Site Assessment Clean, dry & intact 11/12/24 1912   Line Status Blood return noted 11/12/24 1912   Phlebitis Assessment No symptoms 11/12/24 1912   Infiltration Assessment 0 11/12/24 1912        Opportunity for questions and clarification was provided.      Patient transported with:  Tech

## 2024-11-13 NOTE — CONSULTS
Infectious Disease Consultation Note        Reason: sepsis, group B streptococcus bacteremia    Current abx Prior abx   Ceftriaxone, vancomycin      Lines:       Assessment :  64-year-old man with past medical history significant for hypertension presented to [facility] on 11/12/24 with left arm numbness, chest discomfort x 1 day    Hospitalization [facility] September 2024 for infected right ankle wound/cellulitis  Status post I&D on 9/8/2024  IntraOp cultures 9/8-MRSA, heavy mixed skin dimitri, group A strep    Now with fever Tmax 102, persistent thoracic/lumbar spine pain/tenderness, group B streptococcus bacteremia    Clinical presentation consistent with sepsis-present on admission due to group B streptococcus bacteremia, probable thoracic/lumbar spine infection    History of left arm numbness, chest discomfort along with spinal tenderness is highly concerning for thoracic spine infection.  Unable to perform MRI with gadolinium due to recent cervical spine MRI with chante.  Hence will obtain CT imaging.    Exact source of group B streptococcus in the blood not entirely clear.  No definitive evidence of skin/soft tissue infection at this time.- ?  Open sores versus unreported IV drug abuse.  Urine drug screen positive for opiates.  Patient denies IV drug abuse.  However girlfriend is concerned that patient may have an unreported IV drug abuse  She also reports patient had episode of binge drinking last weekend     Recommendations:      continue ceftriaxone, vancomycin.  Increase ceftriaxone to 2 g IV every 12 hours to cover for possible spinal/paraspinal infection  Obtain stat CT cervical/thoracic spine with contrast  Repeat blood culture in a.m.  Follow-up spine surgery recommendations after CT results available  Further recommendations based on above test results, clinical course      Thank you for consultation request. Above plan was discussed in details with patient, and dr Sethi. Please call me if any further questions

## 2024-11-13 NOTE — ED PROVIDER NOTES
EMERGENCY DEPARTMENT HISTORY AND PHYSICAL EXAM      Patient Name: Dora Frazier  MRN: [id number]  YOB: 1960  Provider: Manjula Donaldson MD  PCP: No primary care provider on file.   Time/Date of evaluation: 12:07 AM EST on 11/13/24    History of Presenting Illness     Chief Complaint   Patient presents with    Chest Pain    Chills       History Provided By: Patient     History (Narative):   Dora Frazier is a 64 y.o. male with a PMHX of hypertension  who presents to the emergency department  by POV C/O chest pain, back pain, shortness of breath, and feeling like he cannot raise his arms.  He has pain and numbness in his axillary area.  He states that his symptoms began the day before yesterday.  He was seen yesterday in the ED and had a chest x-ray of the CTA that was negative.    The patient states that he is having difficulty walking and weakness in his legs.  He denies any bowel or bladder incontinence though.        Past History     Past Medical History:  Past Medical History:   Diagnosis Date    Primary hypertension        Past Surgical History:  Past Surgical History:   Procedure Laterality Date    BACK SURGERY      FOOT DEBRIDEMENT Right 9/8/2024    RIGHT ANKLE WOUND  INCISION AND DRAINAGE performed by Redd Bowser DPM at [facility] MAIN OR       Family History:  Family History   Problem Relation Age of Onset    Heart Attack Mother     Coronary Art Dis Mother        Social History:  Social History     Tobacco Use    Smoking status: Every Day     Current packs/day: 1.00     Average packs/day: 1 pack/day for 2.2 years (2.2 ttl pk-yrs)     Types: Cigarettes     Start date: 9/7/2022   Vaping Use    Vaping status: Every Day    Substances: Nicotine    Devices: Disposable       Medications:  No current facility-administered medications for this encounter.     Current Outpatient Medications   Medication Sig Dispense Refill    ketorolac (TORADOL) 10 MG tablet Take 1 tablet by mouth every 6 hours as needed for  found.    DISPOSITION             PATIENT REFERRED TO:  No follow-up provider specified.    DISCHARGE MEDICATIONS:  New Prescriptions    No medications on file       DISCONTINUED MEDICATIONS:  Discontinued Medications    No medications on file              (Please note that portions of this note were completed with a voice recognition program.  Efforts were made to edit the dictations but occasionally words are mis-transcribed.)    Manjula Donaldson MD (electronically signed)        Dragon Disclaimer     Please note that this dictation was completed with Sendoid, the computer voice recognition software.  Quite often unanticipated grammatical, syntax, homophones, and other interpretive errors are inadvertently transcribed by the computer software.  Please disregard these errors.  Please excuse any errors that have escaped final proofreading.

## 2024-11-14 ENCOUNTER — APPOINTMENT (OUTPATIENT)
Facility: HOSPITAL | Age: 64
End: 2024-11-14

## 2024-11-14 LAB
ALBUMIN SERPL-MCNC: 2.3 G/DL (ref 3.4–5)
ANION GAP SERPL CALC-SCNC: 5 MMOL/L (ref 3–18)
BASOPHILS # BLD: 0 K/UL (ref 0–0.1)
BASOPHILS NFR BLD: 0 % (ref 0–2)
BUN SERPL-MCNC: 15 MG/DL (ref 7–18)
BUN/CREAT SERPL: 15 (ref 12–20)
CALCIUM SERPL-MCNC: 8.8 MG/DL (ref 8.5–10.1)
CHLORIDE SERPL-SCNC: 107 MMOL/L (ref 100–111)
CO2 SERPL-SCNC: 25 MMOL/L (ref 21–32)
CREAT SERPL-MCNC: 0.97 MG/DL (ref 0.6–1.3)
DIFFERENTIAL METHOD BLD: ABNORMAL
EKG ATRIAL RATE: 63 BPM
EKG DIAGNOSIS: NORMAL
EKG P AXIS: 74 DEGREES
EKG P-R INTERVAL: 166 MS
EKG Q-T INTERVAL: 380 MS
EKG QRS DURATION: 86 MS
EKG QTC CALCULATION (BAZETT): 388 MS
EKG R AXIS: 4 DEGREES
EKG T AXIS: 32 DEGREES
EKG VENTRICULAR RATE: 63 BPM
EOSINOPHIL # BLD: 0.1 K/UL (ref 0–0.4)
EOSINOPHIL NFR BLD: 1 % (ref 0–5)
ERYTHROCYTE [DISTWIDTH] IN BLOOD BY AUTOMATED COUNT: 14.4 % (ref 11.6–14.5)
GLUCOSE BLD STRIP.AUTO-MCNC: 98 MG/DL (ref 70–110)
GLUCOSE SERPL-MCNC: 99 MG/DL (ref 74–99)
HCT VFR BLD AUTO: 35.9 % (ref 36–48)
HGB BLD-MCNC: 11.3 G/DL (ref 13–16)
IMM GRANULOCYTES # BLD AUTO: 0.1 K/UL (ref 0–0.04)
IMM GRANULOCYTES NFR BLD AUTO: 1 % (ref 0–0.5)
LYMPHOCYTES # BLD: 1.9 K/UL (ref 0.9–3.6)
LYMPHOCYTES NFR BLD: 20 % (ref 21–52)
MAGNESIUM SERPL-MCNC: 2.2 MG/DL (ref 1.6–2.6)
MCH RBC QN AUTO: 28.5 PG (ref 24–34)
MCHC RBC AUTO-ENTMCNC: 31.5 G/DL (ref 31–37)
MCV RBC AUTO: 90.4 FL (ref 78–100)
MONOCYTES # BLD: 0.9 K/UL (ref 0.05–1.2)
MONOCYTES NFR BLD: 9 % (ref 3–10)
NEUTS SEG # BLD: 6.6 K/UL (ref 1.8–8)
NEUTS SEG NFR BLD: 69 % (ref 40–73)
NRBC # BLD: 0 K/UL (ref 0–0.01)
NRBC BLD-RTO: 0 PER 100 WBC
PHOSPHATE SERPL-MCNC: 2.4 MG/DL (ref 2.5–4.9)
PLATELET # BLD AUTO: 166 K/UL (ref 135–420)
PMV BLD AUTO: 9.7 FL (ref 9.2–11.8)
POTASSIUM SERPL-SCNC: 3.9 MMOL/L (ref 3.5–5.5)
RBC # BLD AUTO: 3.97 M/UL (ref 4.35–5.65)
SODIUM SERPL-SCNC: 137 MMOL/L (ref 136–145)
VANCOMYCIN SERPL-MCNC: 3.5 UG/ML (ref 5–40)
WBC # BLD AUTO: 9.6 K/UL (ref 4.6–13.2)

## 2024-11-14 PROCEDURE — 6370000000 HC RX 637 (ALT 250 FOR IP): Performed by: PHYSICIAN ASSISTANT

## 2024-11-14 PROCEDURE — 94761 N-INVAS EAR/PLS OXIMETRY MLT: CPT

## 2024-11-14 PROCEDURE — 99233 SBSQ HOSP IP/OBS HIGH 50: CPT | Performed by: STUDENT IN AN ORGANIZED HEALTH CARE EDUCATION/TRAINING PROGRAM

## 2024-11-14 PROCEDURE — 6370000000 HC RX 637 (ALT 250 FOR IP): Performed by: STUDENT IN AN ORGANIZED HEALTH CARE EDUCATION/TRAINING PROGRAM

## 2024-11-14 PROCEDURE — 6360000002 HC RX W HCPCS: Performed by: EMERGENCY MEDICINE

## 2024-11-14 PROCEDURE — 2500000003 HC RX 250 WO HCPCS: Performed by: PHYSICIAN ASSISTANT

## 2024-11-14 PROCEDURE — 93010 ELECTROCARDIOGRAM REPORT: CPT | Performed by: INTERNAL MEDICINE

## 2024-11-14 PROCEDURE — 80202 ASSAY OF VANCOMYCIN: CPT

## 2024-11-14 PROCEDURE — 80069 RENAL FUNCTION PANEL: CPT

## 2024-11-14 PROCEDURE — 2580000003 HC RX 258: Performed by: STUDENT IN AN ORGANIZED HEALTH CARE EDUCATION/TRAINING PROGRAM

## 2024-11-14 PROCEDURE — 85025 COMPLETE CBC W/AUTO DIFF WBC: CPT

## 2024-11-14 PROCEDURE — 71550 MRI CHEST W/O DYE: CPT

## 2024-11-14 PROCEDURE — 1100000003 HC PRIVATE W/ TELEMETRY

## 2024-11-14 PROCEDURE — 2580000003 HC RX 258: Performed by: INTERNAL MEDICINE

## 2024-11-14 PROCEDURE — 83735 ASSAY OF MAGNESIUM: CPT

## 2024-11-14 PROCEDURE — 6360000002 HC RX W HCPCS: Performed by: INTERNAL MEDICINE

## 2024-11-14 PROCEDURE — 87040 BLOOD CULTURE FOR BACTERIA: CPT

## 2024-11-14 PROCEDURE — 36415 COLL VENOUS BLD VENIPUNCTURE: CPT

## 2024-11-14 PROCEDURE — 6360000002 HC RX W HCPCS: Performed by: STUDENT IN AN ORGANIZED HEALTH CARE EDUCATION/TRAINING PROGRAM

## 2024-11-14 PROCEDURE — 93005 ELECTROCARDIOGRAM TRACING: CPT | Performed by: STUDENT IN AN ORGANIZED HEALTH CARE EDUCATION/TRAINING PROGRAM

## 2024-11-14 PROCEDURE — 82962 GLUCOSE BLOOD TEST: CPT

## 2024-11-14 PROCEDURE — 6360000002 HC RX W HCPCS: Performed by: PHYSICIAN ASSISTANT

## 2024-11-14 RX ORDER — OXYCODONE HYDROCHLORIDE 5 MG/1
10 TABLET ORAL EVERY 4 HOURS PRN
Status: DISCONTINUED | OUTPATIENT
Start: 2024-11-14 | End: 2024-11-16

## 2024-11-14 RX ORDER — NITROGLYCERIN 0.4 MG/1
0.4 TABLET SUBLINGUAL EVERY 5 MIN PRN
Status: DISCONTINUED | OUTPATIENT
Start: 2024-11-14 | End: 2024-11-22 | Stop reason: HOSPADM

## 2024-11-14 RX ORDER — SENNA AND DOCUSATE SODIUM 50; 8.6 MG/1; MG/1
1 TABLET, FILM COATED ORAL DAILY
Status: DISCONTINUED | OUTPATIENT
Start: 2024-11-14 | End: 2024-11-22 | Stop reason: HOSPADM

## 2024-11-14 RX ORDER — NICOTINE 21 MG/24HR
1 PATCH, TRANSDERMAL 24 HOURS TRANSDERMAL DAILY
Status: DISCONTINUED | OUTPATIENT
Start: 2024-11-14 | End: 2024-11-22 | Stop reason: HOSPADM

## 2024-11-14 RX ORDER — LORAZEPAM 2 MG/ML
1 INJECTION INTRAMUSCULAR ONCE
Status: COMPLETED | OUTPATIENT
Start: 2024-11-14 | End: 2024-11-14

## 2024-11-14 RX ORDER — LIDOCAINE 4 G/G
1 PATCH TOPICAL DAILY
Status: DISCONTINUED | OUTPATIENT
Start: 2024-11-14 | End: 2024-11-22 | Stop reason: HOSPADM

## 2024-11-14 RX ORDER — ACETAMINOPHEN 325 MG/1
650 TABLET ORAL EVERY 4 HOURS PRN
Status: DISCONTINUED | OUTPATIENT
Start: 2024-11-14 | End: 2024-11-15

## 2024-11-14 RX ORDER — ASPIRIN 81 MG/1
81 TABLET, CHEWABLE ORAL DAILY
Status: DISCONTINUED | OUTPATIENT
Start: 2024-11-14 | End: 2024-11-16

## 2024-11-14 RX ORDER — VANCOMYCIN 1.75 G/350ML
1250 INJECTION, SOLUTION INTRAVENOUS EVERY 12 HOURS
Status: DISCONTINUED | OUTPATIENT
Start: 2024-11-14 | End: 2024-11-14 | Stop reason: ALTCHOICE

## 2024-11-14 RX ORDER — LORAZEPAM 2 MG/ML
2 INJECTION INTRAMUSCULAR ONCE
Status: DISCONTINUED | OUTPATIENT
Start: 2024-11-14 | End: 2024-11-14

## 2024-11-14 RX ADMIN — WATER 2000 MG: 1 INJECTION INTRAMUSCULAR; INTRAVENOUS; SUBCUTANEOUS at 05:24

## 2024-11-14 RX ADMIN — FOLIC ACID 1 MG: 5 INJECTION, SOLUTION INTRAMUSCULAR; INTRAVENOUS; SUBCUTANEOUS at 16:07

## 2024-11-14 RX ADMIN — AMLODIPINE BESYLATE 10 MG: 10 TABLET ORAL at 15:56

## 2024-11-14 RX ADMIN — OXYCODONE 10 MG: 5 TABLET ORAL at 12:46

## 2024-11-14 RX ADMIN — NITROGLYCERIN 0.4 MG: 0.4 TABLET SUBLINGUAL at 10:18

## 2024-11-14 RX ADMIN — VANCOMYCIN 1250 MG: 1.75 INJECTION, SOLUTION INTRAVENOUS at 03:03

## 2024-11-14 RX ADMIN — HYDROMORPHONE HYDROCHLORIDE 1 MG: 1 INJECTION, SOLUTION INTRAMUSCULAR; INTRAVENOUS; SUBCUTANEOUS at 10:24

## 2024-11-14 RX ADMIN — SENNOSIDES AND DOCUSATE SODIUM 1 TABLET: 50; 8.6 TABLET ORAL at 10:30

## 2024-11-14 RX ADMIN — ASPIRIN 81 MG CHEWABLE TABLET 81 MG: 81 TABLET CHEWABLE at 10:30

## 2024-11-14 RX ADMIN — VANCOMYCIN HYDROCHLORIDE 750 MG: 750 INJECTION, POWDER, LYOPHILIZED, FOR SOLUTION INTRAVENOUS at 05:29

## 2024-11-14 RX ADMIN — FOLIC ACID 1 MG: 5 INJECTION, SOLUTION INTRAMUSCULAR; INTRAVENOUS; SUBCUTANEOUS at 01:32

## 2024-11-14 RX ADMIN — NITROGLYCERIN 0.4 MG: 0.4 TABLET SUBLINGUAL at 10:11

## 2024-11-14 RX ADMIN — WATER 2000 MG: 1 INJECTION INTRAMUSCULAR; INTRAVENOUS; SUBCUTANEOUS at 16:03

## 2024-11-14 RX ADMIN — OXYCODONE 10 MG: 5 TABLET ORAL at 01:35

## 2024-11-14 RX ADMIN — OXYCODONE 10 MG: 5 TABLET ORAL at 08:27

## 2024-11-14 RX ADMIN — LORAZEPAM 1 MG: 2 INJECTION INTRAMUSCULAR; INTRAVENOUS at 11:42

## 2024-11-14 RX ADMIN — NITROGLYCERIN 0.4 MG: 0.4 TABLET SUBLINGUAL at 10:03

## 2024-11-14 RX ADMIN — OXYCODONE 10 MG: 5 TABLET ORAL at 23:43

## 2024-11-14 RX ADMIN — THIAMINE HYDROCHLORIDE 200 MG: 100 INJECTION, SOLUTION INTRAMUSCULAR; INTRAVENOUS at 08:29

## 2024-11-14 RX ADMIN — VANCOMYCIN 1250 MG: 1.75 INJECTION, SOLUTION INTRAVENOUS at 15:29

## 2024-11-14 ASSESSMENT — PAIN SCALES - GENERAL
PAINLEVEL_OUTOF10: 7
PAINLEVEL_OUTOF10: 6
PAINLEVEL_OUTOF10: 10
PAINLEVEL_OUTOF10: 10
PAINLEVEL_OUTOF10: 0
PAINLEVEL_OUTOF10: 10
PAINLEVEL_OUTOF10: 10
PAINLEVEL_OUTOF10: 2
PAINLEVEL_OUTOF10: 10
PAINLEVEL_OUTOF10: 0
PAINLEVEL_OUTOF10: 0
PAINLEVEL_OUTOF10: 7

## 2024-11-14 ASSESSMENT — PAIN DESCRIPTION - DESCRIPTORS
DESCRIPTORS: SHARP
DESCRIPTORS: ACHING

## 2024-11-14 ASSESSMENT — PAIN - FUNCTIONAL ASSESSMENT
PAIN_FUNCTIONAL_ASSESSMENT: ACTIVITIES ARE NOT PREVENTED
PAIN_FUNCTIONAL_ASSESSMENT: INTOLERABLE, UNABLE TO DO ANY ACTIVE OR PASSIVE ACTIVITIES
PAIN_FUNCTIONAL_ASSESSMENT: INTOLERABLE, UNABLE TO DO ANY ACTIVE OR PASSIVE ACTIVITIES
PAIN_FUNCTIONAL_ASSESSMENT: PREVENTS OR INTERFERES SOME ACTIVE ACTIVITIES AND ADLS

## 2024-11-14 ASSESSMENT — PAIN DESCRIPTION - LOCATION
LOCATION: CHEST
LOCATION: CHEST;BACK
LOCATION: CHEST
LOCATION: BACK

## 2024-11-14 ASSESSMENT — PAIN DESCRIPTION - FREQUENCY
FREQUENCY: INTERMITTENT
FREQUENCY: INTERMITTENT

## 2024-11-14 ASSESSMENT — PAIN DESCRIPTION - ORIENTATION
ORIENTATION: RIGHT;LEFT
ORIENTATION: MID

## 2024-11-14 ASSESSMENT — PAIN DESCRIPTION - ONSET
ONSET: GRADUAL
ONSET: GRADUAL

## 2024-11-14 ASSESSMENT — PAIN DESCRIPTION - PAIN TYPE
TYPE: CHRONIC PAIN
TYPE: CHRONIC PAIN

## 2024-11-14 NOTE — PLAN OF CARE
Problem: Discharge Planning  Goal: Discharge to home or other facility with appropriate resources  Outcome: Progressing  Flowsheets (Taken 11/13/2024 2000)  Discharge to home or other facility with appropriate resources: Identify barriers to discharge with patient and caregiver     Problem: Pain  Goal: Verbalizes/displays adequate comfort level or baseline comfort level  Outcome: Progressing  Flowsheets (Taken 11/13/2024 2243)  Verbalizes/displays adequate comfort level or baseline comfort level: Assess pain using appropriate pain scale  Note: Encourage patient to report pain and acceptable comfort level utilizing the Number Pain Scale.     Problem: Support with Decision-Making  Goal: Verbalization of thoughts/feelings regarding decision  Outcome: Progressing  Goal: Movement towards resolution of decision  Outcome: Progressing  Goal: Identifies/restores coping resources/skills  Outcome: Progressing  Goal: Explore resources for additional follow up, post discharge  Outcome: Progressing     Problem: Safety - Adult  Goal: Free from fall injury  Outcome: Progressing  Flowsheets (Taken 11/13/2024 2243)  Free From Fall Injury: Instruct family/caregiver on patient safety  Note: All safety measures to remain in place to include non-skid socks, bed alarm and call bell within reach.

## 2024-11-14 NOTE — PROGRESS NOTES
4 Eyes Skin Assessment     NAME:  Dora Frazier  YOB: 1960  MEDICAL RECORD NUMBER:  [id number]    The patient is being assessed for  Admission/ shift change     I agree that at least one RN has performed a thorough Head to Toe Skin Assessment on the patient. ALL assessment sites listed below have been assessed.      Areas assessed by both nurses:    Head, Face, Ears, Shoulders, Back, Chest, Arms, Elbows, Hands, Sacrum. Buttock, Coccyx, Ischium, Legs. Feet and Heels, and Under Medical Devices         Does the Patient have a Wound? Yes wound(s) were present on assessment. LDA wound assessment was Initiated and completed by RN - Patient has scattered abrasions on bilateral shins and upper ext. Ruptured boil to inner folds of sacrum. Dry cracked feet.        Hemant Prevention initiated by RN: No  Wound Care Orders initiated by RN: Yes    Pressure Injury (Stage 3,4, Unstageable, DTI, NWPT, and Complex wounds) if present, place Wound referral order by RN under : Yes    New Ostomies, if present place, Ostomy referral order under : No     Nurse 1 eSignature: Electronically signed by VALERY ANAYA RN on 11/13/24 at 7:13 PM EST    **SHARE this note so that the co-signing nurse can place an eSignature**    Nurse 2 eSignature: Electronically signed by Karina Sanchez RN on 11/13/24 at 11:32 PM EST

## 2024-11-14 NOTE — SIGNIFICANT EVENT
[facility]  Rapid/Medical Response Team Note    Patient:    Dora Frazier 64 y.o. male, : 1960  Patient MRN: [id number]    Admission Date: 2024   Admission Diagnosis: Hypomagnesemia [E83.42]  Septicemia (HCC) [A41.9]  Chest pain, unspecified type [R07.9]    RAPID RESPONSE  Rapid response called for: Chest pain    OBJECTIVE    RRT/MRT start time:  1020             RRT/MRT end time: 1030  Vitals:    24 1019   BP:    Pulse: 65   Resp:    Temp:    SpO2:       Physical Exam:  Gen: NAD, comfortable  HEENT: normocephalic, atraumatic, MMM, no JVD  CV: RRR, S1/S2 present without M/R/G, +2 radial pulses, well-perfused, PMI not displaced. Mild reproducible tenderness to palpation of inferior sternum just superior to xyphoid process  Pulm: CTAB, no wheezes, no crackles  Abd: S/NT/ND, no rebound, no guarding, no hepatosplenomegaly   MSK: no clubbing, no edema  Skin: warm, dry, intact, no rash  Neuro: CN II-XII grossly intact, no focal deficits appreciated   Psych: appropriate, alert, oriented x3    ASSESSMENT/PLAN AND DISPOSITION  Dora Frazier is a 64 y.o. year old male admitted for Hypomagnesemia [E83.42]  Septicemia (HCC) [A41.9]  Chest pain, unspecified type [R07.9]  Rapid Response Team/ Medical Response Team Called For: chest pain    In setting of chest pain completed the below:    Medications Administered During Rapid:  NTG x3 prior to arrival    EKG: obtained, unremarkable    Labs: none    Imaging: none    Other interventions: None    Medical Problem(s)    Chest Pain  Plan:  - likely secondary to thoracic abscess as unchanging chest pain for 2 days  - no acute interventions    Disposition: staying in room   Patient condition: stable    Attending Dr. Jimenez notified of rapid response and is in agreement with plan.     Primary team resuming care.     [facility] Senior resident Dr. Spencer present during Rapid Response.    Silvano Bernstein MD -PGY 1  [facility]  2024 11:12

## 2024-11-14 NOTE — PROGRESS NOTES
For this patient, at his request, no more information is to be released to a  Gabrielle Pineda.  He is not kin.  Significant other is Gabrielle Bess.  Also information can be released to daughter Nadia.  This nurse had this conversation with patient.

## 2024-11-14 NOTE — PROGRESS NOTES
Patient completed 1 out of 3 exams. per DR. Solis wanted to complete the chest mri today. we will try to complete the remainder of the exam tomorrow with more medicine given prior to mri. Patient was moving a lot during exam, mri will be attempted again tomorrow.

## 2024-11-14 NOTE — PROGRESS NOTES
conducted an initial consultation and Spiritual Assessment for Dora Frazier, who is a 64 y.o.,male. Patient's Primary Language is: English.   According to the patient's EMR Mandaen Affiliation is: Confucianist.     The reason the Patient came to the hospital is:   Patient Active Problem List    Diagnosis Date Noted    Bacteremia due to group B Streptococcus 11/13/2024    Hypomagnesemia 11/13/2024    Hypophosphatemia 11/13/2024    Open wound of foot excluding toes 09/16/2024    Abscess of right foot 09/10/2024    Cellulitis 09/07/2024    Infected wound 09/07/2024    Cellulitis of right foot without toes 09/07/2024    Wound dehiscence 09/07/2024    Essential hypertension 09/07/2024    Cellulitis and abscess of buttock 10/01/2022    Hyponatremia 10/01/2022    Consuelo-rectal abscess 10/01/2022    Sepsis ([facility]) 10/01/2022    HECTOR (acute kidney injury) ([facility]) 10/01/2022    Elevated LFTs 10/01/2022        The  provided the following Interventions:  Initiated a relationship of care and support. Patient alert but having discomfort. Had test this morning and awaiting results. Discuss advance directive. Patient not interested in completing. Explored issues of zeb, belief, spirituality and Pentecostal/ritual needs while hospitalized. Listened empathically. Provided chaplaincy education concerning Advance Medical Directive. Provided information about Spiritual Care Services. Patient processed feeling about current hospitalization. Patient expressed gratitude for 's visit. Offered prayer and assurance of continued prayers on patient's behalf. Chart reviewed.     Shade Rowland   Staff   Spiritual Health  [phone]    Spiritual Health History and Assessment/Progress Note  [facility]    Advance Care Planning,  ,  ,      Name: Dora Frazier MRN: [id number]    Age: 64 y.o.     Sex: male   Language: English   Episcopalian: Confucianist   Septicemia (HCC)     Date: 11/14/2024

## 2024-11-14 NOTE — PLAN OF CARE
therapy assess nares and determine need for appliance change or resting period.  Outcome: Progressing  Note: Assess pt's skin. Abscess noted of buttocks will dress and consult wound care if needed.

## 2024-11-14 NOTE — PROGRESS NOTES
4 Eyes Skin Assessment     NAME:  Dora Frazier  YOB: 1960  MEDICAL RECORD NUMBER:  [id number]    The patient is being assessed for  Shift Handoff    I agree that at least one RN has performed a thorough Head to Toe Skin Assessment on the patient. ALL assessment sites listed below have been assessed.      Areas assessed by both nurses:    Head, Face, Ears, Shoulders, Back, Chest, Arms, Elbows, Hands, Sacrum. Buttock, Coccyx, Ischium, Legs. Feet and Heels, Under Medical Devices , and Other na        Does the Patient have a Wound? Yes wound(s) were present on assessment. LDA wound assessment was Initiated and completed by RN       Hemant Prevention initiated by RN: No  Wound Care Orders initiated by RN: No    Pressure Injury (Stage 3,4, Unstageable, DTI, NWPT, and Complex wounds) if present, place Wound referral order by RN under : No    New Ostomies, if present place, Ostomy referral order under : No     Nurse 1 eSignature: Electronically signed by Karina Sanchez RN on 11/14/24 at 6:17 AM EST    **SHARE this note so that the co-signing nurse can place an eSignature**    Nurse 2 eSignature: Electronically signed by Elvia Zavala RN on 11/14/24 at 11:34 AM EST

## 2024-11-14 NOTE — PROGRESS NOTES
Pt c/o chest pain. EKG done stat. ST elevation noted on  V2- V6 leads. Dr. Jimenez aware & notified.  VS-HR-116, BP-163/82, O2 Sat-98.  At 1003am, CP-10/10, VS- BP-139/74, HR-66, O2 Sat's-98 Admin 1 NTG SL  At 1011am, CP-10/10, /79, HR-66.#2 NTG SL  AT 1018am- CP 10/10, BP-146/82, HR-63 #3 NTG SL  1121am- Pt is still c/o CP 10/10. Ok to take pt to MRI as per Dr. Jimenez & Dr. Noni Solis.  1248pm Pt got back from MRI.Unable to do all test r/t pt is uneasy and moving around

## 2024-11-14 NOTE — CONSULTS
[unfilled]   Consult    Patient: Dora Frazier MRN: [id number]  SSN: [id number]    YOB: 1960  Age: 64 y.o.  Sex: male      Subjective:      Dora Frazier is a 64 y.o. male Hx CAD, HTN presented to ED 2 days ago with chest pain and numbness in his left arm. He also reports mid and low back pain along with increased weakness in his lower extremities the last few days. He was previously admitted in September right ankle abscess requiring incision and drainage. MRI cervical spine with findings concerning for possible thoracic abscess. Patient found to have positive blood cultures group B streptococcus and onset fever yesterday.    Past Medical History:   Diagnosis Date    Primary hypertension      Past Surgical History:   Procedure Laterality Date    BACK SURGERY      FOOT DEBRIDEMENT Right 9/8/2024    RIGHT ANKLE WOUND  INCISION AND DRAINAGE performed by Redd Bowser DPM at [facility] MAIN OR      Family History   Problem Relation Age of Onset    Heart Attack Mother     Coronary Art Dis Mother      Social History     Tobacco Use    Smoking status: Every Day     Current packs/day: 1.00     Average packs/day: 1 pack/day for 2.2 years (2.2 ttl pk-yrs)     Types: Cigarettes     Start date: 9/7/2022    Smokeless tobacco: Not on file   Substance Use Topics    Alcohol use: Not on file      Current Facility-Administered Medications   Medication Dose Route Frequency Provider Last Rate Last Admin    vancomycin (VANCOCIN) 1250 mg in 250 mL IVPB  1,250 mg IntraVENous Q12H Noni Solis MD        thiamine (B-1) injection 200 mg  200 mg IntraVENous Daily Africa Matson PA-C   200 mg at 11/14/24 0829    folic acid injection 1 mg  1 mg IntraVENous Daily Africa Matson PA-C   1 mg at 11/14/24 0132    HYDROmorphone HCl PF (DILAUDID) injection 1 mg  1 mg IntraVENous Q4H PRN Africa Matson PA-C        oxyCODONE (ROXICODONE) immediate release tablet 10 mg  10 mg Oral Q4H PRN Africa Matson PA-C   10  mg at 11/14/24 0827    acetaminophen (TYLENOL) tablet 650 mg  650 mg Oral Q4H PRN Africa Matson PA-C   650 mg at 11/13/24 2049    ondansetron (ZOFRAN) injection 4 mg  4 mg IntraVENous Q6H PRN Africa Matson PA-C        naloxone (NARCAN) injection 0.4 mg  0.4 mg IntraVENous PRN Africa Matson PA-C        cefTRIAXone (ROCEPHIN) 2,000 mg in sterile water 20 mL IV syringe  2,000 mg IntraVENous Q12H Noni Solis MD   2,000 mg at 11/14/24 0524    amLODIPine (NORVASC) tablet 10 mg  10 mg Oral Daily Africa Matson PA-C   10 mg at 11/14/24 0828    hydrALAZINE (APRESOLINE) injection 10 mg  10 mg IntraVENous Q6H PRN Africa Matson PA-C            No Known Allergies    Review of Systems:  A comprehensive review of systems was negative except for that written in the History of Present Illness.    Objective:     Vitals:    11/14/24 0345 11/14/24 0745 11/14/24 0827 11/14/24 0828   BP: 109/63 (!) 153/77  107/67   Pulse: 67 63     Resp: 16 17 26    Temp: 100 °F (37.8 °C) 99.6 °F (37.6 °C)     TempSrc: Oral Oral     SpO2:  96%     Weight:       Height:            Physical Exam:  General: No acute distress.  Neck: Normal ROM.  Extremities: Moves all extremities.  Back: Antalgic decreased ROM.  Neuro: Good strength in his upper extremities intrinsics, deltoids, triceps and biceps. Numbness into his left upper extremity. 3/5 motor strength in his bilateral lower extremities. Gait not assessed.    Assessment:         Plan:     Patient with progressive back pain and new onset weakness in his lower extremities. MRI cervical spine negative, but concerning for possible thoracic abscess. Blood cultures positive for group b strep. WBC 9.6. Temp 99.6. Awaiting MRI thoracic and lumbar spine today. Continue with NPO status. Abx per ID recs.     Signed By: Deanne Amaya PA-C     November 14, 2024

## 2024-11-14 NOTE — PLAN OF CARE
Problem: Cardiovascular - Adult  Goal: Maintains optimal cardiac output and hemodynamic stability  Flowsheets (Taken 11/14/2024 1133)  Maintains optimal cardiac output and hemodynamic stability:   Monitor blood pressure and heart rate   Monitor urine output and notify Licensed Independent Practitioner for values outside of normal range   Assess for signs of decreased cardiac output  Note: Assess pt chest discomfort. ASA and pain med given to pt.     Problem: Safety - Adult  Goal: Free from fall injury  11/13/2024 2243 by Karina Sanchez RN  Outcome: Progressing  Flowsheets (Taken 11/13/2024 2243)  Free From Fall Injury: Instruct family/caregiver on patient safety  Note: All safety measures to remain in place to include non-skid socks, bed alarm and call bell within reach.

## 2024-11-14 NOTE — PROGRESS NOTES
once medically stable, anticipated discharge date 24    I spent 50 minutes with the patient in face-to-face consultation, of which greater than 50% was spent in counseling and coordination of care as described above.    Case discussed with:  [x]Patient  []Family  [x]Nursing  [x]Case Management  DVT Prophylaxis:  []Lovenox  []Hep SQ  [x]SCDs  []Coumadin   []Eliquis/Xarelto     Objective:   VS: BP (!) 154/73   Pulse 70   Temp 98.5 °F (36.9 °C) (Oral)   Resp 26   Ht 1.803 m (5' 11\")   Wt 83.9 kg (185 lb)   SpO2 100%   BMI 25.80 kg/m²    Tmax/24hrs: Temp (24hrs), Av.9 °F (37.2 °C), Min:98.1 °F (36.7 °C), Max:100 °F (37.8 °C)  IOBRIEF  Intake/Output Summary (Last 24 hours) at 2024 1437  Last data filed at 2024 0826  Gross per 24 hour   Intake --   Output 300 ml   Net -300 ml     General:  Alert, cooperative, no acute distress    HEENT: PERRLA, anicteric sclerae.  Pulmonary:  CTA Bilaterally. No Wheezing/Rales.  Cardiovascular: Regular rate and Rhythm.  Tenderness to palpation left anterior chest  GI:  Soft, Non distended, Non tender. + Bowel sounds.  Extremities:  No edema. No calf tenderness.  No track marks appreciated in patient's upper extremities,  tenderness left shoulder, left lower extremity down to shin, mild tenderness in thoracic and lumbar spine. Rectal area observed and patient has a small residual hole with associated questionable abscess and about 3:00. It is leaking a dark and foul-smelling fluid. There is no tenderness or surrounding cellulitis.   Psych: Good insight. Not anxious or agitated.  Neurologic: Alert and oriented X 3. Moves all ext.  Medications:   Current Facility-Administered Medications   Medication Dose Route Frequency    vancomycin (VANCOCIN) 1250 mg in 250 mL IVPB  1,250 mg IntraVENous Q12H    sennosides-docusate sodium (SENOKOT-S) 8.6-50 MG tablet 1 tablet  1 tablet Oral Daily    nitroGLYCERIN (NITROSTAT) SL tablet 0.4 mg  0.4 mg SubLINGual Q5 Min PRN     MCHC 31.5 31.0 - 37.0 g/dL    RDW 14.4 11.6 - 14.5 %    Platelets 166 135 - 420 K/uL    MPV 9.7 9.2 - 11.8 FL    Nucleated RBCs 0.0 0  WBC    nRBC 0.00 0.00 - 0.01 K/uL    Neutrophils % 69 40 - 73 %    Lymphocytes % 20 (L) 21 - 52 %    Monocytes % 9 3 - 10 %    Eosinophils % 1 0 - 5 %    Basophils % 0 0 - 2 %    Immature Granulocytes % 1 (H) 0.0 - 0.5 %    Neutrophils Absolute 6.6 1.8 - 8.0 K/UL    Lymphocytes Absolute 1.9 0.9 - 3.6 K/UL    Monocytes Absolute 0.9 0.05 - 1.2 K/UL    Eosinophils Absolute 0.1 0.0 - 0.4 K/UL    Basophils Absolute 0.0 0.0 - 0.1 K/UL    Immature Granulocytes Absolute 0.1 (H) 0.00 - 0.04 K/UL    Differential Type AUTOMATED     Magnesium    Collection Time: 11/14/24  2:21 AM   Result Value Ref Range    Magnesium 2.2 1.6 - 2.6 mg/dL   Renal Function Panel    Collection Time: 11/14/24  2:21 AM   Result Value Ref Range    Sodium 137 136 - 145 mmol/L    Potassium 3.9 3.5 - 5.5 mmol/L    Chloride 107 100 - 111 mmol/L    CO2 25 21 - 32 mmol/L    Anion Gap 5 3.0 - 18 mmol/L    Glucose 99 74 - 99 mg/dL    BUN 15 7.0 - 18 MG/DL    Creatinine 0.97 0.6 - 1.3 MG/DL    BUN/Creatinine Ratio 15 12 - 20      Est, Glom Filt Rate 88 >60 ml/min/1.73m2    Calcium 8.8 8.5 - 10.1 MG/DL    Phosphorus 2.4 (L) 2.5 - 4.9 MG/DL    Albumin 2.3 (L) 3.4 - 5.0 g/dL   EKG 12 Lead    Collection Time: 11/14/24  9:43 AM   Result Value Ref Range    Ventricular Rate 63 BPM    Atrial Rate 63 BPM    P-R Interval 166 ms    QRS Duration 86 ms    Q-T Interval 380 ms    QTc Calculation (Bazett) 388 ms    P Axis 74 degrees    R Axis 4 degrees    T Axis 32 degrees    Diagnosis       Normal sinus rhythm  Voltage criteria for left ventricular hypertrophy  Possible Inferior infarct , age undetermined  Abnormal ECG  When compared with ECG of 12-NOV-2024 18:59,  Borderline criteria for Inferior infarct are now present  Nonspecific T wave abnormality no longer evident in Lateral leads  Confirmed by Talib Solis MD ([id number]) on

## 2024-11-14 NOTE — PROGRESS NOTES
Infectious Disease Progress Note        Reason: sepsis, group B streptococcus bacteremia    Current abx Prior abx   Ceftriaxone, vancomycin      Lines:       Assessment :  64-year-old man with past medical history significant for hypertension presented to [facility] on 11/12/24 with left arm numbness, chest discomfort x 1 day    Hospitalization [facility] September 2024 for infected right ankle wound/cellulitis  Status post I&D on 9/8/2024  IntraOp cultures 9/8-MRSA, heavy mixed skin dimitri, group A strep    Now with fever Tmax 102, persistent thoracic/lumbar spine pain/tenderness, group B streptococcus bacteremia, left upper chest erythema/tenderness    Clinical presentation consistent with sepsis-present on admission due to group B streptococcus bacteremia, probable   Left sternoclavicular joint septic arthritis    MRI c-spine with findings concerning for thoracic spine infection.  Improved back pain/resolved thoracic, lumbar spine tenderness on today's exam      Exact source of group B streptococcus in the blood not entirely clear.  No definitive evidence of skin/soft tissue infection at this time.- ?  Open sores versus unreported IV drug abuse.  Urine drug screen positive for opiates.  Patient denies IV drug abuse.  However girlfriend is concerned that patient may have an unreported IV drug abuse  She also reports patient had episode of binge drinking last weekend    S/p RRT this am for chest pain-left upper chest pain likely musculoskeletal secondary to suspected left SC joint infection    History of perirectal abscess-status post I&D October 2022.  Currently no signs of active infection.  Occasional minimal serous drainage from that site sent for cultures on 11/13/2024.  Cultures 11/13-pending     Recommendations:      continue ceftriaxone, discontinue vancomycin.   Obtain stat MRI thoracic/lumbar spine with/without contrast, MRI sternoclavicular joints - d/w MRI tech. Will   Repeat blood culture   Follow-up spine surgery  spent in face to face evaluation.        Disclaimer: Sections of this note are dictated utilizing voice recognition software, which may have resulted in some phonetic based errors in grammar and contents. Even though attempts were made to correct all the mistakes, some may have been missed, and remained in the body of the document. If questions arise, please contact our department.    Dr. Noni Solis, Infectious Disease Specialist  [phone]  November 14, 2024  7:55 AM

## 2024-11-14 NOTE — PROGRESS NOTES
[facility]   Pharmacy Pharmacokinetic Monitoring Service - Vancomycin    Indication: sepsis  Goal AUC/ALEC: 400-600  Day of Therapy: 2  Additional Antimicrobials: CAX    Pertinent Laboratory Values:   Wt Readings from Last 1 Encounters:   11/12/24 83.9 kg (185 lb)     Temp Readings from Last 1 Encounters:   11/14/24 100 °F (37.8 °C) (Oral)     Estimated Creatinine Clearance: 84 mL/min (based on SCr of 0.97 mg/dL).    Recent Labs     11/13/24  1524 11/14/24  0221   CREATININE 1.06 0.97   BUN 10 15   WBC 11.7 9.6     Pertinent Cultures:  Date Source Results   11/12 blood gpc   11/14 wound gpc, gnr   MRSA Nasal Swab: NA    Assessment:  Date Current Dose Level (mg/L) Timing of Level (h) AUC/ALEC   11/13 2,000 mg x1  1,250 mg q18h - - -   11/14 1,250mg q18h  750mg x1  1,250mg q12h 3.5 25    Note: Serum concentrations collected for AUC dosing may appear elevated if collected in close proximity to the dose administered, this is not necessarily an indication of toxicity    Plan:  Vancomycin 1,250mg q12h  Projected AUCss/T = 537/14.6  Ordered a level for 11/16 with AM labs  Pharmacy will continue to monitor patient and adjust therapy as indicated    Thank you for the consult,  LLYOD GOVEA RPH  11/14/2024

## 2024-11-15 ENCOUNTER — APPOINTMENT (OUTPATIENT)
Facility: HOSPITAL | Age: 64
End: 2024-11-15

## 2024-11-15 LAB
AMPHET UR QL SCN: NEGATIVE
ANION GAP SERPL CALC-SCNC: 4 MMOL/L (ref 3–18)
BACTERIA SPEC CULT: ABNORMAL
BACTERIA SPEC CULT: ABNORMAL
BARBITURATES UR QL SCN: NEGATIVE
BASOPHILS # BLD: 0 K/UL (ref 0–0.1)
BASOPHILS NFR BLD: 0 % (ref 0–2)
BENZODIAZ UR QL: NEGATIVE
BUN SERPL-MCNC: 11 MG/DL (ref 7–18)
BUN/CREAT SERPL: 13 (ref 12–20)
CALCIUM SERPL-MCNC: 8.9 MG/DL (ref 8.5–10.1)
CANNABINOIDS UR QL SCN: NEGATIVE
CHLORIDE SERPL-SCNC: 105 MMOL/L (ref 100–111)
CO2 SERPL-SCNC: 26 MMOL/L (ref 21–32)
COCAINE UR QL SCN: NEGATIVE
CREAT SERPL-MCNC: 0.86 MG/DL (ref 0.6–1.3)
DIFFERENTIAL METHOD BLD: ABNORMAL
EOSINOPHIL # BLD: 0.1 K/UL (ref 0–0.4)
EOSINOPHIL NFR BLD: 2 % (ref 0–5)
ERYTHROCYTE [DISTWIDTH] IN BLOOD BY AUTOMATED COUNT: 14.2 % (ref 11.6–14.5)
GLUCOSE SERPL-MCNC: 86 MG/DL (ref 74–99)
GRAM STN SPEC: ABNORMAL
HCT VFR BLD AUTO: 36.2 % (ref 36–48)
HGB BLD-MCNC: 11.3 G/DL (ref 13–16)
IMM GRANULOCYTES # BLD AUTO: 0 K/UL (ref 0–0.04)
IMM GRANULOCYTES NFR BLD AUTO: 0 % (ref 0–0.5)
INR PPP: 1 (ref 0.9–1.1)
LYMPHOCYTES # BLD: 1.7 K/UL (ref 0.9–3.6)
LYMPHOCYTES NFR BLD: 24 % (ref 21–52)
Lab: ABNORMAL
MAGNESIUM SERPL-MCNC: 2.2 MG/DL (ref 1.6–2.6)
MCH RBC QN AUTO: 28.3 PG (ref 24–34)
MCHC RBC AUTO-ENTMCNC: 31.2 G/DL (ref 31–37)
MCV RBC AUTO: 90.7 FL (ref 78–100)
METHADONE UR QL: NEGATIVE
MONOCYTES # BLD: 0.8 K/UL (ref 0.05–1.2)
MONOCYTES NFR BLD: 12 % (ref 3–10)
NEUTS SEG # BLD: 4.3 K/UL (ref 1.8–8)
NEUTS SEG NFR BLD: 62 % (ref 40–73)
NRBC # BLD: 0 K/UL (ref 0–0.01)
NRBC BLD-RTO: 0 PER 100 WBC
OPIATES UR QL: POSITIVE
PCP UR QL: NEGATIVE
PLATELET # BLD AUTO: 200 K/UL (ref 135–420)
PMV BLD AUTO: 9.5 FL (ref 9.2–11.8)
POTASSIUM SERPL-SCNC: 4.2 MMOL/L (ref 3.5–5.5)
PROTHROMBIN TIME: 13.8 SEC (ref 11.9–14.9)
RBC # BLD AUTO: 3.99 M/UL (ref 4.35–5.65)
SERVICE CMNT-IMP: ABNORMAL
SERVICE CMNT-IMP: ABNORMAL
SODIUM SERPL-SCNC: 135 MMOL/L (ref 136–145)
WBC # BLD AUTO: 6.9 K/UL (ref 4.6–13.2)

## 2024-11-15 PROCEDURE — 85025 COMPLETE CBC W/AUTO DIFF WBC: CPT

## 2024-11-15 PROCEDURE — 1100000003 HC PRIVATE W/ TELEMETRY

## 2024-11-15 PROCEDURE — 99253 IP/OBS CNSLTJ NEW/EST LOW 45: CPT | Performed by: PHYSICIAN ASSISTANT

## 2024-11-15 PROCEDURE — 80307 DRUG TEST PRSMV CHEM ANLYZR: CPT

## 2024-11-15 PROCEDURE — 6370000000 HC RX 637 (ALT 250 FOR IP): Performed by: STUDENT IN AN ORGANIZED HEALTH CARE EDUCATION/TRAINING PROGRAM

## 2024-11-15 PROCEDURE — 6360000004 HC RX CONTRAST MEDICATION: Performed by: INTERNAL MEDICINE

## 2024-11-15 PROCEDURE — 6360000002 HC RX W HCPCS: Performed by: INTERNAL MEDICINE

## 2024-11-15 PROCEDURE — 94761 N-INVAS EAR/PLS OXIMETRY MLT: CPT

## 2024-11-15 PROCEDURE — 36415 COLL VENOUS BLD VENIPUNCTURE: CPT

## 2024-11-15 PROCEDURE — 80048 BASIC METABOLIC PNL TOTAL CA: CPT

## 2024-11-15 PROCEDURE — 85610 PROTHROMBIN TIME: CPT

## 2024-11-15 PROCEDURE — 72158 MRI LUMBAR SPINE W/O & W/DYE: CPT

## 2024-11-15 PROCEDURE — 2580000003 HC RX 258: Performed by: INTERNAL MEDICINE

## 2024-11-15 PROCEDURE — 72157 MRI CHEST SPINE W/O & W/DYE: CPT

## 2024-11-15 PROCEDURE — 83735 ASSAY OF MAGNESIUM: CPT

## 2024-11-15 PROCEDURE — 6360000002 HC RX W HCPCS: Performed by: PHYSICIAN ASSISTANT

## 2024-11-15 PROCEDURE — A9577 INJ MULTIHANCE: HCPCS | Performed by: INTERNAL MEDICINE

## 2024-11-15 PROCEDURE — 2500000003 HC RX 250 WO HCPCS: Performed by: PHYSICIAN ASSISTANT

## 2024-11-15 RX ORDER — LORAZEPAM 2 MG/ML
2 INJECTION INTRAMUSCULAR ONCE
Status: COMPLETED | OUTPATIENT
Start: 2024-11-15 | End: 2024-11-15

## 2024-11-15 RX ORDER — ACETAMINOPHEN 325 MG/1
650 TABLET ORAL EVERY 4 HOURS PRN
Status: DISCONTINUED | OUTPATIENT
Start: 2024-11-15 | End: 2024-11-16

## 2024-11-15 RX ADMIN — THIAMINE HYDROCHLORIDE 200 MG: 100 INJECTION, SOLUTION INTRAMUSCULAR; INTRAVENOUS at 09:59

## 2024-11-15 RX ADMIN — OXYCODONE 10 MG: 5 TABLET ORAL at 05:36

## 2024-11-15 RX ADMIN — HYDROMORPHONE HYDROCHLORIDE 1 MG: 1 INJECTION, SOLUTION INTRAMUSCULAR; INTRAVENOUS; SUBCUTANEOUS at 22:44

## 2024-11-15 RX ADMIN — AMLODIPINE BESYLATE 10 MG: 10 TABLET ORAL at 09:59

## 2024-11-15 RX ADMIN — FOLIC ACID 1 MG: 5 INJECTION, SOLUTION INTRAMUSCULAR; INTRAVENOUS; SUBCUTANEOUS at 10:56

## 2024-11-15 RX ADMIN — WATER 2000 MG: 1 INJECTION INTRAMUSCULAR; INTRAVENOUS; SUBCUTANEOUS at 16:22

## 2024-11-15 RX ADMIN — GADOBENATE DIMEGLUMINE 16 ML: 529 INJECTION, SOLUTION INTRAVENOUS at 17:29

## 2024-11-15 RX ADMIN — WATER 2000 MG: 1 INJECTION INTRAMUSCULAR; INTRAVENOUS; SUBCUTANEOUS at 05:29

## 2024-11-15 RX ADMIN — HYDROMORPHONE HYDROCHLORIDE 1 MG: 1 INJECTION, SOLUTION INTRAMUSCULAR; INTRAVENOUS; SUBCUTANEOUS at 16:25

## 2024-11-15 RX ADMIN — ACETAMINOPHEN 325MG 650 MG: 325 TABLET ORAL at 09:59

## 2024-11-15 RX ADMIN — OXYCODONE 10 MG: 5 TABLET ORAL at 10:04

## 2024-11-15 RX ADMIN — LORAZEPAM 2 MG: 2 INJECTION INTRAMUSCULAR; INTRAVENOUS at 16:27

## 2024-11-15 ASSESSMENT — PAIN SCALES - GENERAL
PAINLEVEL_OUTOF10: 0
PAINLEVEL_OUTOF10: 10
PAINLEVEL_OUTOF10: 9
PAINLEVEL_OUTOF10: 8
PAINLEVEL_OUTOF10: 10
PAINLEVEL_OUTOF10: 5
PAINLEVEL_OUTOF10: 0
PAINLEVEL_OUTOF10: 10

## 2024-11-15 ASSESSMENT — PAIN DESCRIPTION - FREQUENCY
FREQUENCY: CONTINUOUS
FREQUENCY: INTERMITTENT
FREQUENCY: CONTINUOUS

## 2024-11-15 ASSESSMENT — PAIN DESCRIPTION - DESCRIPTORS
DESCRIPTORS: ACHING
DESCRIPTORS: TENDER;SORE

## 2024-11-15 ASSESSMENT — PAIN DESCRIPTION - PAIN TYPE
TYPE: ACUTE PAIN
TYPE: ACUTE PAIN
TYPE: CHRONIC PAIN

## 2024-11-15 ASSESSMENT — PAIN DESCRIPTION - ONSET
ONSET: ON-GOING
ONSET: GRADUAL
ONSET: ON-GOING

## 2024-11-15 ASSESSMENT — PAIN DESCRIPTION - ORIENTATION
ORIENTATION: LEFT;RIGHT;MID
ORIENTATION: LEFT
ORIENTATION: LEFT

## 2024-11-15 ASSESSMENT — PAIN DESCRIPTION - LOCATION
LOCATION: CHEST;BACK
LOCATION: CHEST;BACK
LOCATION: CHEST
LOCATION: CHEST;BACK

## 2024-11-15 ASSESSMENT — PAIN - FUNCTIONAL ASSESSMENT
PAIN_FUNCTIONAL_ASSESSMENT: PREVENTS OR INTERFERES SOME ACTIVE ACTIVITIES AND ADLS
PAIN_FUNCTIONAL_ASSESSMENT: ACTIVITIES ARE NOT PREVENTED

## 2024-11-15 NOTE — CARE COORDINATION
11/15/24 0835   Service Assessment   Patient Orientation Alert and Oriented   Cognition Alert   History Provided By Patient   Primary Caregiver Self   Accompanied By/Relationship no one at bedside   Support Systems Children   Patient's Healthcare Decision Maker is: Patient Declined (Legal Next of Kin Remains as Decision Maker)   PCP Verified by CM Yes  (Needs PCP)   Prior Functional Level Independent in ADLs/IADLs   Current Functional Level Independent in ADLs/IADLs   Can patient return to prior living arrangement Yes   Ability to make needs known: Good   Family able to assist with home care needs: Yes   Would you like for me to discuss the discharge plan with any other family members/significant others, and if so, who? Yes  (Shannon Chambers- daughter)   Financial Resources None   Community Resources None   CM/SW Referral No PCP   Social/Functional History   Lives With Daughter   Type of Home House   Home Layout One level   Home Access Stairs to enter with rails   Entrance Stairs - Number of Steps 4   Entrance Stairs - Rails Both   Bathroom Shower/Tub Tub/Shower unit   Bathroom Toilet Standard   Bathroom Equipment Grab bars in shower   Bathroom Accessibility Accessible   Home Equipment None   Receives Help From Family   ADL Assistance Independent   Homemaking Assistance Independent   Homemaking Responsibilities Yes   Ambulation Assistance Independent   Transfer Assistance Independent   Active  No   Patient's  Info Daughter   Mode of Transportation Car   Education   (not applicable)   Occupation Full time employment   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 No   Potential Assistance Purchasing Medications Yes   Type of Home Care Services None   Patient expects to be discharged to: House   Follow Up Appointment: Best Day/Time  Monday

## 2024-11-15 NOTE — PROGRESS NOTES
[facility]  Progress Note    Patient: Dora Frazier Age: 64 y.o. : 1960 MR#: [id number] SSN: [id number]  Date/Time: 11/15/2024     Subjective:   Patient seen and examined.  Reports improvement in chest pain 8 out of 10 severity.  He denies abdominal pain, shortness of breath, nausea, headaches, dizziness or emesis, numbness or tingling.      Discussed need for MRI of spine, thoracic surgery evaluate and possible concern for abscess.  Questions were answered the best of my ability    Assessment/Plan:   Group B STREP bacteremia   Ceftriaxone- ID following and aiding in ABX management, appreciate assistance.  Repeat blood cultures no growth to date.  Thoracic and lumbar spine MRI pending, cervical spine w/ concern for phlegmon at upper thoracic spine level  -MRI chest with possible left SC joint septic joint.  Thoracic evaluation appreciated, plan to consult IR for joint aspiration.  Appreciate subspecialty care.   -Patient unable to complete both MRIs today will obtain thoracic MRI and proceed with lumbar MRI at a later date suspect will likely need sedation as patient was given 3 mg of Ativan and was still unable to sit still during MRI.  -Suspect chest pain is musculoskeletal secondary to suspected abscess.  Clinical course not consistent with acute coronary syndrome.  Normal troponin, no changes on EKG.  -Orthopedic surgery following.  Consider HIV testing should patient have spinal abscess  Pain management  Hx of Abscesses req I&D in the past- jenifer rectal and ankle  Wound culture of rectal abscess sent  HTN  Blood pressure medication added  Alcohol use  Thiamine, folic acid  Does not appear to be in withdrawal at this time.  Nursing to contact hospitalist if CIWA protocol needs to be applied.  Tobacco abuse   Nicotine patch          Dispo plan: Home with home health care once medically stable, anticipated discharge date 24    I spent 35 minutes with the

## 2024-11-15 NOTE — CONSULTS
Interventional Radiology    Consulted for a left sternoclavicular joint aspiration by CTS.    There is questionable signal abnormality more so on the left than right sternoclavicular joint. Limited evaluation given lack of IV contrast and motion artifact. Questionable abscess of left SC joint.    CT guided left sternoclavicular joint aspiration will be planned for 11/18/24 as schedule allows with moderate sedation.    NPO at MN prior to procedure for moderate sedation.    Thank you,  Vanessa Sosa, PA  [phone]

## 2024-11-15 NOTE — PLAN OF CARE
Problem: Discharge Planning  Goal: Discharge to home or other facility with appropriate resources  Outcome: Progressing  Flowsheets (Taken 11/13/2024 2000 by Karina Sanchez, RN)  Discharge to home or other facility with appropriate resources: Identify barriers to discharge with patient and caregiver  Note: Continue IV antibiotic, MRI need to be done      Problem: Pain  Goal: Verbalizes/displays adequate comfort level or baseline comfort level  Outcome: Progressing  Flowsheets (Taken 11/14/2024 2057 by Carlito Pitts, RN)  Verbalizes/displays adequate comfort level or baseline comfort level:   Encourage patient to monitor pain and request assistance   Assess pain using appropriate pain scale   Administer analgesics based on type and severity of pain and evaluate response  Note: Patient educated on pain medication availability per MAR for pain management. Pt verbalized understanding and denies pain.  Pt tolerating pain with prn pain medication.       Problem: Safety - Adult  Goal: Free from fall injury  Outcome: Progressing  Flowsheets (Taken 11/14/2024 2057 by Carlito Pitts, RN)  Free From Fall Injury:   Based on caregiver fall risk screen, instruct family/caregiver to ask for assistance with transferring infant if caregiver noted to have fall risk factors   Instruct family/caregiver on patient safety  Note: Educate patient to used call bell

## 2024-11-15 NOTE — PROGRESS NOTES
Vss afeb  Limited mri done of chest - concerning findings of left sc joint.  Motion degraded.  Limited cuts of upper t spine- not commented on by radiology, but no evident epidural abscess by my reading.  Still awaiting MRI T and LS spine as confirmatory studies for no surgical issue in spine.  These need to be completed.  Patient requires studies.  As patient having pain, these appear to require anesthesia or heavy sedation.  Studies done thus far do not allow determination of presence or absence of significant spinal infection.  WBC is improving.afebrile, though pain regularly 10/10

## 2024-11-15 NOTE — PLAN OF CARE
Problem: Pain  Goal: Verbalizes/displays adequate comfort level or baseline comfort level  11/14/2024 2057 by Carlito Pitts RN  Flowsheets (Taken 11/14/2024 2057)  Verbalizes/displays adequate comfort level or baseline comfort level:   Encourage patient to monitor pain and request assistance   Assess pain using appropriate pain scale   Administer analgesics based on type and severity of pain and evaluate response  Note: Pt. Educated on pain medication availability for pain management.  Pt. Verbalized understanding.   11/14/2024 1443 by Elvia Zavala RN  Outcome: Progressing  Note: Free of chest pain- Patient Goal. Admin x3 NTG SL and Dilaudid IVP & Roxicodone PO as per prn for pain controll.     Problem: Safety - Adult  Goal: Free from fall injury  11/14/2024 2057 by Carlito Pitts RN  Outcome: Progressing  Flowsheets (Taken 11/14/2024 2057)  Free From Fall Injury:   Based on caregiver fall risk screen, instruct family/caregiver to ask for assistance with transferring infant if caregiver noted to have fall risk factors   Instruct family/caregiver on patient safety  Note: Pt. Educated on call bell when in need of help and assistance.  Pt. Verbalized understanding.   11/14/2024 1443 by Elvia Zavala RN  Outcome: Progressing  Note: Educate pt to use call light when getting OOB. Reinforce pt teaching r/t having assistance available when OOB to use the bathroom     Problem: Skin/Tissue Integrity  Goal: Absence of new skin breakdown  Description: 1.  Monitor for areas of redness and/or skin breakdown  2.  Assess vascular access sites hourly  3.  Every 4-6 hours minimum:  Change oxygen saturation probe site  4.  Every 4-6 hours:  If on nasal continuous positive airway pressure, respiratory therapy assess nares and determine need for appliance change or resting period.  11/14/2024 2057 by Carlito Pitts RN  Outcome: Progressing  Note: Pt. Skin is clean dry and intact.     11/14/2024 1443  by Elvia Zavala, RN  Outcome: Progressing  Note: Assess pt's skin. Abscess noted of buttocks will dress and consult wound care if needed.     Problem: Pain  Goal: Verbalizes/displays adequate comfort level or baseline comfort level  11/14/2024 2057 by Carlito Pitts RN  Flowsheets (Taken 11/14/2024 2057)  Verbalizes/displays adequate comfort level or baseline comfort level:   Encourage patient to monitor pain and request assistance   Assess pain using appropriate pain scale   Administer analgesics based on type and severity of pain and evaluate response  Note: Pt. Educated on pain medication availability for pain management.  Pt. Verbalized understanding.   11/14/2024 1443 by Elvia Zavala, RN  Outcome: Progressing  Note: Free of chest pain- Patient Goal. Admin x3 NTG SL and Dilaudid IVP & Roxicodone PO as per prn for pain controll.

## 2024-11-15 NOTE — CONSULTS
[facility]  -  Consult  11/15/2024    Dora Frazier is a 64 y.o. male who is being seen on consult for SC Joint infection, at  Dr. Jimenez's request.    Assessment:   Bacteremia  Sternoclavicular joint infection    Patient Active Problem List    Diagnosis Date Noted    Bacteremia due to group B Streptococcus 11/13/2024    Hypomagnesemia 11/13/2024    Hypophosphatemia 11/13/2024    Open wound of foot excluding toes 09/16/2024    Abscess of right foot 09/10/2024    Cellulitis 09/07/2024    Infected wound 09/07/2024    Cellulitis of right foot without toes 09/07/2024    Wound dehiscence 09/07/2024    Essential hypertension 09/07/2024    Cellulitis and abscess of buttock 10/01/2022    Hyponatremia 10/01/2022    Consuelo-rectal abscess 10/01/2022    Sepsis (HCC) 10/01/2022    HECTOR (acute kidney injury) (HCC) 10/01/2022    Elevated LFTs 10/01/2022       Plan:   IR consult for joint aspiration  Will follow clinically to see if he responds to ABX or needs open I&D of SC joint  Agree to the above assessment and plan   Subjective:     Chief Complaint   Patient presents with    Chest Pain    Chills     History of Present Illness:   Dora Frazier is a 64 y.o. male with history of recent ankle infection who presented with bacteremia and shoulder pain  For three days. He presented to ED for same and was found to have bactereamia  Past Medical History:     Past Medical History:   Diagnosis Date    Primary hypertension        Past Surgical History:     Past Surgical History:   Procedure Laterality Date    BACK SURGERY      FOOT DEBRIDEMENT Right 9/8/2024    RIGHT ANKLE WOUND  INCISION AND DRAINAGE performed by Redd Bowser DPM at [facility] MAIN OR       Social History:   He  reports that he has been smoking cigarettes. He started smoking about 2 years ago. He has a 2.2 pack-year smoking history. He does not have any smokeless tobacco history on file.  He  has no history on file for alcohol use.    Family

## 2024-11-15 NOTE — PROGRESS NOTES
Infectious Disease Progress Note        Reason: sepsis, group B streptococcus bacteremia    Current abx Prior abx   Ceftriaxone, vancomycin      Lines:       Assessment :  64-year-old man with past medical history significant for hypertension presented to [facility] on 11/12/24 with left arm numbness, chest discomfort x 1 day    Hospitalization [facility] September 2024 for infected right ankle wound/cellulitis  Status post I&D on 9/8/2024  IntraOp cultures 9/8-MRSA, heavy mixed skin dimitri, group A strep    Now with fever Tmax 102, persistent thoracic/lumbar spine pain/tenderness, group B streptococcus bacteremia, left upper chest erythema/tenderness    Clinical presentation consistent with sepsis-present on admission due to group B streptococcus bacteremia, probable   Left sternoclavicular joint septic arthritis    MRI c-spine with findings concerning for thoracic spine infection.  Improved back pain/resolved thoracic, lumbar spine tenderness on today's exam      Exact source of group B streptococcus in the blood not entirely clear.  No definitive evidence of skin/soft tissue infection at this time.- ?  Open sores versus unreported IV drug abuse.  Urine drug screen positive for opiates.  Patient denies IV drug abuse.  However girlfriend is concerned that patient may have an unreported IV drug abuse  She also reports patient had episode of binge drinking last weekend    Left SC joint septic arthritis: MRI findings 11/14 confirms clinical suspicion of left SC joint septic arthritis.  No worsening pain/erythema noted on today's exam.  Thoracic surgery consulted    History of perirectal abscess-status post I&D October 2022.  Currently no signs of active infection.  Occasional minimal serous drainage from that site sent for cultures on 11/13/2024.  Cultures 11/13-diphtheroid, group B streptococcus likely skin dimitri.     Spine surgery follow-up appreciated.  Unable to obtain MRI thoracic/lumbar spine on 11/14 since patient unable to  IntraVENous Q12H    amLODIPine (NORVASC) tablet 10 mg  10 mg Oral Daily    hydrALAZINE (APRESOLINE) injection 10 mg  10 mg IntraVENous Q6H PRN       Allergies: Patient has no known allergies.    Family History   Problem Relation Age of Onset    Heart Attack Mother     Coronary Art Dis Mother      Social History     Socioeconomic History    Marital status: Single     Spouse name: Not on file    Number of children: Not on file    Years of education: Not on file    Highest education level: Not on file   Occupational History    Not on file   Tobacco Use    Smoking status: Every Day     Current packs/day: 1.00     Average packs/day: 1 pack/day for 2.2 years (2.2 ttl pk-yrs)     Types: Cigarettes     Start date: 9/7/2022    Smokeless tobacco: Not on file   Vaping Use    Vaping status: Every Day    Substances: Nicotine    Devices: Disposable   Substance and Sexual Activity    Alcohol use: Not on file    Drug use: Not on file    Sexual activity: Not on file   Other Topics Concern    Not on file   Social History Narrative    Not on file     Social Determinants of Health     Financial Resource Strain: Not on file   Food Insecurity: No Food Insecurity (11/13/2024)    Hunger Vital Sign     Worried About Running Out of Food in the Last Year: Never true     Ran Out of Food in the Last Year: Never true   Transportation Needs: No Transportation Needs (11/13/2024)    PRAPARE - Transportation     Lack of Transportation (Medical): No     Lack of Transportation (Non-Medical): No   Physical Activity: Not on file   Stress: Not on file   Social Connections: Not on file   Intimate Partner Violence: Not on file   Housing Stability: Low Risk  (11/13/2024)    Housing Stability Vital Sign     Unable to Pay for Housing in the Last Year: No     Number of Times Moved in the Last Year: 1     Homeless in the Last Year: No     Social History     Tobacco Use   Smoking Status Every Day    Current packs/day: 1.00    Average packs/day: 1 pack/day for

## 2024-11-16 LAB
ANION GAP SERPL CALC-SCNC: 6 MMOL/L (ref 3–18)
BACTERIA SPEC CULT: ABNORMAL
BASOPHILS # BLD: 0 K/UL (ref 0–0.1)
BASOPHILS NFR BLD: 0 % (ref 0–2)
BUN SERPL-MCNC: 9 MG/DL (ref 7–18)
BUN/CREAT SERPL: 11 (ref 12–20)
CALCIUM SERPL-MCNC: 9.3 MG/DL (ref 8.5–10.1)
CHLORIDE SERPL-SCNC: 101 MMOL/L (ref 100–111)
CO2 SERPL-SCNC: 26 MMOL/L (ref 21–32)
CREAT SERPL-MCNC: 0.85 MG/DL (ref 0.6–1.3)
DIFFERENTIAL METHOD BLD: ABNORMAL
EOSINOPHIL # BLD: 0.1 K/UL (ref 0–0.4)
EOSINOPHIL NFR BLD: 2 % (ref 0–5)
ERYTHROCYTE [DISTWIDTH] IN BLOOD BY AUTOMATED COUNT: 13.8 % (ref 11.6–14.5)
GLUCOSE SERPL-MCNC: 91 MG/DL (ref 74–99)
GRAM STN SPEC: ABNORMAL
HCT VFR BLD AUTO: 38.7 % (ref 36–48)
HGB BLD-MCNC: 12.2 G/DL (ref 13–16)
IMM GRANULOCYTES # BLD AUTO: 0 K/UL (ref 0–0.04)
IMM GRANULOCYTES NFR BLD AUTO: 0 % (ref 0–0.5)
LYMPHOCYTES # BLD: 1.9 K/UL (ref 0.9–3.6)
LYMPHOCYTES NFR BLD: 28 % (ref 21–52)
MCH RBC QN AUTO: 28.6 PG (ref 24–34)
MCHC RBC AUTO-ENTMCNC: 31.5 G/DL (ref 31–37)
MCV RBC AUTO: 90.6 FL (ref 78–100)
MONOCYTES # BLD: 0.9 K/UL (ref 0.05–1.2)
MONOCYTES NFR BLD: 13 % (ref 3–10)
NEUTS SEG # BLD: 3.7 K/UL (ref 1.8–8)
NEUTS SEG NFR BLD: 56 % (ref 40–73)
NRBC # BLD: 0 K/UL (ref 0–0.01)
NRBC BLD-RTO: 0 PER 100 WBC
PLATELET # BLD AUTO: 241 K/UL (ref 135–420)
PMV BLD AUTO: 8.9 FL (ref 9.2–11.8)
POTASSIUM SERPL-SCNC: 4.5 MMOL/L (ref 3.5–5.5)
RBC # BLD AUTO: 4.27 M/UL (ref 4.35–5.65)
SERVICE CMNT-IMP: ABNORMAL
SODIUM SERPL-SCNC: 133 MMOL/L (ref 136–145)
WBC # BLD AUTO: 6.5 K/UL (ref 4.6–13.2)

## 2024-11-16 PROCEDURE — 99233 SBSQ HOSP IP/OBS HIGH 50: CPT | Performed by: STUDENT IN AN ORGANIZED HEALTH CARE EDUCATION/TRAINING PROGRAM

## 2024-11-16 PROCEDURE — 36415 COLL VENOUS BLD VENIPUNCTURE: CPT

## 2024-11-16 PROCEDURE — 1100000003 HC PRIVATE W/ TELEMETRY

## 2024-11-16 PROCEDURE — 6360000002 HC RX W HCPCS: Performed by: INTERNAL MEDICINE

## 2024-11-16 PROCEDURE — 6370000000 HC RX 637 (ALT 250 FOR IP): Performed by: STUDENT IN AN ORGANIZED HEALTH CARE EDUCATION/TRAINING PROGRAM

## 2024-11-16 PROCEDURE — 85025 COMPLETE CBC W/AUTO DIFF WBC: CPT

## 2024-11-16 PROCEDURE — 94761 N-INVAS EAR/PLS OXIMETRY MLT: CPT

## 2024-11-16 PROCEDURE — 80048 BASIC METABOLIC PNL TOTAL CA: CPT

## 2024-11-16 PROCEDURE — 2580000003 HC RX 258: Performed by: INTERNAL MEDICINE

## 2024-11-16 PROCEDURE — 6360000002 HC RX W HCPCS: Performed by: PHYSICIAN ASSISTANT

## 2024-11-16 PROCEDURE — 2500000003 HC RX 250 WO HCPCS: Performed by: PHYSICIAN ASSISTANT

## 2024-11-16 RX ORDER — POLYETHYLENE GLYCOL 3350 17 G/17G
17 POWDER, FOR SOLUTION ORAL DAILY PRN
Status: DISCONTINUED | OUTPATIENT
Start: 2024-11-16 | End: 2024-11-22 | Stop reason: HOSPADM

## 2024-11-16 RX ORDER — ACETAMINOPHEN 325 MG/1
650 TABLET ORAL EVERY 4 HOURS PRN
Status: DISCONTINUED | OUTPATIENT
Start: 2024-11-16 | End: 2024-11-17

## 2024-11-16 RX ORDER — HYDROMORPHONE HYDROCHLORIDE 1 MG/ML
1 INJECTION, SOLUTION INTRAMUSCULAR; INTRAVENOUS; SUBCUTANEOUS EVERY 4 HOURS PRN
Status: DISCONTINUED | OUTPATIENT
Start: 2024-11-16 | End: 2024-11-22 | Stop reason: HOSPADM

## 2024-11-16 RX ORDER — OXYCODONE HYDROCHLORIDE 5 MG/1
10 TABLET ORAL EVERY 4 HOURS PRN
Status: DISCONTINUED | OUTPATIENT
Start: 2024-11-16 | End: 2024-11-22 | Stop reason: HOSPADM

## 2024-11-16 RX ADMIN — SENNOSIDES AND DOCUSATE SODIUM 1 TABLET: 50; 8.6 TABLET ORAL at 08:42

## 2024-11-16 RX ADMIN — OXYCODONE 10 MG: 5 TABLET ORAL at 19:55

## 2024-11-16 RX ADMIN — ACETAMINOPHEN 325MG 650 MG: 325 TABLET ORAL at 17:58

## 2024-11-16 RX ADMIN — HYDROMORPHONE HYDROCHLORIDE 1 MG: 1 INJECTION, SOLUTION INTRAMUSCULAR; INTRAVENOUS; SUBCUTANEOUS at 13:42

## 2024-11-16 RX ADMIN — WATER 2000 MG: 1 INJECTION INTRAMUSCULAR; INTRAVENOUS; SUBCUTANEOUS at 16:32

## 2024-11-16 RX ADMIN — HYDROMORPHONE HYDROCHLORIDE 1 MG: 1 INJECTION, SOLUTION INTRAMUSCULAR; INTRAVENOUS; SUBCUTANEOUS at 08:42

## 2024-11-16 RX ADMIN — AMLODIPINE BESYLATE 10 MG: 10 TABLET ORAL at 08:42

## 2024-11-16 RX ADMIN — HYDROMORPHONE HYDROCHLORIDE 1 MG: 1 INJECTION, SOLUTION INTRAMUSCULAR; INTRAVENOUS; SUBCUTANEOUS at 04:36

## 2024-11-16 RX ADMIN — THIAMINE HYDROCHLORIDE 200 MG: 100 INJECTION, SOLUTION INTRAMUSCULAR; INTRAVENOUS at 08:42

## 2024-11-16 RX ADMIN — FOLIC ACID 1 MG: 5 INJECTION, SOLUTION INTRAMUSCULAR; INTRAVENOUS; SUBCUTANEOUS at 12:08

## 2024-11-16 RX ADMIN — WATER 2000 MG: 1 INJECTION INTRAMUSCULAR; INTRAVENOUS; SUBCUTANEOUS at 04:36

## 2024-11-16 ASSESSMENT — PAIN DESCRIPTION - PAIN TYPE
TYPE: ACUTE PAIN

## 2024-11-16 ASSESSMENT — PAIN DESCRIPTION - LOCATION
LOCATION: CHEST
LOCATION: CHEST;SHOULDER;ARM
LOCATION: CHEST

## 2024-11-16 ASSESSMENT — PAIN DESCRIPTION - ORIENTATION
ORIENTATION: LEFT;ANTERIOR
ORIENTATION: LEFT;ANTERIOR;POSTERIOR
ORIENTATION: LEFT;ANTERIOR;POSTERIOR

## 2024-11-16 ASSESSMENT — PAIN SCALES - GENERAL
PAINLEVEL_OUTOF10: 0
PAINLEVEL_OUTOF10: 10
PAINLEVEL_OUTOF10: 6
PAINLEVEL_OUTOF10: 10
PAINLEVEL_OUTOF10: 6
PAINLEVEL_OUTOF10: 9
PAINLEVEL_OUTOF10: 6
PAINLEVEL_OUTOF10: 0
PAINLEVEL_OUTOF10: 10
PAINLEVEL_OUTOF10: 7
PAINLEVEL_OUTOF10: 6

## 2024-11-16 ASSESSMENT — PAIN DESCRIPTION - DIRECTION: RADIATING_TOWARDS: SHOULDER

## 2024-11-16 ASSESSMENT — PAIN - FUNCTIONAL ASSESSMENT
PAIN_FUNCTIONAL_ASSESSMENT: PREVENTS OR INTERFERES SOME ACTIVE ACTIVITIES AND ADLS
PAIN_FUNCTIONAL_ASSESSMENT: ACTIVITIES ARE NOT PREVENTED
PAIN_FUNCTIONAL_ASSESSMENT: PREVENTS OR INTERFERES SOME ACTIVE ACTIVITIES AND ADLS
PAIN_FUNCTIONAL_ASSESSMENT: PREVENTS OR INTERFERES SOME ACTIVE ACTIVITIES AND ADLS

## 2024-11-16 ASSESSMENT — PAIN DESCRIPTION - DESCRIPTORS
DESCRIPTORS: SHARP;ACHING
DESCRIPTORS: SHARP
DESCRIPTORS: ACHING
DESCRIPTORS: ACHING;SHARP

## 2024-11-16 ASSESSMENT — PAIN DESCRIPTION - FREQUENCY
FREQUENCY: CONTINUOUS

## 2024-11-16 ASSESSMENT — PAIN DESCRIPTION - ONSET
ONSET: ON-GOING

## 2024-11-16 NOTE — PROGRESS NOTES
[facility]  Progress Note    Patient: Dora Frazier Age: 64 y.o. : 1960 MR#: [id number] SSN: [id number]  Date/Time: 2024     Subjective:   Patient seen and examined.  Overnight events reviewed.  Discussed importance of being hospitalized for further treatment, patient reported that he wanted to go to [employer] [facility] to have some M&Ms. Reports improvement in chest pain  He denies abdominal pain, shortness of breath, nausea, headaches, dizziness or emesis, numbness or tingling.   Discussed MRI results, procedure on Monday for SC joint aspiration.  Questions were answered the best of my ability.    Assessment/Plan:   Group B STREP bacteremia   High-dose ceftriaxone every 12h- ID following and aiding in ABX management, appreciate assistance.  Repeat blood cultures no growth to date.  MRI thoracic spine with evidence of phlegmon T1-T4. Orthopedic surgery following.  Continue medical management for now suspect patient may need surgery if worsening symptoms/clinical deterioration.  Appreciate orthopedic assistance.  -Neurochecks every 4h.  MRI lumbar spine without abscess, however with severe narrowing at multiple vertebral levels will need to follow-up outpatient with spine surgery.   -MRI chest with possible left SC joint septic joint.  Thoracic evaluation appreciated, plan for  IR for joint aspiration on .  N.p.o. midnight . appreciate subspecialty care.   -Suspect chest pain is musculoskeletal secondary to suspected abscess.  Clinical course not consistent with acute coronary syndrome.  Normal troponin, no changes on EKG.  Pain management  Hx of Abscesses req I&D in the past- jenifer rectal and ankle  Wound culture of rectal abscess sent  HTN  Blood pressure medication added  Alcohol use  Thiamine, folic acid  Does not appear to be in withdrawal at this time.  Nursing to contact hospitalist if CIWA protocol needs to be applied.  Tobacco abuse   Nicotine

## 2024-11-16 NOTE — CONSULTS
Consult    Patient: Dora Frazier MRN: [id number]  SSN: [id number]    YOB: 1960  Age: 64 y.o.  Sex: male      Subjective:      Dora Frazier is a 64 y.o. male who is being seen for sepsis possible spinal infection.  Patient presented on Wednesday.  He has a long history of infections.  Was having thoracic back pain sense of weakness and numbness.  Initial studies done on Wednesday demonstrated no infection in the cervical spine but concerns about the thoracic spine for variety of reasons no further MRIs were done of the patient's spine on Wednesday or Thursday and finally a thoracic and lumbar MRI was done yesterday.  In this period of time the patient has resolved and his fever pain has improved somewhat his white count has normalized.  He also has resolved the sense of weakness in his legs and is up and ambulatory to the bathroom.  He denies any bowel or bladder dysfunction.  He has pain around his chest wall on the left related to evident he may have infected sternoclavicular joint.  MRI done of his thoracic spine demonstrates what appears to be a phlegmon at approximately T2.  No significant cord compression.    Patient's physical exam demonstrates normal strength of the deltoids biceps triceps and intrinsics no clonus Mirza's Babinski normal reflexes patellar and Achilles no sensory deficit..    Past Medical History:   Diagnosis Date    Primary hypertension      Past Surgical History:   Procedure Laterality Date    BACK SURGERY      FOOT DEBRIDEMENT Right 9/8/2024    RIGHT ANKLE WOUND  INCISION AND DRAINAGE performed by Redd Bowser DPM at [facility] MAIN OR      Family History   Problem Relation Age of Onset    Heart Attack Mother     Coronary Art Dis Mother      Social History     Tobacco Use    Smoking status: Every Day     Current packs/day: 1.00     Average packs/day: 1 pack/day for 2.2 years (2.2 ttl pk-yrs)     Types: Cigarettes     Start date: 9/7/2022    Smokeless tobacco: Not on    BP:  (!) 151/82  133/67   Pulse: 60 88  63   Resp:  17 18 18   Temp:  99.1 °F (37.3 °C)  98 °F (36.7 °C)   TempSrc:  Oral  Oral   SpO2:  98%  97%   Weight:       Height:            Physical Exam:  Normal motor and sensory exam  No reflex changes of complaints of pain somewhat improved in his thoracic spine and chest wall    MRI with phlegmon at T2 as well as probable septic sternoclavicular joint.  Assessment:       There is a patient with bacteremia sepsis appears to have suffered infection involving the epidural space at T2 there hence of septic arthropathy in the upper thoracic spine as well.    Patient has been treated with powerful antibiotics and has normalized his vitals is now afebrile.  He has resolved what ever sense of neurologic dysfunction he had.  He has normal strength sensation and reflexes he is now ambulatory independently to the bathroom.    Patient's labs demonstrated a normalized white blood count.        Plan:     Generally my recommendation for an epidural infection would be emergent surgery.  Luckily in this patient's symptoms have generally improved or resolved over the past 48 to 72 hours.  Antibiotic treatment appears to be working.  His pain is improving his neurology has resolved his fever and white count have resolved.    My recommendation at this point would be observation on high-dose antibiotics.  Should the patient have worsening symptoms I would take him to the OR for thoracic laminectomy and debridement.  Discussing the situation with the patient he very much wishes to avoid surgery.  I have explained to him that if it comes to surgery it will be because of progressive neurology and what appears to be an progressive infection.    We will continue antibiotic treatment as per infectious disease will observe him.  I have stopped steroids which I do not believe play a role and may be detrimental to him resolving his infection.    Signed By: LLOYD BALLARD MD     November 16, 2024

## 2024-11-16 NOTE — PLAN OF CARE
Problem: Discharge Planning  Goal: Discharge to home or other facility with appropriate resources  Description: Patient waiting to get procedure done Monday.   Outcome: Progressing     Problem: Pain  Goal: Verbalizes/displays adequate comfort level or baseline comfort level  Description: Pain improved from 10/10 to 7/10 with IV Dilaudid and Lidocaine patch this AM.  Outcome: Progressing     Problem: Safety - Adult  Goal: Free from fall injury  Description: Fall precautions. Reorient to situation as needed.    Outcome: Progressing     Problem: Cardiovascular - Adult  Goal: Maintains optimal cardiac output and hemodynamic stability  Description: Monitor blood pressure and heart rate  Outcome: Progressing  Goal: Absence of cardiac dysrhythmias or at baseline  Description: Monitor telemetry and assess for rhythm changes  Outcome: Progressing     Problem: Skin/Tissue Integrity - Adult  Goal: Incisions, wounds, or drain sites healing without S/S of infection  Description: Monitor abscess for increased redness, swelling, or drainage  Outcome: Progressing     Problem: Musculoskeletal - Adult  Goal: Maintain proper alignment of affected body part  Description: Assist with ADLs as needed for R shoulder pain.  Administer meds for pain   Outcome: Progressing     Problem: Infection - Adult  Goal: Absence of infection at discharge  Description: Administer IV antibiotics, monitor for fever, monitor WBC count  Outcome: Progressing     Problem: Skin/Tissue Integrity  Goal: Absence of new skin breakdown  Description: Monitor for areas of redness and/or skin breakdown      11/16/2024 1043 by Sivan Avalos, RN  Outcome: Progressing  11/16/2024 1038 by Sivan Avalos, RN  Outcome: Progressing

## 2024-11-16 NOTE — PROGRESS NOTES
4 Eyes Skin Assessment     NAME:  Dora Frazier  YOB: 1960  MEDICAL RECORD NUMBER:  [id number]    The patient is being assessed for  Shift Handoff    I agree that at least one RN has performed a thorough Head to Toe Skin Assessment on the patient. ALL assessment sites listed below have been assessed.      Areas assessed by both nurses:    Head, Face, Ears, Shoulders, Back, Chest, Arms, Elbows, Hands, Sacrum. Buttock, Coccyx, Ischium, Legs. Feet and Heels, and Under Medical Devices         Does the Patient have a Wound? No noted wound(s)       Hemant Prevention initiated by RN: No  Wound Care Orders initiated by RN: No    Pressure Injury (Stage 3,4, Unstageable, DTI, NWPT, and Complex wounds) if present, place Wound referral order by RN under : Yes    New Ostomies, if present place, Ostomy referral order under : No     Nurse 1 eSignature: Electronically signed by Chris Lion RN on 11/15/24 at 8:00 PM EST    **SHARE this note so that the co-signing nurse can place an eSignature**    Nurse 2 eSignature: {Esignature:[id number]}

## 2024-11-16 NOTE — PROGRESS NOTES
Interim events noted.   Afebrile. Hemodynamically stable   Blood cx 11/14 negative at 2 days   MRI thoracic spine with evidence of phlegmon T1-T4.   Recommend to continue high dose ceftriaxone.   Await spine surgery recommendations   D/w Dr Jimenez. Please call me if any new questions or concerns. Thanks

## 2024-11-16 NOTE — PROGRESS NOTES
4 Eyes Skin Assessment     NAME:  Dora Frazier  YOB: 1960  MEDICAL RECORD NUMBER:  [id number]    The patient is being assessed for  Transfer to New Unit    I agree that at least one RN has performed a thorough Head to Toe Skin Assessment on the patient. ALL assessment sites listed below have been assessed.      Areas assessed by both nurses:    Head, Face, Ears, Shoulders, Back, Chest, Arms, Elbows, Hands, Sacrum. Buttock, Coccyx, Ischium, Legs. Feet and Heels, and Under Medical Devices         Does the Patient have a Wound? No noted wound(s)       Hemant Prevention initiated by RN: No  Wound Care Orders initiated by RN: No    Pressure Injury (Stage 3,4, Unstageable, DTI, NWPT, and Complex wounds) if present, place Wound referral order by RN under : No    New Ostomies, if present place, Ostomy referral order under : No     Nurse 1 eSignature: Electronically signed by Lyric Quintana RN on 11/16/24 at 7:13 AM EST    **SHARE this note so that the co-signing nurse can place an eSignature**    Nurse 2 eSignature: Electronically signed by Sivan Avalos RN on 11/16/24 at 7:15 AM EST

## 2024-11-17 LAB
ANION GAP SERPL CALC-SCNC: 9 MMOL/L (ref 3–18)
BASOPHILS # BLD: 0 K/UL (ref 0–0.1)
BASOPHILS NFR BLD: 0 % (ref 0–2)
BUN SERPL-MCNC: 13 MG/DL (ref 7–18)
BUN/CREAT SERPL: 15 (ref 12–20)
CALCIUM SERPL-MCNC: 8.8 MG/DL (ref 8.5–10.1)
CHLORIDE SERPL-SCNC: 103 MMOL/L (ref 100–111)
CO2 SERPL-SCNC: 26 MMOL/L (ref 21–32)
CREAT SERPL-MCNC: 0.89 MG/DL (ref 0.6–1.3)
DIFFERENTIAL METHOD BLD: ABNORMAL
EOSINOPHIL # BLD: 0.2 K/UL (ref 0–0.4)
EOSINOPHIL NFR BLD: 3 % (ref 0–5)
ERYTHROCYTE [DISTWIDTH] IN BLOOD BY AUTOMATED COUNT: 13.6 % (ref 11.6–14.5)
GLUCOSE SERPL-MCNC: 102 MG/DL (ref 74–99)
HCT VFR BLD AUTO: 34.4 % (ref 36–48)
HGB BLD-MCNC: 10.9 G/DL (ref 13–16)
IMM GRANULOCYTES # BLD AUTO: 0 K/UL (ref 0–0.04)
IMM GRANULOCYTES NFR BLD AUTO: 1 % (ref 0–0.5)
LYMPHOCYTES # BLD: 2.1 K/UL (ref 0.9–3.6)
LYMPHOCYTES NFR BLD: 35 % (ref 21–52)
MCH RBC QN AUTO: 28.5 PG (ref 24–34)
MCHC RBC AUTO-ENTMCNC: 31.7 G/DL (ref 31–37)
MCV RBC AUTO: 89.8 FL (ref 78–100)
MONOCYTES # BLD: 1 K/UL (ref 0.05–1.2)
MONOCYTES NFR BLD: 16 % (ref 3–10)
NEUTS SEG # BLD: 2.7 K/UL (ref 1.8–8)
NEUTS SEG NFR BLD: 45 % (ref 40–73)
NRBC # BLD: 0 K/UL (ref 0–0.01)
NRBC BLD-RTO: 0 PER 100 WBC
PLATELET # BLD AUTO: 255 K/UL (ref 135–420)
PMV BLD AUTO: 9.3 FL (ref 9.2–11.8)
POTASSIUM SERPL-SCNC: 4 MMOL/L (ref 3.5–5.5)
RBC # BLD AUTO: 3.83 M/UL (ref 4.35–5.65)
SODIUM SERPL-SCNC: 138 MMOL/L (ref 136–145)
WBC # BLD AUTO: 5.9 K/UL (ref 4.6–13.2)

## 2024-11-17 PROCEDURE — 2500000003 HC RX 250 WO HCPCS: Performed by: PHYSICIAN ASSISTANT

## 2024-11-17 PROCEDURE — 6370000000 HC RX 637 (ALT 250 FOR IP): Performed by: STUDENT IN AN ORGANIZED HEALTH CARE EDUCATION/TRAINING PROGRAM

## 2024-11-17 PROCEDURE — 94761 N-INVAS EAR/PLS OXIMETRY MLT: CPT

## 2024-11-17 PROCEDURE — 36415 COLL VENOUS BLD VENIPUNCTURE: CPT

## 2024-11-17 PROCEDURE — 2580000003 HC RX 258: Performed by: INTERNAL MEDICINE

## 2024-11-17 PROCEDURE — 1100000003 HC PRIVATE W/ TELEMETRY

## 2024-11-17 PROCEDURE — 6360000002 HC RX W HCPCS: Performed by: PHYSICIAN ASSISTANT

## 2024-11-17 PROCEDURE — 99232 SBSQ HOSP IP/OBS MODERATE 35: CPT | Performed by: STUDENT IN AN ORGANIZED HEALTH CARE EDUCATION/TRAINING PROGRAM

## 2024-11-17 PROCEDURE — 6360000002 HC RX W HCPCS: Performed by: INTERNAL MEDICINE

## 2024-11-17 PROCEDURE — 85025 COMPLETE CBC W/AUTO DIFF WBC: CPT

## 2024-11-17 PROCEDURE — 80048 BASIC METABOLIC PNL TOTAL CA: CPT

## 2024-11-17 RX ORDER — GAUZE BANDAGE 2" X 2"
100 BANDAGE TOPICAL DAILY
Status: DISCONTINUED | OUTPATIENT
Start: 2024-11-18 | End: 2024-11-22 | Stop reason: HOSPADM

## 2024-11-17 RX ORDER — ACETAMINOPHEN 325 MG/1
650 TABLET ORAL EVERY 4 HOURS PRN
Status: DISCONTINUED | OUTPATIENT
Start: 2024-11-17 | End: 2024-11-22 | Stop reason: HOSPADM

## 2024-11-17 RX ADMIN — ACETAMINOPHEN 325MG 650 MG: 325 TABLET ORAL at 18:49

## 2024-11-17 RX ADMIN — OXYCODONE 10 MG: 5 TABLET ORAL at 22:19

## 2024-11-17 RX ADMIN — SENNOSIDES AND DOCUSATE SODIUM 1 TABLET: 50; 8.6 TABLET ORAL at 07:55

## 2024-11-17 RX ADMIN — ACETAMINOPHEN 325MG 650 MG: 325 TABLET ORAL at 14:16

## 2024-11-17 RX ADMIN — ACETAMINOPHEN 325MG 650 MG: 325 TABLET ORAL at 07:56

## 2024-11-17 RX ADMIN — FOLIC ACID 1 MG: 5 INJECTION, SOLUTION INTRAMUSCULAR; INTRAVENOUS; SUBCUTANEOUS at 07:55

## 2024-11-17 RX ADMIN — OXYCODONE 10 MG: 5 TABLET ORAL at 04:01

## 2024-11-17 RX ADMIN — OXYCODONE 10 MG: 5 TABLET ORAL at 09:51

## 2024-11-17 RX ADMIN — THIAMINE HYDROCHLORIDE 200 MG: 100 INJECTION, SOLUTION INTRAMUSCULAR; INTRAVENOUS at 07:57

## 2024-11-17 RX ADMIN — WATER 2000 MG: 1 INJECTION INTRAMUSCULAR; INTRAVENOUS; SUBCUTANEOUS at 05:57

## 2024-11-17 RX ADMIN — OXYCODONE 10 MG: 5 TABLET ORAL at 15:12

## 2024-11-17 RX ADMIN — AMLODIPINE BESYLATE 10 MG: 10 TABLET ORAL at 07:54

## 2024-11-17 RX ADMIN — WATER 2000 MG: 1 INJECTION INTRAMUSCULAR; INTRAVENOUS; SUBCUTANEOUS at 16:35

## 2024-11-17 ASSESSMENT — PAIN SCALES - GENERAL
PAINLEVEL_OUTOF10: 6
PAINLEVEL_OUTOF10: 8
PAINLEVEL_OUTOF10: 0
PAINLEVEL_OUTOF10: 6
PAINLEVEL_OUTOF10: 10
PAINLEVEL_OUTOF10: 5
PAINLEVEL_OUTOF10: 0
PAINLEVEL_OUTOF10: 5
PAINLEVEL_OUTOF10: 10
PAINLEVEL_OUTOF10: 8
PAINLEVEL_OUTOF10: 7
PAINLEVEL_OUTOF10: 2
PAINLEVEL_OUTOF10: 6
PAINLEVEL_OUTOF10: 6
PAINLEVEL_OUTOF10: 10
PAINLEVEL_OUTOF10: 0
PAINLEVEL_OUTOF10: 10

## 2024-11-17 ASSESSMENT — PAIN DESCRIPTION - FREQUENCY
FREQUENCY: CONTINUOUS

## 2024-11-17 ASSESSMENT — PAIN DESCRIPTION - PAIN TYPE
TYPE: ACUTE PAIN

## 2024-11-17 ASSESSMENT — PAIN DESCRIPTION - DIRECTION
RADIATING_TOWARDS: CHEST

## 2024-11-17 ASSESSMENT — PAIN DESCRIPTION - LOCATION
LOCATION: CHEST
LOCATION: SHOULDER;CHEST
LOCATION: CHEST;SHOULDER
LOCATION: CHEST
LOCATION: CHEST;SHOULDER
LOCATION: SHOULDER;CHEST

## 2024-11-17 ASSESSMENT — PAIN DESCRIPTION - ORIENTATION
ORIENTATION: LEFT

## 2024-11-17 ASSESSMENT — PAIN DESCRIPTION - ONSET
ONSET: ON-GOING

## 2024-11-17 ASSESSMENT — PAIN DESCRIPTION - DESCRIPTORS
DESCRIPTORS: SHARP
DESCRIPTORS: ACHING;SHARP
DESCRIPTORS: SHARP
DESCRIPTORS: SHARP
DESCRIPTORS: ACHING
DESCRIPTORS: ACHING;SHARP

## 2024-11-17 NOTE — PLAN OF CARE
Problem: Pain  Goal: Verbalizes/displays adequate comfort level or baseline comfort level  Description: Pain improved from 10/10 to 7/10 with IV Dilaudid and Lidocaine patch this AM.  Outcome: Progressing  Flowsheets (Taken 11/17/2024 0806)  Verbalizes/displays adequate comfort level or baseline comfort level:   Encourage patient to monitor pain and request assistance   Assess pain using appropriate pain scale  Note: Patient was given pain medications at given times      Problem: Safety - Adult  Goal: Free from fall injury  Description: Fall precautions. Reorient to situation as needed.    Outcome: Progressing  Note: Patient remained free from falls.     Problem: Skin/Tissue Integrity - Adult  Goal: Incisions, wounds, or drain sites healing without S/S of infection  Description: Monitor abscess for increased redness, swelling, or drainage  Outcome: Progressing  Flowsheets (Taken 11/17/2024 0806)  Incisions, Wounds, or Drain Sites Healing Without Sign and Symptoms of Infection: ADMISSION and DAILY: Assess and document risk factors for pressure ulcer development  Note: Patient has no signs of skin breakdown

## 2024-11-17 NOTE — PROGRESS NOTES
[facility] Hospitalist Group  Progress Note    Patient: Dora Frazier Age: 64 y.o. : 1960 MR#: [id number] SSN: [id number]  Date/Time: 2024     Subjective:   Patient seen and examined.  No acute events overnight.  Expresses that he must leave the hospital due to financial issues.  Reiterated to patient to remain hospitalized for further care. Reports improvement in chest pain  He denies abdominal pain, shortness of breath, nausea, headaches, dizziness or emesis, numbness or tingling.   Discussed procedure on Monday for SC joint aspiration.  Questions were answered the best of my ability.  Assessment/Plan:   Group B STREP bacteremia   High-dose ceftriaxone every 12h- ID following and aiding in ABX management, appreciate assistance.  Repeat blood cultures no growth to date.  MRI thoracic spine with evidence of phlegmon T1-T4. Orthopedic surgery following.  Continue medical management for now suspect patient may need surgery if worsening symptoms/clinical deterioration.  Appreciate orthopedic assistance.  -Neurochecks every 4h.  MRI lumbar spine without abscess, however with severe narrowing at multiple vertebral levels will need to follow-up outpatient with spine surgery.   -MRI chest with possible left SC joint septic joint.  Thoracic evaluation appreciated, plan for  IR for joint aspiration on .  N.p.o. midnight . appreciate subspecialty care.   -Suspect chest pain is musculoskeletal secondary to suspected abscess.  Clinical course not consistent with acute coronary syndrome.  Normal troponin, no changes on EKG.  Pain management  Hx of Abscesses req I&D in the past- jenifer rectal and ankle  Wound culture of rectal abscess sent  HTN  Blood pressure medication added  Alcohol use  Thiamine, folic acid  Does not appear to be in withdrawal at this time.  Nursing to contact hospitalist if CIWA protocol needs to be applied.  Tobacco abuse   Nicotine patch    Dispo plan: Home  (ROXICODONE) immediate release tablet 10 mg  10 mg Oral Q4H PRN    HYDROmorphone HCl PF (DILAUDID) injection 1 mg  1 mg IntraVENous Q4H PRN    polyethylene glycol (GLYCOLAX) packet 17 g  17 g Oral Daily PRN    melatonin tablet 3 mg  3 mg Oral Nightly PRN    sennosides-docusate sodium (SENOKOT-S) 8.6-50 MG tablet 1 tablet  1 tablet Oral Daily    nitroGLYCERIN (NITROSTAT) SL tablet 0.4 mg  0.4 mg SubLINGual Q5 Min PRN    lidocaine 4 % external patch 1 patch  1 patch TransDERmal Daily    nicotine (NICODERM CQ) 14 MG/24HR 1 patch  1 patch TransDERmal Daily    thiamine (B-1) injection 200 mg  200 mg IntraVENous Daily    folic acid injection 1 mg  1 mg IntraVENous Daily    ondansetron (ZOFRAN) injection 4 mg  4 mg IntraVENous Q6H PRN    naloxone (NARCAN) injection 0.4 mg  0.4 mg IntraVENous PRN    cefTRIAXone (ROCEPHIN) 2,000 mg in sterile water 20 mL IV syringe  2,000 mg IntraVENous Q12H    amLODIPine (NORVASC) tablet 10 mg  10 mg Oral Daily    hydrALAZINE (APRESOLINE) injection 10 mg  10 mg IntraVENous Q6H PRN       Labs:    Recent Results (from the past 24 hour(s))   Basic Metabolic Panel    Collection Time: 11/17/24 12:49 AM   Result Value Ref Range    Sodium 138 136 - 145 mmol/L    Potassium 4.0 3.5 - 5.5 mmol/L    Chloride 103 100 - 111 mmol/L    CO2 26 21 - 32 mmol/L    Anion Gap 9 3.0 - 18 mmol/L    Glucose 102 (H) 74 - 99 mg/dL    BUN 13 7.0 - 18 MG/DL    Creatinine 0.89 0.6 - 1.3 MG/DL    BUN/Creatinine Ratio 15 12 - 20      Est, Glom Filt Rate >90 >60 ml/min/1.73m2    Calcium 8.8 8.5 - 10.1 MG/DL   CBC with Auto Differential    Collection Time: 11/17/24 12:49 AM   Result Value Ref Range    WBC 5.9 4.6 - 13.2 K/uL    RBC 3.83 (L) 4.35 - 5.65 M/uL    Hemoglobin 10.9 (L) 13.0 - 16.0 g/dL    Hematocrit 34.4 (L) 36.0 - 48.0 %    MCV 89.8 78.0 - 100.0 FL    MCH 28.5 24.0 - 34.0 PG    MCHC 31.7 31.0 - 37.0 g/dL    RDW 13.6 11.6 - 14.5 %    Platelets 255 135 - 420 K/uL    MPV 9.3 9.2 - 11.8 FL    Nucleated RBCs 0.0 0

## 2024-11-17 NOTE — PLAN OF CARE
Problem: Discharge Planning  Goal: Discharge to home or other facility with appropriate resources  Description: Patient waiting to get procedure done Monday.   Outcome: Progressing     Problem: Pain  Goal: Verbalizes/displays adequate comfort level or baseline comfort level  Description: Pain improved from 10/10 to 6/10 with oxycodone.   11/17/2024 1145 by Sivan Aavlos RN  Outcome: Progressing  11/17/2024 0806 by Anthony Enriquez RN  Outcome: Progressing  Flowsheets (Taken 11/17/2024 0806)  Verbalizes/displays adequate comfort level or baseline comfort level:   Encourage patient to monitor pain and request assistance   Assess pain using appropriate pain scale  Note: Patient was given pain medications at given times      Problem: Safety - Adult  Goal: Free from fall injury  Description: Fall precautions. Reorient to situation as needed.    11/17/2024 1145 by Sivan Avalos RN  Outcome: Progressing  11/17/2024 0806 by Anthony Enriquez RN  Outcome: Progressing  Note: Patient remained free from falls.     Problem: Cardiovascular - Adult  Goal: Maintains optimal cardiac output and hemodynamic stability  Description: Monitor blood pressure and heart rate  Outcome: Progressing  Goal: Absence of cardiac dysrhythmias or at baseline  Description: Monitor telemetry and assess for rhythm changes  Outcome: Progressing     Problem: Skin/Tissue Integrity - Adult  Goal: Incisions, wounds, or drain sites healing without S/S of infection  Description: Monitor abscess for increased redness, swelling, or drainage  11/17/2024 1145 by Sivan Avalos RN  Outcome: Progressing  11/17/2024 0806 by Anthony Enriquez RN  Outcome: Progressing  Flowsheets (Taken 11/17/2024 0806)  Incisions, Wounds, or Drain Sites Healing Without Sign and Symptoms of Infection: ADMISSION and DAILY: Assess and document risk factors for pressure ulcer development  Note: Patient has no signs of skin breakdown     Problem: Musculoskeletal - Adult  Goal:  Called pt and verbalized results and recommendations. She verbalized understanding and scheduled lab appt to repeat sodium level.

## 2024-11-17 NOTE — PROGRESS NOTES
4 Eyes Skin Assessment     NAME:  Dora Frazier  YOB: 1960  MEDICAL RECORD NUMBER:  [id number]    The patient is being assessed for  Shift Handoff    I agree that at least one RN has performed a thorough Head to Toe Skin Assessment on the patient. ALL assessment sites listed below have been assessed.      Areas assessed by both nurses:    Head, Face, Ears, Shoulders, Back, Chest, Arms, Elbows, Hands, Sacrum. Buttock, Coccyx, Ischium, Legs. Feet and Heels, and Under Medical Devices         Does the Patient have a Wound? No noted wound(s)       Hemant Prevention initiated by RN: No  Wound Care Orders initiated by RN: No    Pressure Injury (Stage 3,4, Unstageable, DTI, NWPT, and Complex wounds) if present, place Wound referral order by RN under : No    New Ostomies, if present place, Ostomy referral order under : No     Nurse 1 eSignature: Electronically signed by Sivan Avalos RN on 11/16/24 at 7:13 PM EST    **SHARE this note so that the co-signing nurse can place an eSignature**    Nurse 2 eSignature: {Esignature:[id number]}

## 2024-11-18 ENCOUNTER — APPOINTMENT (OUTPATIENT)
Facility: HOSPITAL | Age: 64
End: 2024-11-18

## 2024-11-18 LAB
ANION GAP SERPL CALC-SCNC: 4 MMOL/L (ref 3–18)
BASOPHILS # BLD: 0 K/UL (ref 0–0.1)
BASOPHILS NFR BLD: 1 % (ref 0–2)
BUN SERPL-MCNC: 12 MG/DL (ref 7–18)
BUN/CREAT SERPL: 13 (ref 12–20)
CALCIUM SERPL-MCNC: 9.1 MG/DL (ref 8.5–10.1)
CHLORIDE SERPL-SCNC: 104 MMOL/L (ref 100–111)
CO2 SERPL-SCNC: 27 MMOL/L (ref 21–32)
CREAT SERPL-MCNC: 0.9 MG/DL (ref 0.6–1.3)
DIFFERENTIAL METHOD BLD: ABNORMAL
EOSINOPHIL # BLD: 0.2 K/UL (ref 0–0.4)
EOSINOPHIL NFR BLD: 4 % (ref 0–5)
ERYTHROCYTE [DISTWIDTH] IN BLOOD BY AUTOMATED COUNT: 13.6 % (ref 11.6–14.5)
GLUCOSE SERPL-MCNC: 100 MG/DL (ref 74–99)
HCT VFR BLD AUTO: 35.2 % (ref 36–48)
HGB BLD-MCNC: 11 G/DL (ref 13–16)
IMM GRANULOCYTES # BLD AUTO: 0.1 K/UL (ref 0–0.04)
IMM GRANULOCYTES NFR BLD AUTO: 1 % (ref 0–0.5)
INR PPP: 1.1 (ref 0.9–1.1)
LYMPHOCYTES # BLD: 1.9 K/UL (ref 0.9–3.6)
LYMPHOCYTES NFR BLD: 34 % (ref 21–52)
MCH RBC QN AUTO: 28.5 PG (ref 24–34)
MCHC RBC AUTO-ENTMCNC: 31.3 G/DL (ref 31–37)
MCV RBC AUTO: 91.2 FL (ref 78–100)
MONOCYTES # BLD: 1 K/UL (ref 0.05–1.2)
MONOCYTES NFR BLD: 17 % (ref 3–10)
NEUTS SEG # BLD: 2.5 K/UL (ref 1.8–8)
NEUTS SEG NFR BLD: 44 % (ref 40–73)
NRBC # BLD: 0 K/UL (ref 0–0.01)
NRBC BLD-RTO: 0 PER 100 WBC
PLATELET # BLD AUTO: 315 K/UL (ref 135–420)
PMV BLD AUTO: 9.6 FL (ref 9.2–11.8)
POTASSIUM SERPL-SCNC: 3.9 MMOL/L (ref 3.5–5.5)
PROTHROMBIN TIME: 14.3 SEC (ref 11.9–14.9)
RBC # BLD AUTO: 3.86 M/UL (ref 4.35–5.65)
SODIUM SERPL-SCNC: 135 MMOL/L (ref 136–145)
WBC # BLD AUTO: 5.6 K/UL (ref 4.6–13.2)

## 2024-11-18 PROCEDURE — 36415 COLL VENOUS BLD VENIPUNCTURE: CPT

## 2024-11-18 PROCEDURE — 87075 CULTR BACTERIA EXCEPT BLOOD: CPT

## 2024-11-18 PROCEDURE — 2580000003 HC RX 258: Performed by: INTERNAL MEDICINE

## 2024-11-18 PROCEDURE — 6360000002 HC RX W HCPCS: Performed by: STUDENT IN AN ORGANIZED HEALTH CARE EDUCATION/TRAINING PROGRAM

## 2024-11-18 PROCEDURE — 2500000003 HC RX 250 WO HCPCS: Performed by: PHYSICIAN ASSISTANT

## 2024-11-18 PROCEDURE — 87070 CULTURE OTHR SPECIMN AEROBIC: CPT

## 2024-11-18 PROCEDURE — 6370000000 HC RX 637 (ALT 250 FOR IP): Performed by: STUDENT IN AN ORGANIZED HEALTH CARE EDUCATION/TRAINING PROGRAM

## 2024-11-18 PROCEDURE — 85025 COMPLETE CBC W/AUTO DIFF WBC: CPT

## 2024-11-18 PROCEDURE — 2500000003 HC RX 250 WO HCPCS: Performed by: RADIOLOGY

## 2024-11-18 PROCEDURE — 99152 MOD SED SAME PHYS/QHP 5/>YRS: CPT

## 2024-11-18 PROCEDURE — 6360000002 HC RX W HCPCS: Performed by: INTERNAL MEDICINE

## 2024-11-18 PROCEDURE — 87205 SMEAR GRAM STAIN: CPT

## 2024-11-18 PROCEDURE — 1100000003 HC PRIVATE W/ TELEMETRY

## 2024-11-18 PROCEDURE — 99232 SBSQ HOSP IP/OBS MODERATE 35: CPT | Performed by: STUDENT IN AN ORGANIZED HEALTH CARE EDUCATION/TRAINING PROGRAM

## 2024-11-18 PROCEDURE — 85610 PROTHROMBIN TIME: CPT

## 2024-11-18 PROCEDURE — 6360000002 HC RX W HCPCS: Performed by: RADIOLOGY

## 2024-11-18 PROCEDURE — 80048 BASIC METABOLIC PNL TOTAL CA: CPT

## 2024-11-18 PROCEDURE — 94761 N-INVAS EAR/PLS OXIMETRY MLT: CPT

## 2024-11-18 RX ORDER — FENTANYL CITRATE 50 UG/ML
INJECTION, SOLUTION INTRAMUSCULAR; INTRAVENOUS PRN
Status: COMPLETED | OUTPATIENT
Start: 2024-11-18 | End: 2024-11-18

## 2024-11-18 RX ORDER — LIDOCAINE HYDROCHLORIDE 10 MG/ML
INJECTION, SOLUTION EPIDURAL; INFILTRATION; INTRACAUDAL; PERINEURAL PRN
Status: COMPLETED | OUTPATIENT
Start: 2024-11-18 | End: 2024-11-18

## 2024-11-18 RX ORDER — MIDAZOLAM HYDROCHLORIDE 1 MG/ML
INJECTION, SOLUTION INTRAMUSCULAR; INTRAVENOUS
Status: DISCONTINUED
Start: 2024-11-18 | End: 2024-11-18

## 2024-11-18 RX ORDER — FENTANYL CITRATE 50 UG/ML
INJECTION, SOLUTION INTRAMUSCULAR; INTRAVENOUS
Status: DISCONTINUED
Start: 2024-11-18 | End: 2024-11-18

## 2024-11-18 RX ORDER — HEPARIN SODIUM 5000 [USP'U]/ML
5000 INJECTION, SOLUTION INTRAVENOUS; SUBCUTANEOUS 2 TIMES DAILY
Status: DISCONTINUED | OUTPATIENT
Start: 2024-11-18 | End: 2024-11-22 | Stop reason: HOSPADM

## 2024-11-18 RX ORDER — MIDAZOLAM HYDROCHLORIDE 2 MG/2ML
INJECTION, SOLUTION INTRAMUSCULAR; INTRAVENOUS PRN
Status: COMPLETED | OUTPATIENT
Start: 2024-11-18 | End: 2024-11-18

## 2024-11-18 RX ADMIN — LIDOCAINE HYDROCHLORIDE 5 ML: 10 INJECTION, SOLUTION EPIDURAL; INFILTRATION; INTRACAUDAL; PERINEURAL at 15:43

## 2024-11-18 RX ADMIN — FOLIC ACID 1 MG: 5 INJECTION, SOLUTION INTRAMUSCULAR; INTRAVENOUS; SUBCUTANEOUS at 10:10

## 2024-11-18 RX ADMIN — HYDROMORPHONE HYDROCHLORIDE 1 MG: 1 INJECTION, SOLUTION INTRAMUSCULAR; INTRAVENOUS; SUBCUTANEOUS at 13:18

## 2024-11-18 RX ADMIN — OXYCODONE 10 MG: 5 TABLET ORAL at 17:20

## 2024-11-18 RX ADMIN — WATER 2000 MG: 1 INJECTION INTRAMUSCULAR; INTRAVENOUS; SUBCUTANEOUS at 05:30

## 2024-11-18 RX ADMIN — FENTANYL CITRATE 50 MCG: 50 INJECTION, SOLUTION INTRAMUSCULAR; INTRAVENOUS at 15:33

## 2024-11-18 RX ADMIN — WATER 2000 MG: 1 INJECTION INTRAMUSCULAR; INTRAVENOUS; SUBCUTANEOUS at 16:35

## 2024-11-18 RX ADMIN — Medication 100 MG: at 10:03

## 2024-11-18 RX ADMIN — AMLODIPINE BESYLATE 10 MG: 10 TABLET ORAL at 10:03

## 2024-11-18 RX ADMIN — HEPARIN SODIUM 5000 UNITS: 5000 INJECTION INTRAVENOUS; SUBCUTANEOUS at 21:30

## 2024-11-18 RX ADMIN — MIDAZOLAM HYDROCHLORIDE 1 MG: 1 INJECTION, SOLUTION INTRAMUSCULAR; INTRAVENOUS at 15:33

## 2024-11-18 RX ADMIN — OXYCODONE 10 MG: 5 TABLET ORAL at 05:36

## 2024-11-18 RX ADMIN — HYDROMORPHONE HYDROCHLORIDE 1 MG: 1 INJECTION, SOLUTION INTRAMUSCULAR; INTRAVENOUS; SUBCUTANEOUS at 20:20

## 2024-11-18 ASSESSMENT — PAIN DESCRIPTION - LOCATION
LOCATION: SHOULDER
LOCATION: BACK;CHEST
LOCATION: SHOULDER
LOCATION: HEAD;SHOULDER;CHEST
LOCATION: CHEST;BACK

## 2024-11-18 ASSESSMENT — PAIN DESCRIPTION - FREQUENCY: FREQUENCY: CONTINUOUS

## 2024-11-18 ASSESSMENT — PAIN DESCRIPTION - ORIENTATION
ORIENTATION: LEFT

## 2024-11-18 ASSESSMENT — PAIN DESCRIPTION - DESCRIPTORS
DESCRIPTORS: ACHING
DESCRIPTORS: ACHING;SHARP
DESCRIPTORS: ACHING;SHARP
DESCRIPTORS: ACHING

## 2024-11-18 ASSESSMENT — PAIN DESCRIPTION - ONSET: ONSET: ON-GOING

## 2024-11-18 ASSESSMENT — PAIN SCALES - GENERAL
PAINLEVEL_OUTOF10: 8
PAINLEVEL_OUTOF10: 5
PAINLEVEL_OUTOF10: 9
PAINLEVEL_OUTOF10: 9
PAINLEVEL_OUTOF10: 4
PAINLEVEL_OUTOF10: 4
PAINLEVEL_OUTOF10: 10
PAINLEVEL_OUTOF10: 8
PAINLEVEL_OUTOF10: 5

## 2024-11-18 ASSESSMENT — PAIN - FUNCTIONAL ASSESSMENT
PAIN_FUNCTIONAL_ASSESSMENT: ACTIVITIES ARE NOT PREVENTED
PAIN_FUNCTIONAL_ASSESSMENT: PREVENTS OR INTERFERES SOME ACTIVE ACTIVITIES AND ADLS
PAIN_FUNCTIONAL_ASSESSMENT: ACTIVITIES ARE NOT PREVENTED

## 2024-11-18 ASSESSMENT — PAIN DESCRIPTION - PAIN TYPE
TYPE: ACUTE PAIN

## 2024-11-18 NOTE — PROGRESS NOTES
TRANSFER - OUT REPORT:    Verbal report given to ANDREA De (name) on Dora Frazier being transferred to [address] [place] [address] (unit) for routine progression of patient care       Report consisted of patient's Situation, Background, Assessment and   Recommendations(SBAR).     Information from the following report(s) Nurse Handoff Report was reviewed with the receiving nurse.    Opportunity for questions and clarification was provided.

## 2024-11-18 NOTE — PROGRESS NOTES
Vss afeb  Neuro intact  Leg symptoms resolved  Continued chest pain/ t spine pain- somewhat improved  Wbc normal and decreasing  Plan  Joint aspiration today  Continue abx per ID, recommend at least 6 weeks combined therapy given possible septic facets.  Epidural phlegmon, steadily improving clinical course on abx. No plan for surgery at this time  Patient ambulatory evidently wanted to leave. Explained importance of continued treatment. He very much wants to avoid surgery, and treatment is what is keeping him from needing it.

## 2024-11-18 NOTE — PROGRESS NOTES
4 Eyes Skin Assessment     NAME:  Dora Frazier  YOB: 1960  MEDICAL RECORD NUMBER:  [id number]    The patient is being assessed for  Shift Handoff    I agree that at least one RN has performed a thorough Head to Toe Skin Assessment on the patient. ALL assessment sites listed below have been assessed.      Areas assessed by both nurses:    Head, Face, Ears, Shoulders, Back, Chest, Arms, Elbows, Hands, Sacrum. Buttock, Coccyx, Ischium, Legs. Feet and Heels, and Under Medical Devices         Does the Patient have a Wound? No noted wound(s)       Hemant Prevention initiated by RN: No  Wound Care Orders initiated by RN: No    Pressure Injury (Stage 3,4, Unstageable, DTI, NWPT, and Complex wounds) if present, place Wound referral order by RN under : No    New Ostomies, if present place, Ostomy referral order under : No     Nurse 1 eSignature: Electronically signed by Sivan Avalos RN on 11/17/24 at 7:18 PM EST    **SHARE this note so that the co-signing nurse can place an eSignature**    Nurse 2 eSignature: Electronically signed by Hannah Deng RN on 11/18/24 at 7:56 AM EST

## 2024-11-18 NOTE — PROGRESS NOTES
[facility] Hospitalist [facility]  Progress Note    Patient: Dora Frazier Age: 64 y.o. : 1960 MR#: [id number] SSN: [id number]  Date/Time: 2024     Subjective:   Patient seen and examined.  No acute events overnight.    Reports improvement in chest pain.He denies abdominal pain, shortness of breath, nausea, headaches, dizziness or emesis, numbness or tingling.   Discussed procedure for SC joint aspiration.  Questions were answered the best of my ability.  Assessment/Plan:   Group B STREP bacteremia   High-dose ceftriaxone every 12h- ID following and aiding in ABX management, appreciate assistance.  Repeat blood cultures no growth to date.  MRI thoracic spine with evidence of phlegmon T1-T4. Orthopedic surgery following.  Continue medical management for now suspect patient may need surgery if worsening symptoms/clinical deterioration.  Appreciate orthopedic assistance.  -Neurochecks every 4h.  MRI lumbar spine without abscess, however with severe narrowing at multiple vertebral levels will need to follow-up outpatient with spine surgery.   -Will need repeat spine imaging in 2 weeks to assess for improvement in infection, if worsening abscess suspect patient will likely need surgical intervention.  -MRI chest with possible left SC joint septic joint.  Thoracic evaluation appreciated, plan for  IR for joint aspiration on .  N.p.o.appreciate subspecialty care.   - cultures ordered per ID  -Suspect chest pain is musculoskeletal secondary to suspected abscess.  Clinical course not consistent with acute coronary syndrome.  Normal troponin, no changes on EKG.  Pain management  Hx of Abscesses req I&D in the past- jenifer rectal and ankle  Wound culture of rectal abscess sent  HTN  Blood pressure medication added  Alcohol use  Thiamine, folic acid  Does not appear to be in withdrawal at this time.  Nursing to contact hospitalist if CIWA protocol needs to be applied.  Tobacco abuse

## 2024-11-18 NOTE — PROGRESS NOTES
4 Eyes Skin Assessment     NAME:  Dora Frazier  YOB: 1960  MEDICAL RECORD NUMBER:  [id number]    The patient is being assessed for  Shift Handoff    I agree that at least one RN has performed a thorough Head to Toe Skin Assessment on the patient. ALL assessment sites listed below have been assessed.      Areas assessed by both nurses:    Head, Face, Ears, Shoulders, Back, Chest, Arms, Elbows, Hands, Sacrum. Buttock, Coccyx, Ischium, Legs. Feet and Heels, and Under Medical Devices         Does the Patient have a Wound? Yes wound(s) were present on assessment. LDA wound assessment was Initiated and completed by RN       Hemant Prevention initiated by RN: No  Wound Care Orders initiated by RN: Yes    Pressure Injury (Stage 3,4, Unstageable, DTI, NWPT, and Complex wounds) if present, place Wound referral order by RN under : No    New Ostomies, if present place, Ostomy referral order under : No     Perirectal abscess    Nurse 1 eSignature: Electronically signed by Hannah Deng RN on 11/18/24 at 7:55 AM EST    **SHARE this note so that the co-signing nurse can place an eSignature**    Nurse 2 eSignature: Electronically signed by Kenisha Stearns RN on 11/18/24 at 1:59 PM EST

## 2024-11-18 NOTE — PLAN OF CARE
Problem: Discharge Planning  Goal: Discharge to home or other facility with appropriate resources  Description: Patient waiting to get procedure done Monday.   11/18/2024 1202 by Kenisha Stearns RN  Outcome: Progressing  11/17/2024 2245 by Hannah Deng, RN  Outcome: Progressing  Flowsheets  Taken 11/17/2024 2245  Discharge to home or other facility with appropriate resources:   Identify discharge learning needs (meds, wound care, etc)   Identify barriers to discharge with patient and caregiver  Taken 11/17/2024 1940  Discharge to home or other facility with appropriate resources: Identify barriers to discharge with patient and caregiver     Problem: Pain  Goal: Verbalizes/displays adequate comfort level or baseline comfort level  Description: Pain improved from 10/10 to 6/10 with oxycodone.   11/18/2024 1202 by Kenisha Stearns RN  Outcome: Progressing  11/17/2024 2245 by Hannah Deng, RN  Outcome: Progressing  Flowsheets (Taken 11/17/2024 2245)  Verbalizes/displays adequate comfort level or baseline comfort level:   Assess pain using appropriate pain scale   Encourage patient to monitor pain and request assistance   Administer analgesics based on type and severity of pain and evaluate response   Implement non-pharmacological measures as appropriate and evaluate response     Problem: Safety - Adult  Goal: Free from fall injury  Description: Fall precautions. Reorient to situation as needed.    11/18/2024 1202 by Kenisha Stearns, RN  Outcome: Progressing  11/17/2024 2245 by Hannah Deng, RN  Outcome: Progressing  Flowsheets (Taken 11/17/2024 2245)  Free From Fall Injury: Instruct family/caregiver on patient safety

## 2024-11-18 NOTE — PROGRESS NOTES
Infectious Disease Progress Note        Reason: sepsis, group B streptococcus bacteremia    Current abx Prior abx   Ceftriaxone, vancomycin      Lines:       Assessment :  64-year-old man with past medical history significant for hypertension presented to [facility] on 11/12/24 with left arm numbness, chest discomfort x 1 day    Hospitalization [facility] September 2024 for infected right ankle wound/cellulitis  Status post I&D on 9/8/2024  IntraOp cultures 9/8-MRSA, heavy mixed skin dimitri, group A strep    Now with fever Tmax 102, persistent thoracic/lumbar spine pain/tenderness, group B streptococcus bacteremia, left upper chest erythema/tenderness    Clinical presentation consistent with sepsis-present on admission due to group B streptococcus bacteremia, probable   Left sternoclavicular joint septic arthritis    MRI c-spine with findings concerning for thoracic spine infection.  Improved back pain/resolved thoracic, lumbar spine tenderness on today's exam      Exact source of group B streptococcus in the blood not entirely clear.  No definitive evidence of skin/soft tissue infection at this time.- ?  Open sores versus unreported IV drug abuse.  Urine drug screen positive for opiates.  Patient denies IV drug abuse.  However girlfriend is concerned that patient may have an unreported IV drug abuse  She also reports patient had episode of binge drinking last weekend    Left SC joint septic arthritis: MRI findings 11/14 confirms clinical suspicion of left SC joint septic arthritis.  No worsening pain/erythema noted on today's exam.  Thoracic surgery consulted    History of perirectal abscess-status post I&D October 2022.  Currently no signs of active infection.  Occasional minimal serous drainage from that site sent for cultures on 11/13/2024.  Cultures 11/13-diphtheroid, group B streptococcus likely skin dimitri.     MRI 11/15 with evidence of thoracic spine phlegmon  Spine surgery follow-up appreciated.       Recommendations:    Culture, Blood 2 [[id number]]     Order Status: Canceled Specimen: Blood     Blood Culture 1 [[id number]]  (Abnormal)  (Susceptibility) Collected: 11/12/24 1934    Order Status: Completed Specimen: Blood Updated: 11/15/24 0848     Special Requests NO SPECIAL REQUESTS        Gram Stain       AEROBIC AND ANAEROBIC BOTTLES Gram positive cocci IN PAIRS IN CHAINS                  SMEAR CALLED TO AND CORRECTLY REPEATED BY: RCIHIE CHRISTIANSON MD ER ON [id number] AT 1005 TO KNE           Culture       STREPTOCOCCI, BETA HEMOLYTIC GROUP B GROWING IN BOTH BOTTLES DRAWN No Site Indicated          Susceptibility        Beta Hemolytic Streptococcus - Group B      BACTERIAL SUSCEPTIBILITY PANEL ALEC      ampicillin <=0.25 ug/mL Sensitive      cefotaxime <=0.12 ug/mL Sensitive      cefTRIAXone <=0.12 ug/mL Sensitive      clindamycin <=0.25 ug/mL Sensitive      Inducible Clindamycin Negative ug/mL Sensitive      levofloxacin 1 ug/mL Sensitive      linezolid <=2 ug/mL Sensitive      Penicillin G 0.12 ug/mL Sensitive      vancomycin 0.5 ug/mL Sensitive                           COVID-19 & Influenza Combo [[id number]] Collected: 11/12/24 1934    Order Status: Completed Specimen: Nasopharyngeal Updated: 11/12/24 2041     Source Nasopharyngeal        SARS-CoV-2, PCR Not detected        Comment: Not Detected results do not preclude SARS-CoV-2 infection and should not be used as the sole basis for patient management decisions.Results must be combined with clinical observations, patient history, and epidemiological information.        Rapid Influenza A By PCR Not detected        Rapid Influenza B By PCR Not detected        Comment: Testing was performed using elsa Anita SARS-CoV-2 and Influenza A/B nucleic acid assay.  This test is a multiplex Real-Time Reverse Transcriptase Polymerase Chain Reaction (RT-PCR) based in vitro diagnostic test intended for the qualitative detection of nucleic acids from SARS-CoV-2, Influenza A, and Influenza B in

## 2024-11-18 NOTE — PLAN OF CARE
Problem: Discharge Planning  Goal: Discharge to home or other facility with appropriate resources  Description: Patient waiting to get procedure done Monday.   11/17/2024 2245 by Hannah Deng RN  Outcome: Progressing  Flowsheets  Taken 11/17/2024 2245  Discharge to home or other facility with appropriate resources:   Identify discharge learning needs (meds, wound care, etc)   Identify barriers to discharge with patient and caregiver  Taken 11/17/2024 1940  Discharge to home or other facility with appropriate resources: Identify barriers to discharge with patient and caregiver  11/17/2024 1145 by Sivan Avalos RN  Outcome: Progressing     Problem: Pain  Goal: Verbalizes/displays adequate comfort level or baseline comfort level  Description: Pain improved from 10/10 to 6/10 with oxycodone.   11/17/2024 2245 by Hannah Deng RN  Outcome: Progressing  Flowsheets (Taken 11/17/2024 2245)  Verbalizes/displays adequate comfort level or baseline comfort level:   Assess pain using appropriate pain scale   Encourage patient to monitor pain and request assistance   Administer analgesics based on type and severity of pain and evaluate response   Implement non-pharmacological measures as appropriate and evaluate response  11/17/2024 1145 by Sivan Avalos RN  Outcome: Progressing     Problem: Safety - Adult  Goal: Free from fall injury  Description: Fall precautions. Reorient to situation as needed.    11/17/2024 2245 by Hannah Deng RN  Outcome: Progressing  Flowsheets (Taken 11/17/2024 2245)  Free From Fall Injury: Instruct family/caregiver on patient safety  11/17/2024 1145 by Sivan Avalos RN  Outcome: Progressing     Problem: Cardiovascular - Adult  Goal: Maintains optimal cardiac output and hemodynamic stability  Description: Monitor blood pressure and heart rate  11/17/2024 2245 by Hannah Deng RN  Outcome: Progressing  Flowsheets  Taken 11/17/2024 2245  Maintains optimal cardiac output and

## 2024-11-18 NOTE — PROGRESS NOTES
Assumed care of patient from ANDREA Young (off going nurse). Patient alert and oriented. No apparent distress noted. Patient offers no concerns and can verbalize needs. Assessment to follow. Call bell within reach. Bed in lowest, locked position.

## 2024-11-19 LAB
ANION GAP SERPL CALC-SCNC: 5 MMOL/L (ref 3–18)
BASOPHILS # BLD: 0 K/UL (ref 0–0.1)
BASOPHILS NFR BLD: 1 % (ref 0–2)
BUN SERPL-MCNC: 12 MG/DL (ref 7–18)
BUN/CREAT SERPL: 13 (ref 12–20)
CALCIUM SERPL-MCNC: 9.4 MG/DL (ref 8.5–10.1)
CHLORIDE SERPL-SCNC: 107 MMOL/L (ref 100–111)
CO2 SERPL-SCNC: 26 MMOL/L (ref 21–32)
CREAT SERPL-MCNC: 0.94 MG/DL (ref 0.6–1.3)
DIFFERENTIAL METHOD BLD: ABNORMAL
EOSINOPHIL # BLD: 0.2 K/UL (ref 0–0.4)
EOSINOPHIL NFR BLD: 4 % (ref 0–5)
ERYTHROCYTE [DISTWIDTH] IN BLOOD BY AUTOMATED COUNT: 13.7 % (ref 11.6–14.5)
GLUCOSE SERPL-MCNC: 91 MG/DL (ref 74–99)
HCT VFR BLD AUTO: 36.1 % (ref 36–48)
HGB BLD-MCNC: 11.4 G/DL (ref 13–16)
IMM GRANULOCYTES # BLD AUTO: 0.1 K/UL (ref 0–0.04)
IMM GRANULOCYTES NFR BLD AUTO: 2 % (ref 0–0.5)
LYMPHOCYTES # BLD: 2.1 K/UL (ref 0.9–3.6)
LYMPHOCYTES NFR BLD: 36 % (ref 21–52)
MCH RBC QN AUTO: 28.6 PG (ref 24–34)
MCHC RBC AUTO-ENTMCNC: 31.6 G/DL (ref 31–37)
MCV RBC AUTO: 90.5 FL (ref 78–100)
MONOCYTES # BLD: 0.9 K/UL (ref 0.05–1.2)
MONOCYTES NFR BLD: 15 % (ref 3–10)
NEUTS SEG # BLD: 2.6 K/UL (ref 1.8–8)
NEUTS SEG NFR BLD: 43 % (ref 40–73)
NRBC # BLD: 0 K/UL (ref 0–0.01)
NRBC BLD-RTO: 0 PER 100 WBC
PLATELET # BLD AUTO: 362 K/UL (ref 135–420)
PMV BLD AUTO: 9 FL (ref 9.2–11.8)
POTASSIUM SERPL-SCNC: 4.3 MMOL/L (ref 3.5–5.5)
RBC # BLD AUTO: 3.99 M/UL (ref 4.35–5.65)
SODIUM SERPL-SCNC: 138 MMOL/L (ref 136–145)
WBC # BLD AUTO: 5.9 K/UL (ref 4.6–13.2)

## 2024-11-19 PROCEDURE — 80048 BASIC METABOLIC PNL TOTAL CA: CPT

## 2024-11-19 PROCEDURE — 94761 N-INVAS EAR/PLS OXIMETRY MLT: CPT

## 2024-11-19 PROCEDURE — 6370000000 HC RX 637 (ALT 250 FOR IP): Performed by: STUDENT IN AN ORGANIZED HEALTH CARE EDUCATION/TRAINING PROGRAM

## 2024-11-19 PROCEDURE — 1100000003 HC PRIVATE W/ TELEMETRY

## 2024-11-19 PROCEDURE — 6360000002 HC RX W HCPCS: Performed by: STUDENT IN AN ORGANIZED HEALTH CARE EDUCATION/TRAINING PROGRAM

## 2024-11-19 PROCEDURE — 2500000003 HC RX 250 WO HCPCS: Performed by: PHYSICIAN ASSISTANT

## 2024-11-19 PROCEDURE — 6360000002 HC RX W HCPCS: Performed by: INTERNAL MEDICINE

## 2024-11-19 PROCEDURE — 36415 COLL VENOUS BLD VENIPUNCTURE: CPT

## 2024-11-19 PROCEDURE — 2580000003 HC RX 258: Performed by: INTERNAL MEDICINE

## 2024-11-19 PROCEDURE — 99232 SBSQ HOSP IP/OBS MODERATE 35: CPT | Performed by: INTERNAL MEDICINE

## 2024-11-19 PROCEDURE — 85025 COMPLETE CBC W/AUTO DIFF WBC: CPT

## 2024-11-19 RX ORDER — FOLIC ACID 1 MG/1
1 TABLET ORAL DAILY
Status: DISCONTINUED | OUTPATIENT
Start: 2024-11-20 | End: 2024-11-22 | Stop reason: HOSPADM

## 2024-11-19 RX ADMIN — AMLODIPINE BESYLATE 10 MG: 10 TABLET ORAL at 10:21

## 2024-11-19 RX ADMIN — OXYCODONE 10 MG: 5 TABLET ORAL at 10:29

## 2024-11-19 RX ADMIN — HEPARIN SODIUM 5000 UNITS: 5000 INJECTION INTRAVENOUS; SUBCUTANEOUS at 10:21

## 2024-11-19 RX ADMIN — OXYCODONE 10 MG: 5 TABLET ORAL at 17:41

## 2024-11-19 RX ADMIN — HEPARIN SODIUM 5000 UNITS: 5000 INJECTION INTRAVENOUS; SUBCUTANEOUS at 20:21

## 2024-11-19 RX ADMIN — ACETAMINOPHEN 325MG 650 MG: 325 TABLET ORAL at 20:18

## 2024-11-19 RX ADMIN — FOLIC ACID 1 MG: 5 INJECTION, SOLUTION INTRAMUSCULAR; INTRAVENOUS; SUBCUTANEOUS at 10:29

## 2024-11-19 RX ADMIN — OXYCODONE 10 MG: 5 TABLET ORAL at 00:41

## 2024-11-19 RX ADMIN — Medication 100 MG: at 10:21

## 2024-11-19 RX ADMIN — OXYCODONE 10 MG: 5 TABLET ORAL at 05:25

## 2024-11-19 RX ADMIN — SENNOSIDES AND DOCUSATE SODIUM 1 TABLET: 50; 8.6 TABLET ORAL at 10:21

## 2024-11-19 RX ADMIN — WATER 2000 MG: 1 INJECTION INTRAMUSCULAR; INTRAVENOUS; SUBCUTANEOUS at 17:41

## 2024-11-19 RX ADMIN — WATER 2000 MG: 1 INJECTION INTRAMUSCULAR; INTRAVENOUS; SUBCUTANEOUS at 05:23

## 2024-11-19 ASSESSMENT — PAIN DESCRIPTION - ORIENTATION
ORIENTATION: LEFT
ORIENTATION: LEFT
ORIENTATION: LEFT;UPPER
ORIENTATION: LEFT

## 2024-11-19 ASSESSMENT — PAIN SCALES - GENERAL
PAINLEVEL_OUTOF10: 6
PAINLEVEL_OUTOF10: 0
PAINLEVEL_OUTOF10: 2
PAINLEVEL_OUTOF10: 9
PAINLEVEL_OUTOF10: 3
PAINLEVEL_OUTOF10: 0
PAINLEVEL_OUTOF10: 3
PAINLEVEL_OUTOF10: 0
PAINLEVEL_OUTOF10: 10
PAINLEVEL_OUTOF10: 3
PAINLEVEL_OUTOF10: 9
PAINLEVEL_OUTOF10: 6

## 2024-11-19 ASSESSMENT — PAIN DESCRIPTION - PAIN TYPE
TYPE: ACUTE PAIN

## 2024-11-19 ASSESSMENT — PAIN DESCRIPTION - FREQUENCY
FREQUENCY: CONTINUOUS

## 2024-11-19 ASSESSMENT — PAIN DESCRIPTION - LOCATION
LOCATION: BACK;CHEST;SHOULDER
LOCATION: BACK;CHEST
LOCATION: ARM;CHEST
LOCATION: BACK;CHEST;SHOULDER

## 2024-11-19 ASSESSMENT — PAIN DESCRIPTION - DESCRIPTORS
DESCRIPTORS: SHARP
DESCRIPTORS: ACHING
DESCRIPTORS: ACHING;TINGLING;SHARP
DESCRIPTORS: TINGLING;SHARP

## 2024-11-19 ASSESSMENT — PAIN - FUNCTIONAL ASSESSMENT
PAIN_FUNCTIONAL_ASSESSMENT: ACTIVITIES ARE NOT PREVENTED

## 2024-11-19 ASSESSMENT — PAIN DESCRIPTION - ONSET
ONSET: ON-GOING
ONSET: ON-GOING
ONSET: GRADUAL

## 2024-11-19 NOTE — PROGRESS NOTES
Infectious Disease Progress Note        Reason: sepsis, group B streptococcus bacteremia    Current abx Prior abx   Ceftriaxone  vancomycin     Lines:       Assessment :  64-year-old man with past medical history significant for hypertension presented to [facility] on 11/12/24 with left arm numbness, chest discomfort x 1 day    Hospitalization [facility] September 2024 for infected right ankle wound/cellulitis  Status post I&D on 9/8/2024  IntraOp cultures 9/8-MRSA, heavy mixed skin dimitri, group A strep    Now with fever Tmax 102, persistent thoracic/lumbar spine pain/tenderness, group B streptococcus bacteremia, left upper chest erythema/tenderness    Clinical presentation consistent with sepsis-present on admission due to group B streptococcus bacteremia, probable   Left sternoclavicular joint septic arthritis    MRI c-spine with findings concerning for thoracic spine infection.  Improved back pain/resolved thoracic, lumbar spine tenderness on today's exam      Exact source of group B streptococcus in the blood not entirely clear.  No definitive evidence of skin/soft tissue infection at this time.- ?  Open sores versus unreported IV drug abuse.  Urine drug screen positive for opiates.  Patient denies IV drug abuse.  However girlfriend is concerned that patient may have an unreported IV drug abuse  She also reports patient had episode of binge drinking last weekend    Left SC joint septic arthritis: MRI findings 11/14 confirms clinical suspicion of left SC joint septic arthritis.  No worsening pain/erythema noted on today's exam.  Thoracic surgery eval appreciated.  Status post IR guided drainage 11/18/2024.  Cultures 11/18-no growth so far    History of perirectal abscess-status post I&D October 2022.  Currently no signs of active infection.  Occasional minimal serous drainage from that site sent for cultures on 11/13/2024.  Cultures 11/13-diphtheroid, group B streptococcus likely skin dimitri.     MRI 11/15 with evidence of  11/12/24 1934    Order Status: Completed Specimen: Blood Updated: 11/15/24 0848     Special Requests NO SPECIAL REQUESTS        Gram Stain       AEROBIC AND ANAEROBIC BOTTLES Gram positive cocci IN PAIRS IN CHAINS                  SMEAR CALLED TO AND CORRECTLY REPEATED BY: RICHIE CHRISTIANSON MD ER ON [id number] AT 1005 TO KNE           Culture       STREPTOCOCCI, BETA HEMOLYTIC GROUP B GROWING IN BOTH BOTTLES DRAWN No Site Indicated          Susceptibility        Beta Hemolytic Streptococcus - Group B      BACTERIAL SUSCEPTIBILITY PANEL ALEC      ampicillin <=0.25 ug/mL Sensitive      cefotaxime <=0.12 ug/mL Sensitive      cefTRIAXone <=0.12 ug/mL Sensitive      clindamycin <=0.25 ug/mL Sensitive      Inducible Clindamycin Negative ug/mL Sensitive      levofloxacin 1 ug/mL Sensitive      linezolid <=2 ug/mL Sensitive      Penicillin G 0.12 ug/mL Sensitive      vancomycin 0.5 ug/mL Sensitive                           COVID-19 & Influenza Combo [[id number]] Collected: 11/12/24 1934    Order Status: Completed Specimen: Nasopharyngeal Updated: 11/12/24 2041     Source Nasopharyngeal        SARS-CoV-2, PCR Not detected        Comment: Not Detected results do not preclude SARS-CoV-2 infection and should not be used as the sole basis for patient management decisions.Results must be combined with clinical observations, patient history, and epidemiological information.        Rapid Influenza A By PCR Not detected        Rapid Influenza B By PCR Not detected        Comment: Testing was performed using elsa Anita SARS-CoV-2 and Influenza A/B nucleic acid assay.  This test is a multiplex Real-Time Reverse Transcriptase Polymerase Chain Reaction (RT-PCR) based in vitro diagnostic test intended for the qualitative detection of nucleic acids from SARS-CoV-2, Influenza A, and Influenza B in nasopharyngeal specimens.         Culture, Blood, PCR ID Panel [[id number]]  (Abnormal) Collected: 11/12/24 1934    Order Status: Completed Specimen: Blood

## 2024-11-19 NOTE — PROGRESS NOTES
4 Eyes Skin Assessment     NAME:  Dora Frazier  YOB: 1960  MEDICAL RECORD NUMBER:  [id number]    The patient is being assessed for  Shift Handoff    I agree that at least one RN has performed a thorough Head to Toe Skin Assessment on the patient. ALL assessment sites listed below have been assessed.      Areas assessed by both nurses:    Head, Face, Ears, Shoulders, Back, Chest, Arms, Elbows, Hands, Sacrum. Buttock, Coccyx, Ischium, Legs. Feet and Heels, and Under Medical Devices         Does the Patient have a Wound? Yes wound(s) were present on assessment. LDA wound assessment was Initiated and completed by RN       Hemant Prevention initiated by RN: No  Wound Care Orders initiated by RN: No    Pressure Injury (Stage 3,4, Unstageable, DTI, NWPT, and Complex wounds) if present, place Wound referral order by RN under : No    New Ostomies, if present place, Ostomy referral order under : No     Abscess to inner buttocks    Nurse 1 eSignature: Electronically signed by Hannah Deng RN on 11/19/24 at 7:47 AM EST    **SHARE this note so that the co-signing nurse can place an eSignature**    Nurse 2 eSignature: Electronically signed by Sivan Avalos RN on 11/19/24 at 7:48 AM EST

## 2024-11-19 NOTE — PLAN OF CARE
family/caregiver on patient safety  11/18/2024 1202 by Kenisha Stearns RN  Outcome: Progressing     Problem: Cardiovascular - Adult  Goal: Maintains optimal cardiac output and hemodynamic stability  Description: Monitor blood pressure and heart rate  Recent Flowsheet Documentation  Taken 11/18/2024 2020 by Hannah Deng RN  Maintains optimal cardiac output and hemodynamic stability: Monitor blood pressure and heart rate  11/18/2024 1202 by Kenisha Stearns RN  Outcome: Progressing  Goal: Absence of cardiac dysrhythmias or at baseline  Description: Monitor telemetry and assess for rhythm changes  Recent Flowsheet Documentation  Taken 11/18/2024 2020 by Hannah Deng RN  Absence of cardiac dysrhythmias or at baseline: Monitor cardiac rate and rhythm  11/18/2024 1202 by Kenisha Stearns RN  Outcome: Progressing     Problem: Skin/Tissue Integrity  Goal: Absence of new skin breakdown  Description: Monitor for areas of redness and/or skin breakdown      11/18/2024 1202 by Kenisha Stearns RN  Outcome: Progressing     Problem: Skin/Tissue Integrity - Adult  Goal: Incisions, wounds, or drain sites healing without S/S of infection  Description: Monitor abscess for increased redness, swelling, or drainage  11/19/2024 0006 by Hannah Deng RN  Outcome: Progressing  Flowsheets (Taken 11/18/2024 2020)  Incisions, Wounds, or Drain Sites Healing Without Sign and Symptoms of Infection: ADMISSION and DAILY: Assess and document risk factors for pressure ulcer development  11/18/2024 1202 by Kenisha Stearns RN  Outcome: Progressing     Problem: Musculoskeletal - Adult  Goal: Maintain proper alignment of affected body part  Description: Assist with ADLs as needed for R shoulder pain.  Administer meds for pain   11/18/2024 1202 by Kenisha Stearns RN  Outcome: Progressing     Problem: Infection - Adult  Goal: Absence of infection at discharge  Description: Administer IV antibiotics, monitor for fever, monitor WBC count  Recent Flowsheet  Documentation  Taken 11/18/2024 2020 by Hannah Deng, RN  Absence of infection at discharge: Assess and monitor for signs and symptoms of infection  11/18/2024 1202 by Kenisha Stearns, RN  Outcome: Progressing

## 2024-11-19 NOTE — PROGRESS NOTES
[facility]  Progress Note    Patient: Dora Frazier Age: 64 y.o. : 1960 MR#: [id number] SSN: [id number]  Date/Time: 2024     Subjective:   Patient seen and examined.  No acute events overnight.    Has no cp no sob;    Assessment/Plan:   Group B STREP bacteremia   High-dose ceftriaxone every 12h- ID following and aiding in ABX management, appreciate assistance.  Repeat blood cultures no growth to date.    MRI thoracic spine with evidence of phlegmon T1-T4. Orthopedic surgery following.      Continue medical management for now suspect patient may need surgery if worsening symptoms/clinical deterioration.  Appreciate orthopedic assistance.    MRI lumbar spine without abscess, however with severe narrowing at multiple vertebral levels will need to follow-up outpatient with spine surgery.     Per spine surgery;    Re-image in 4-6 weeks or if symptoms re-demonstrate.  Fu in office in four weeks;    Continue abx per ID, recommend at least 6 weeks combined therapy given possible septic facets.  Epidural phlegmon, steadily improving clinical course on abx. No plan for surgery at this time    -MRI chest with possible left SC joint septic joint.  Thoracic evaluation appreciated,     S/p Thoracic evaluation appreciated, plan for IR for joint aspiration on .  Await culture results    -Suspect chest pain is musculoskeletal secondary to suspected abscess.  Clinical course not consistent with acute coronary syndrome.  Normal troponin, no changes on EKG.  Pain management  Hx of Abscesses req I&D in the past- jenifer rectal and ankle    Wound culture of rectal abscess sent    HTN  Blood pressure medication added    Alcohol use  Thiamine, folic acid    Does not appear to be in withdrawal at this time.  Nursing to contact hospitalist if CIWA protocol needs to be applied.  Tobacco abuse   Nicotine patch    Dispo plan: Home with home health with IV antibiotics.  Patient has no  Hematocrit 36.1 36.0 - 48.0 %    MCV 90.5 78.0 - 100.0 FL    MCH 28.6 24.0 - 34.0 PG    MCHC 31.6 31.0 - 37.0 g/dL    RDW 13.7 11.6 - 14.5 %    Platelets 362 135 - 420 K/uL    MPV 9.0 (L) 9.2 - 11.8 FL    Nucleated RBCs 0.0 0  WBC    nRBC 0.00 0.00 - 0.01 K/uL    Neutrophils % 43 40 - 73 %    Lymphocytes % 36 21 - 52 %    Monocytes % 15 (H) 3 - 10 %    Eosinophils % 4 0 - 5 %    Basophils % 1 0 - 2 %    Immature Granulocytes % 2 (H) 0.0 - 0.5 %    Neutrophils Absolute 2.6 1.8 - 8.0 K/UL    Lymphocytes Absolute 2.1 0.9 - 3.6 K/UL    Monocytes Absolute 0.9 0.05 - 1.2 K/UL    Eosinophils Absolute 0.2 0.0 - 0.4 K/UL    Basophils Absolute 0.0 0.0 - 0.1 K/UL    Immature Granulocytes Absolute 0.1 (H) 0.00 - 0.04 K/UL    Differential Type AUTOMATED       Signed By: Parish Anand MD     November 19, 2024      Disclaimer: Sections of this note are dictated using utilizing voice recognition software.  Minor typographical errors may be present. If questions arise, please do not hesitate to contact me or call our department.

## 2024-11-19 NOTE — CARE COORDINATION
Called [facility] nursing [place] [phone] to see if they could accept patient for 6 weeks of IV antibiotics. No answer, left voicemail to call back.     Soraya LAURENTN, RN   Case Management   [phone]     Dressing: pressure dressing with telfa

## 2024-11-19 NOTE — PROGRESS NOTES
Vss afeb  Neuro intact  Pain improving  Wbc nl  Plan  Abx per ID  Re-image in 4-6 weeks or if symptoms re-demonstrate.  Fu in office in four weeks  Re-consult if new issues arise.

## 2024-11-19 NOTE — PROGRESS NOTES
4 Eyes Skin Assessment     NAME:  Dora Frazier  YOB: 1960  MEDICAL RECORD NUMBER:  [id number]    The patient is being assessed for  Shift Handoff    I agree that at least one RN has performed a thorough Head to Toe Skin Assessment on the patient. ALL assessment sites listed below have been assessed.      Areas assessed by both nurses:    Head, Face, Ears, Shoulders, Back, Chest, Arms, Elbows, Hands, Sacrum. Buttock, Coccyx, Ischium, and Under Medical Devices         Does the Patient have a Wound? Yes wound(s) were present on assessment. LDA wound assessment was Initiated and completed by RN       Hemant Prevention initiated by RN: No  Wound Care Orders initiated by RN: No    Pressure Injury (Stage 3,4, Unstageable, DTI, NWPT, and Complex wounds) if present, place Wound referral order by RN under : No    New Ostomies, if present place, Ostomy referral order under : No     Nurse 1 eSignature: Electronically signed by Kenisha Stearns RN on 11/18/24 at 8:23 PM EST    **SHARE this note so that the co-signing nurse can place an eSignature**    Nurse 2 eSignature: {Esignature:[id number]}

## 2024-11-19 NOTE — PLAN OF CARE
Problem: Discharge Planning  Goal: Discharge to home or other facility with appropriate resources  Description: Patient has no insurance and possibly needs IV Antibiotics. Discussed with case management    11/19/2024 1303 by Sivan Avalos RN  Outcome: Progressing  11/19/2024 0006 by Hannah Deng RN  Outcome: Progressing  Flowsheets (Taken 11/18/2024 2020)  Discharge to home or other facility with appropriate resources: Identify barriers to discharge with patient and caregiver     Problem: Pain  Goal: Verbalizes/displays adequate comfort level or baseline comfort level  Description: Pain is controlled with oxycodone.  Patient reports improved pain from over the weekend. Administer pain medications as ordered to control pain    11/19/2024 1303 by Sivan Avalos RN  Outcome: Progressing  11/19/2024 0006 by Hannah Deng RN  Outcome: Progressing  Flowsheets (Taken 11/17/2024 2245)  Verbalizes/displays adequate comfort level or baseline comfort level:   Assess pain using appropriate pain scale   Encourage patient to monitor pain and request assistance   Administer analgesics based on type and severity of pain and evaluate response   Implement non-pharmacological measures as appropriate and evaluate response     Problem: Safety - Adult  Goal: Free from fall injury  Description: Fall precautions. Reorient to situation as needed.    11/19/2024 1303 by Sivan Avalos RN  Outcome: Progressing  11/19/2024 0006 by Hannah Deng RN  Outcome: Progressing  Flowsheets (Taken 11/17/2024 2245)  Free From Fall Injury: Instruct family/caregiver on patient safety     Problem: Cardiovascular - Adult  Goal: Maintains optimal cardiac output and hemodynamic stability  Description: Monitor blood pressure and heart rate  Outcome: Progressing  Goal: Absence of cardiac dysrhythmias or at baseline  Description: Monitor telemetry and assess for rhythm changes  Outcome: Progressing     Problem: Skin/Tissue Integrity -  Adult  Goal: Incisions, wounds, or drain sites healing without S/S of infection  Description: Monitor abscess for increased redness, swelling, or drainage  11/19/2024 1303 by Sivan Avalos, RN  Outcome: Progressing  11/19/2024 0006 by Hannah Deng, RN  Outcome: Progressing  Flowsheets (Taken 11/18/2024 2020)  Incisions, Wounds, or Drain Sites Healing Without Sign and Symptoms of Infection: ADMISSION and DAILY: Assess and document risk factors for pressure ulcer development     Problem: Musculoskeletal - Adult  Goal: Maintain proper alignment of affected body part  Description: Assist with ADLs as needed for R shoulder pain.  Administer meds for pain   Outcome: Progressing     Problem: Infection - Adult  Goal: Absence of infection at discharge  Description: Administer IV antibiotics, monitor for fever, monitor WBC count  Outcome: Progressing     Problem: Skin/Tissue Integrity  Goal: Absence of new skin breakdown  Description: Monitor for areas of redness and/or skin breakdown      Outcome: Progressing

## 2024-11-20 LAB
ANION GAP SERPL CALC-SCNC: 4 MMOL/L (ref 3–18)
BACTERIA SPEC CULT: NORMAL
BASOPHILS # BLD: 0.1 K/UL (ref 0–0.1)
BASOPHILS NFR BLD: 1 % (ref 0–2)
BUN SERPL-MCNC: 12 MG/DL (ref 7–18)
BUN/CREAT SERPL: 14 (ref 12–20)
CALCIUM SERPL-MCNC: 9.5 MG/DL (ref 8.5–10.1)
CHLORIDE SERPL-SCNC: 104 MMOL/L (ref 100–111)
CO2 SERPL-SCNC: 28 MMOL/L (ref 21–32)
CREAT SERPL-MCNC: 0.87 MG/DL (ref 0.6–1.3)
DIFFERENTIAL METHOD BLD: ABNORMAL
EOSINOPHIL # BLD: 0.3 K/UL (ref 0–0.4)
EOSINOPHIL NFR BLD: 5 % (ref 0–5)
ERYTHROCYTE [DISTWIDTH] IN BLOOD BY AUTOMATED COUNT: 13.7 % (ref 11.6–14.5)
GLUCOSE SERPL-MCNC: 98 MG/DL (ref 74–99)
HCT VFR BLD AUTO: 34.8 % (ref 36–48)
HGB BLD-MCNC: 11 G/DL (ref 13–16)
IMM GRANULOCYTES # BLD AUTO: 0.1 K/UL (ref 0–0.04)
IMM GRANULOCYTES NFR BLD AUTO: 2 % (ref 0–0.5)
LYMPHOCYTES # BLD: 2.1 K/UL (ref 0.9–3.6)
LYMPHOCYTES NFR BLD: 38 % (ref 21–52)
MCH RBC QN AUTO: 28.4 PG (ref 24–34)
MCHC RBC AUTO-ENTMCNC: 31.6 G/DL (ref 31–37)
MCV RBC AUTO: 89.7 FL (ref 78–100)
MONOCYTES # BLD: 0.9 K/UL (ref 0.05–1.2)
MONOCYTES NFR BLD: 16 % (ref 3–10)
NEUTS SEG # BLD: 2.1 K/UL (ref 1.8–8)
NEUTS SEG NFR BLD: 38 % (ref 40–73)
NRBC # BLD: 0 K/UL (ref 0–0.01)
NRBC BLD-RTO: 0 PER 100 WBC
PLATELET # BLD AUTO: 374 K/UL (ref 135–420)
PMV BLD AUTO: 9.5 FL (ref 9.2–11.8)
POTASSIUM SERPL-SCNC: 3.9 MMOL/L (ref 3.5–5.5)
RBC # BLD AUTO: 3.88 M/UL (ref 4.35–5.65)
SERVICE CMNT-IMP: NORMAL
SODIUM SERPL-SCNC: 136 MMOL/L (ref 136–145)
WBC # BLD AUTO: 5.6 K/UL (ref 4.6–13.2)

## 2024-11-20 PROCEDURE — 1100000003 HC PRIVATE W/ TELEMETRY

## 2024-11-20 PROCEDURE — 6360000002 HC RX W HCPCS: Performed by: STUDENT IN AN ORGANIZED HEALTH CARE EDUCATION/TRAINING PROGRAM

## 2024-11-20 PROCEDURE — 6370000000 HC RX 637 (ALT 250 FOR IP): Performed by: STUDENT IN AN ORGANIZED HEALTH CARE EDUCATION/TRAINING PROGRAM

## 2024-11-20 PROCEDURE — 6370000000 HC RX 637 (ALT 250 FOR IP): Performed by: INTERNAL MEDICINE

## 2024-11-20 PROCEDURE — 6360000002 HC RX W HCPCS: Performed by: INTERNAL MEDICINE

## 2024-11-20 PROCEDURE — 85025 COMPLETE CBC W/AUTO DIFF WBC: CPT

## 2024-11-20 PROCEDURE — 80048 BASIC METABOLIC PNL TOTAL CA: CPT

## 2024-11-20 PROCEDURE — 94761 N-INVAS EAR/PLS OXIMETRY MLT: CPT

## 2024-11-20 PROCEDURE — 99231 SBSQ HOSP IP/OBS SF/LOW 25: CPT | Performed by: INTERNAL MEDICINE

## 2024-11-20 PROCEDURE — 36415 COLL VENOUS BLD VENIPUNCTURE: CPT

## 2024-11-20 PROCEDURE — 2580000003 HC RX 258: Performed by: INTERNAL MEDICINE

## 2024-11-20 RX ADMIN — WATER 2000 MG: 1 INJECTION INTRAMUSCULAR; INTRAVENOUS; SUBCUTANEOUS at 17:35

## 2024-11-20 RX ADMIN — AMLODIPINE BESYLATE 10 MG: 10 TABLET ORAL at 09:34

## 2024-11-20 RX ADMIN — OXYCODONE 10 MG: 5 TABLET ORAL at 19:28

## 2024-11-20 RX ADMIN — FOLIC ACID 1 MG: 1 TABLET ORAL at 09:33

## 2024-11-20 RX ADMIN — OXYCODONE 10 MG: 5 TABLET ORAL at 10:30

## 2024-11-20 RX ADMIN — Medication 100 MG: at 09:33

## 2024-11-20 RX ADMIN — HEPARIN SODIUM 5000 UNITS: 5000 INJECTION INTRAVENOUS; SUBCUTANEOUS at 21:23

## 2024-11-20 RX ADMIN — HEPARIN SODIUM 5000 UNITS: 5000 INJECTION INTRAVENOUS; SUBCUTANEOUS at 09:34

## 2024-11-20 RX ADMIN — SENNOSIDES AND DOCUSATE SODIUM 1 TABLET: 50; 8.6 TABLET ORAL at 09:33

## 2024-11-20 RX ADMIN — OXYCODONE 10 MG: 5 TABLET ORAL at 23:40

## 2024-11-20 RX ADMIN — WATER 2000 MG: 1 INJECTION INTRAMUSCULAR; INTRAVENOUS; SUBCUTANEOUS at 04:49

## 2024-11-20 ASSESSMENT — PAIN DESCRIPTION - FREQUENCY
FREQUENCY: CONTINUOUS
FREQUENCY: INTERMITTENT
FREQUENCY: INTERMITTENT

## 2024-11-20 ASSESSMENT — PAIN DESCRIPTION - DIRECTION: RADIATING_TOWARDS: CHEST

## 2024-11-20 ASSESSMENT — PAIN SCALES - GENERAL
PAINLEVEL_OUTOF10: 8
PAINLEVEL_OUTOF10: 2
PAINLEVEL_OUTOF10: 10
PAINLEVEL_OUTOF10: 0
PAINLEVEL_OUTOF10: 10
PAINLEVEL_OUTOF10: 7

## 2024-11-20 ASSESSMENT — PAIN - FUNCTIONAL ASSESSMENT
PAIN_FUNCTIONAL_ASSESSMENT: ACTIVITIES ARE NOT PREVENTED

## 2024-11-20 ASSESSMENT — PAIN DESCRIPTION - ORIENTATION
ORIENTATION: LEFT
ORIENTATION: LEFT
ORIENTATION: RIGHT;LEFT;UPPER

## 2024-11-20 ASSESSMENT — PAIN DESCRIPTION - ONSET
ONSET: PROGRESSIVE
ONSET: PROGRESSIVE
ONSET: ON-GOING

## 2024-11-20 ASSESSMENT — PAIN DESCRIPTION - LOCATION
LOCATION: SHOULDER
LOCATION: SHOULDER
LOCATION: CHEST;ARM;SHOULDER

## 2024-11-20 ASSESSMENT — PAIN DESCRIPTION - DESCRIPTORS
DESCRIPTORS: ACHING
DESCRIPTORS: ACHING;SHARP;TINGLING
DESCRIPTORS: ACHING

## 2024-11-20 ASSESSMENT — PAIN DESCRIPTION - PAIN TYPE
TYPE: ACUTE PAIN

## 2024-11-20 NOTE — PROGRESS NOTES
Admit Date: 2024    POD * No surgery found *    Procedure:  * No surgery found *    Subjective:   Patient seen, chart reviewed, all acute events noted.  Patient presently is awake alert and answers all question appropriate.  Patient states he is feeling much more comfortable.  Still with some mild pain but significant decrease since admission.  Patient denies any fever or chills.  Reports a very good appetite with no nausea or vomiting.  Patient is a 64 y.o. male who is being seen on consult for SC Joint infection, at  Dr. Jimenez's request.   Patient presented with a history of recent ankle infection who presented with bacteremia and shoulder pain x 3 days. He presented to ED for same and was found to have bactereamia  Objective:     Blood pressure (!) 162/75, pulse 84, temperature 98.6 °F (37 °C), temperature source Oral, resp. rate 17, height 1.803 m (5' 11\"), weight 83.9 kg (185 lb), SpO2 97%.    Temp (24hrs), Av.6 °F (37 °C), Min:98.2 °F (36.8 °C), Max:99.3 °F (37.4 °C)      Physical Exam:    General:   awake alert and oriented   Skin:   no rashes or skin lesions noted on limited exam   HEENT:  Normocephalic, atraumatic, PERRL, EOMI, no scleral icterus or pallor; no conjunctival hemmohage;  nasal and oral mucous are moist and without evidence of lesions. No thrush. Dentition good Neck supple, no bruits.   Lymph Nodes:   no cervical, axillary or inguinal adenopathy   Lungs:   non-labored, bilaterally clear to aspiration- no crackles wheezes rales or rhonchi   Heart:  RRR, s1 and s2; no murmurs rubs or gallops, no edema, + pedal pulses   Abdomen:  soft, non-distended, active bowel sounds, no hepatomegaly, no splenomegaly. Appropriate surgical scars for stated surgeries. Non-tender   Genitourinary:  deferred   Extremities:   no clubbing, cyanosis; no joint effusions or swelling; Full ROM of all large joints to the upper and lower extremities; muscle mass appropriate for age   Neurologic:  No gross focal

## 2024-11-20 NOTE — CARE COORDINATION
Requested that the  follow up to determine if patient is Medicaid pending.  If he is, will need to complete an [facility] for long term care.    Also requested that the  follow up to determine if the patient has a discharge place from long term care when IV antibiotics are completed.

## 2024-11-20 NOTE — CARE COORDINATION
Spoke to patient at bedside for possible transfer to [facility] to complete IV antibiotics. Patient stated he needs to speak with his daughter tonight after she gets off work.     Emailed Gloria with [employer] on 11/19 to see if she could screen patient for Medicaid. Follow-up email sent today to see if patient will meet criteria. Waiting response.     Soraya LAURENTN, RN   Case Management   [phone]

## 2024-11-20 NOTE — PROGRESS NOTES
Comprehensive Nutrition Assessment    Type and Reason for Visit:  Initial, LOS    Nutrition Recommendations/Plan:   Honor patient request (coffee TID with meals)  No further recommendations, good appetite/intake     Malnutrition Assessment:  Malnutrition Status:  No indicators at this time    Nutrition Assessment:    LOS Assessment. Pt is a 65 y/o male admitted for GPC bacteremia, abscess    Nutrition Related Findings:    LBM 11/19, no edema noted Wound Type: Skin Tears, Surgical Incision (abscess)       Current Nutrition Intake & Therapies:    Average Meal Intake: %  Average Supplements Intake: None Ordered  ADULT DIET; Regular    Anthropometric Measures:  Height: 180.3 cm (5' 10.98\")  Ideal Body Weight (IBW): 172 lbs (78 kg)    Admission Body Weight: 83.9 kg (184 lb 15.5 oz)  Current Body Weight: 83.9 kg (184 lb 15.5 oz), 107.5 % IBW. Weight Source: Stated  Current BMI (kg/m2): 25.8           Weight Adjustment For: No Adjustment                 BMI Categories: Overweight (BMI 25.0-29.9)    Estimated Daily Nutrient Needs:  Energy Requirements Based On: Kcal/kg  Weight Used for Energy Requirements: Current  Energy (kcal/day): 8068-0154 kcal (25-30 kcal/kg)  Weight Used for Protein Requirements: Current  Protein (g/day): 84--101 g (1-1.2 g/kg)  Method Used for Fluid Requirements: 1 ml/kcal  Fluid (ml/day): 6497-5177 mL or per MD    Nutrition Diagnosis:   No nutrition diagnosis at this time related to   as evidenced by      Nutrition Interventions:   Food and/or Nutrient Delivery: Continue Current Diet        Plan of Care discussed with: patient    Goals:  Goals: Maintain adequate nutrition status, prior to discharge  Type of Goal: New goal       Nutrition Monitoring and Evaluation:   Behavioral-Environmental Outcomes: None Identified  Food/Nutrient Intake Outcomes: Food and Nutrient Intake  Physical Signs/Symptoms Outcomes: Meal Time Behavior, GI Status, Weight    Discharge Planning:    No discharge needs at  this time     Marnie Asencio, LUIS  Contact: [phone]

## 2024-11-20 NOTE — PLAN OF CARE
Problem: Pain  Goal: Verbalizes/displays adequate comfort level or baseline comfort level  Description: Pain is controlled with oxycodone.  Patient reports improved pain from over the weekend. Administer pain medications as ordered to control pain    11/20/2024 0109 by Carolina Aguirre, RN  Outcome: Progressing  Flowsheets (Taken 11/17/2024 8511 by Hannah Deng, RN)  Verbalizes/displays adequate comfort level or baseline comfort level:   Assess pain using appropriate pain scale   Encourage patient to monitor pain and request assistance   Administer analgesics based on type and severity of pain and evaluate response   Implement non-pharmacological measures as appropriate and evaluate response  Note: Patient's upper right arm pain was 6/10, Tylenol was given for pain; pain improved.  11/19/2024 1303 by Sivan Avalos, RN  Outcome: Progressing

## 2024-11-20 NOTE — PLAN OF CARE
Problem: Discharge Planning  Goal: Discharge to home or other facility with appropriate resources  Description: Patient has no insurance and needs IV Antibiotics. Discussed with case management. Possible d/c to [facility]    11/20/2024 1136 by Sivan Avalos RN  Outcome: Progressing  11/20/2024 0109 by Carolina Aguirre, RN  Outcome: Progressing  Flowsheets (Taken 11/18/2024 2020 by Hannah Deng, RN)  Discharge to home or other facility with appropriate resources: Identify barriers to discharge with patient and caregiver  Note: Patient is able to maintain care independently upon discharge.     Problem: Pain  Goal: Verbalizes/displays adequate comfort level or baseline comfort level  Description: Pain is controlled with oxycodone.  Patient reports improved pain from over the weekend. Administer pain medications as ordered to control pain    11/20/2024 1136 by Sivan Avalos RN  Outcome: Progressing  11/20/2024 0109 by Carolina Aguirre RN  Outcome: Progressing  Flowsheets (Taken 11/17/2024 2245 by Hannah Deng, RN)  Verbalizes/displays adequate comfort level or baseline comfort level:   Assess pain using appropriate pain scale   Encourage patient to monitor pain and request assistance   Administer analgesics based on type and severity of pain and evaluate response   Implement non-pharmacological measures as appropriate and evaluate response  Note: Patient's upper right arm pain was 6/10, Tylenol was given for pain; pain improved.       Problem: Safety - Adult  Goal: Free from fall injury  Description: Fall precautions. Patient ambulatory in room  11/20/2024 1136 by Sivan Avalos RN  Outcome: Progressing  11/20/2024 0109 by Carolina Aguirre RN  Outcome: Progressing  Flowsheets (Taken 11/17/2024 2245 by Hannah Deng, RN)  Free From Fall Injury: Instruct family/caregiver on patient safety  Note: Patient ambulates with yellow socks.     Problem: Cardiovascular - Adult  Goal: Maintains optimal

## 2024-11-20 NOTE — PROGRESS NOTES
1905 - Received bedside report from Sivan MENDEZ, patient was resting in bed, watching television, bed in lowest position and pt oriented to call button.

## 2024-11-20 NOTE — PROGRESS NOTES
4 Eyes Skin Assessment     NAME:  Dora Frazier  YOB: 1960  MEDICAL RECORD NUMBER:  [id number]    The patient is being assessed for  Shift Handoff    I agree that at least one RN has performed a thorough Head to Toe Skin Assessment on the patient. ALL assessment sites listed below have been assessed.      Areas assessed by both nurses:    Head, Face, Ears, Shoulders, Back, Chest, Arms, Elbows, Hands, Sacrum. Buttock, Coccyx, Ischium, Legs. Feet and Heels, and Under Medical Devices         Does the Patient have a Wound? Yes wound(s) were present on assessment. LDA wound assessment was Initiated and completed by RN       Hemant Prevention initiated by RN: No  Wound Care Orders initiated by RN: No    Pressure Injury (Stage 3,4, Unstageable, DTI, NWPT, and Complex wounds) if present, place Wound referral order by RN under : No    New Ostomies, if present place, Ostomy referral order under : No     Nurse 1 eSignature: Electronically signed by Carolina Aguirre RN on 11/20/24 at 7:41 AM EST    **SHARE this note so that the co-signing nurse can place an eSignature**    Nurse 2 eSignature: {Esignature:[id number]}

## 2024-11-20 NOTE — PROGRESS NOTES
pending    I spent 35 minutes with the patient in face-to-face consultation, of which greater than 50% was spent in counseling and coordination of care as described above.    Case discussed with:  [x]Patient  []Family  [x]Nursing  [x]Case Management  DVT Prophylaxis:  []Lovenox  []Hep SQ  [x]SCDs  []Coumadin   []Eliquis/Xarelto     Objective:   VS: BP (!) 162/75   Pulse 84   Temp 98.6 °F (37 °C) (Oral)   Resp 17   Ht 1.803 m (5' 11\")   Wt 83.9 kg (185 lb)   SpO2 97%   BMI 25.80 kg/m²    Tmax/24hrs: Temp (24hrs), Av.6 °F (37 °C), Min:98.2 °F (36.8 °C), Max:99.3 °F (37.4 °C)  IOBRIEF  Intake/Output Summary (Last 24 hours) at 2024 1446  Last data filed at 2024 0445  Gross per 24 hour   Intake 240 ml   Output --   Net 240 ml     General:  Alert, cooperative, no acute distress    Cardiovascular: Regular rate and Rhythm.    GI:  Soft, Non distended, Non tender. + Bowel sounds.  Extremities:  No edema.  Psych: Good insight. Not anxious or agitated.  Neurologic: Alert and oriented X 3. Moves all ext.    Medications:   Current Facility-Administered Medications   Medication Dose Route Frequency    folic acid (FOLVITE) tablet 1 mg  1 mg Oral Daily    heparin (porcine) injection 5,000 Units  5,000 Units SubCUTAneous BID    acetaminophen (TYLENOL) tablet 650 mg  650 mg Oral Q4H PRN    thiamine mononitrate tablet 100 mg  100 mg Oral Daily    oxyCODONE (ROXICODONE) immediate release tablet 10 mg  10 mg Oral Q4H PRN    HYDROmorphone HCl PF (DILAUDID) injection 1 mg  1 mg IntraVENous Q4H PRN    polyethylene glycol (GLYCOLAX) packet 17 g  17 g Oral Daily PRN    melatonin tablet 3 mg  3 mg Oral Nightly PRN    sennosides-docusate sodium (SENOKOT-S) 8.6-50 MG tablet 1 tablet  1 tablet Oral Daily    nitroGLYCERIN (NITROSTAT) SL tablet 0.4 mg  0.4 mg SubLINGual Q5 Min PRN    lidocaine 4 % external patch 1 patch  1 patch TransDERmal Daily    nicotine (NICODERM CQ) 14 MG/24HR 1 patch  1 patch TransDERmal Daily     recognition software.  Minor typographical errors may be present. If questions arise, please do not hesitate to contact me or call our department.

## 2024-11-20 NOTE — CARE COORDINATION
This writer has been in contact with [facility].  Once discharge location after care at [facility] has been finalized, they should be able to accept the patient.    [facility]AI will also need to be completed if the patient has applied for Medicaid.

## 2024-11-21 PROCEDURE — 6370000000 HC RX 637 (ALT 250 FOR IP): Performed by: STUDENT IN AN ORGANIZED HEALTH CARE EDUCATION/TRAINING PROGRAM

## 2024-11-21 PROCEDURE — 6370000000 HC RX 637 (ALT 250 FOR IP): Performed by: INTERNAL MEDICINE

## 2024-11-21 PROCEDURE — 2580000003 HC RX 258: Performed by: INTERNAL MEDICINE

## 2024-11-21 PROCEDURE — 02HV33Z INSERTION OF INFUSION DEVICE INTO SUPERIOR VENA CAVA, PERCUTANEOUS APPROACH: ICD-10-PCS | Performed by: INTERNAL MEDICINE

## 2024-11-21 PROCEDURE — 6360000002 HC RX W HCPCS: Performed by: STUDENT IN AN ORGANIZED HEALTH CARE EDUCATION/TRAINING PROGRAM

## 2024-11-21 PROCEDURE — 36569 INSJ PICC 5 YR+ W/O IMAGING: CPT

## 2024-11-21 PROCEDURE — 6360000002 HC RX W HCPCS: Performed by: INTERNAL MEDICINE

## 2024-11-21 PROCEDURE — 99231 SBSQ HOSP IP/OBS SF/LOW 25: CPT | Performed by: INTERNAL MEDICINE

## 2024-11-21 PROCEDURE — 1100000003 HC PRIVATE W/ TELEMETRY

## 2024-11-21 PROCEDURE — 94761 N-INVAS EAR/PLS OXIMETRY MLT: CPT

## 2024-11-21 RX ADMIN — Medication 100 MG: at 08:51

## 2024-11-21 RX ADMIN — HEPARIN SODIUM 5000 UNITS: 5000 INJECTION INTRAVENOUS; SUBCUTANEOUS at 09:07

## 2024-11-21 RX ADMIN — OXYCODONE 10 MG: 5 TABLET ORAL at 22:00

## 2024-11-21 RX ADMIN — HEPARIN SODIUM 5000 UNITS: 5000 INJECTION INTRAVENOUS; SUBCUTANEOUS at 20:23

## 2024-11-21 RX ADMIN — HYDROMORPHONE HYDROCHLORIDE 1 MG: 1 INJECTION, SOLUTION INTRAMUSCULAR; INTRAVENOUS; SUBCUTANEOUS at 17:24

## 2024-11-21 RX ADMIN — WATER 2000 MG: 1 INJECTION INTRAMUSCULAR; INTRAVENOUS; SUBCUTANEOUS at 05:19

## 2024-11-21 RX ADMIN — WATER 2000 MG: 1 INJECTION INTRAMUSCULAR; INTRAVENOUS; SUBCUTANEOUS at 17:19

## 2024-11-21 RX ADMIN — HYDROMORPHONE HYDROCHLORIDE 1 MG: 1 INJECTION, SOLUTION INTRAMUSCULAR; INTRAVENOUS; SUBCUTANEOUS at 09:02

## 2024-11-21 RX ADMIN — FOLIC ACID 1 MG: 1 TABLET ORAL at 08:51

## 2024-11-21 RX ADMIN — HYDROMORPHONE HYDROCHLORIDE 1 MG: 1 INJECTION, SOLUTION INTRAMUSCULAR; INTRAVENOUS; SUBCUTANEOUS at 13:30

## 2024-11-21 RX ADMIN — AMLODIPINE BESYLATE 10 MG: 10 TABLET ORAL at 08:51

## 2024-11-21 ASSESSMENT — PAIN SCALES - GENERAL
PAINLEVEL_OUTOF10: 4
PAINLEVEL_OUTOF10: 0
PAINLEVEL_OUTOF10: 0
PAINLEVEL_OUTOF10: 8
PAINLEVEL_OUTOF10: 9
PAINLEVEL_OUTOF10: 10
PAINLEVEL_OUTOF10: 10
PAINLEVEL_OUTOF10: 9
PAINLEVEL_OUTOF10: 5
PAINLEVEL_OUTOF10: 9
PAINLEVEL_OUTOF10: 10
PAINLEVEL_OUTOF10: 5

## 2024-11-21 ASSESSMENT — PAIN DESCRIPTION - LOCATION
LOCATION: STERNUM
LOCATION: BACK;SHOULDER
LOCATION: BACK;CHEST;SHOULDER
LOCATION: CHEST;BACK
LOCATION: STERNUM
LOCATION: CHEST;BACK
LOCATION: CHEST;BACK;SHOULDER

## 2024-11-21 ASSESSMENT — PAIN DESCRIPTION - ONSET
ONSET: GRADUAL

## 2024-11-21 ASSESSMENT — PAIN DESCRIPTION - FREQUENCY
FREQUENCY: INTERMITTENT
FREQUENCY: INTERMITTENT
FREQUENCY: CONTINUOUS
FREQUENCY: INTERMITTENT

## 2024-11-21 ASSESSMENT — PAIN DESCRIPTION - PAIN TYPE
TYPE: ACUTE PAIN

## 2024-11-21 ASSESSMENT — PAIN DESCRIPTION - DESCRIPTORS
DESCRIPTORS: ACHING;HEAVINESS
DESCRIPTORS: ACHING;HEAVINESS
DESCRIPTORS: PRESSURE
DESCRIPTORS: ACHING;SHARP
DESCRIPTORS: PRESSURE
DESCRIPTORS: ACHING;HEAVINESS
DESCRIPTORS: ACHING;HEAVINESS

## 2024-11-21 ASSESSMENT — PAIN DESCRIPTION - ORIENTATION
ORIENTATION: LEFT
ORIENTATION: LEFT;ANTERIOR;POSTERIOR
ORIENTATION: LEFT

## 2024-11-21 ASSESSMENT — PAIN - FUNCTIONAL ASSESSMENT
PAIN_FUNCTIONAL_ASSESSMENT: ACTIVITIES ARE NOT PREVENTED

## 2024-11-21 NOTE — PROGRESS NOTES
4 Eyes Skin Assessment     NAME:  Dora Frazier  YOB: 1960  MEDICAL RECORD NUMBER:  [id number]    The patient is being assessed for  Shift Handoff    I agree that at least one RN has performed a thorough Head to Toe Skin Assessment on the patient. ALL assessment sites listed below have been assessed.      Areas assessed by both nurses:    Head, Face, Ears, Shoulders, Back, Chest, Arms, Elbows, Hands, Sacrum. Buttock, Coccyx, Ischium, Legs. Feet and Heels, and Under Medical Devices         Does the Patient have a Wound? No noted wound(s)       Hemant Prevention initiated by RN: No  Wound Care Orders initiated by RN: No    Pressure Injury (Stage 3,4, Unstageable, DTI, NWPT, and Complex wounds) if present, place Wound referral order by RN under : No    New Ostomies, if present place, Ostomy referral order under : No     Nurse 1 eSignature: Electronically signed by Sivan Avalos RN on 11/20/24 at 7:09 PM EST    **SHARE this note so that the co-signing nurse can place an eSignature**    Nurse 2 eSignature: Electronically signed by ALESSIO GOVEA RN on 11/20/24 at 9:53 PM EST

## 2024-11-21 NOTE — PROGRESS NOTES
4 Eyes Skin Assessment     NAME:  Dora Frazier  YOB: 1960  MEDICAL RECORD NUMBER:  [id number]    The patient is being assessed for  Shift Handoff    I agree that at least one RN has performed a thorough Head to Toe Skin Assessment on the patient. ALL assessment sites listed below have been assessed.      Areas assessed by both nurses:    Head, Face, Ears, Shoulders, Back, Chest, Arms, Elbows, Hands, Sacrum. Buttock, Coccyx, Ischium, and Legs. Feet and Heels        Does the Patient have a Wound? No noted wound(s)       Hemant Prevention initiated by RN: Yes  Wound Care Orders initiated by RN: No    Pressure Injury (Stage 3,4, Unstageable, DTI, NWPT, and Complex wounds) if present, place Wound referral order by RN under : No    New Ostomies, if present place, Ostomy referral order under : No     Nurse 1 eSignature: Electronically signed by ALESSIO GOVEA RN on 11/21/24 at 6:18 AM EST    **SHARE this note so that the co-signing nurse can place an eSignature**    Nurse 2 eSignature: {Esignature:[id number]}

## 2024-11-21 NOTE — PROGRESS NOTES
Infectious Disease Progress Note        Reason: sepsis, group B streptococcus bacteremia    Current abx Prior abx   Ceftriaxone  vancomycin     Lines:       Assessment :  64-year-old man with past medical history significant for hypertension presented to [facility] on 11/12/24 with left arm numbness, chest discomfort x 1 day    Hospitalization [facility] September 2024 for infected right ankle wound/cellulitis  Status post I&D on 9/8/2024  IntraOp cultures 9/8-MRSA, heavy mixed skin dimitri, group A strep    Now with fever Tmax 102, persistent thoracic/lumbar spine pain/tenderness, group B streptococcus bacteremia, left upper chest erythema/tenderness    Clinical presentation consistent with sepsis-present on admission due to group B streptococcus bacteremia, probable   Left sternoclavicular joint septic arthritis    MRI c-spine with findings concerning for thoracic spine infection.  Improved back pain/resolved thoracic, lumbar spine tenderness on today's exam      Exact source of group B streptococcus in the blood not entirely clear.  No definitive evidence of skin/soft tissue infection at this time.- ?  Open sores versus unreported IV drug abuse.  Urine drug screen positive for opiates.  Patient denies IV drug abuse.  However girlfriend is concerned that patient may have an unreported IV drug abuse  She also reports patient had episode of binge drinking last weekend    Left SC joint septic arthritis: MRI findings 11/14 confirms clinical suspicion of left SC joint septic arthritis.  No worsening pain/erythema noted on today's exam.  Thoracic surgery eval appreciated.  Status post IR guided drainage 11/18/2024.  Cultures 11/18-no growth so far    History of perirectal abscess-status post I&D October 2022.  Currently no signs of active infection.  Occasional minimal serous drainage from that site sent for cultures on 11/13/2024.  Cultures 11/13-diphtheroid, group B streptococcus likely skin dimitri.     MRI 11/15 with evidence of  Specimen: Blood     Culture, Blood 2 [[id number]]     Order Status: Canceled Specimen: Blood     Blood Culture 1 [[id number]]  (Abnormal)  (Susceptibility) Collected: 11/12/24 1934    Order Status: Completed Specimen: Blood Updated: 11/15/24 0848     Special Requests NO SPECIAL REQUESTS        Gram Stain       AEROBIC AND ANAEROBIC BOTTLES Gram positive cocci IN PAIRS IN CHAINS                  SMEAR CALLED TO AND CORRECTLY REPEATED BY: RICHIE CHRISTIANSON MD ER ON [id number] AT 1005 TO KNE           Culture       STREPTOCOCCI, BETA HEMOLYTIC GROUP B GROWING IN BOTH BOTTLES DRAWN No Site Indicated          Susceptibility        Beta Hemolytic Streptococcus - Group B      BACTERIAL SUSCEPTIBILITY PANEL ALEC      ampicillin <=0.25 ug/mL Sensitive      cefotaxime <=0.12 ug/mL Sensitive      cefTRIAXone <=0.12 ug/mL Sensitive      clindamycin <=0.25 ug/mL Sensitive      Inducible Clindamycin Negative ug/mL Sensitive      levofloxacin 1 ug/mL Sensitive      linezolid <=2 ug/mL Sensitive      Penicillin G 0.12 ug/mL Sensitive      vancomycin 0.5 ug/mL Sensitive                           COVID-19 & Influenza Combo [[id number]] Collected: 11/12/24 1934    Order Status: Completed Specimen: Nasopharyngeal Updated: 11/12/24 2041     Source Nasopharyngeal        SARS-CoV-2, PCR Not detected        Comment: Not Detected results do not preclude SARS-CoV-2 infection and should not be used as the sole basis for patient management decisions.Results must be combined with clinical observations, patient history, and epidemiological information.        Rapid Influenza A By PCR Not detected        Rapid Influenza B By PCR Not detected        Comment: Testing was performed using elsa Anita SARS-CoV-2 and Influenza A/B nucleic acid assay.  This test is a multiplex Real-Time Reverse Transcriptase Polymerase Chain Reaction (RT-PCR) based in vitro diagnostic test intended for the qualitative detection of nucleic acids from SARS-CoV-2, Influenza A, and

## 2024-11-21 NOTE — PLAN OF CARE
Problem: Discharge Planning  Goal: Discharge to home or other facility with appropriate resources  Description: Patient has no insurance and needs IV Antibiotics. Discussed with case management. Possible d/c to [facility]    11/21/2024 0040 by Chey Petersen RN  Outcome: Progressing  11/21/2024 0033 by Chey Petersen, RN  Flowsheets  Taken 11/21/2024 0033  Discharge to home or other facility with appropriate resources:   Arrange for needed discharge resources and transportation as appropriate   Identify discharge learning needs (meds, wound care, etc)   Arrange for interpreters to assist at discharge as needed   Refer to discharge planning if patient needs post-hospital services based on physician order or complex needs related to functional status, cognitive ability or social support system  Taken 11/20/2024 1910  Discharge to home or other facility with appropriate resources: Identify barriers to discharge with patient and caregiver  Note: Case management  consult  11/20/2024 1136 by Sivan Avalos RN  Outcome: Progressing     Problem: Pain  Goal: Verbalizes/displays adequate comfort level or baseline comfort level  Description: Pain is controlled with oxycodone.  Patient reports improved pain from over the weekend. Administer pain medications as ordered to control pain    11/21/2024 0040 by Chey Petersen RN  Outcome: Progressing  11/21/2024 0033 by Chey Petersen RN  Flowsheets (Taken 11/21/2024 0033)  Verbalizes/displays adequate comfort level or baseline comfort level:   Encourage patient to monitor pain and request assistance   Administer analgesics based on type and severity of pain and evaluate response   Assess pain using appropriate pain scale  Note: Medicate pain as needed    11/20/2024 1136 by Sivan Avalos RN  Outcome: Progressing     Problem: Support with Decision-Making  Goal: Verbalization of thoughts/feelings regarding decision  Description: Provide education of care

## 2024-11-21 NOTE — CARE COORDINATION
Spoke to patient at bedside stated [facility] can accept patient as a swing bed, patient is in agreement. Called number provided to request a bed [phone], wrong number. Requested a new number to call.     Called [facility] [phone], spoke to Derrek and requested a swing bed. Patient has been assigned to room 253.     Soraya QUIÑONES, RN   Case Management   [phone]

## 2024-11-21 NOTE — PROGRESS NOTES
[employer] message sent out to Dr. Anand for possible patient transfer to [facility] this evening ,Physician to physician contact number provided.

## 2024-11-21 NOTE — PROGRESS NOTES
[facility] informed that patient needs [facility], they were contacted via website. Order is on file, consent can be obtained.

## 2024-11-21 NOTE — PROGRESS NOTES
Return message from   via [employer]  stating pt needs PICC line and can d/c after idr tomorrow.

## 2024-11-21 NOTE — PROGRESS NOTES
[facility]  Progress Note    Patient: Dora Frazier Age: 64 y.o. : 1960 MR#: [id number] SSN: [id number]  Date/Time: 2024     Subjective:   Patient seen and examined.  No acute events overnight.    Has no cp no sob;    Patient wants to leave to pay his bills go to the bank.    Patient currently has no insurance cannot get outpatient IV antibiotics at this point.    Assessment/Plan:   Group B STREP bacteremia   High-dose ceftriaxone every 12h- ID following and aiding in ABX management, appreciate assistance.  Repeat blood cultures no growth to date.    MRI thoracic spine with evidence of phlegmon T1-T4. Orthopedic surgery following.      Continue medical management for now suspect patient may need surgery if worsening symptoms/clinical deterioration.  Appreciate orthopedic assistance.    MRI lumbar spine without abscess, however with severe narrowing at multiple vertebral levels will need to follow-up outpatient with spine surgery.     Per spine surgery;    Re-image in 4-6 weeks or if symptoms re-demonstrate.  Fu in office in four weeks;    Continue abx per ID, recommend at least 6 weeks combined therapy given possible septic facets.  Epidural phlegmon, steadily improving clinical course on abx. No plan for surgery at this time    -MRI chest with possible left SC joint septic joint.  Thoracic evaluation appreciated,     S/p Thoracic evaluation appreciated, plan for IR for joint aspiration on .  Await culture results    -Suspect chest pain is musculoskeletal secondary to suspected abscess.  Clinical course not consistent with acute coronary syndrome.  Normal troponin, no changes on EKG.  Pain management  Hx of Abscesses req I&D in the past- jenifer rectal and ankle    Wound culture of rectal abscess sent    HTN on Norvasc;    Alcohol use  Thiamine, folic acid      Dispo plan: Home with home health with IV antibiotics.  Patient has no insurance.  Disposition

## 2024-11-21 NOTE — PLAN OF CARE
Problem: Discharge Planning  Goal: Discharge to home or other facility with appropriate resources  Description: Patient has no insurance and needs IV Antibiotics. Discussed with case management. Possible d/c to [facility]    Outcome: Progressing     Problem: Pain  Goal: Verbalizes/displays adequate comfort level or baseline comfort level  Description: Pain is controlled with oxycodone.  Patient reports improved pain from over the weekend. Administer pain medications as ordered to control pain    Outcome: Progressing     Problem: Support with Decision-Making  Goal: Verbalization of thoughts/feelings regarding decision  Description: Provide education of care plan  Outcome: Progressing     Problem: Support with Decision-Making  Goal: Movement towards resolution of decision  Description: Emotional support as needed  Outcome: Progressing     Problem: Support with Decision-Making  Goal: Identifies/restores coping resources/skills  Outcome: Progressing     Problem: Support with Decision-Making  Goal: Explore resources for additional follow up, post discharge  Outcome: Progressing     Problem: Safety - Adult  Goal: Free from fall injury  Description: Fall precautions. Patient ambulatory in room  Outcome: Progressing     Problem: Cardiovascular - Adult  Goal: Maintains optimal cardiac output and hemodynamic stability  Description: Monitor blood pressure and heart rate  Outcome: Progressing  Flowsheets (Taken 11/21/2024 3917)  Maintains optimal cardiac output and hemodynamic stability:   Assess for signs of decreased cardiac output   Monitor urine output and notify Licensed Independent Practitioner for values outside of normal range   Monitor blood pressure and heart rate  Note: Monitor vitals. Instructed patient to verbalize input and output     Problem: Cardiovascular - Adult  Goal: Absence of cardiac dysrhythmias or at baseline  Description: Monitor telemetry and assess for rhythm changes  Outcome:  Progressing  Flowsheets (Taken 11/21/2024 1444)  Absence of cardiac dysrhythmias or at baseline:   Assess for signs of decreased cardiac output   Monitor cardiac rate and rhythm   Administer antiarrhythmia medication and electrolyte replacement as ordered  Note: Monitor telemetry and vitals. Educated patient to notify if any change in heart rate felt and for any signs of MI or stroke     Problem: Skin/Tissue Integrity  Goal: Absence of new skin breakdown  Description: Monitor for areas of redness and/or skin breakdown      Outcome: Progressing  Note: Will continue to monitor for signs of skin breakdown. Educated patient on importance of repositioning frequently at least every 2 hours     Problem: Skin/Tissue Integrity - Adult  Goal: Incisions, wounds, or drain sites healing without S/S of infection  Description: Monitor abscess for increased redness, swelling, or drainage  Outcome: Progressing     Problem: Musculoskeletal - Adult  Goal: Maintain proper alignment of affected body part  Description: Assist with ADLs as needed for R shoulder pain.  Administer meds for pain   Outcome: Progressing     Problem: Infection - Adult  Goal: Absence of infection at discharge  Description: Administer IV antibiotics, monitor for fever, monitor WBC count  Outcome: Progressing  Flowsheets (Taken 11/21/2024 1444)  Absence of infection at discharge:   Monitor lab/diagnostic results   Monitor all insertion sites i.e., indwelling lines, tubes and drains   Assess and monitor for signs and symptoms of infection   Administer medications as ordered   Instruct and encourage patient and family to use good hand hygiene technique   Identify and instruct in appropriate isolation precautions for identified infection/condition  Note: Will continue to monitor for signs and symptoms of infection. Instructed patient to notify if any signs of infection experienced

## 2024-11-21 NOTE — CARE COORDINATION
[facility] screening completed.     Screening ID # : [id number]  The following forms were included in the submission:   [facility]     DMAS-96     DMAS-95RC     DMAS-97    Soraya QUIÑONES, RN   Case Management   [phone]

## 2024-11-21 NOTE — CARE COORDINATION
EMS will need to be called to arrange transport.  Nurse report to be called to [phone].    Physician report to be called to [phone]    Called the CM on call to provide information.

## 2024-11-22 ENCOUNTER — HOSPITAL ENCOUNTER (INPATIENT)
Age: 64
LOS: 27 days | Discharge: ANOTHER HOSPITAL | DRG: 344 | End: 2024-12-19
Attending: INTERNAL MEDICINE | Admitting: INTERNAL MEDICINE
Payer: MEDICAID

## 2024-11-22 VITALS
HEIGHT: 71 IN | TEMPERATURE: 99.3 F | DIASTOLIC BLOOD PRESSURE: 74 MMHG | OXYGEN SATURATION: 100 % | WEIGHT: 185 LBS | SYSTOLIC BLOOD PRESSURE: 130 MMHG | BODY MASS INDEX: 25.9 KG/M2 | HEART RATE: 59 BPM | RESPIRATION RATE: 18 BRPM

## 2024-11-22 PROBLEM — M00.9 SEPTIC ARTHRITIS: Status: ACTIVE | Noted: 2024-11-22

## 2024-11-22 LAB
BACTERIA SPEC CULT: NORMAL
BACTERIA SPEC CULT: NORMAL
GRAM STN SPEC: NORMAL
GRAM STN SPEC: NORMAL
SARS-COV-2 RDRP RESP QL NAA+PROBE: NOT DETECTED
SERVICE CMNT-IMP: NORMAL
SERVICE CMNT-IMP: NORMAL
SOURCE: NORMAL

## 2024-11-22 PROCEDURE — 6360000002 HC RX W HCPCS: Performed by: INTERNAL MEDICINE

## 2024-11-22 PROCEDURE — 6370000000 HC RX 637 (ALT 250 FOR IP): Performed by: INTERNAL MEDICINE

## 2024-11-22 PROCEDURE — 2580000003 HC RX 258: Performed by: NURSE PRACTITIONER

## 2024-11-22 PROCEDURE — 6370000000 HC RX 637 (ALT 250 FOR IP): Performed by: NURSE PRACTITIONER

## 2024-11-22 PROCEDURE — 87635 SARS-COV-2 COVID-19 AMP PRB: CPT

## 2024-11-22 PROCEDURE — 6370000000 HC RX 637 (ALT 250 FOR IP): Performed by: STUDENT IN AN ORGANIZED HEALTH CARE EDUCATION/TRAINING PROGRAM

## 2024-11-22 PROCEDURE — 6360000002 HC RX W HCPCS: Performed by: STUDENT IN AN ORGANIZED HEALTH CARE EDUCATION/TRAINING PROGRAM

## 2024-11-22 PROCEDURE — 99239 HOSP IP/OBS DSCHRG MGMT >30: CPT | Performed by: INTERNAL MEDICINE

## 2024-11-22 PROCEDURE — 2580000003 HC RX 258: Performed by: INTERNAL MEDICINE

## 2024-11-22 PROCEDURE — 1100000000 HC RM PRIVATE

## 2024-11-22 PROCEDURE — 94761 N-INVAS EAR/PLS OXIMETRY MLT: CPT

## 2024-11-22 RX ORDER — LIDOCAINE 4 G/G
1 PATCH TOPICAL DAILY
Status: DISCONTINUED | OUTPATIENT
Start: 2024-11-23 | End: 2024-12-19 | Stop reason: HOSPADM

## 2024-11-22 RX ORDER — AMLODIPINE BESYLATE 10 MG/1
10 TABLET ORAL DAILY
Qty: 30 TABLET | Refills: 3 | Status: ON HOLD
Start: 2024-11-23

## 2024-11-22 RX ORDER — SENNA AND DOCUSATE SODIUM 50; 8.6 MG/1; MG/1
1 TABLET, FILM COATED ORAL DAILY
Qty: 30 TABLET | Refills: 0 | Status: ON HOLD | OUTPATIENT
Start: 2024-11-23

## 2024-11-22 RX ORDER — SODIUM CHLORIDE 0.9 % (FLUSH) 0.9 %
5-40 SYRINGE (ML) INJECTION PRN
Status: DISCONTINUED | OUTPATIENT
Start: 2024-11-22 | End: 2024-12-19 | Stop reason: HOSPADM

## 2024-11-22 RX ORDER — POLYETHYLENE GLYCOL 3350 17 G/17G
17 POWDER, FOR SOLUTION ORAL DAILY
Status: DISCONTINUED | OUTPATIENT
Start: 2024-11-23 | End: 2024-12-19 | Stop reason: HOSPADM

## 2024-11-22 RX ORDER — SODIUM CHLORIDE 0.9 % (FLUSH) 0.9 %
5-40 SYRINGE (ML) INJECTION EVERY 12 HOURS SCHEDULED
Status: DISCONTINUED | OUTPATIENT
Start: 2024-11-22 | End: 2024-12-19 | Stop reason: HOSPADM

## 2024-11-22 RX ORDER — POLYETHYLENE GLYCOL 3350 17 G/17G
17 POWDER, FOR SOLUTION ORAL DAILY
Qty: 30 PACKET | Refills: 0 | Status: ON HOLD
Start: 2024-11-22 | End: 2024-12-22

## 2024-11-22 RX ORDER — FOLIC ACID 1 MG/1
1 TABLET ORAL DAILY
Qty: 30 TABLET | Refills: 3 | Status: ON HOLD
Start: 2024-11-23

## 2024-11-22 RX ORDER — FOLIC ACID 1 MG/1
1 TABLET ORAL DAILY
Status: DISCONTINUED | OUTPATIENT
Start: 2024-11-23 | End: 2024-12-19 | Stop reason: HOSPADM

## 2024-11-22 RX ORDER — LIDOCAINE 4 G/G
1 PATCH TOPICAL DAILY
Qty: 10 PATCH | Refills: 0 | Status: ON HOLD
Start: 2024-11-23

## 2024-11-22 RX ORDER — GAUZE BANDAGE 2" X 2"
100 BANDAGE TOPICAL DAILY
Status: DISCONTINUED | OUTPATIENT
Start: 2024-11-23 | End: 2024-12-19 | Stop reason: HOSPADM

## 2024-11-22 RX ORDER — SENNOSIDES A AND B 8.6 MG/1
1 TABLET, FILM COATED ORAL DAILY PRN
Status: DISCONTINUED | OUTPATIENT
Start: 2024-11-22 | End: 2024-12-19 | Stop reason: HOSPADM

## 2024-11-22 RX ORDER — ACETAMINOPHEN 650 MG/1
650 SUPPOSITORY RECTAL EVERY 6 HOURS PRN
Status: DISCONTINUED | OUTPATIENT
Start: 2024-11-22 | End: 2024-12-19 | Stop reason: HOSPADM

## 2024-11-22 RX ORDER — SODIUM CHLORIDE 9 MG/ML
INJECTION, SOLUTION INTRAVENOUS PRN
Status: DISCONTINUED | OUTPATIENT
Start: 2024-11-22 | End: 2024-12-19 | Stop reason: HOSPADM

## 2024-11-22 RX ORDER — AMLODIPINE BESYLATE 5 MG/1
10 TABLET ORAL DAILY
Status: DISCONTINUED | OUTPATIENT
Start: 2024-11-23 | End: 2024-12-19 | Stop reason: HOSPADM

## 2024-11-22 RX ORDER — THIAMINE MONONITRATE (VIT B1) 100 MG
100 TABLET ORAL DAILY
Qty: 30 TABLET | Refills: 0 | Status: ON HOLD
Start: 2024-11-23

## 2024-11-22 RX ORDER — NICOTINE 21 MG/24HR
1 PATCH, TRANSDERMAL 24 HOURS TRANSDERMAL DAILY
Qty: 30 PATCH | Refills: 3 | Status: ON HOLD
Start: 2024-11-23

## 2024-11-22 RX ORDER — OXYCODONE HYDROCHLORIDE 10 MG/1
10 TABLET ORAL EVERY 6 HOURS PRN
Qty: 15 TABLET | Refills: 0 | Status: ON HOLD
Start: 2024-11-22 | End: 2024-11-25

## 2024-11-22 RX ORDER — CEFTRIAXONE 500 MG/1
2000 INJECTION, POWDER, FOR SOLUTION INTRAMUSCULAR; INTRAVENOUS EVERY 24 HOURS
Qty: 192 EACH | Refills: 0 | Status: ON HOLD
Start: 2024-11-22 | End: 2025-01-09

## 2024-11-22 RX ORDER — ACETAMINOPHEN 325 MG/1
650 TABLET ORAL EVERY 6 HOURS PRN
Status: DISCONTINUED | OUTPATIENT
Start: 2024-11-22 | End: 2024-12-19 | Stop reason: HOSPADM

## 2024-11-22 RX ORDER — OXYCODONE HYDROCHLORIDE 10 MG/1
10 TABLET ORAL EVERY 6 HOURS PRN
Status: DISPENSED | OUTPATIENT
Start: 2024-11-22 | End: 2024-11-26

## 2024-11-22 RX ADMIN — HYDROMORPHONE HYDROCHLORIDE 1 MG: 1 INJECTION, SOLUTION INTRAMUSCULAR; INTRAVENOUS; SUBCUTANEOUS at 08:05

## 2024-11-22 RX ADMIN — HEPARIN SODIUM 5000 UNITS: 5000 INJECTION INTRAVENOUS; SUBCUTANEOUS at 08:10

## 2024-11-22 RX ADMIN — HYDROMORPHONE HYDROCHLORIDE 1 MG: 1 INJECTION, SOLUTION INTRAMUSCULAR; INTRAVENOUS; SUBCUTANEOUS at 17:52

## 2024-11-22 RX ADMIN — HYDROMORPHONE HYDROCHLORIDE 1 MG: 1 INJECTION, SOLUTION INTRAMUSCULAR; INTRAVENOUS; SUBCUTANEOUS at 12:47

## 2024-11-22 RX ADMIN — OXYCODONE HYDROCHLORIDE 10 MG: 10 TABLET ORAL at 23:11

## 2024-11-22 RX ADMIN — Medication 100 MG: at 08:08

## 2024-11-22 RX ADMIN — FOLIC ACID 1 MG: 1 TABLET ORAL at 08:08

## 2024-11-22 RX ADMIN — WATER 2000 MG: 1 INJECTION INTRAMUSCULAR; INTRAVENOUS; SUBCUTANEOUS at 04:49

## 2024-11-22 RX ADMIN — AMLODIPINE BESYLATE 10 MG: 10 TABLET ORAL at 08:03

## 2024-11-22 ASSESSMENT — PAIN DESCRIPTION - ONSET
ONSET: ON-GOING
ONSET: ON-GOING

## 2024-11-22 ASSESSMENT — PAIN DESCRIPTION - LOCATION
LOCATION: BACK;CHEST;SHOULDER
LOCATION: CHEST;SHOULDER
LOCATION: BACK;CHEST;SHOULDER
LOCATION: CHEST;SHOULDER;BACK
LOCATION: SHOULDER;CHEST
LOCATION: CHEST;SHOULDER

## 2024-11-22 ASSESSMENT — PAIN DESCRIPTION - ORIENTATION
ORIENTATION: LEFT

## 2024-11-22 ASSESSMENT — PAIN SCALES - GENERAL
PAINLEVEL_OUTOF10: 0
PAINLEVEL_OUTOF10: 8
PAINLEVEL_OUTOF10: 9
PAINLEVEL_OUTOF10: 9
PAINLEVEL_OUTOF10: 8
PAINLEVEL_OUTOF10: 4
PAINLEVEL_OUTOF10: 3
PAINLEVEL_OUTOF10: 10
PAINLEVEL_OUTOF10: 10
PAINLEVEL_OUTOF10: 5

## 2024-11-22 ASSESSMENT — PAIN - FUNCTIONAL ASSESSMENT
PAIN_FUNCTIONAL_ASSESSMENT: ACTIVITIES ARE NOT PREVENTED

## 2024-11-22 ASSESSMENT — PAIN DESCRIPTION - PAIN TYPE
TYPE: ACUTE PAIN

## 2024-11-22 ASSESSMENT — PAIN DESCRIPTION - DESCRIPTORS
DESCRIPTORS: ACHING;SHARP
DESCRIPTORS: ACHING;HEAVINESS
DESCRIPTORS: ACHING;HEAVINESS

## 2024-11-22 ASSESSMENT — PAIN DESCRIPTION - FREQUENCY
FREQUENCY: CONTINUOUS
FREQUENCY: CONTINUOUS

## 2024-11-22 ASSESSMENT — PAIN SCALES - WONG BAKER: WONGBAKER_NUMERICALRESPONSE: NO HURT

## 2024-11-22 NOTE — CARE COORDINATION
Requested Case Management specialist to assist with transportation to [facility].  Address is [address] [place] [address] [place] [address] and phone number is [phone]   Any steps at the residence? No  Patient will require BLS transport.   Pt requires Stretcher If stretcher, reason: chest pain  Patient is currently requiring oxygen No   Height:5'10\"   Weight: 185  Pt is on isolation: Yes Isolation is for: MRSA  Is the pt ready now? YES  Requested time: Next Available  PCS Faxed: No  Insurance verified on face sheet: No  Auth needed for transport: YES  CM completed PCS/ Envelope and placed on chart.     Vicki Carranza BSW   Case Management

## 2024-11-22 NOTE — PLAN OF CARE
Problem: Safety - Adult  Goal: Free from fall injury  Description: Fall precautions. Patient ambulatory in room  Outcome: Progressing  Note: Patient remained free from falls     Problem: Cardiovascular - Adult  Goal: Maintains optimal cardiac output and hemodynamic stability  Description: Monitor blood pressure and heart rate  Outcome: Progressing  Note: Patient maintained hemodynamic stability      Problem: Pain  Goal: Verbalizes/displays adequate comfort level or baseline comfort level  Description: Pain is controlled with oxycodone.  Patient reports improved pain from over the weekend. Administer pain medications as ordered to control pain    Note: Patient complained of pain one time during shift

## 2024-11-22 NOTE — PROGRESS NOTES
Infectious Disease Progress Note        Reason: sepsis, group B streptococcus bacteremia    Current abx Prior abx   Ceftriaxone  vancomycin     Lines:       Assessment :  64-year-old man with past medical history significant for hypertension presented to [facility] on 11/12/24 with left arm numbness, chest discomfort x 1 day    Hospitalization [facility] September 2024 for infected right ankle wound/cellulitis  Status post I&D on 9/8/2024  IntraOp cultures 9/8-MRSA, heavy mixed skin dimitri, group A strep    Now with fever Tmax 102, persistent thoracic/lumbar spine pain/tenderness, group B streptococcus bacteremia, left upper chest erythema/tenderness    Clinical presentation consistent with sepsis-present on admission due to group B streptococcus bacteremia, probable   Left sternoclavicular joint septic arthritis    MRI c-spine with findings concerning for thoracic spine infection.  Improved back pain/resolved thoracic, lumbar spine tenderness on today's exam      Exact source of group B streptococcus in the blood not entirely clear.  No definitive evidence of skin/soft tissue infection at this time.- ?  Open sores versus unreported IV drug abuse.  Urine drug screen positive for opiates.  Patient denies IV drug abuse.  However girlfriend is concerned that patient may have an unreported IV drug abuse  She also reports patient had episode of binge drinking last weekend    Left SC joint septic arthritis: MRI findings 11/14 confirms clinical suspicion of left SC joint septic arthritis.  No worsening pain/erythema noted on today's exam.  Thoracic surgery eval appreciated.  Status post IR guided drainage 11/18/2024.  Cultures 11/18-no growth so far    History of perirectal abscess-status post I&D October 2022.  Currently no signs of active infection.  Occasional minimal serous drainage from that site sent for cultures on 11/13/2024.  Cultures 11/13-diphtheroid, bacteroides, group B streptococcus likely skin dimitri.     MRI 11/15 with  Collected: 11/12/24 1910    Order Status: Completed Specimen: Blood Updated: 11/15/24 0850     Special Requests NO SPECIAL REQUESTS        Gram Stain       AEROBIC BOTTLE Gram positive cocci IN PAIRS IN CHAINS                  SMEAR CALLED TO AND CORRECTLY REPE[facility] AT 1628 HRS TO 1396.           Culture       STREPTOCOCCI, BETA HEMOLYTIC GROUP B GROWING IN THIS SINGLE BOTTLE DRAWN No Site Indicated REFER TO [id number] FOR SENSITIVITIES                      RADIOLOGY:    All available imaging studies/reports in [facility] for this admission were reviewed        Disclaimer: Sections of this note are dictated utilizing voice recognition software, which may have resulted in some phonetic based errors in grammar and contents. Even though attempts were made to correct all the mistakes, some may have been missed, and remained in the body of the document. If questions arise, please contact our department.    Dr. Noni Solis, Infectious Disease Specialist  [phone]  November 22, 2024  9:47 AM

## 2024-11-22 NOTE — CARE COORDINATION
Called the number for [facility] [phone] and spoke with Janneth downs to arrange BLS stretcher transport to [facility].  Address is [address] [place] [address] [place] [address] and phone number is [phone]. The pickup time is 6 PM and the trip ID is [id number].    Addendum: 16:51 Called transport back and gave them the phone number for [address] [place] [address] [phone]

## 2024-11-22 NOTE — CARE COORDINATION
informed Dr. Anand to call report to  @ [facility].     spoke with Joyce at [facility] and per Joyce patient room is 240 swing.    Vicki Carranza BSW   Case Management

## 2024-11-22 NOTE — DISCHARGE SUMMARY
Hospitalist Discharge Summary     Patient ID:  Dora Frazier  [id number]  64 y.o.  1960    PCP on record: No primary care provider on file.    Admit date: 11/12/2024  Discharge date and time: 11/22/2024    Discharge Diagnoses: and hospital course;       64-year-old man with past medical history significant for hypertension presented to [facility] on 11/12/24 with left arm numbness, chest discomfort x 1 day     Hospitalization [facility] September 2024 for infected right ankle wound/cellulitis  Status post I&D on 9/8/2024  IntraOp cultures 9/8-MRSA, heavy mixed skin dimitri, group A strep     Now with fever Tmax 102, persistent thoracic/lumbar spine pain/tenderness, group B streptococcus bacteremia, left upper chest erythema/tenderness     Clinical presentation consistent with sepsis-present on admission due to group B streptococcus bacteremia, probable   Left sternoclavicular joint septic arthritis     MRI c-spine with findings concerning for thoracic spine infection.  Improved back pain/resolved thoracic, lumbar spine tenderness on today's exam        Exact source of group B streptococcus in the blood not entirely clear.  No definitive evidence of skin/soft tissue infection at this time.- ?  Open sores versus unreported IV drug abuse.  Urine drug screen positive for opiates.  Patient denies IV drug abuse.  However girlfriend is concerned that patient may have an unreported IV drug abuse  She also reports patient had episode of binge drinking last weekend     Left SC joint septic arthritis: MRI findings 11/14 confirms clinical suspicion of left SC joint septic arthritis.  No worsening pain/erythema noted on today's exam.  Thoracic surgery eval appreciated.  Status post IR guided drainage 11/18/2024.  Cultures 11/18-no growth so far     History of perirectal abscess-status post I&D October 2022.  Currently no signs of active infection.  Occasional minimal serous drainage from that site sent for cultures on

## 2024-11-22 NOTE — PROCEDURES
PICC Line Insertion Procedure Note    Procedure: Insertion of #4 FR/18G PICC    Indications:  Long Term IV therapy    Procedure Details   Informed consent was obtained for the procedure, including sedation.  Risks of lung perforation, hemorrhage, and adverse drug reaction were discussed.     #4 FR/18G PICC inserted to the R Basilic vein per hospital protocol.   Blood return:  yes    Findings:  Catheter inserted to 47 cm, with 0 cm. Exposed. Mid upper arm circumference is 33 cm.  There were no changes to vital signs. Catheter was flushed with 20 cc NS. Patient did tolerate procedure well.    Recommendations:  PLACEMENT VERIFIED BY 3CG. OKAY TO USE LINE NOW, NO NEED FOR CXR.   PICC Brochure given to patient with teaching instruction.PROCEDURE NOTE  Date: 11/21/2024   Name: Dora Frazier  YOB: 1960    Procedures

## 2024-11-22 NOTE — CARE COORDINATION
spoke with Joyce at [facility] and per Joyce patient will need to have a covid test and a copy of the result sent with patient at discharge. ANDREA Kingston was notified.    Vicki Carranza BSW   Case Management

## 2024-11-22 NOTE — PROGRESS NOTES
4 Eyes Skin Assessment     NAME:  Dora Frazier  YOB: 1960  MEDICAL RECORD NUMBER:  [id number]    The patient is being assessed for  Shift Handoff    I agree that at least one RN has performed a thorough Head to Toe Skin Assessment on the patient. ALL assessment sites listed below have been assessed.      Areas assessed by both nurses:    Head, Face, Ears, Shoulders, Back, Chest, Arms, Elbows, Hands, Sacrum. Buttock, Coccyx, Ischium, Legs. Feet and Heels, and Under Medical Devices         Does the Patient have a Wound? No noted wound(s)       Hemant Prevention initiated by RN: No  Wound Care Orders initiated by RN: No    Pressure Injury (Stage 3,4, Unstageable, DTI, NWPT, and Complex wounds) if present, place Wound referral order by RN under : No    New Ostomies, if present place, Ostomy referral order under : No     Nurse 1 eSignature: Electronically signed by Adam Mariee RN on 11/21/24 at 8:44 PM EST    **SHARE this note so that the co-signing nurse can place an eSignature**    Nurse 2 eSignature: {Esignature:[id number]}

## 2024-11-22 NOTE — CARE COORDINATION
Ptient discharged to [facility].  Patient hs no insurance and patient is awaiting transportation.    Vicki Carranza BSW   Case Management

## 2024-11-23 PROCEDURE — 6370000000 HC RX 637 (ALT 250 FOR IP): Performed by: NURSE PRACTITIONER

## 2024-11-23 PROCEDURE — 6360000002 HC RX W HCPCS: Performed by: INTERNAL MEDICINE

## 2024-11-23 PROCEDURE — 2580000003 HC RX 258: Performed by: NURSE PRACTITIONER

## 2024-11-23 PROCEDURE — 1100000000 HC RM PRIVATE

## 2024-11-23 PROCEDURE — 2580000003 HC RX 258: Performed by: INTERNAL MEDICINE

## 2024-11-23 RX ADMIN — OXYCODONE HYDROCHLORIDE 10 MG: 10 TABLET ORAL at 08:03

## 2024-11-23 RX ADMIN — Medication 100 MG: at 08:04

## 2024-11-23 RX ADMIN — OXYCODONE HYDROCHLORIDE 10 MG: 10 TABLET ORAL at 22:06

## 2024-11-23 RX ADMIN — OXYCODONE HYDROCHLORIDE 10 MG: 10 TABLET ORAL at 15:56

## 2024-11-23 RX ADMIN — CEFTRIAXONE SODIUM 2000 MG: 2 INJECTION, POWDER, FOR SOLUTION INTRAMUSCULAR; INTRAVENOUS at 09:39

## 2024-11-23 RX ADMIN — SODIUM CHLORIDE, PRESERVATIVE FREE 10 ML: 5 INJECTION INTRAVENOUS at 09:00

## 2024-11-23 RX ADMIN — SODIUM CHLORIDE, PRESERVATIVE FREE 10 ML: 5 INJECTION INTRAVENOUS at 20:16

## 2024-11-23 RX ADMIN — FOLIC ACID 1 MG: 1 TABLET ORAL at 08:04

## 2024-11-23 RX ADMIN — AMLODIPINE BESYLATE 10 MG: 5 TABLET ORAL at 08:04

## 2024-11-23 ASSESSMENT — PAIN SCALES - GENERAL
PAINLEVEL_OUTOF10: 5
PAINLEVEL_OUTOF10: 9
PAINLEVEL_OUTOF10: 0
PAINLEVEL_OUTOF10: 4
PAINLEVEL_OUTOF10: 9
PAINLEVEL_OUTOF10: 10
PAINLEVEL_OUTOF10: 7

## 2024-11-23 ASSESSMENT — PAIN DESCRIPTION - ORIENTATION
ORIENTATION: LEFT
ORIENTATION: LEFT

## 2024-11-23 ASSESSMENT — PAIN SCALES - WONG BAKER: WONGBAKER_NUMERICALRESPONSE: NO HURT

## 2024-11-23 ASSESSMENT — PAIN DESCRIPTION - LOCATION
LOCATION: SHOULDER

## 2024-11-23 ASSESSMENT — PAIN DESCRIPTION - DESCRIPTORS
DESCRIPTORS: PRESSURE
DESCRIPTORS: ACHING
DESCRIPTORS: ACHING
DESCRIPTORS: PRESSURE

## 2024-11-23 NOTE — H&P
hour(s))   COVID-19, Rapid    Collection Time: 11/22/24  4:00 PM    Specimen: Nasopharyngeal   Result Value Ref Range    Source Nasopharyngeal      SARS-CoV-2, Rapid Not detected NOTD       MRI Chest 11/14/24:  IMPRESSION:  Considerably limited examination due to motion artifact. Lack of IV contrast  also limits visibility.     There is faint bone marrow signal abnormality within the left upper manubrium  which is concerning for early changes related to osteomyelitis in the  appropriate clinical setting. There is trace fluid in the left sternoclavicular  joint space which could be seen with a septic joint. There is also edema in the  left anterior thoracic wall surrounding the left first rib costochondral  cartilage and extending into the left pectoralis major muscle.     MRI Cervical Spine 11/13/24:  IMPRESSION:     1. No discitis or osteomyelitis in the cervical spine. No epidural abscess in  the cervical spine.     2. Thin strip of posterior epidural enhancement in the upper thoracic spine,  incompletely evaluated. Phlegmon from facet inflammation not excluded. Clinical  correlation? Follow-up with thoracic MRI may be indicated.     3. Multilevel degenerative disc disease. No significant cord compression or  central stenosis. Foraminal stenosis worst at C7-T1 and C6-C7.     4. Discogenic endplate edema, with vertebral body edema to suggest mild  compression fracture in C5 and C6. No retropulsion.     5. Moderate bilateral mastoid effusion    MRI Thoracic Spine 11/15/24:  IMPRESSION:  1.  Epidural phlegmon in the posterior epidural space which is significantly  increased since prior MRI of the cervical spine. No fluid collection. It extends  from T1 to T4 and thickest with moderate canal narrowing at T2-T3.  2.  Septic arthritis and osteomyelitis of the facets of T2-T3 through T4-T5.   3.  Edema in the interspinous ligaments of T2-T3 and T3-T4. This could be  infectious.       MRI Thoracic Spine

## 2024-11-23 NOTE — PLAN OF CARE
Problem: Discharge Planning  Goal: Discharge to home or other facility with appropriate resources  Outcome: Progressing     Problem: Safety - Adult  Goal: Free from fall injury  11/23/2024 0903 by Lina Rowland RN  Outcome: Progressing  11/23/2024 0016 by Susanne Bower, RN  Outcome: Progressing     Problem: Pain  Goal: Verbalizes/displays adequate comfort level or baseline comfort level  11/23/2024 0903 by Lina Rowland, RN  Outcome: Progressing  11/23/2024 0016 by Susanne Bower, RN  Outcome: Progressing

## 2024-11-23 NOTE — PROGRESS NOTES
0700-Beside shift report received from LYNN Bower RN. Assumed care of patient.     0730-Breakfast tray provided.    0804-AM medications given- Education Provided. Pt tolerated well.     1130-Lunch tray provided.     1630-Supper tray provided.     1850-Bedside shift change report given to ANDREA Egan (oncoming nurse) by MITCH Rowland RN (offgoing nurse). Report included the following information Nurse Handoff Report.

## 2024-11-23 NOTE — PROGRESS NOTES
Called [facility] and gave report to Luz Marina; all questions answered.  Patient is awaiting transportation and has been updated on approximate time of transportation arrival  All belongings taken with patient; all areas of room checked prior to discharge

## 2024-11-23 NOTE — PROGRESS NOTES
2124 Patient arrived to room 253 via stretcher.   Patient is alert and oriented. Pic line on right basilic present on admission.  Gown and non slip socks applied. Oriented to room and call bell system. Pt able to ambulate from bathroom and back to bed w/o any complaints of SOB or dizziness    2311 10 mg OXY-IR given for 9/10 left shoulder and chest pain    2341 pain improved. Pt satisfied    2351 20G iv placed in left AC    0030 vitals obtained    0440 pt resting in bed. Normal respiratory patterns noted. Vitals obtained    0658 bedside report given to hardik cross

## 2024-11-24 PROCEDURE — 1100000000 HC RM PRIVATE

## 2024-11-24 PROCEDURE — 2580000003 HC RX 258: Performed by: NURSE PRACTITIONER

## 2024-11-24 PROCEDURE — 2580000003 HC RX 258: Performed by: INTERNAL MEDICINE

## 2024-11-24 PROCEDURE — 6370000000 HC RX 637 (ALT 250 FOR IP): Performed by: NURSE PRACTITIONER

## 2024-11-24 PROCEDURE — 6360000002 HC RX W HCPCS: Performed by: INTERNAL MEDICINE

## 2024-11-24 RX ADMIN — ACETAMINOPHEN 325MG 650 MG: 325 TABLET ORAL at 11:54

## 2024-11-24 RX ADMIN — Medication 100 MG: at 08:09

## 2024-11-24 RX ADMIN — OXYCODONE HYDROCHLORIDE 10 MG: 10 TABLET ORAL at 08:09

## 2024-11-24 RX ADMIN — FOLIC ACID 1 MG: 1 TABLET ORAL at 08:09

## 2024-11-24 RX ADMIN — CEFTRIAXONE SODIUM 2000 MG: 2 INJECTION, POWDER, FOR SOLUTION INTRAMUSCULAR; INTRAVENOUS at 08:14

## 2024-11-24 RX ADMIN — OXYCODONE HYDROCHLORIDE 10 MG: 10 TABLET ORAL at 16:38

## 2024-11-24 RX ADMIN — OXYCODONE HYDROCHLORIDE 10 MG: 10 TABLET ORAL at 22:33

## 2024-11-24 RX ADMIN — AMLODIPINE BESYLATE 10 MG: 5 TABLET ORAL at 08:09

## 2024-11-24 RX ADMIN — SODIUM CHLORIDE, PRESERVATIVE FREE 10 ML: 5 INJECTION INTRAVENOUS at 22:34

## 2024-11-24 RX ADMIN — SODIUM CHLORIDE, PRESERVATIVE FREE 10 ML: 5 INJECTION INTRAVENOUS at 08:12

## 2024-11-24 ASSESSMENT — PAIN DESCRIPTION - LOCATION
LOCATION: SHOULDER
LOCATION: SHOULDER
LOCATION: CHEST
LOCATION: SHOULDER

## 2024-11-24 ASSESSMENT — PAIN DESCRIPTION - ORIENTATION
ORIENTATION: LEFT

## 2024-11-24 ASSESSMENT — PAIN SCALES - GENERAL
PAINLEVEL_OUTOF10: 6
PAINLEVEL_OUTOF10: 0
PAINLEVEL_OUTOF10: 0
PAINLEVEL_OUTOF10: 10
PAINLEVEL_OUTOF10: 6
PAINLEVEL_OUTOF10: 10
PAINLEVEL_OUTOF10: 10
PAINLEVEL_OUTOF10: 0
PAINLEVEL_OUTOF10: 0
PAINLEVEL_OUTOF10: 4

## 2024-11-24 ASSESSMENT — PAIN DESCRIPTION - DESCRIPTORS
DESCRIPTORS: ACHING

## 2024-11-24 ASSESSMENT — PAIN SCALES - WONG BAKER: WONGBAKER_NUMERICALRESPONSE: NO HURT

## 2024-11-24 NOTE — PROGRESS NOTES
0700--Beside shift report received from ANDREA Egan. Assumed care of patient.     0809-AM medications given-PRN Oxycodone given for pain. Education Provided. Pt tolerated well.     1630-PRN Oxycodone given for pain. Pt tolerated well.    Bedside shift change report given to ANDREA Brown (oncoming nurse) by ANDREA Mills (offgoing nurse). Report included the following information Nurse Handoff Report.

## 2024-11-24 NOTE — PLAN OF CARE
Problem: Discharge Planning  Goal: Discharge to home or other facility with appropriate resources  11/24/2024 0727 by Lina Rowland RN  Outcome: Progressing  11/23/2024 1931 by Jignesh Cerda, RN  Outcome: Adequate for Discharge     Problem: Safety - Adult  Goal: Free from fall injury  11/24/2024 0727 by Lina Rowland RN  Outcome: Progressing  11/23/2024 1931 by Jignesh Cerda, RN  Outcome: Adequate for Discharge     Problem: Pain  Goal: Verbalizes/displays adequate comfort level or baseline comfort level  11/24/2024 0727 by Lina Rowland RN  Outcome: Progressing  11/23/2024 1931 by Jignesh Cerda, RN  Outcome: Progressing

## 2024-11-24 NOTE — PROGRESS NOTES
1900: Report received from Lina Connolly. Assumed care of the patient.  1916: VS taken, pt resting. Complains of 9/10 L shoulder pain.  2003: Lidocaine patch removed, PICC line flushed and checked for patency.  2200: 10 mg Oxycodone administered, pt was in 10/10 pain.  2236: Pain reassessed, pain decreased to 5/10.  0650: Bedside report given to Lina Connolly.  0700: End of shift.

## 2024-11-24 NOTE — PLAN OF CARE
Problem: Pain  Goal: Verbalizes/displays adequate comfort level or baseline comfort level  11/23/2024 1931 by Jignesh Cerda RN  Outcome: Progressing  11/23/2024 0903 by Lina Rowland RN  Outcome: Progressing     Problem: Discharge Planning  Goal: Discharge to home or other facility with appropriate resources  11/23/2024 1931 by Jignesh Cerda RN  Outcome: Adequate for Discharge  11/23/2024 0903 by Lina Rowland RN  Outcome: Progressing     Problem: Safety - Adult  Goal: Free from fall injury  11/23/2024 1931 by Jignesh Cerda RN  Outcome: Adequate for Discharge  11/23/2024 0903 by Lina Rowland RN  Outcome: Progressing     Problem: Safety - Adult  Goal: Free from fall injury  11/23/2024 1931 by Jignesh Cerda, RN  Outcome: Adequate for Discharge  11/23/2024 0903 by Lina Rowland RN  Outcome: Progressing

## 2024-11-25 PROCEDURE — 2580000003 HC RX 258: Performed by: INTERNAL MEDICINE

## 2024-11-25 PROCEDURE — 1100000000 HC RM PRIVATE

## 2024-11-25 PROCEDURE — 6370000000 HC RX 637 (ALT 250 FOR IP): Performed by: NURSE PRACTITIONER

## 2024-11-25 PROCEDURE — 6360000002 HC RX W HCPCS: Performed by: INTERNAL MEDICINE

## 2024-11-25 PROCEDURE — 2580000003 HC RX 258: Performed by: NURSE PRACTITIONER

## 2024-11-25 RX ADMIN — OXYCODONE HYDROCHLORIDE 10 MG: 10 TABLET ORAL at 14:27

## 2024-11-25 RX ADMIN — OXYCODONE HYDROCHLORIDE 10 MG: 10 TABLET ORAL at 08:01

## 2024-11-25 RX ADMIN — AMLODIPINE BESYLATE 10 MG: 5 TABLET ORAL at 08:01

## 2024-11-25 RX ADMIN — FOLIC ACID 1 MG: 1 TABLET ORAL at 08:01

## 2024-11-25 RX ADMIN — ACETAMINOPHEN 325MG 650 MG: 325 TABLET ORAL at 12:03

## 2024-11-25 RX ADMIN — Medication 100 MG: at 08:01

## 2024-11-25 RX ADMIN — OXYCODONE HYDROCHLORIDE 10 MG: 10 TABLET ORAL at 21:27

## 2024-11-25 RX ADMIN — SODIUM CHLORIDE, PRESERVATIVE FREE 10 ML: 5 INJECTION INTRAVENOUS at 20:04

## 2024-11-25 RX ADMIN — CEFTRIAXONE SODIUM 2000 MG: 2 INJECTION, POWDER, FOR SOLUTION INTRAMUSCULAR; INTRAVENOUS at 08:12

## 2024-11-25 ASSESSMENT — PAIN SCALES - WONG BAKER
WONGBAKER_NUMERICALRESPONSE: NO HURT

## 2024-11-25 ASSESSMENT — PAIN SCALES - GENERAL
PAINLEVEL_OUTOF10: 8
PAINLEVEL_OUTOF10: 0
PAINLEVEL_OUTOF10: 5
PAINLEVEL_OUTOF10: 9
PAINLEVEL_OUTOF10: 3
PAINLEVEL_OUTOF10: 10
PAINLEVEL_OUTOF10: 9
PAINLEVEL_OUTOF10: 3
PAINLEVEL_OUTOF10: 9
PAINLEVEL_OUTOF10: 6

## 2024-11-25 ASSESSMENT — PAIN DESCRIPTION - LOCATION
LOCATION: SHOULDER;ARM
LOCATION: SHOULDER;BACK;ARM
LOCATION: SHOULDER;ARM
LOCATION: SHOULDER
LOCATION: SHOULDER;ARM

## 2024-11-25 ASSESSMENT — PAIN DESCRIPTION - DESCRIPTORS
DESCRIPTORS: PRESSURE;HEAVINESS
DESCRIPTORS: HEAVINESS
DESCRIPTORS: PRESSURE;HEAVINESS

## 2024-11-25 ASSESSMENT — PAIN DESCRIPTION - ORIENTATION
ORIENTATION: LEFT

## 2024-11-25 NOTE — PROGRESS NOTES
0700- Assumed care of the patient from off going nurse via bedside shift report. Patient resting in bed, alert.    Assessment complete. Vitals obtained. No needs voiced at this time. CBWR    0730- breakfast provided     0801- administered scheduled medications per MAR    1130- lunch provided, patient request coffee with all meals     1203- administered PRN medication per patient request for chronic pain to left shoulder    1427- administered PRN pain medication per patient request for chronic pain to left shoulder    1630- dinner provided    1900- report given to Susanne DURAN

## 2024-11-25 NOTE — PROGRESS NOTES
1915: Bedside shift change report given to GRETA Rees RN (oncoming nurse) by MITCH Rowland RN (offgoing nurse). Report included the following information Nurse Handoff Report, Adult Overview, MAR, Cardiac Rhythm Sinus Kevin, Neuro Assessment, and Event Log.   Assumed care of patient. Bedside shift discussion done. Whiteboard updated. Patient resting in bed, participating in bedside shift discussion. Plan for patient discussed. Bed is in lowest position and call bell within reach.     2024: VSS. Currently bradycardic, not new to patient, asymptomatic.     2233: Administered PRN pain medication for 10/10 pain. Ice chips provided at patient's request. Patient sitting in bedside recliner. No other needs at this time.    Bedside shift change report given to KENDALL Wheeler RN/MAXWELL Rogers RN (oncoming nurse) by GRETA Rees RN (offgoing nurse). Report included the following information Nurse Handoff Report, Adult Overview, MAR, Cardiac Rhythm Sinus Rhythm, Neuro Assessment, and Event Log.

## 2024-11-25 NOTE — PROGRESS NOTES
Advance Care Planning     Advance Care Planning Inpatient Note  [facility] Department    Today's Date: 11/25/2024  Unit: F 2 [facility]    Received request from HealthCare Provider.  Upon review of chart and communication with care team, patient's decision making abilities are not in question.. Patient was/were present in the room during visit.    Goals of ACP Conversation:  Discuss advance care planning documents    Health Care Decision Makers:     No healthcare decision makers have been documented.    Summary:  No Decision Maker named by patient at this time    Advance Care Planning Documents (Patient Wishes):  None     Assessment:     11/25/24 1825   Encounter Summary   Encounter Overview/Reason Advance Care Planning   Encounter Code  Assessment by  services   Service Provided For Patient   Referral/Consult From Nurse   Support System Family members   Last Encounter  11/25/24  (IV-SA-Advance Care Plan Written Consult-[facility])   Complexity of Encounter Low   Begin Time 1432   End Time  1451   Total Time Calculated 19 min   Advance Care Planning   Type ACP conversation   Assessment/Intervention/Outcome   Assessment Coping   Intervention Active listening;Explored/Affirmed feelings, thoughts, concerns;Sustaining Presence/Ministry of presence   Outcome Expressed Gratitude;Receptive         Interventions:  Reviewed but did not complete ACP document    Care Preferences Communicated:   No    Outcomes/Plan:  ACP Discussion: Refused    Electronically signed by YULIA Florence on 11/25/2024 at 6:28 PM

## 2024-11-25 NOTE — PROGRESS NOTES
Due to patients isolation status, pharmacy is unable to do a medication reconciliation.      A medication reconciliation was done by Last Reviewed by Susanne Bower RN on 11/22/2024 at 10:50 PM upon admission.     Thanks.   Pharmacy

## 2024-11-25 NOTE — PLAN OF CARE
Problem: Discharge Planning  Goal: Discharge to home or other facility with appropriate resources  11/25/2024 0834 by Reyna Wheeler RN  Outcome: Progressing  11/24/2024 2151 by Stephanie Rees RN  Outcome: Progressing  Flowsheets (Taken 11/24/2024 2151)  Discharge to home or other facility with appropriate resources:   Identify barriers to discharge with patient and caregiver   Arrange for needed discharge resources and transportation as appropriate   Identify discharge learning needs (meds, wound care, etc)     Problem: Safety - Adult  Goal: Free from fall injury  11/25/2024 0834 by Reyna Wheeler RN  Outcome: Progressing  11/24/2024 2151 by Stephanie Rees RN  Outcome: Progressing  Flowsheets (Taken 11/24/2024 2151)  Free From Fall Injury: Instruct family/caregiver on patient safety     Problem: Pain  Goal: Verbalizes/displays adequate comfort level or baseline comfort level  11/25/2024 0834 by Reyna Wheeler RN  Outcome: Progressing  11/24/2024 2151 by Stephanie Rees RN  Outcome: Progressing  Flowsheets (Taken 11/24/2024 2151)  Verbalizes/displays adequate comfort level or baseline comfort level:   Encourage patient to monitor pain and request assistance   Assess pain using appropriate pain scale   Administer analgesics based on type and severity of pain and evaluate response   Implement non-pharmacological measures as appropriate and evaluate response   Consider cultural and social influences on pain and pain management   Notify Licensed Independent Practitioner if interventions unsuccessful or patient reports new pain

## 2024-11-25 NOTE — PLAN OF CARE
Problem: Discharge Planning  Goal: Discharge to home or other facility with appropriate resources  Outcome: Progressing  Flowsheets (Taken 11/24/2024 2151)  Discharge to home or other facility with appropriate resources:   Identify barriers to discharge with patient and caregiver   Arrange for needed discharge resources and transportation as appropriate   Identify discharge learning needs (meds, wound care, etc)     Problem: Safety - Adult  Goal: Free from fall injury  Outcome: Progressing  Flowsheets (Taken 11/24/2024 2151)  Free From Fall Injury: Instruct family/caregiver on patient safety     Problem: Pain  Goal: Verbalizes/displays adequate comfort level or baseline comfort level  Outcome: Progressing  Flowsheets (Taken 11/24/2024 2151)  Verbalizes/displays adequate comfort level or baseline comfort level:   Encourage patient to monitor pain and request assistance   Assess pain using appropriate pain scale   Administer analgesics based on type and severity of pain and evaluate response   Implement non-pharmacological measures as appropriate and evaluate response   Consider cultural and social influences on pain and pain management   Notify Licensed Independent Practitioner if interventions unsuccessful or patient reports new pain

## 2024-11-26 PROCEDURE — 1100000000 HC RM PRIVATE

## 2024-11-26 PROCEDURE — 6370000000 HC RX 637 (ALT 250 FOR IP): Performed by: NURSE PRACTITIONER

## 2024-11-26 PROCEDURE — 6360000002 HC RX W HCPCS: Performed by: INTERNAL MEDICINE

## 2024-11-26 PROCEDURE — 2580000003 HC RX 258: Performed by: INTERNAL MEDICINE

## 2024-11-26 PROCEDURE — 2580000003 HC RX 258: Performed by: NURSE PRACTITIONER

## 2024-11-26 RX ADMIN — SODIUM CHLORIDE, PRESERVATIVE FREE 10 ML: 5 INJECTION INTRAVENOUS at 09:35

## 2024-11-26 RX ADMIN — SODIUM CHLORIDE, PRESERVATIVE FREE 10 ML: 5 INJECTION INTRAVENOUS at 21:15

## 2024-11-26 RX ADMIN — ACETAMINOPHEN 325MG 650 MG: 325 TABLET ORAL at 21:14

## 2024-11-26 RX ADMIN — CEFTRIAXONE SODIUM 2000 MG: 2 INJECTION, POWDER, FOR SOLUTION INTRAMUSCULAR; INTRAVENOUS at 09:30

## 2024-11-26 RX ADMIN — FOLIC ACID 1 MG: 1 TABLET ORAL at 09:30

## 2024-11-26 RX ADMIN — ACETAMINOPHEN 325MG 650 MG: 325 TABLET ORAL at 09:30

## 2024-11-26 RX ADMIN — ACETAMINOPHEN 325MG 650 MG: 325 TABLET ORAL at 17:53

## 2024-11-26 RX ADMIN — Medication 100 MG: at 09:30

## 2024-11-26 RX ADMIN — AMLODIPINE BESYLATE 10 MG: 5 TABLET ORAL at 09:30

## 2024-11-26 ASSESSMENT — PAIN SCALES - GENERAL
PAINLEVEL_OUTOF10: 1
PAINLEVEL_OUTOF10: 2
PAINLEVEL_OUTOF10: 2
PAINLEVEL_OUTOF10: 6
PAINLEVEL_OUTOF10: 6
PAINLEVEL_OUTOF10: 3
PAINLEVEL_OUTOF10: 2

## 2024-11-26 ASSESSMENT — PAIN DESCRIPTION - DESCRIPTORS
DESCRIPTORS: ACHING
DESCRIPTORS: ACHING

## 2024-11-26 ASSESSMENT — PAIN - FUNCTIONAL ASSESSMENT
PAIN_FUNCTIONAL_ASSESSMENT: ACTIVITIES ARE NOT PREVENTED

## 2024-11-26 ASSESSMENT — PAIN SCALES - WONG BAKER
WONGBAKER_NUMERICALRESPONSE: NO HURT
WONGBAKER_NUMERICALRESPONSE: HURTS A LITTLE BIT
WONGBAKER_NUMERICALRESPONSE: HURTS A LITTLE BIT
WONGBAKER_NUMERICALRESPONSE: NO HURT

## 2024-11-26 ASSESSMENT — PAIN DESCRIPTION - LOCATION
LOCATION: FOOT
LOCATION: ABDOMEN
LOCATION: SHOULDER

## 2024-11-26 ASSESSMENT — PAIN DESCRIPTION - ORIENTATION
ORIENTATION: RIGHT
ORIENTATION: LEFT

## 2024-11-26 NOTE — PROGRESS NOTES
0700 - Bedside shift report received from ANDREA Skinner.      0800 - VS, assessments completed.  Breakfast tray provided.  Patient showered independently,    0940 - Medications given as scheduled. Given prn Tylenol for pain. CBWR.    1130 - Lunch tray provided, complaints denied. CBWR.    1753 - Patient given prn Tylenol for shoulder pain rating of 3.  Education provided regarding contact isolation status. CBWR.    1900 - Shift report provided to oncoming shift.

## 2024-11-26 NOTE — PLAN OF CARE
Problem: Discharge Planning  Goal: Discharge to home or other facility with appropriate resources  11/25/2024 2216 by Susanne Bower RN  Outcome: Progressing  11/25/2024 0834 by Reyna Wheeler RN  Outcome: Progressing     Problem: Safety - Adult  Goal: Free from fall injury  11/25/2024 2216 by Susanne Bower RN  Outcome: Progressing  11/25/2024 0834 by Reyna Wheeler RN  Outcome: Progressing     Problem: Pain  Goal: Verbalizes/displays adequate comfort level or baseline comfort level  11/25/2024 2216 by Susanne Bower RN  Outcome: Progressing  11/25/2024 0834 by Reyna Wheeler RN  Outcome: Progressing

## 2024-11-26 NOTE — PLAN OF CARE
Problem: Discharge Planning  Goal: Discharge to home or other facility with appropriate resources  11/26/2024 0821 by Phyllis Castañeda RN  Outcome: Progressing  11/25/2024 2216 by Susanne Bower RN  Outcome: Progressing     Problem: Safety - Adult  Goal: Free from fall injury  11/26/2024 0821 by Phyllis Castañeda RN  Outcome: Progressing  11/25/2024 2216 by Susanne Bower RN  Outcome: Progressing     Problem: Pain  Goal: Verbalizes/displays adequate comfort level or baseline comfort level  11/26/2024 0821 by Phyllis Castañeda RN  Outcome: Progressing  11/25/2024 2216 by Susanne Bower RN  Outcome: Progressing

## 2024-11-26 NOTE — PROGRESS NOTES
1900 report received from KENDALL Wheeler RN. Pt awake in bed. Pt needs addressed at this time.    2011 assessment complete vitals obtained    2127 PRN OXY IR 10 mg given for 10/10 left arm ans shoulder pain    2157 pain improved. Pt satisfied    0054 rounded on pt. Pt asleep in bed. Normal respiratory patterns notes    0705 report given to Phyllis MENDEZ

## 2024-11-27 LAB
ALBUMIN SERPL-MCNC: 2.5 G/DL (ref 3.4–5)
ALBUMIN/GLOB SERPL: 0.4 (ref 0.8–1.7)
ALP SERPL-CCNC: 71 U/L (ref 45–117)
ALT SERPL-CCNC: 30 U/L (ref 16–61)
ANION GAP SERPL CALC-SCNC: 4 MMOL/L (ref 3–18)
AST SERPL W P-5'-P-CCNC: 24 U/L (ref 10–38)
BASOPHILS # BLD: 0.1 K/UL (ref 0–0.1)
BASOPHILS NFR BLD: 2 % (ref 0–2)
BILIRUB SERPL-MCNC: 0.2 MG/DL (ref 0.2–1)
BUN SERPL-MCNC: 10 MG/DL (ref 7–18)
BUN/CREAT SERPL: 11 (ref 12–20)
CA-I BLD-MCNC: 9.8 MG/DL (ref 8.5–10.1)
CHLORIDE SERPL-SCNC: 109 MMOL/L (ref 100–111)
CO2 SERPL-SCNC: 29 MMOL/L (ref 21–32)
CREAT SERPL-MCNC: 0.92 MG/DL (ref 0.6–1.3)
DIFFERENTIAL METHOD BLD: ABNORMAL
EOSINOPHIL # BLD: 0.2 K/UL (ref 0–0.4)
EOSINOPHIL NFR BLD: 5 % (ref 0–5)
ERYTHROCYTE [DISTWIDTH] IN BLOOD BY AUTOMATED COUNT: 13.7 % (ref 11.6–14.5)
GLOBULIN SER CALC-MCNC: 5.6 G/DL (ref 2–4)
GLUCOSE SERPL-MCNC: 95 MG/DL (ref 74–99)
HCT VFR BLD AUTO: 38.4 % (ref 36–48)
HGB BLD-MCNC: 11.7 G/DL (ref 13–16)
IMM GRANULOCYTES # BLD AUTO: 0 K/UL (ref 0–0.04)
IMM GRANULOCYTES NFR BLD AUTO: 1 % (ref 0–0.5)
LYMPHOCYTES # BLD: 1.8 K/UL (ref 0.9–3.6)
LYMPHOCYTES NFR BLD: 39 % (ref 21–52)
MCH RBC QN AUTO: 27.3 PG (ref 24–34)
MCHC RBC AUTO-ENTMCNC: 30.5 G/DL (ref 31–37)
MCV RBC AUTO: 89.7 FL (ref 78–100)
MONOCYTES # BLD: 0.7 K/UL (ref 0.05–1.2)
MONOCYTES NFR BLD: 16 % (ref 3–10)
NEUTS SEG # BLD: 1.7 K/UL (ref 1.8–8)
NEUTS SEG NFR BLD: 37 % (ref 40–73)
NRBC # BLD: 0 K/UL (ref 0–0.01)
NRBC BLD-RTO: 0 PER 100 WBC
PLATELET # BLD AUTO: 416 K/UL (ref 135–420)
PMV BLD AUTO: 8.4 FL (ref 9.2–11.8)
POTASSIUM SERPL-SCNC: 4.4 MMOL/L (ref 3.5–5.5)
PROT SERPL-MCNC: 8.1 G/DL (ref 6.4–8.2)
RBC # BLD AUTO: 4.28 M/UL (ref 4.35–5.65)
SODIUM SERPL-SCNC: 142 MMOL/L (ref 136–145)
WBC # BLD AUTO: 4.6 K/UL (ref 4.6–13.2)

## 2024-11-27 PROCEDURE — 85025 COMPLETE CBC W/AUTO DIFF WBC: CPT

## 2024-11-27 PROCEDURE — 1100000000 HC RM PRIVATE

## 2024-11-27 PROCEDURE — 36415 COLL VENOUS BLD VENIPUNCTURE: CPT

## 2024-11-27 PROCEDURE — 2580000003 HC RX 258: Performed by: INTERNAL MEDICINE

## 2024-11-27 PROCEDURE — 80053 COMPREHEN METABOLIC PANEL: CPT

## 2024-11-27 PROCEDURE — 6360000002 HC RX W HCPCS: Performed by: INTERNAL MEDICINE

## 2024-11-27 PROCEDURE — 6370000000 HC RX 637 (ALT 250 FOR IP): Performed by: NURSE PRACTITIONER

## 2024-11-27 PROCEDURE — 2580000003 HC RX 258: Performed by: NURSE PRACTITIONER

## 2024-11-27 RX ADMIN — POLYETHYLENE GLYCOL 3350 17 G: 17 POWDER, FOR SOLUTION ORAL at 09:30

## 2024-11-27 RX ADMIN — ACETAMINOPHEN 325MG 650 MG: 325 TABLET ORAL at 09:30

## 2024-11-27 RX ADMIN — SODIUM CHLORIDE, PRESERVATIVE FREE 10 ML: 5 INJECTION INTRAVENOUS at 21:04

## 2024-11-27 RX ADMIN — SODIUM CHLORIDE, PRESERVATIVE FREE 10 ML: 5 INJECTION INTRAVENOUS at 10:05

## 2024-11-27 RX ADMIN — Medication 100 MG: at 09:30

## 2024-11-27 RX ADMIN — FOLIC ACID 1 MG: 1 TABLET ORAL at 09:30

## 2024-11-27 RX ADMIN — AMLODIPINE BESYLATE 10 MG: 5 TABLET ORAL at 09:30

## 2024-11-27 RX ADMIN — ACETAMINOPHEN 325MG 650 MG: 325 TABLET ORAL at 18:02

## 2024-11-27 RX ADMIN — CEFTRIAXONE SODIUM 2000 MG: 2 INJECTION, POWDER, FOR SOLUTION INTRAMUSCULAR; INTRAVENOUS at 09:30

## 2024-11-27 RX ADMIN — ACETAMINOPHEN 325MG 650 MG: 325 TABLET ORAL at 04:51

## 2024-11-27 ASSESSMENT — PAIN DESCRIPTION - LOCATION
LOCATION: SHOULDER
LOCATION: SHOULDER
LOCATION: BACK;LEG
LOCATION: SHOULDER

## 2024-11-27 ASSESSMENT — PAIN - FUNCTIONAL ASSESSMENT
PAIN_FUNCTIONAL_ASSESSMENT: ACTIVITIES ARE NOT PREVENTED

## 2024-11-27 ASSESSMENT — PAIN SCALES - WONG BAKER
WONGBAKER_NUMERICALRESPONSE: NO HURT

## 2024-11-27 ASSESSMENT — PAIN SCALES - GENERAL
PAINLEVEL_OUTOF10: 3
PAINLEVEL_OUTOF10: 3
PAINLEVEL_OUTOF10: 8
PAINLEVEL_OUTOF10: 3
PAINLEVEL_OUTOF10: 3
PAINLEVEL_OUTOF10: 4
PAINLEVEL_OUTOF10: 3

## 2024-11-27 ASSESSMENT — PAIN DESCRIPTION - ORIENTATION
ORIENTATION: RIGHT
ORIENTATION: LEFT

## 2024-11-27 ASSESSMENT — PAIN DESCRIPTION - DESCRIPTORS
DESCRIPTORS: ACHING

## 2024-11-27 NOTE — PROGRESS NOTES
0700 - Bedside shift report received from ANDREA Kenny.  Patient awake and participative.  CBWR.     0800 - VS, assessments completed.  Breakfast tray provided.  Patient showered independently,     0940 - Medications given as scheduled. Given prn Tylenol for pain. CBWR.     1130 - Lunch tray provided, complaints denied. CBWR.    1230 - Sterile dressing change performed on right upper arm PICC line.  Patient tolerated procedure without complaint of discomfort.  CBWR.     1430 - Patient resting quietly in room Ambulates to bathroom freely.  Denies complaints. CBWR..    1630 - Dinner tray provided.  Appetite good.  Complaints denied.  CBWR.    1830 - Given prn Tylenol for complaint of left shoulder pain and a snack.  Resting quietly in bed. CBWR.    1900 - Report given to oncoming shift, CBWR.

## 2024-11-27 NOTE — PROGRESS NOTES
1915  Received care of pt lying in bed watching TV.  Pt is alert and oriented x 4.  Routine assessment and VS completed.  Pt rates pain 2 a this time and is comfortable at that level.  [place] and soda provided. Call bell in reach.    2120  Pt accepted hs meds without difficulty    2330  Resting with eyes closed.    0200  Resting with eyes closed.    0451 Pt up to bathroom and requesting prn tylenol.  Tylenol tabs 2 po given for same,    0600  Pt resting with eyes closed.  No distress noted.

## 2024-11-27 NOTE — PLAN OF CARE
Problem: Discharge Planning  Goal: Discharge to home or other facility with appropriate resources  11/26/2024 1950 by Zoya Schulz, RN  Outcome: Progressing  11/26/2024 0821 by Phyllis Castañeda RN  Outcome: Progressing     Problem: Safety - Adult  Goal: Free from fall injury  11/26/2024 1950 by Zoya Schulz, RN  Outcome: Progressing  11/26/2024 0821 by Phyllis Castañeda RN  Outcome: Progressing     Problem: Pain  Goal: Verbalizes/displays adequate comfort level or baseline comfort level  11/26/2024 1950 by Zoya Schulz, RN  Outcome: Progressing  11/26/2024 0821 by Phyllis Castañeda RN  Outcome: Progressing

## 2024-11-27 NOTE — PLAN OF CARE
Problem: Discharge Planning  Goal: Discharge to home or other facility with appropriate resources  11/27/2024 0723 by Phyllis Castañeda RN  Outcome: Progressing  11/26/2024 1950 by Zoya Schulz RN  Outcome: Progressing     Problem: Safety - Adult  Goal: Free from fall injury  11/27/2024 0723 by Phyllis Castañeda RN  Outcome: Progressing  11/26/2024 1950 by Zoya Schulz RN  Outcome: Progressing     Problem: Pain  Goal: Verbalizes/displays adequate comfort level or baseline comfort level  11/27/2024 0723 by Phyllis Castañeda RN  Outcome: Progressing  11/26/2024 1950 by Zoya Schulz, RN  Outcome: Progressing

## 2024-11-28 PROCEDURE — 6360000002 HC RX W HCPCS: Performed by: INTERNAL MEDICINE

## 2024-11-28 PROCEDURE — 2580000003 HC RX 258: Performed by: NURSE PRACTITIONER

## 2024-11-28 PROCEDURE — 1100000000 HC RM PRIVATE

## 2024-11-28 PROCEDURE — 6370000000 HC RX 637 (ALT 250 FOR IP): Performed by: NURSE PRACTITIONER

## 2024-11-28 PROCEDURE — 2580000003 HC RX 258: Performed by: INTERNAL MEDICINE

## 2024-11-28 RX ADMIN — Medication 100 MG: at 07:50

## 2024-11-28 RX ADMIN — SODIUM CHLORIDE, PRESERVATIVE FREE 10 ML: 5 INJECTION INTRAVENOUS at 20:50

## 2024-11-28 RX ADMIN — ACETAMINOPHEN 325MG 650 MG: 325 TABLET ORAL at 18:37

## 2024-11-28 RX ADMIN — CEFTRIAXONE SODIUM 2000 MG: 2 INJECTION, POWDER, FOR SOLUTION INTRAMUSCULAR; INTRAVENOUS at 09:21

## 2024-11-28 RX ADMIN — ACETAMINOPHEN 325MG 650 MG: 325 TABLET ORAL at 07:50

## 2024-11-28 RX ADMIN — AMLODIPINE BESYLATE 10 MG: 5 TABLET ORAL at 07:50

## 2024-11-28 RX ADMIN — ACETAMINOPHEN 325MG 650 MG: 325 TABLET ORAL at 00:00

## 2024-11-28 RX ADMIN — FOLIC ACID 1 MG: 1 TABLET ORAL at 07:50

## 2024-11-28 RX ADMIN — SODIUM CHLORIDE, PRESERVATIVE FREE 10 ML: 5 INJECTION INTRAVENOUS at 10:00

## 2024-11-28 ASSESSMENT — PAIN - FUNCTIONAL ASSESSMENT
PAIN_FUNCTIONAL_ASSESSMENT: ACTIVITIES ARE NOT PREVENTED

## 2024-11-28 ASSESSMENT — PAIN DESCRIPTION - PAIN TYPE
TYPE: ACUTE PAIN
TYPE: ACUTE PAIN

## 2024-11-28 ASSESSMENT — PAIN SCALES - GENERAL
PAINLEVEL_OUTOF10: 1
PAINLEVEL_OUTOF10: 10
PAINLEVEL_OUTOF10: 4
PAINLEVEL_OUTOF10: 3
PAINLEVEL_OUTOF10: 4
PAINLEVEL_OUTOF10: 7
PAINLEVEL_OUTOF10: 3

## 2024-11-28 ASSESSMENT — PAIN DESCRIPTION - DIRECTION: RADIATING_TOWARDS: LEFT UNDERARM

## 2024-11-28 ASSESSMENT — PAIN DESCRIPTION - ONSET
ONSET: SUDDEN
ONSET: ON-GOING
ONSET: OTHER (COMMENT)

## 2024-11-28 ASSESSMENT — PAIN DESCRIPTION - ORIENTATION
ORIENTATION: LEFT
ORIENTATION: LEFT
ORIENTATION: LEFT;UPPER

## 2024-11-28 ASSESSMENT — PAIN DESCRIPTION - FREQUENCY
FREQUENCY: INTERMITTENT
FREQUENCY: INTERMITTENT
FREQUENCY: CONTINUOUS

## 2024-11-28 ASSESSMENT — PAIN DESCRIPTION - DESCRIPTORS
DESCRIPTORS: ACHING
DESCRIPTORS: PRESSURE
DESCRIPTORS: SHARP

## 2024-11-28 ASSESSMENT — PAIN DESCRIPTION - LOCATION
LOCATION: SHOULDER
LOCATION: CHEST
LOCATION: CHEST

## 2024-11-28 NOTE — PROGRESS NOTES
1900 Assumed care of patient after shift report. Patient in bed watching TV. Denies any distress at this time.    2030 Assessment and VS completed. Patient reports he is hungry; snack of sandwich and soda given to patient.    0000 C/o left shoulder pain 4/10 PSR; Tylenol given for c/o pain.    0200 Resting quietly with eyes closed. Even chest rise and fall; no distress noted at this time.     0500 Patient to continues to rest quietly with eyes closed. Unlabored respirations noted.

## 2024-11-28 NOTE — PLAN OF CARE
Problem: Discharge Planning  Goal: Discharge to home or other facility with appropriate resources  11/27/2024 1950 by Adam Dee, RN  Outcome: Progressing  11/27/2024 0723 by Phyllis Castañeda, RN  Outcome: Progressing  Flowsheets (Taken 11/27/2024 0723)  Discharge to home or other facility with appropriate resources: Identify barriers to discharge with patient and caregiver     Problem: Safety - Adult  Goal: Free from fall injury  11/27/2024 1950 by Adam Dee, RN  Outcome: Progressing  11/27/2024 0723 by Phyllis Castañeda, RN  Outcome: Progressing     Problem: Pain  Goal: Verbalizes/displays adequate comfort level or baseline comfort level  11/27/2024 1950 by Adam Dee, RN  Outcome: Progressing  11/27/2024 0723 by Phyllis Castañeda, RN  Outcome: Progressing

## 2024-11-28 NOTE — PROGRESS NOTES
-Practitioner Mustapha notified of the patient's pain at this time.  -Tylenol was given for 10/10 left upper chest, radiating to left underarm and back.  -Patient states that typically it is only with movement, but it was hurting him as soon as he woke from his nap. The patient states that the tylenol he has been getting has not been fully covering his pain levels  -Per practitioner, she will review his chart and put in orders.

## 2024-11-29 PROCEDURE — 1100000000 HC RM PRIVATE

## 2024-11-29 PROCEDURE — 6360000002 HC RX W HCPCS: Performed by: INTERNAL MEDICINE

## 2024-11-29 PROCEDURE — 2580000003 HC RX 258: Performed by: NURSE PRACTITIONER

## 2024-11-29 PROCEDURE — 6370000000 HC RX 637 (ALT 250 FOR IP): Performed by: NURSE PRACTITIONER

## 2024-11-29 PROCEDURE — 2580000003 HC RX 258: Performed by: INTERNAL MEDICINE

## 2024-11-29 RX ADMIN — FOLIC ACID 1 MG: 1 TABLET ORAL at 08:39

## 2024-11-29 RX ADMIN — ACETAMINOPHEN 325MG 650 MG: 325 TABLET ORAL at 22:55

## 2024-11-29 RX ADMIN — ACETAMINOPHEN 325MG 650 MG: 325 TABLET ORAL at 08:39

## 2024-11-29 RX ADMIN — CEFTRIAXONE SODIUM 2000 MG: 2 INJECTION, POWDER, FOR SOLUTION INTRAMUSCULAR; INTRAVENOUS at 08:45

## 2024-11-29 RX ADMIN — Medication 100 MG: at 08:39

## 2024-11-29 RX ADMIN — SODIUM CHLORIDE, PRESERVATIVE FREE 10 ML: 5 INJECTION INTRAVENOUS at 20:47

## 2024-11-29 RX ADMIN — AMLODIPINE BESYLATE 10 MG: 5 TABLET ORAL at 08:39

## 2024-11-29 RX ADMIN — POLYETHYLENE GLYCOL 3350 17 G: 17 POWDER, FOR SOLUTION ORAL at 08:40

## 2024-11-29 RX ADMIN — SODIUM CHLORIDE, PRESERVATIVE FREE 10 ML: 5 INJECTION INTRAVENOUS at 09:20

## 2024-11-29 RX ADMIN — ACETAMINOPHEN 325MG 650 MG: 325 TABLET ORAL at 17:41

## 2024-11-29 ASSESSMENT — PAIN DESCRIPTION - LOCATION
LOCATION: HEAD
LOCATION: HEAD
LOCATION: SHOULDER
LOCATION: SHOULDER
LOCATION: HEAD;SHOULDER
LOCATION: HEAD

## 2024-11-29 ASSESSMENT — PAIN SCALES - WONG BAKER
WONGBAKER_NUMERICALRESPONSE: HURTS A LITTLE BIT
WONGBAKER_NUMERICALRESPONSE: NO HURT
WONGBAKER_NUMERICALRESPONSE: HURTS A LITTLE BIT
WONGBAKER_NUMERICALRESPONSE: NO HURT

## 2024-11-29 ASSESSMENT — PAIN - FUNCTIONAL ASSESSMENT
PAIN_FUNCTIONAL_ASSESSMENT: ACTIVITIES ARE NOT PREVENTED

## 2024-11-29 ASSESSMENT — PAIN DESCRIPTION - DESCRIPTORS
DESCRIPTORS: ACHING

## 2024-11-29 ASSESSMENT — PAIN DESCRIPTION - ORIENTATION
ORIENTATION: MID
ORIENTATION: LEFT
ORIENTATION: MID

## 2024-11-29 ASSESSMENT — PAIN DESCRIPTION - ONSET
ONSET: GRADUAL
ONSET: GRADUAL

## 2024-11-29 ASSESSMENT — PAIN SCALES - GENERAL
PAINLEVEL_OUTOF10: 4
PAINLEVEL_OUTOF10: 3
PAINLEVEL_OUTOF10: 2
PAINLEVEL_OUTOF10: 1
PAINLEVEL_OUTOF10: 7
PAINLEVEL_OUTOF10: 2
PAINLEVEL_OUTOF10: 3
PAINLEVEL_OUTOF10: 2
PAINLEVEL_OUTOF10: 0

## 2024-11-29 ASSESSMENT — PAIN DESCRIPTION - PAIN TYPE
TYPE: ACUTE PAIN
TYPE: ACUTE PAIN

## 2024-11-29 ASSESSMENT — PAIN DESCRIPTION - FREQUENCY
FREQUENCY: INTERMITTENT
FREQUENCY: INTERMITTENT

## 2024-11-29 NOTE — PLAN OF CARE
Problem: Discharge Planning  Goal: Discharge to home or other facility with appropriate resources  Outcome: Progressing  Flowsheets (Taken 11/27/2024 0723 by Phyllis Castañeda, RN)  Discharge to home or other facility with appropriate resources: Identify barriers to discharge with patient and caregiver     Problem: Safety - Adult  Goal: Free from fall injury  11/29/2024 0916 by Dominga Troy RN  Outcome: Progressing  Flowsheets (Taken 11/29/2024 0916)  Free From Fall Injury: Instruct family/caregiver on patient safety  11/28/2024 1917 by Teri Aaron, RN  Outcome: Progressing  Flowsheets (Taken 11/28/2024 0730)  Free From Fall Injury: Instruct family/caregiver on patient safety     Problem: Pain  Goal: Verbalizes/displays adequate comfort level or baseline comfort level  11/29/2024 0916 by Dominga Troy RN  Outcome: Progressing  Flowsheets (Taken 11/29/2024 0916)  Verbalizes/displays adequate comfort level or baseline comfort level:   Encourage patient to monitor pain and request assistance   Assess pain using appropriate pain scale   Administer analgesics based on type and severity of pain and evaluate response  11/28/2024 1917 by Teri Aaron, RN  Outcome: Progressing  Flowsheets (Taken 11/28/2024 0730)  Verbalizes/displays adequate comfort level or baseline comfort level: Encourage patient to monitor pain and request assistance

## 2024-11-29 NOTE — PROGRESS NOTES
1920 Received care of pt lying in bed watching TV.  Routine assessment and VS completed.  Pt complains of mild discomfort to left chest and shoulder area.  Pt otherwise complaint free.    2115  Pt accepted hs meds and snack provided.  No distress noted.    0030  Pt resting with eyes closed and no distress noted.    0320  Pt resting with eyes closed and resp even and unlabored.    0600  Pt sleeping with no distress noted.

## 2024-11-29 NOTE — PROGRESS NOTES
0812 Assumed care. Assessment completed. Patient awake in bed. Ambulates around room on own. No distress noted. Patient has call light in reach.    0955 Patient up to shower. Tolerated shower well. No shortness of breath or distress indicated. Monitored patient ambulating back to bed. Call light in reach.    1103 Patient sleeping.    1422 Patient denies any needs or concerns. Call light in reach.

## 2024-11-30 PROCEDURE — 6370000000 HC RX 637 (ALT 250 FOR IP): Performed by: NURSE PRACTITIONER

## 2024-11-30 PROCEDURE — 1100000000 HC RM PRIVATE

## 2024-11-30 PROCEDURE — 6360000002 HC RX W HCPCS: Performed by: INTERNAL MEDICINE

## 2024-11-30 PROCEDURE — 2580000003 HC RX 258: Performed by: INTERNAL MEDICINE

## 2024-11-30 PROCEDURE — 2580000003 HC RX 258: Performed by: NURSE PRACTITIONER

## 2024-11-30 RX ADMIN — SODIUM CHLORIDE, PRESERVATIVE FREE 10 ML: 5 INJECTION INTRAVENOUS at 20:25

## 2024-11-30 RX ADMIN — AMLODIPINE BESYLATE 10 MG: 5 TABLET ORAL at 09:30

## 2024-11-30 RX ADMIN — ACETAMINOPHEN 325MG 650 MG: 325 TABLET ORAL at 20:29

## 2024-11-30 RX ADMIN — CEFTRIAXONE SODIUM 2000 MG: 2 INJECTION, POWDER, FOR SOLUTION INTRAMUSCULAR; INTRAVENOUS at 09:39

## 2024-11-30 RX ADMIN — SODIUM CHLORIDE, PRESERVATIVE FREE 10 ML: 5 INJECTION INTRAVENOUS at 09:32

## 2024-11-30 RX ADMIN — ACETAMINOPHEN 325MG 650 MG: 325 TABLET ORAL at 09:35

## 2024-11-30 RX ADMIN — Medication 100 MG: at 09:31

## 2024-11-30 RX ADMIN — FOLIC ACID 1 MG: 1 TABLET ORAL at 09:31

## 2024-11-30 ASSESSMENT — PAIN SCALES - GENERAL
PAINLEVEL_OUTOF10: 3
PAINLEVEL_OUTOF10: 6
PAINLEVEL_OUTOF10: 2

## 2024-11-30 ASSESSMENT — PAIN DESCRIPTION - DESCRIPTORS
DESCRIPTORS: ACHING
DESCRIPTORS: ACHING

## 2024-11-30 ASSESSMENT — PAIN - FUNCTIONAL ASSESSMENT
PAIN_FUNCTIONAL_ASSESSMENT: ACTIVITIES ARE NOT PREVENTED
PAIN_FUNCTIONAL_ASSESSMENT: ACTIVITIES ARE NOT PREVENTED

## 2024-11-30 ASSESSMENT — PAIN DESCRIPTION - LOCATION
LOCATION: SHOULDER
LOCATION: HIP;LEG

## 2024-11-30 ASSESSMENT — PAIN DESCRIPTION - ORIENTATION: ORIENTATION: LEFT;UPPER

## 2024-11-30 NOTE — PROGRESS NOTES
1900 Assumed care of patient after shift report. Patient watching TV; denies distress at this time.    2040 Assessment and VS completed. Patient requesting a snack. [place], ice cream and peanut butter crackers and soda given to patient. Patient requested Tylenol for shoulder pain but he was told that it is too soon to the previous dose to get that now. He verbalized understanding.    2255 Tylenol given for c/o left shoulder 3/10 PSR.

## 2024-11-30 NOTE — PLAN OF CARE
Problem: Discharge Planning  Goal: Discharge to home or other facility with appropriate resources  11/30/2024 1227 by Derrek Mata, RN  Outcome: Progressing  11/29/2024 2233 by Adam Dee, RN  Outcome: Progressing     Problem: Safety - Adult  Goal: Free from fall injury  11/30/2024 1227 by Derrek Mata, RN  Outcome: Progressing  11/29/2024 2233 by Adam Dee, RN  Outcome: Progressing     Problem: Pain  Goal: Verbalizes/displays adequate comfort level or baseline comfort level  11/30/2024 1227 by Derrek Mata, RN  Outcome: Progressing  11/29/2024 2233 by Adam Dee, RN  Outcome: Progressing

## 2024-11-30 NOTE — PLAN OF CARE
Problem: Discharge Planning  Goal: Discharge to home or other facility with appropriate resources  11/29/2024 2233 by Adam Dee RN  Outcome: Progressing  11/29/2024 0916 by Dominga Troy RN  Outcome: Progressing  Flowsheets (Taken 11/27/2024 0723 by Phyllis Castañeda RN)  Discharge to home or other facility with appropriate resources: Identify barriers to discharge with patient and caregiver     Problem: Safety - Adult  Goal: Free from fall injury  11/29/2024 2233 by Adam Dee RN  Outcome: Progressing  11/29/2024 0916 by Dominga Troy RN  Outcome: Progressing  Flowsheets (Taken 11/29/2024 0916)  Free From Fall Injury: Instruct family/caregiver on patient safety     Problem: Pain  Goal: Verbalizes/displays adequate comfort level or baseline comfort level  11/29/2024 2233 by Adam Dee RN  Outcome: Progressing  11/29/2024 0916 by Dominga Troy RN  Outcome: Progressing  Flowsheets (Taken 11/29/2024 0916)  Verbalizes/displays adequate comfort level or baseline comfort level:   Encourage patient to monitor pain and request assistance   Assess pain using appropriate pain scale   Administer analgesics based on type and severity of pain and evaluate response

## 2024-11-30 NOTE — PROGRESS NOTES
Hospitalist Progress Note    Daily Progress Note: 2024 11:10 PM      Dora Frazier                                            MRN: [id number]                                  :1960    Hospital course / Assessment and Plans   Principle Problems:  #T-Spine Discitis/Phlegmon, Group B strep Bacteremia, Left Septic Joint Arthritis  --Continue IV Rocephin via PICC to RUE through 25.  --Continue Weekly CBC/CMP while on antibiotics.  --Follow left SC joint cultures from  - [facility].  --F/U with Spine Surgery in 4 weeks re: thoracic spine phlegmon. Dr. Agapito Jacobs.  --F/U with Cardiothoracic Surgery in 4 weeks re: left sternoclavicular joint infection. Dr. Hector Field.   --F/U with Infectious Disease in 4 weeks. Dr. Noni Solis.  --Repeat MRI T Spine, Chest in 4 weeks to determine switch to prolonged oral antibiotics versus need for drainage/surgical intervention. Re-image sooner if symptoms re-demonstrate. - Ordered for 24.  --Continue Lidocaine patch to left upper chest.  --C/S case management for coordination of care, f/u's.     #HTN  --Continue Amlodipine 10mg daily.  --VS per routine.     #Tobacco Use  --Declined Nicotine patch.     #Alcohol Use  --Continue thiamine and folic acid daily.      Full Code     Disposition / Plans   Disposition: home once treatment completed    Subjective:     Pt examined and seen at bedside. No acute events reported overnight. Received patient sitting on side of bed in no acute distress. Patient denies fevers, chills, nausea, vomiting, dizziness, lightheadedness, cough, shortness of breath, constipation, diarrhea. Pt states appetite is good. Pt offers no complaints. States he is here til the beginning of the year for his antibiotics. C/O chronic left sided chest pain that was present on arrival. Will continue with current plan of care.     Labs and Vitals:    /83   Pulse (!) 47   Temp 98.2 °F (36.8 °C) (Oral)   Resp 18   Ht 1.803 m (5'

## 2024-12-01 PROCEDURE — 2580000003 HC RX 258: Performed by: INTERNAL MEDICINE

## 2024-12-01 PROCEDURE — 6370000000 HC RX 637 (ALT 250 FOR IP): Performed by: NURSE PRACTITIONER

## 2024-12-01 PROCEDURE — 2580000003 HC RX 258: Performed by: NURSE PRACTITIONER

## 2024-12-01 PROCEDURE — 1100000000 HC RM PRIVATE

## 2024-12-01 PROCEDURE — 6360000002 HC RX W HCPCS: Performed by: INTERNAL MEDICINE

## 2024-12-01 RX ADMIN — Medication 100 MG: at 09:45

## 2024-12-01 RX ADMIN — FOLIC ACID 1 MG: 1 TABLET ORAL at 09:45

## 2024-12-01 RX ADMIN — ACETAMINOPHEN 325MG 650 MG: 325 TABLET ORAL at 09:45

## 2024-12-01 RX ADMIN — ACETAMINOPHEN 325MG 650 MG: 325 TABLET ORAL at 22:26

## 2024-12-01 RX ADMIN — CEFTRIAXONE SODIUM 2000 MG: 2 INJECTION, POWDER, FOR SOLUTION INTRAMUSCULAR; INTRAVENOUS at 09:21

## 2024-12-01 RX ADMIN — SODIUM CHLORIDE, PRESERVATIVE FREE 10 ML: 5 INJECTION INTRAVENOUS at 09:48

## 2024-12-01 RX ADMIN — SODIUM CHLORIDE, PRESERVATIVE FREE 10 ML: 5 INJECTION INTRAVENOUS at 20:13

## 2024-12-01 RX ADMIN — AMLODIPINE BESYLATE 10 MG: 5 TABLET ORAL at 09:45

## 2024-12-01 RX ADMIN — ACETAMINOPHEN 325MG 650 MG: 325 TABLET ORAL at 17:21

## 2024-12-01 ASSESSMENT — PAIN DESCRIPTION - DESCRIPTORS
DESCRIPTORS: ACHING

## 2024-12-01 ASSESSMENT — PAIN DESCRIPTION - ORIENTATION
ORIENTATION: LEFT

## 2024-12-01 ASSESSMENT — PAIN SCALES - GENERAL
PAINLEVEL_OUTOF10: 4
PAINLEVEL_OUTOF10: 3
PAINLEVEL_OUTOF10: 0
PAINLEVEL_OUTOF10: 2
PAINLEVEL_OUTOF10: 3
PAINLEVEL_OUTOF10: 0

## 2024-12-01 ASSESSMENT — PAIN - FUNCTIONAL ASSESSMENT
PAIN_FUNCTIONAL_ASSESSMENT: ACTIVITIES ARE NOT PREVENTED

## 2024-12-01 ASSESSMENT — PAIN DESCRIPTION - LOCATION
LOCATION: SHOULDER

## 2024-12-01 ASSESSMENT — PAIN SCALES - WONG BAKER: WONGBAKER_NUMERICALRESPONSE: HURTS A LITTLE BIT

## 2024-12-01 NOTE — PLAN OF CARE
Problem: Discharge Planning  Goal: Discharge to home or other facility with appropriate resources  11/30/2024 2137 by Adam Dee RN  Outcome: Progressing  11/30/2024 1228 by Derrek Mata RN  Outcome: Progressing  11/30/2024 1227 by Derrek Mata, RN  Outcome: Progressing     Problem: Safety - Adult  Goal: Free from fall injury  11/30/2024 2137 by Adam Dee RN  Outcome: Progressing  11/30/2024 1228 by Derrek Mata RN  Outcome: Progressing  11/30/2024 1227 by Derrek Mata, RN  Outcome: Progressing     Problem: Pain  Goal: Verbalizes/displays adequate comfort level or baseline comfort level  11/30/2024 2137 by Adam Dee RN  Outcome: Progressing  11/30/2024 1228 by Derrek Mata, RN  Outcome: Progressing  11/30/2024 1227 by Derrek Mata, RN  Outcome: Progressing

## 2024-12-01 NOTE — PROGRESS NOTES
0700 - Bedside shift report received from ANDREA Juarez.  Patient resting quietly in bed.  CBWR.    0740 - VS, assessments completed.  CBWR.  Breakfast tray provided.     0945 - Scheduled medications given.  Patient ambulating in room.  Showered independently and shaved.  CBWR.    1130 - Lunch tray provided.  CBWR.    1330 - Patient resting quietly in his room sitting upright in a chair.  Complaints denied. CBWR.    1445 - Patient provided with a snack, ice and gingerale. OOB to chair at bedside.  Complaints denied.CBWR.    1730 - Patient given prn Tylenol for left shoulder pain.  Denies other complaints. CBWR.    1900 - Patient resting quietly .  Report given to oncoming nurse, Ann MENDEZ. CBWR.

## 2024-12-01 NOTE — PLAN OF CARE
Problem: Discharge Planning  Goal: Discharge to home or other facility with appropriate resources  12/1/2024 0751 by Phyllis Castañeda RN  Outcome: Progressing  11/30/2024 2137 by Adam Dee, RN  Outcome: Progressing     Problem: Safety - Adult  Goal: Free from fall injury  12/1/2024 0751 by Phyllis Castañeda RN  Outcome: Progressing  11/30/2024 2137 by Adam Dee, RN  Outcome: Progressing     Problem: Pain  Goal: Verbalizes/displays adequate comfort level or baseline comfort level  12/1/2024 0751 by Phyllis Castañeda RN  Outcome: Progressing  11/30/2024 2137 by Adam Dee, RN  Outcome: Progressing

## 2024-12-01 NOTE — PROGRESS NOTES
1900 Assumed care of patient after shift report. Patient sitting in chair watching TV. Denies distress at this time.     2029 Assessment and VS completed. Patient was given a bedtime snack and Tylenol for c/o left shoulder pain 6/10 PSR.     2300 Patient sleeping; unlabored respirations were noted.     0300 Patient sleeping quietly. No distress was noted; unlabored sleep respirations were noted.    0500 Patient continues to sleep without distress.

## 2024-12-02 PROCEDURE — 6370000000 HC RX 637 (ALT 250 FOR IP): Performed by: NURSE PRACTITIONER

## 2024-12-02 PROCEDURE — 1100000000 HC RM PRIVATE

## 2024-12-02 PROCEDURE — 2580000003 HC RX 258: Performed by: NURSE PRACTITIONER

## 2024-12-02 PROCEDURE — 2580000003 HC RX 258: Performed by: INTERNAL MEDICINE

## 2024-12-02 PROCEDURE — 6360000002 HC RX W HCPCS: Performed by: INTERNAL MEDICINE

## 2024-12-02 RX ADMIN — FOLIC ACID 1 MG: 1 TABLET ORAL at 09:02

## 2024-12-02 RX ADMIN — SODIUM CHLORIDE, PRESERVATIVE FREE 10 ML: 5 INJECTION INTRAVENOUS at 21:15

## 2024-12-02 RX ADMIN — AMLODIPINE BESYLATE 10 MG: 5 TABLET ORAL at 09:02

## 2024-12-02 RX ADMIN — Medication 100 MG: at 08:53

## 2024-12-02 RX ADMIN — ACETAMINOPHEN 325MG 650 MG: 325 TABLET ORAL at 09:02

## 2024-12-02 RX ADMIN — SODIUM CHLORIDE, PRESERVATIVE FREE 10 ML: 5 INJECTION INTRAVENOUS at 08:54

## 2024-12-02 RX ADMIN — ACETAMINOPHEN 325MG 650 MG: 325 TABLET ORAL at 21:09

## 2024-12-02 RX ADMIN — CEFTRIAXONE SODIUM 2000 MG: 2 INJECTION, POWDER, FOR SOLUTION INTRAMUSCULAR; INTRAVENOUS at 08:52

## 2024-12-02 ASSESSMENT — PAIN DESCRIPTION - LOCATION
LOCATION: SHOULDER
LOCATION: SHOULDER

## 2024-12-02 ASSESSMENT — PAIN DESCRIPTION - ORIENTATION
ORIENTATION: LEFT
ORIENTATION: LEFT

## 2024-12-02 ASSESSMENT — PAIN SCALES - GENERAL
PAINLEVEL_OUTOF10: 3
PAINLEVEL_OUTOF10: 5
PAINLEVEL_OUTOF10: 2
PAINLEVEL_OUTOF10: 2
PAINLEVEL_OUTOF10: 0

## 2024-12-02 ASSESSMENT — PAIN DESCRIPTION - DESCRIPTORS: DESCRIPTORS: ACHING

## 2024-12-02 NOTE — PLAN OF CARE
Problem: Discharge Planning  Goal: Discharge to home or other facility with appropriate resources  12/1/2024 2102 by Adam Dee RN  Outcome: Progressing  12/1/2024 0751 by Phyllis Castañeda RN  Outcome: Progressing     Problem: Safety - Adult  Goal: Free from fall injury  12/1/2024 2102 by Adam Dee, RN  Outcome: Progressing  12/1/2024 0751 by Phyllis Castañeda RN  Outcome: Progressing     Problem: Pain  Goal: Verbalizes/displays adequate comfort level or baseline comfort level  12/1/2024 2102 by Adam Dee, RN  Outcome: Progressing  12/1/2024 0751 by Phyllis Castañeda RN  Outcome: Progressing

## 2024-12-02 NOTE — PROGRESS NOTES
1900 Assumed care of patient after shift report. Patient awake watching TV. Denies distress at this time.     2015  VS and assessment completed. Patient given bedtime snack. C/o mild pain to left shoulder; patient was reminded that he could only take Tylenol every 6 hours; he verbalized understanding.    2228  Tylenol given for c/o left shoulder pain 3/10 PSR.    0030 In bed asleep; noted unlabored breathing pattern.     0230 In bed asleep; unlabored respirations noted.

## 2024-12-03 PROCEDURE — 6370000000 HC RX 637 (ALT 250 FOR IP): Performed by: NURSE PRACTITIONER

## 2024-12-03 PROCEDURE — 2580000003 HC RX 258: Performed by: INTERNAL MEDICINE

## 2024-12-03 PROCEDURE — 2580000003 HC RX 258: Performed by: NURSE PRACTITIONER

## 2024-12-03 PROCEDURE — 1100000000 HC RM PRIVATE

## 2024-12-03 PROCEDURE — 6360000002 HC RX W HCPCS: Performed by: INTERNAL MEDICINE

## 2024-12-03 RX ADMIN — CEFTRIAXONE SODIUM 2000 MG: 2 INJECTION, POWDER, FOR SOLUTION INTRAMUSCULAR; INTRAVENOUS at 09:22

## 2024-12-03 RX ADMIN — SODIUM CHLORIDE, PRESERVATIVE FREE 10 ML: 5 INJECTION INTRAVENOUS at 09:00

## 2024-12-03 RX ADMIN — ACETAMINOPHEN 325MG 650 MG: 325 TABLET ORAL at 19:37

## 2024-12-03 RX ADMIN — Medication 100 MG: at 09:14

## 2024-12-03 RX ADMIN — SODIUM CHLORIDE, PRESERVATIVE FREE 10 ML: 5 INJECTION INTRAVENOUS at 21:09

## 2024-12-03 RX ADMIN — FOLIC ACID 1 MG: 1 TABLET ORAL at 09:14

## 2024-12-03 RX ADMIN — AMLODIPINE BESYLATE 10 MG: 5 TABLET ORAL at 09:14

## 2024-12-03 ASSESSMENT — PAIN SCALES - GENERAL
PAINLEVEL_OUTOF10: 0
PAINLEVEL_OUTOF10: 3
PAINLEVEL_OUTOF10: 5

## 2024-12-03 ASSESSMENT — PAIN DESCRIPTION - DESCRIPTORS: DESCRIPTORS: ACHING

## 2024-12-03 ASSESSMENT — PAIN SCALES - WONG BAKER: WONGBAKER_NUMERICALRESPONSE: NO HURT

## 2024-12-03 ASSESSMENT — PAIN DESCRIPTION - ORIENTATION: ORIENTATION: LEFT

## 2024-12-03 ASSESSMENT — PAIN DESCRIPTION - LOCATION: LOCATION: SHOULDER

## 2024-12-03 NOTE — PLAN OF CARE
Problem: Discharge Planning  Goal: Discharge to home or other facility with appropriate resources  12/3/2024 0830 by Kathy Kincaid RN  Outcome: Progressing  12/2/2024 2138 by Adam Dee, RN  Outcome: Progressing     Problem: Safety - Adult  Goal: Free from fall injury  12/3/2024 0830 by Kathy Kincaid RN  Outcome: Progressing  12/2/2024 2138 by Adam Dee, RN  Outcome: Progressing     Problem: Pain  Goal: Verbalizes/displays adequate comfort level or baseline comfort level  12/3/2024 0830 by Kathy Kincaid RN  Outcome: Progressing  12/2/2024 2138 by Adam Dee, RN  Outcome: Progressing

## 2024-12-03 NOTE — PROGRESS NOTES
0700 - Assumed care of patient. Report received from ANDREA Goodrich. Patient in bed alert. Denied needs.    0915 - Patient up in chair. Scheduled IV antibiotic started. CBWR    1130 - Patient getting up to chair for lunch.     1400 - In on rounds. Patient up in room. He denied needs.    1600 - Patient given ice as requested. Denied needs. Patient ambulates in room.    1815 - Recliner moved to room. Snacks given as requested. Patient denied further needs.

## 2024-12-04 LAB
ALBUMIN SERPL-MCNC: 2.7 G/DL (ref 3.4–5)
ALBUMIN/GLOB SERPL: 0.6 (ref 0.8–1.7)
ALP SERPL-CCNC: 78 U/L (ref 45–117)
ALT SERPL-CCNC: 42 U/L (ref 16–61)
ANION GAP SERPL CALC-SCNC: 5 MMOL/L (ref 3–18)
AST SERPL W P-5'-P-CCNC: 32 U/L (ref 10–38)
BASOPHILS # BLD: 0.1 K/UL (ref 0–0.1)
BASOPHILS NFR BLD: 1 % (ref 0–2)
BILIRUB SERPL-MCNC: 0.1 MG/DL (ref 0.2–1)
BUN SERPL-MCNC: 9 MG/DL (ref 7–18)
BUN/CREAT SERPL: 10 (ref 12–20)
CA-I BLD-MCNC: 9.2 MG/DL (ref 8.5–10.1)
CHLORIDE SERPL-SCNC: 108 MMOL/L (ref 100–111)
CO2 SERPL-SCNC: 28 MMOL/L (ref 21–32)
CREAT SERPL-MCNC: 0.88 MG/DL (ref 0.6–1.3)
DIFFERENTIAL METHOD BLD: ABNORMAL
EOSINOPHIL # BLD: 0.4 K/UL (ref 0–0.4)
EOSINOPHIL NFR BLD: 7 % (ref 0–5)
ERYTHROCYTE [DISTWIDTH] IN BLOOD BY AUTOMATED COUNT: 13.8 % (ref 11.6–14.5)
GLOBULIN SER CALC-MCNC: 4.9 G/DL (ref 2–4)
GLUCOSE SERPL-MCNC: 93 MG/DL (ref 74–99)
HCT VFR BLD AUTO: 37.3 % (ref 36–48)
HGB BLD-MCNC: 11.9 G/DL (ref 13–16)
IMM GRANULOCYTES # BLD AUTO: 0.1 K/UL (ref 0–0.04)
IMM GRANULOCYTES NFR BLD AUTO: 1 % (ref 0–0.5)
LYMPHOCYTES # BLD: 2 K/UL (ref 0.9–3.6)
LYMPHOCYTES NFR BLD: 42 % (ref 21–52)
MCH RBC QN AUTO: 28.1 PG (ref 24–34)
MCHC RBC AUTO-ENTMCNC: 31.9 G/DL (ref 31–37)
MCV RBC AUTO: 88 FL (ref 78–100)
MONOCYTES # BLD: 1 K/UL (ref 0.05–1.2)
MONOCYTES NFR BLD: 20 % (ref 3–10)
NEUTS SEG # BLD: 1.4 K/UL (ref 1.8–8)
NEUTS SEG NFR BLD: 29 % (ref 40–73)
NRBC # BLD: 0 K/UL (ref 0–0.01)
NRBC BLD-RTO: 0 PER 100 WBC
PLATELET # BLD AUTO: 287 K/UL (ref 135–420)
PMV BLD AUTO: 8.8 FL (ref 9.2–11.8)
POTASSIUM SERPL-SCNC: 3.9 MMOL/L (ref 3.5–5.5)
PROT SERPL-MCNC: 7.6 G/DL (ref 6.4–8.2)
RBC # BLD AUTO: 4.24 M/UL (ref 4.35–5.65)
SODIUM SERPL-SCNC: 141 MMOL/L (ref 136–145)
WBC # BLD AUTO: 4.8 K/UL (ref 4.6–13.2)

## 2024-12-04 PROCEDURE — 36415 COLL VENOUS BLD VENIPUNCTURE: CPT

## 2024-12-04 PROCEDURE — 80053 COMPREHEN METABOLIC PANEL: CPT

## 2024-12-04 PROCEDURE — 6370000000 HC RX 637 (ALT 250 FOR IP): Performed by: NURSE PRACTITIONER

## 2024-12-04 PROCEDURE — 6360000002 HC RX W HCPCS: Performed by: INTERNAL MEDICINE

## 2024-12-04 PROCEDURE — 2580000003 HC RX 258: Performed by: NURSE PRACTITIONER

## 2024-12-04 PROCEDURE — 85025 COMPLETE CBC W/AUTO DIFF WBC: CPT

## 2024-12-04 PROCEDURE — 1100000000 HC RM PRIVATE

## 2024-12-04 PROCEDURE — 2580000003 HC RX 258: Performed by: INTERNAL MEDICINE

## 2024-12-04 RX ADMIN — SODIUM CHLORIDE, PRESERVATIVE FREE 10 ML: 5 INJECTION INTRAVENOUS at 08:56

## 2024-12-04 RX ADMIN — SODIUM CHLORIDE, PRESERVATIVE FREE 10 ML: 5 INJECTION INTRAVENOUS at 20:05

## 2024-12-04 RX ADMIN — ACETAMINOPHEN 325MG 650 MG: 325 TABLET ORAL at 08:41

## 2024-12-04 RX ADMIN — FOLIC ACID 1 MG: 1 TABLET ORAL at 08:42

## 2024-12-04 RX ADMIN — ACETAMINOPHEN 325MG 650 MG: 325 TABLET ORAL at 14:33

## 2024-12-04 RX ADMIN — CEFTRIAXONE SODIUM 2000 MG: 2 INJECTION, POWDER, FOR SOLUTION INTRAMUSCULAR; INTRAVENOUS at 08:54

## 2024-12-04 RX ADMIN — AMLODIPINE BESYLATE 10 MG: 5 TABLET ORAL at 08:41

## 2024-12-04 RX ADMIN — Medication 100 MG: at 08:42

## 2024-12-04 RX ADMIN — ACETAMINOPHEN 325MG 650 MG: 325 TABLET ORAL at 20:05

## 2024-12-04 ASSESSMENT — PAIN DESCRIPTION - DESCRIPTORS
DESCRIPTORS: ACHING

## 2024-12-04 ASSESSMENT — PAIN SCALES - GENERAL
PAINLEVEL_OUTOF10: 0
PAINLEVEL_OUTOF10: 0
PAINLEVEL_OUTOF10: 5
PAINLEVEL_OUTOF10: 2
PAINLEVEL_OUTOF10: 2
PAINLEVEL_OUTOF10: 6
PAINLEVEL_OUTOF10: 3
PAINLEVEL_OUTOF10: 6
PAINLEVEL_OUTOF10: 3
PAINLEVEL_OUTOF10: 3
PAINLEVEL_OUTOF10: 2
PAINLEVEL_OUTOF10: 3
PAINLEVEL_OUTOF10: 2
PAINLEVEL_OUTOF10: 2

## 2024-12-04 ASSESSMENT — PAIN DESCRIPTION - LOCATION
LOCATION: SHOULDER

## 2024-12-04 ASSESSMENT — PAIN SCALES - WONG BAKER
WONGBAKER_NUMERICALRESPONSE: NO HURT

## 2024-12-04 ASSESSMENT — PAIN DESCRIPTION - ORIENTATION
ORIENTATION: LEFT
ORIENTATION: LEFT
ORIENTATION: RIGHT

## 2024-12-04 NOTE — PLAN OF CARE
Problem: Discharge Planning  Goal: Discharge to home or other facility with appropriate resources  Flowsheets (Taken 12/3/2024 2101)  Discharge to home or other facility with appropriate resources:   Identify barriers to discharge with patient and caregiver   Identify discharge learning needs (meds, wound care, etc)   Refer to discharge planning if patient needs post-hospital services based on physician order or complex needs related to functional status, cognitive ability or social support system   Arrange for needed discharge resources and transportation as appropriate   Arrange for interpreters to assist at discharge as needed

## 2024-12-04 NOTE — PLAN OF CARE
Problem: Discharge Planning  Goal: Discharge to home or other facility with appropriate resources  12/4/2024 0753 by Princess Carranza, LPN  Outcome: Progressing  12/3/2024 2322 by Sarah Ruiz, RN  Flowsheets  Taken 12/3/2024 2322  Discharge to home or other facility with appropriate resources:   Identify barriers to discharge with patient and caregiver   Identify discharge learning needs (meds, wound care, etc)   Refer to discharge planning if patient needs post-hospital services based on physician order or complex needs related to functional status, cognitive ability or social support system   Arrange for needed discharge resources and transportation as appropriate   Arrange for interpreters to assist at discharge as needed  Taken 12/3/2024 1930  Discharge to home or other facility with appropriate resources: Identify barriers to discharge with patient and caregiver     Problem: Safety - Adult  Goal: Free from fall injury  Outcome: Progressing     Problem: Pain  Goal: Verbalizes/displays adequate comfort level or baseline comfort level  Outcome: Progressing

## 2024-12-04 NOTE — PROGRESS NOTES
1900 Bedside and Verbal shift change report given to PAPO Ruiz RN (oncoming nurse) by RICHIE Wheeler RN (offgoing nurse). Report included the following information Nurse Handoff Report, Intake/Output, MAR, Recent Results, and Med Rec Status. Pt aaox4, sitting up in chair talking on his cell phone.     2000  Pt sitting up in chair eating HS snack.     0000 Patient resting quietly in bed with eyes closed. Resp easy and nonlabored. No distress noted. Pt aroused for vital signs. Pt voices no complaints. CBWR.

## 2024-12-04 NOTE — PROGRESS NOTES
0700- Assumed care of patient. Received bedside shift report from night shift nurse.     0725- Vital signs assessed. Shift assessment complete. Patient has no complaints.     0841- Medication given per MAR. Patient has no complaints.     0940- Provider at bedside.     1110- Vital signs assessed. Patient provided with snack.     1200- Patient in chair eating lunch.     1433- Tylenol given for pain located in left shoulder.     1616- Vital signs assessed and stable.     1730- Patient in chair eating dinner with no complaints.     1845- Shift assessment given to oncoming nurse.

## 2024-12-05 PROCEDURE — 6370000000 HC RX 637 (ALT 250 FOR IP): Performed by: NURSE PRACTITIONER

## 2024-12-05 PROCEDURE — 2580000003 HC RX 258: Performed by: INTERNAL MEDICINE

## 2024-12-05 PROCEDURE — 2580000003 HC RX 258: Performed by: NURSE PRACTITIONER

## 2024-12-05 PROCEDURE — 6360000002 HC RX W HCPCS: Performed by: INTERNAL MEDICINE

## 2024-12-05 PROCEDURE — 1100000000 HC RM PRIVATE

## 2024-12-05 RX ADMIN — ACETAMINOPHEN 325MG 650 MG: 325 TABLET ORAL at 20:12

## 2024-12-05 RX ADMIN — SODIUM CHLORIDE, PRESERVATIVE FREE 10 ML: 5 INJECTION INTRAVENOUS at 09:22

## 2024-12-05 RX ADMIN — AMLODIPINE BESYLATE 10 MG: 5 TABLET ORAL at 09:14

## 2024-12-05 RX ADMIN — ACETAMINOPHEN 325MG 650 MG: 325 TABLET ORAL at 09:14

## 2024-12-05 RX ADMIN — SODIUM CHLORIDE, PRESERVATIVE FREE 10 ML: 5 INJECTION INTRAVENOUS at 20:17

## 2024-12-05 RX ADMIN — CEFTRIAXONE SODIUM 2000 MG: 2 INJECTION, POWDER, FOR SOLUTION INTRAMUSCULAR; INTRAVENOUS at 09:21

## 2024-12-05 RX ADMIN — Medication 100 MG: at 09:14

## 2024-12-05 RX ADMIN — FOLIC ACID 1 MG: 1 TABLET ORAL at 09:14

## 2024-12-05 ASSESSMENT — PAIN SCALES - GENERAL
PAINLEVEL_OUTOF10: 0
PAINLEVEL_OUTOF10: 5
PAINLEVEL_OUTOF10: 6

## 2024-12-05 ASSESSMENT — PAIN DESCRIPTION - LOCATION
LOCATION: SHOULDER
LOCATION: SHOULDER

## 2024-12-05 ASSESSMENT — PAIN DESCRIPTION - FREQUENCY: FREQUENCY: INTERMITTENT

## 2024-12-05 ASSESSMENT — PAIN DESCRIPTION - ONSET: ONSET: GRADUAL

## 2024-12-05 ASSESSMENT — PAIN DESCRIPTION - PAIN TYPE: TYPE: ACUTE PAIN

## 2024-12-05 ASSESSMENT — PAIN DESCRIPTION - DESCRIPTORS
DESCRIPTORS: ACHING
DESCRIPTORS: NUMBNESS;ACHING

## 2024-12-05 ASSESSMENT — PAIN SCALES - WONG BAKER
WONGBAKER_NUMERICALRESPONSE: NO HURT
WONGBAKER_NUMERICALRESPONSE: NO HURT

## 2024-12-05 ASSESSMENT — PAIN - FUNCTIONAL ASSESSMENT: PAIN_FUNCTIONAL_ASSESSMENT: ACTIVITIES ARE NOT PREVENTED

## 2024-12-05 ASSESSMENT — PAIN DESCRIPTION - ORIENTATION: ORIENTATION: LEFT

## 2024-12-05 NOTE — PLAN OF CARE
Problem: Discharge Planning  Goal: Discharge to home or other facility with appropriate resources  12/5/2024 0749 by Dominga Queen RN  Outcome: Progressing  12/4/2024 2253 by Zoya Schulz RN  Outcome: Progressing     Problem: Safety - Adult  Goal: Free from fall injury  12/5/2024 0749 by Dominga Queen RN  Outcome: Progressing  12/4/2024 2253 by Zoya Schulz RN  Outcome: Progressing     Problem: Pain  Goal: Verbalizes/displays adequate comfort level or baseline comfort level  12/5/2024 0749 by Dominga Queen RN  Outcome: Progressing  12/4/2024 2253 by Zoya Schulz, RN  Outcome: Progressing

## 2024-12-05 NOTE — PROGRESS NOTES
0645 - Received report from night nurse Zoya BAILEY RN. No issues or concerns overnight. Swing patient for IV antibiotics.     0726 - Shift vitals completed.     0746 - Head to toe assessment completed and documented in flowsheets.     0800 - Patient in shower.     0914 - AM medication given.     Patient up in room independent. No issues or concerns noted today.     1372 - Report given to night nurse ANDREA Juarez.

## 2024-12-05 NOTE — PROGRESS NOTES
1915  Received care of pt sitting up in chair talking on phone.  Pt is alert and oriented x 4. Routine assessment and VS completed and WNL.  Pt is complaint free at this time.  Call bell in reach.    2005  Tylenol tabs 2 po given for complaint of left shoulder pain.    2230  Sleeping with no distress noted.    0200  Sleeping with no distress noted.    0600  Pt resting with eyes closed and no distress noted.

## 2024-12-06 PROCEDURE — 1100000000 HC RM PRIVATE

## 2024-12-06 PROCEDURE — 2580000003 HC RX 258: Performed by: NURSE PRACTITIONER

## 2024-12-06 PROCEDURE — 2580000003 HC RX 258: Performed by: INTERNAL MEDICINE

## 2024-12-06 PROCEDURE — 6360000002 HC RX W HCPCS: Performed by: INTERNAL MEDICINE

## 2024-12-06 PROCEDURE — 6370000000 HC RX 637 (ALT 250 FOR IP): Performed by: NURSE PRACTITIONER

## 2024-12-06 RX ADMIN — CEFTRIAXONE SODIUM 2000 MG: 2 INJECTION, POWDER, FOR SOLUTION INTRAMUSCULAR; INTRAVENOUS at 09:13

## 2024-12-06 RX ADMIN — FOLIC ACID 1 MG: 1 TABLET ORAL at 09:07

## 2024-12-06 RX ADMIN — ACETAMINOPHEN 325MG 650 MG: 325 TABLET ORAL at 21:12

## 2024-12-06 RX ADMIN — ACETAMINOPHEN 325MG 650 MG: 325 TABLET ORAL at 09:07

## 2024-12-06 RX ADMIN — Medication 100 MG: at 09:07

## 2024-12-06 RX ADMIN — SODIUM CHLORIDE, PRESERVATIVE FREE 10 ML: 5 INJECTION INTRAVENOUS at 09:14

## 2024-12-06 RX ADMIN — SODIUM CHLORIDE, PRESERVATIVE FREE 10 ML: 5 INJECTION INTRAVENOUS at 21:12

## 2024-12-06 RX ADMIN — AMLODIPINE BESYLATE 10 MG: 5 TABLET ORAL at 09:07

## 2024-12-06 ASSESSMENT — PAIN SCALES - GENERAL
PAINLEVEL_OUTOF10: 5
PAINLEVEL_OUTOF10: 2
PAINLEVEL_OUTOF10: 5

## 2024-12-06 ASSESSMENT — PAIN DESCRIPTION - FREQUENCY: FREQUENCY: INTERMITTENT

## 2024-12-06 ASSESSMENT — PAIN DESCRIPTION - DESCRIPTORS
DESCRIPTORS: ACHING
DESCRIPTORS: ACHING

## 2024-12-06 ASSESSMENT — PAIN DESCRIPTION - ORIENTATION
ORIENTATION: LEFT
ORIENTATION: LEFT

## 2024-12-06 ASSESSMENT — PAIN DESCRIPTION - LOCATION
LOCATION: SHOULDER
LOCATION: SHOULDER

## 2024-12-06 NOTE — PROGRESS NOTES
Patient received.  Sitting in chair watching football.  Call bell in reach.    2015 Meds given.  Tylenol given for shoulder pain.    2230 patient resting in bed.  Eyes closed.  Awakens easily.  No needs voiced.    0200 Remains resting in bed.  Denies needs.  Call bell in reach.    0500 Resting in bed.  Denies needs.

## 2024-12-06 NOTE — PLAN OF CARE
Problem: Discharge Planning  Goal: Discharge to home or other facility with appropriate resources  Outcome: Progressing  Flowsheets (Taken 12/3/2024 2322 by Sarah Ruiz, RN)  Discharge to home or other facility with appropriate resources:   Identify barriers to discharge with patient and caregiver   Identify discharge learning needs (meds, wound care, etc)   Refer to discharge planning if patient needs post-hospital services based on physician order or complex needs related to functional status, cognitive ability or social support system   Arrange for needed discharge resources and transportation as appropriate   Arrange for interpreters to assist at discharge as needed     Problem: Safety - Adult  Goal: Free from fall injury  Outcome: Progressing  Flowsheets (Taken 11/29/2024 0916 by Dominga Troy, RN)  Free From Fall Injury: Instruct family/caregiver on patient safety     Problem: Pain  Goal: Verbalizes/displays adequate comfort level or baseline comfort level  Outcome: Progressing  Flowsheets (Taken 12/5/2024 1256)  Verbalizes/displays adequate comfort level or baseline comfort level:   Encourage patient to monitor pain and request assistance   Assess pain using appropriate pain scale   Administer analgesics based on type and severity of pain and evaluate response   Implement non-pharmacological measures as appropriate and evaluate response

## 2024-12-07 PROCEDURE — 6370000000 HC RX 637 (ALT 250 FOR IP): Performed by: NURSE PRACTITIONER

## 2024-12-07 PROCEDURE — 2580000003 HC RX 258: Performed by: NURSE PRACTITIONER

## 2024-12-07 PROCEDURE — 6360000002 HC RX W HCPCS: Performed by: INTERNAL MEDICINE

## 2024-12-07 PROCEDURE — 2580000003 HC RX 258: Performed by: INTERNAL MEDICINE

## 2024-12-07 PROCEDURE — 1100000000 HC RM PRIVATE

## 2024-12-07 RX ADMIN — CEFTRIAXONE SODIUM 2000 MG: 2 INJECTION, POWDER, FOR SOLUTION INTRAMUSCULAR; INTRAVENOUS at 09:09

## 2024-12-07 RX ADMIN — FOLIC ACID 1 MG: 1 TABLET ORAL at 09:04

## 2024-12-07 RX ADMIN — ACETAMINOPHEN 325MG 650 MG: 325 TABLET ORAL at 09:12

## 2024-12-07 RX ADMIN — SODIUM CHLORIDE, PRESERVATIVE FREE 10 ML: 5 INJECTION INTRAVENOUS at 20:19

## 2024-12-07 RX ADMIN — AMLODIPINE BESYLATE 10 MG: 5 TABLET ORAL at 09:04

## 2024-12-07 RX ADMIN — ACETAMINOPHEN 325MG 650 MG: 325 TABLET ORAL at 19:26

## 2024-12-07 RX ADMIN — Medication 100 MG: at 09:04

## 2024-12-07 RX ADMIN — SODIUM CHLORIDE, PRESERVATIVE FREE 10 ML: 5 INJECTION INTRAVENOUS at 09:05

## 2024-12-07 ASSESSMENT — PAIN DESCRIPTION - ORIENTATION
ORIENTATION: LEFT
ORIENTATION: LEFT

## 2024-12-07 ASSESSMENT — PAIN SCALES - GENERAL
PAINLEVEL_OUTOF10: 5
PAINLEVEL_OUTOF10: 6
PAINLEVEL_OUTOF10: 3

## 2024-12-07 ASSESSMENT — PAIN DESCRIPTION - DESCRIPTORS: DESCRIPTORS: PRESSURE;OTHER (COMMENT)

## 2024-12-07 ASSESSMENT — PAIN DESCRIPTION - LOCATION
LOCATION: SHOULDER
LOCATION: SHOULDER

## 2024-12-07 NOTE — PLAN OF CARE
Problem: Discharge Planning  Goal: Discharge to home or other facility with appropriate resources  12/6/2024 2327 by Adam Dee, RN  Outcome: Progressing  12/6/2024 1026 by Ab Guevara RN  Outcome: Progressing  Flowsheets (Taken 12/6/2024 0799)  Discharge to home or other facility with appropriate resources:   Identify barriers to discharge with patient and caregiver   Arrange for needed discharge resources and transportation as appropriate   Identify discharge learning needs (meds, wound care, etc)     Problem: Safety - Adult  Goal: Free from fall injury  12/6/2024 2327 by Adam Dee, RN  Outcome: Progressing  12/6/2024 1026 by Ab Guevara, RN  Outcome: Progressing     Problem: Pain  Goal: Verbalizes/displays adequate comfort level or baseline comfort level  12/6/2024 2327 by Adam Dee, RN  Outcome: Progressing  12/6/2024 1026 by Ab Guevara, RN  Outcome: Progressing

## 2024-12-07 NOTE — PROGRESS NOTES
1900 Assumed care of patient after shift report. Patient awake in chair watching TV. Denies distress at this time.     2030 Assessment and VS completed.     2112 Tylenol given for c/o left shoulder pain. Snacks given to patient as he stated today's meals were lacking.     0000 Resting quietly with eyes closed. Noted audible sleep respirations.    0300 Patient continues to rest quietly with eyes closed. Unlabored sleep respirations were noted.

## 2024-12-07 NOTE — PLAN OF CARE
Problem: Discharge Planning  Goal: Discharge to home or other facility with appropriate resources  Outcome: Progressing  Flowsheets (Taken 12/7/2024 0903)  Discharge to home or other facility with appropriate resources:   Arrange for needed discharge resources and transportation as appropriate   Identify barriers to discharge with patient and caregiver   Identify discharge learning needs (meds, wound care, etc)     Problem: Safety - Adult  Goal: Free from fall injury  Outcome: Progressing     Problem: Pain  Goal: Verbalizes/displays adequate comfort level or baseline comfort level  Outcome: Progressing

## 2024-12-08 PROCEDURE — 2580000003 HC RX 258: Performed by: NURSE PRACTITIONER

## 2024-12-08 PROCEDURE — 2580000003 HC RX 258: Performed by: INTERNAL MEDICINE

## 2024-12-08 PROCEDURE — 6370000000 HC RX 637 (ALT 250 FOR IP): Performed by: NURSE PRACTITIONER

## 2024-12-08 PROCEDURE — 6360000002 HC RX W HCPCS: Performed by: INTERNAL MEDICINE

## 2024-12-08 PROCEDURE — 1100000000 HC RM PRIVATE

## 2024-12-08 RX ADMIN — Medication 100 MG: at 08:10

## 2024-12-08 RX ADMIN — ACETAMINOPHEN 325MG 650 MG: 325 TABLET ORAL at 08:17

## 2024-12-08 RX ADMIN — AMLODIPINE BESYLATE 10 MG: 5 TABLET ORAL at 08:10

## 2024-12-08 RX ADMIN — ACETAMINOPHEN 325MG 650 MG: 325 TABLET ORAL at 20:19

## 2024-12-08 RX ADMIN — CEFTRIAXONE SODIUM 2000 MG: 2 INJECTION, POWDER, FOR SOLUTION INTRAMUSCULAR; INTRAVENOUS at 08:14

## 2024-12-08 RX ADMIN — FOLIC ACID 1 MG: 1 TABLET ORAL at 08:10

## 2024-12-08 RX ADMIN — SODIUM CHLORIDE, PRESERVATIVE FREE 10 ML: 5 INJECTION INTRAVENOUS at 08:14

## 2024-12-08 RX ADMIN — SODIUM CHLORIDE, PRESERVATIVE FREE 10 ML: 5 INJECTION INTRAVENOUS at 20:11

## 2024-12-08 ASSESSMENT — PAIN DESCRIPTION - FREQUENCY: FREQUENCY: INTERMITTENT

## 2024-12-08 ASSESSMENT — PAIN SCALES - GENERAL
PAINLEVEL_OUTOF10: 2
PAINLEVEL_OUTOF10: 5
PAINLEVEL_OUTOF10: 5
PAINLEVEL_OUTOF10: 2

## 2024-12-08 ASSESSMENT — PAIN DESCRIPTION - PAIN TYPE: TYPE: ACUTE PAIN

## 2024-12-08 ASSESSMENT — PAIN DESCRIPTION - ORIENTATION
ORIENTATION: LEFT
ORIENTATION: LEFT

## 2024-12-08 ASSESSMENT — PAIN DESCRIPTION - LOCATION
LOCATION: SHOULDER
LOCATION: SHOULDER

## 2024-12-08 ASSESSMENT — PAIN DESCRIPTION - DESCRIPTORS
DESCRIPTORS: ACHING;PRESSURE
DESCRIPTORS: PRESSURE

## 2024-12-08 ASSESSMENT — PAIN - FUNCTIONAL ASSESSMENT: PAIN_FUNCTIONAL_ASSESSMENT: ACTIVITIES ARE NOT PREVENTED

## 2024-12-08 ASSESSMENT — PAIN DESCRIPTION - ONSET: ONSET: PROGRESSIVE

## 2024-12-08 ASSESSMENT — PAIN DESCRIPTION - DIRECTION: RADIATING_TOWARDS: UNDER ARM

## 2024-12-08 NOTE — PROGRESS NOTES
Report received from day shift nurse and care assumed. Pt found sitting in bed watching TV.    1930 Pt complaining of right shoulder pain, pt given tylenol PO.    2230 Pt awake watching TV, snack given    0030 Pt awake watching TV, no complaints voiced, no c/o pain or distress.    0200 Pt sleeping    0400 Pt asleep, no complaints voiced, no c/o pain or distress.    0700 Report given to day shift nurse and care assumed.

## 2024-12-08 NOTE — PLAN OF CARE
Problem: Discharge Planning  Goal: Discharge to home or other facility with appropriate resources  12/7/2024 1932 by Isabelle Gu RN  Outcome: Progressing  12/7/2024 1338 by Ab Guevara RN  Outcome: Progressing  Flowsheets (Taken 12/7/2024 0903)  Discharge to home or other facility with appropriate resources:   Arrange for needed discharge resources and transportation as appropriate   Identify barriers to discharge with patient and caregiver   Identify discharge learning needs (meds, wound care, etc)     Problem: Safety - Adult  Goal: Free from fall injury  12/7/2024 1932 by Isabelle Gu, RN  Outcome: Progressing  12/7/2024 1338 by Ab Guevara, RN  Outcome: Progressing     Problem: Pain  Goal: Verbalizes/displays adequate comfort level or baseline comfort level  12/7/2024 1932 by Isabelle Gu, RN  Outcome: Progressing  12/7/2024 1338 by Ab Guevara, RN  Outcome: Progressing

## 2024-12-08 NOTE — PLAN OF CARE
Nutrition Assessment     Type and Reason for Visit: Initial    Nutrition Recommendations/Plan:   Continue current diet order     Malnutrition Assessment:  Malnutrition Status: No malnutrition    Nutrition Assessment:  65 yo with past medical history significant for hypertension presented to [facility] on 11/12/24 with left arm numbness,currently on abx.  Current diet order appropriate, no sig wt changes, no nutritional needs at this time.    Estimated Daily Nutrient Needs:  Energy (kcal):  7337-4055 kcal (25-30 kcal/kg) Weight Used for Energy Requirements: Current     Protein (g):  84--101 g (1-1.2 g/kg) Weight Used for Protein Requirements: Current        Fluid (ml/day):  1660-4385 mL or per MD Method Used for Fluid Requirements: 1 ml/kcal    Nutrition Related Findings:          Current Nutrition Therapies:    ADULT DIET; Regular    Anthropometric Measures:  Height: 180.3 cm (5' 11\")  Current Body Wt: 83.9 kg (185 lb)   BMI: 25.8        Nutrition Diagnosis:   No nutrition diagnosis at this time     Nutrition Interventions:   Food and/or Nutrient Delivery: Continue Current Diet  Nutrition Education/Counseling: No recommendation at this time  Coordination of Nutrition Care: Continue to monitor while inpatient       Goals:  Goals: Meet at least 75% of estimated needs  Type of Goal: New goal  Previous Goal Met: New Goal    Nutrition Monitoring and Evaluation:   Behavioral-Environmental Outcomes: None Identified  Food/Nutrient Intake Outcomes: Food and Nutrient Intake  Physical Signs/Symptoms Outcomes: Nutrition Focused Physical Findings, Weight, Meal Time Behavior    Discharge Planning:    No discharge needs at this time     Robin Ramirez MS RDN  Contact: [phone]

## 2024-12-09 PROCEDURE — 6370000000 HC RX 637 (ALT 250 FOR IP): Performed by: NURSE PRACTITIONER

## 2024-12-09 PROCEDURE — 1100000000 HC RM PRIVATE

## 2024-12-09 PROCEDURE — 6360000002 HC RX W HCPCS: Performed by: INTERNAL MEDICINE

## 2024-12-09 PROCEDURE — 2580000003 HC RX 258: Performed by: INTERNAL MEDICINE

## 2024-12-09 PROCEDURE — 2580000003 HC RX 258: Performed by: NURSE PRACTITIONER

## 2024-12-09 RX ADMIN — CEFTRIAXONE SODIUM 2000 MG: 2 INJECTION, POWDER, FOR SOLUTION INTRAMUSCULAR; INTRAVENOUS at 08:55

## 2024-12-09 RX ADMIN — AMLODIPINE BESYLATE 10 MG: 5 TABLET ORAL at 08:50

## 2024-12-09 RX ADMIN — ACETAMINOPHEN 325MG 650 MG: 325 TABLET ORAL at 08:50

## 2024-12-09 RX ADMIN — SODIUM CHLORIDE, PRESERVATIVE FREE 10 ML: 5 INJECTION INTRAVENOUS at 20:10

## 2024-12-09 RX ADMIN — Medication 100 MG: at 08:50

## 2024-12-09 RX ADMIN — FOLIC ACID 1 MG: 1 TABLET ORAL at 08:50

## 2024-12-09 RX ADMIN — ACETAMINOPHEN 325MG 650 MG: 325 TABLET ORAL at 20:14

## 2024-12-09 ASSESSMENT — PAIN SCALES - GENERAL
PAINLEVEL_OUTOF10: 5
PAINLEVEL_OUTOF10: 2
PAINLEVEL_OUTOF10: 3
PAINLEVEL_OUTOF10: 5
PAINLEVEL_OUTOF10: 4

## 2024-12-09 ASSESSMENT — PAIN DESCRIPTION - LOCATION
LOCATION: ARM
LOCATION: SHOULDER

## 2024-12-09 ASSESSMENT — PAIN DESCRIPTION - DESCRIPTORS: DESCRIPTORS: ACHING;NUMBNESS;PRESSURE

## 2024-12-09 ASSESSMENT — PAIN DESCRIPTION - ORIENTATION
ORIENTATION: LEFT
ORIENTATION: LEFT

## 2024-12-09 NOTE — PROGRESS NOTES
Report received from day shift nurse and care assumed. Pt found lying in bed watching TV.    2200 Pt awake watching TV, no complaints voiced, no c/o pain or  distress.    0100 Pt  asleep, resting comfortably.    0400 Pt sleeping comfortably    0700 Report given to day shift nurse and care assumed.

## 2024-12-09 NOTE — PROGRESS NOTES
Droa Boucherlindsay Frazier  1960        Shift report received from off going primary nurse on 12/09/24  : Isabelle Gu RN     Bedside report received  Medications reviewed   Plan of care reviewed  White Board updated      Breakfast tray provided and set up. Tolerated well.       Lunch tray provided and set up. Tolerated well.       Dinner tray provided and set up. Tolerated well.     Shift report given to incoming primary nurse, : Isabelle Gu RN     Bedside report given  Medications reviewed  Plan of care reviewed  White Board updated

## 2024-12-10 PROCEDURE — 1100000000 HC RM PRIVATE

## 2024-12-10 PROCEDURE — 2580000003 HC RX 258: Performed by: INTERNAL MEDICINE

## 2024-12-10 PROCEDURE — 6370000000 HC RX 637 (ALT 250 FOR IP): Performed by: NURSE PRACTITIONER

## 2024-12-10 PROCEDURE — 6360000002 HC RX W HCPCS: Performed by: INTERNAL MEDICINE

## 2024-12-10 PROCEDURE — 2580000003 HC RX 258: Performed by: NURSE PRACTITIONER

## 2024-12-10 RX ADMIN — AMLODIPINE BESYLATE 10 MG: 5 TABLET ORAL at 09:22

## 2024-12-10 RX ADMIN — ACETAMINOPHEN 325MG 650 MG: 325 TABLET ORAL at 19:50

## 2024-12-10 RX ADMIN — SODIUM CHLORIDE, PRESERVATIVE FREE 10 ML: 5 INJECTION INTRAVENOUS at 19:51

## 2024-12-10 RX ADMIN — ACETAMINOPHEN 325MG 650 MG: 325 TABLET ORAL at 09:22

## 2024-12-10 RX ADMIN — FOLIC ACID 1 MG: 1 TABLET ORAL at 09:22

## 2024-12-10 RX ADMIN — CEFTRIAXONE SODIUM 2000 MG: 2 INJECTION, POWDER, FOR SOLUTION INTRAMUSCULAR; INTRAVENOUS at 09:31

## 2024-12-10 RX ADMIN — SODIUM CHLORIDE, PRESERVATIVE FREE 10 ML: 5 INJECTION INTRAVENOUS at 09:23

## 2024-12-10 RX ADMIN — Medication 100 MG: at 09:22

## 2024-12-10 ASSESSMENT — PAIN DESCRIPTION - ORIENTATION
ORIENTATION: LEFT
ORIENTATION: LEFT

## 2024-12-10 ASSESSMENT — PAIN DESCRIPTION - DESCRIPTORS
DESCRIPTORS: ACHING
DESCRIPTORS: ACHING

## 2024-12-10 ASSESSMENT — PAIN SCALES - GENERAL
PAINLEVEL_OUTOF10: 5
PAINLEVEL_OUTOF10: 0
PAINLEVEL_OUTOF10: 4

## 2024-12-10 ASSESSMENT — PAIN SCALES - WONG BAKER: WONGBAKER_NUMERICALRESPONSE: NO HURT

## 2024-12-10 ASSESSMENT — PAIN DESCRIPTION - LOCATION
LOCATION: SHOULDER
LOCATION: SHOULDER

## 2024-12-10 ASSESSMENT — PAIN - FUNCTIONAL ASSESSMENT: PAIN_FUNCTIONAL_ASSESSMENT: ACTIVITIES ARE NOT PREVENTED

## 2024-12-10 NOTE — PROGRESS NOTES
9249 - Received report from Isabelle ISAAC RN. No issues or concerns noted overnight.     0727 - AM vitals completed.     0730 - Patient in shower.     0922 - AM medication given.     0930 - Head to toe assessment completed and documented in flowsheets.     No issues or concerns this morning.     F/u with [facility] for Dr. Jacbos. Will be self pay since no insurance. $189 for visit + additional fees if anything additional is done. Spoke with Emily in payment department at [phone] ext. [phone]. Apt made for 12/19/24 @ 3pm. Will need to cancel apt if payment cannot be worked out.     F/u with Dr. Hector Field MD Cardiothoracic Surgery on 12/20/24 @ 12 PM. Visit will be $300-$600 + if anything additional is done at visit. Spoke with Negar @ [phone].      Will need MRI of spine and chest prior to visits. Hard disk will need to be sent with patient to both visits.     Transportation needs to be set up.     1850 - Report given to night nurse Adam Dee RN

## 2024-12-10 NOTE — PROGRESS NOTES
Report received from day shift nurse and care assumed. Pt found sitting in a chair watching TV, assessment, VS completed.    2300 Pt awake watching TV, no complaints voiced, no c/o pain or distress.    0200 PT asleep    0500 PT asleep, no complaints voiced, no c/o pain or distress    0700 Report given to day shift nurse and care assumed.

## 2024-12-10 NOTE — PLAN OF CARE
Problem: Discharge Planning  Goal: Discharge to home or other facility with appropriate resources  12/9/2024 2028 by Isabelle Gu RN  Outcome: Progressing  12/9/2024 1106 by Luz Marina Kaye RN  Outcome: Progressing     Problem: Safety - Adult  Goal: Free from fall injury  12/9/2024 2028 by Isabelle Gu RN  Outcome: Progressing  12/9/2024 1106 by Luz Marina Kaye RN  Outcome: Progressing     Problem: Pain  Goal: Verbalizes/displays adequate comfort level or baseline comfort level  12/9/2024 2028 by Isabelle Gu RN  Outcome: Progressing  12/9/2024 1106 by Luz Marina Kaye RN  Outcome: Progressing     Problem: Nutrition Deficit:  Goal: Optimize nutritional status  12/9/2024 2028 by Isabelle Gu RN  Outcome: Progressing  12/9/2024 1106 by Luz Marina Kaye RN  Outcome: Progressing

## 2024-12-10 NOTE — PLAN OF CARE
Problem: Discharge Planning  Goal: Discharge to home or other facility with appropriate resources  12/10/2024 0811 by Dominga Queen RN  Outcome: Progressing  12/9/2024 2028 by Isabelle Gu RN  Outcome: Progressing     Problem: Safety - Adult  Goal: Free from fall injury  12/10/2024 0811 by Dominga Queen RN  Outcome: Progressing  12/9/2024 2028 by Isabelle Gu RN  Outcome: Progressing     Problem: Pain  Goal: Verbalizes/displays adequate comfort level or baseline comfort level  12/10/2024 0811 by Dominga Queen RN  Outcome: Progressing  12/9/2024 2028 by Isabelle Gu RN  Outcome: Progressing     Problem: Nutrition Deficit:  Goal: Optimize nutritional status  12/10/2024 0811 by Dominga Queen RN  Outcome: Progressing  12/9/2024 2028 by Isabelle Gu RN  Outcome: Progressing

## 2024-12-11 LAB
ALBUMIN SERPL-MCNC: 2.9 G/DL (ref 3.4–5)
ALBUMIN/GLOB SERPL: 0.6 (ref 0.8–1.7)
ALP SERPL-CCNC: 80 U/L (ref 45–117)
ALT SERPL-CCNC: 38 U/L (ref 16–61)
ANION GAP SERPL CALC-SCNC: 6 MMOL/L (ref 3–18)
AST SERPL W P-5'-P-CCNC: 24 U/L (ref 10–38)
BACTERIA SPEC CULT: NORMAL
BACTERIA SPEC CULT: NORMAL
BASOPHILS # BLD: 0.1 K/UL (ref 0–0.1)
BASOPHILS NFR BLD: 1 % (ref 0–2)
BILIRUB SERPL-MCNC: 0.2 MG/DL (ref 0.2–1)
BUN SERPL-MCNC: 9 MG/DL (ref 7–18)
BUN/CREAT SERPL: 11 (ref 12–20)
CA-I BLD-MCNC: 9.4 MG/DL (ref 8.5–10.1)
CHLORIDE SERPL-SCNC: 108 MMOL/L (ref 100–111)
CO2 SERPL-SCNC: 27 MMOL/L (ref 21–32)
CREAT SERPL-MCNC: 0.84 MG/DL (ref 0.6–1.3)
DIFFERENTIAL METHOD BLD: ABNORMAL
EOSINOPHIL # BLD: 0.4 K/UL (ref 0–0.4)
EOSINOPHIL NFR BLD: 8 % (ref 0–5)
ERYTHROCYTE [DISTWIDTH] IN BLOOD BY AUTOMATED COUNT: 13.9 % (ref 11.6–14.5)
GLOBULIN SER CALC-MCNC: 4.7 G/DL (ref 2–4)
GLUCOSE SERPL-MCNC: 95 MG/DL (ref 74–99)
HCT VFR BLD AUTO: 38.2 % (ref 36–48)
HGB BLD-MCNC: 11.9 G/DL (ref 13–16)
IMM GRANULOCYTES # BLD AUTO: 0 K/UL (ref 0–0.04)
IMM GRANULOCYTES NFR BLD AUTO: 1 % (ref 0–0.5)
LYMPHOCYTES # BLD: 2.3 K/UL (ref 0.9–3.6)
LYMPHOCYTES NFR BLD: 46 % (ref 21–52)
Lab: NORMAL
MCH RBC QN AUTO: 27.4 PG (ref 24–34)
MCHC RBC AUTO-ENTMCNC: 31.2 G/DL (ref 31–37)
MCV RBC AUTO: 87.8 FL (ref 78–100)
MONOCYTES # BLD: 0.7 K/UL (ref 0.05–1.2)
MONOCYTES NFR BLD: 15 % (ref 3–10)
NEUTS SEG # BLD: 1.4 K/UL (ref 1.8–8)
NEUTS SEG NFR BLD: 29 % (ref 40–73)
NRBC # BLD: 0 K/UL (ref 0–0.01)
NRBC BLD-RTO: 0 PER 100 WBC
PLATELET # BLD AUTO: 204 K/UL (ref 135–420)
PMV BLD AUTO: 8.8 FL (ref 9.2–11.8)
POTASSIUM SERPL-SCNC: 4 MMOL/L (ref 3.5–5.5)
PROT SERPL-MCNC: 7.6 G/DL (ref 6.4–8.2)
RBC # BLD AUTO: 4.35 M/UL (ref 4.35–5.65)
SODIUM SERPL-SCNC: 141 MMOL/L (ref 136–145)
WBC # BLD AUTO: 4.9 K/UL (ref 4.6–13.2)

## 2024-12-11 PROCEDURE — 36415 COLL VENOUS BLD VENIPUNCTURE: CPT

## 2024-12-11 PROCEDURE — 2580000003 HC RX 258: Performed by: NURSE PRACTITIONER

## 2024-12-11 PROCEDURE — 2580000003 HC RX 258: Performed by: INTERNAL MEDICINE

## 2024-12-11 PROCEDURE — 6370000000 HC RX 637 (ALT 250 FOR IP): Performed by: NURSE PRACTITIONER

## 2024-12-11 PROCEDURE — 6360000002 HC RX W HCPCS: Performed by: INTERNAL MEDICINE

## 2024-12-11 PROCEDURE — 80053 COMPREHEN METABOLIC PANEL: CPT

## 2024-12-11 PROCEDURE — 85025 COMPLETE CBC W/AUTO DIFF WBC: CPT

## 2024-12-11 PROCEDURE — 1100000000 HC RM PRIVATE

## 2024-12-11 RX ADMIN — Medication 100 MG: at 08:45

## 2024-12-11 RX ADMIN — ACETAMINOPHEN 325MG 650 MG: 325 TABLET ORAL at 20:17

## 2024-12-11 RX ADMIN — SODIUM CHLORIDE, PRESERVATIVE FREE 10 ML: 5 INJECTION INTRAVENOUS at 20:18

## 2024-12-11 RX ADMIN — FOLIC ACID 1 MG: 1 TABLET ORAL at 08:45

## 2024-12-11 RX ADMIN — ACETAMINOPHEN 325MG 650 MG: 325 TABLET ORAL at 08:45

## 2024-12-11 RX ADMIN — CEFTRIAXONE SODIUM 2000 MG: 2 INJECTION, POWDER, FOR SOLUTION INTRAMUSCULAR; INTRAVENOUS at 08:44

## 2024-12-11 RX ADMIN — SODIUM CHLORIDE, PRESERVATIVE FREE 10 ML: 5 INJECTION INTRAVENOUS at 08:42

## 2024-12-11 RX ADMIN — AMLODIPINE BESYLATE 10 MG: 5 TABLET ORAL at 08:45

## 2024-12-11 ASSESSMENT — PAIN SCALES - GENERAL
PAINLEVEL_OUTOF10: 3
PAINLEVEL_OUTOF10: 5

## 2024-12-11 ASSESSMENT — PAIN DESCRIPTION - DESCRIPTORS: DESCRIPTORS: ACHING

## 2024-12-11 ASSESSMENT — PAIN DESCRIPTION - LOCATION
LOCATION: CHEST
LOCATION: SHOULDER
LOCATION: CHEST

## 2024-12-11 ASSESSMENT — PAIN DESCRIPTION - ORIENTATION
ORIENTATION: LEFT

## 2024-12-11 ASSESSMENT — PAIN - FUNCTIONAL ASSESSMENT: PAIN_FUNCTIONAL_ASSESSMENT: ACTIVITIES ARE NOT PREVENTED

## 2024-12-11 ASSESSMENT — PAIN SCALES - WONG BAKER: WONGBAKER_NUMERICALRESPONSE: NO HURT

## 2024-12-11 NOTE — PLAN OF CARE
Problem: Discharge Planning  Goal: Discharge to home or other facility with appropriate resources  12/10/2024 2047 by Adam Dee RN  Outcome: Progressing  12/10/2024 0811 by Dominga Queen RN  Outcome: Progressing     Problem: Safety - Adult  Goal: Free from fall injury  12/10/2024 2047 by Adam Dee RN  Outcome: Progressing  12/10/2024 0811 by Dominga Queen RN  Outcome: Progressing     Problem: Pain  Goal: Verbalizes/displays adequate comfort level or baseline comfort level  12/10/2024 2047 by Adam Dee RN  Outcome: Progressing  12/10/2024 0811 by Dominga Queen RN  Outcome: Progressing     Problem: Nutrition Deficit:  Goal: Optimize nutritional status  12/10/2024 2047 by Adam Dee RN  Outcome: Progressing  12/10/2024 0811 by Dominga Queen RN  Outcome: Progressing

## 2024-12-11 NOTE — PROGRESS NOTES
0700 - Shift report received and care assumed.  Patient awake, participative in rounds. CBWR.    0740 - VS, assessment completed.  Breakfast tray provided. CBWR.    0850 - Scheduled medication given.  Patient given Tylenol prn for complaint of left shoulder pain.    CBWR.    1130 - Lunch tray provided. Complaints denied.  Patient ambulating independently in room.    1330 - Sterile dressing change performed to right arm PICC, cap changed..  Patient tolerated without complaint of discomfort.

## 2024-12-11 NOTE — PROGRESS NOTES
1900  Assumed care of patient after shift report.     2000 Assessment and VS completed. Patient requests Tylenol for c/o left shoulder pain 4/10 PSR. Patient given snacks of sandwich, fruit and ice cream.    0000 Resting quietly with eyes closed ; noted unlabored sleep respirations.    0300 Resting quietly with eyes closed ; noted unlabored sleep respirations.    0600 Resting quietly with eyes closed ; noted unlabored sleep respirations.

## 2024-12-11 NOTE — PLAN OF CARE
Problem: Discharge Planning  Goal: Discharge to home or other facility with appropriate resources  12/11/2024 0753 by Phyllis Castañeda, RN  Outcome: Progressing  12/10/2024 2047 by Adam Dee, RN  Outcome: Progressing

## 2024-12-12 PROCEDURE — 2580000003 HC RX 258: Performed by: NURSE PRACTITIONER

## 2024-12-12 PROCEDURE — 6360000002 HC RX W HCPCS: Performed by: INTERNAL MEDICINE

## 2024-12-12 PROCEDURE — 6370000000 HC RX 637 (ALT 250 FOR IP): Performed by: NURSE PRACTITIONER

## 2024-12-12 PROCEDURE — 1100000000 HC RM PRIVATE

## 2024-12-12 PROCEDURE — 2580000003 HC RX 258: Performed by: INTERNAL MEDICINE

## 2024-12-12 RX ADMIN — AMLODIPINE BESYLATE 10 MG: 5 TABLET ORAL at 09:06

## 2024-12-12 RX ADMIN — ACETAMINOPHEN 325MG 650 MG: 325 TABLET ORAL at 09:06

## 2024-12-12 RX ADMIN — SODIUM CHLORIDE, PRESERVATIVE FREE 10 ML: 5 INJECTION INTRAVENOUS at 09:14

## 2024-12-12 RX ADMIN — SODIUM CHLORIDE, PRESERVATIVE FREE 10 ML: 5 INJECTION INTRAVENOUS at 20:57

## 2024-12-12 RX ADMIN — Medication 100 MG: at 09:06

## 2024-12-12 RX ADMIN — FOLIC ACID 1 MG: 1 TABLET ORAL at 09:06

## 2024-12-12 RX ADMIN — CEFTRIAXONE SODIUM 2000 MG: 2 INJECTION, POWDER, FOR SOLUTION INTRAMUSCULAR; INTRAVENOUS at 09:09

## 2024-12-12 RX ADMIN — ACETAMINOPHEN 325MG 650 MG: 325 TABLET ORAL at 20:57

## 2024-12-12 ASSESSMENT — PAIN DESCRIPTION - LOCATION
LOCATION: CHEST
LOCATION: SHOULDER

## 2024-12-12 ASSESSMENT — PAIN - FUNCTIONAL ASSESSMENT: PAIN_FUNCTIONAL_ASSESSMENT: ACTIVITIES ARE NOT PREVENTED

## 2024-12-12 ASSESSMENT — PAIN SCALES - WONG BAKER: WONGBAKER_NUMERICALRESPONSE: NO HURT

## 2024-12-12 ASSESSMENT — PAIN DESCRIPTION - ORIENTATION: ORIENTATION: LEFT

## 2024-12-12 ASSESSMENT — PAIN DESCRIPTION - DESCRIPTORS
DESCRIPTORS: ACHING
DESCRIPTORS: PRESSURE

## 2024-12-12 ASSESSMENT — PAIN SCALES - GENERAL
PAINLEVEL_OUTOF10: 5
PAINLEVEL_OUTOF10: 3
PAINLEVEL_OUTOF10: 3
PAINLEVEL_OUTOF10: 5

## 2024-12-12 NOTE — PLAN OF CARE
Problem: Discharge Planning  Goal: Discharge to home or other facility with appropriate resources  12/12/2024 0923 by Phyllis Castañeda RN  Outcome: Progressing  12/12/2024 0141 by Stephanie Rees RN  Outcome: Progressing  Flowsheets (Taken 12/12/2024 0141)  Discharge to home or other facility with appropriate resources:   Identify barriers to discharge with patient and caregiver   Arrange for needed discharge resources and transportation as appropriate     Problem: Safety - Adult  Goal: Free from fall injury  12/12/2024 0923 by Phyllis Castañeda RN  Outcome: Progressing  12/12/2024 0141 by Stephanie Rees RN  Outcome: Progressing  Flowsheets (Taken 12/12/2024 0141)  Free From Fall Injury: Instruct family/caregiver on patient safety     Problem: Pain  Goal: Verbalizes/displays adequate comfort level or baseline comfort level  12/12/2024 0923 by Phyllis Castañeda RN  Outcome: Progressing  12/12/2024 0141 by Stephanie Rees RN  Outcome: Progressing  Flowsheets (Taken 12/12/2024 0141)  Verbalizes/displays adequate comfort level or baseline comfort level:   Encourage patient to monitor pain and request assistance   Assess pain using appropriate pain scale   Administer analgesics based on type and severity of pain and evaluate response   Implement non-pharmacological measures as appropriate and evaluate response   Consider cultural and social influences on pain and pain management   Notify Licensed Independent Practitioner if interventions unsuccessful or patient reports new pain     Problem: Nutrition Deficit:  Goal: Optimize nutritional status  12/12/2024 0923 by Phyllis Castañeda RN  Outcome: Progressing  12/12/2024 0141 by Stephanie Rees RN  Outcome: Progressing  Flowsheets (Taken 12/12/2024 0141)  Nutrient intake appropriate for improving, restoring, or maintaining nutritional needs:   Assess nutritional status and recommend course of action   Monitor oral intake, labs, and treatment plans

## 2024-12-12 NOTE — CARE COORDINATION
Discussed patient appointment with Dr. Jacobs.  He advised that the appointment is scheduled and the patient is able to be seen at that appointment time.    There will not be restrictions limiting this appointment.      Voicemail left with Dr. Field's office to verify appointment.

## 2024-12-12 NOTE — PLAN OF CARE
Problem: Discharge Planning  Goal: Discharge to home or other facility with appropriate resources  Outcome: Progressing  Flowsheets (Taken 12/12/2024 0141)  Discharge to home or other facility with appropriate resources:   Identify barriers to discharge with patient and caregiver   Arrange for needed discharge resources and transportation as appropriate     Problem: Safety - Adult  Goal: Free from fall injury  Outcome: Progressing  Flowsheets (Taken 12/12/2024 0141)  Free From Fall Injury: Instruct family/caregiver on patient safety     Problem: Pain  Goal: Verbalizes/displays adequate comfort level or baseline comfort level  Outcome: Progressing  Flowsheets (Taken 12/12/2024 0141)  Verbalizes/displays adequate comfort level or baseline comfort level:   Encourage patient to monitor pain and request assistance   Assess pain using appropriate pain scale   Administer analgesics based on type and severity of pain and evaluate response   Implement non-pharmacological measures as appropriate and evaluate response   Consider cultural and social influences on pain and pain management   Notify Licensed Independent Practitioner if interventions unsuccessful or patient reports new pain     Problem: Nutrition Deficit:  Goal: Optimize nutritional status  Outcome: Progressing  Flowsheets (Taken 12/12/2024 0141)  Nutrient intake appropriate for improving, restoring, or maintaining nutritional needs:   Assess nutritional status and recommend course of action   Monitor oral intake, labs, and treatment plans

## 2024-12-12 NOTE — PROGRESS NOTES
0700 - Shift report received from ANDREA Brown.  Patient out of bed in shower.  Right arm PICC covered.    0745 - VS, assessment completed.  CBWR. Breakast tray provided.    0915 - Scheduled medications given. Patient given Tylenol prn for left shoulder pain.

## 2024-12-12 NOTE — PROGRESS NOTES
1900: Bedside shift change report given to GRETA Rees RN (oncoming nurse) by RICHIE Castañeda RN (offgoing nurse). Report included the following information Nurse Handoff Report, Adult Overview, Neuro Assessment, and Event Log.   Assumed care of patient. Bedside shift discussion done. Whiteboard updated. Patient is resting in bed. He is alert and participating in bedside shift discussion. No needs or complaints at this time. Bed is in lowest position and call bell is within reach.     2016: VSS. Patient states that pain is a 5/10 on number scale. Pain in the L chest, describes it as pressure/soreness. Administered PRN Tylenol. Removed Lidocaine patch. Provided snacks.    0000: Patient resting in bed at this time, eyes closed.     0541: Patient resting in bed with eyes closed.

## 2024-12-13 PROCEDURE — 2580000003 HC RX 258: Performed by: NURSE PRACTITIONER

## 2024-12-13 PROCEDURE — 2580000003 HC RX 258: Performed by: INTERNAL MEDICINE

## 2024-12-13 PROCEDURE — 6360000002 HC RX W HCPCS: Performed by: INTERNAL MEDICINE

## 2024-12-13 PROCEDURE — 6370000000 HC RX 637 (ALT 250 FOR IP): Performed by: NURSE PRACTITIONER

## 2024-12-13 PROCEDURE — 1100000000 HC RM PRIVATE

## 2024-12-13 RX ADMIN — Medication 100 MG: at 08:36

## 2024-12-13 RX ADMIN — FOLIC ACID 1 MG: 1 TABLET ORAL at 08:36

## 2024-12-13 RX ADMIN — ACETAMINOPHEN 325MG 650 MG: 325 TABLET ORAL at 08:36

## 2024-12-13 RX ADMIN — SODIUM CHLORIDE, PRESERVATIVE FREE 10 ML: 5 INJECTION INTRAVENOUS at 20:42

## 2024-12-13 RX ADMIN — CEFTRIAXONE SODIUM 2000 MG: 2 INJECTION, POWDER, FOR SOLUTION INTRAMUSCULAR; INTRAVENOUS at 08:55

## 2024-12-13 RX ADMIN — AMLODIPINE BESYLATE 10 MG: 5 TABLET ORAL at 08:36

## 2024-12-13 RX ADMIN — ACETAMINOPHEN 325MG 650 MG: 325 TABLET ORAL at 20:42

## 2024-12-13 RX ADMIN — SODIUM CHLORIDE, PRESERVATIVE FREE 10 ML: 5 INJECTION INTRAVENOUS at 08:56

## 2024-12-13 ASSESSMENT — PAIN SCALES - GENERAL
PAINLEVEL_OUTOF10: 1
PAINLEVEL_OUTOF10: 4
PAINLEVEL_OUTOF10: 5
PAINLEVEL_OUTOF10: 0
PAINLEVEL_OUTOF10: 2

## 2024-12-13 ASSESSMENT — PAIN DESCRIPTION - LOCATION
LOCATION: SHOULDER
LOCATION: SHOULDER

## 2024-12-13 ASSESSMENT — PAIN DESCRIPTION - ORIENTATION
ORIENTATION: LEFT
ORIENTATION: LEFT

## 2024-12-13 ASSESSMENT — PAIN SCALES - WONG BAKER: WONGBAKER_NUMERICALRESPONSE: HURTS A LITTLE BIT

## 2024-12-13 ASSESSMENT — PAIN DESCRIPTION - DESCRIPTORS
DESCRIPTORS: DISCOMFORT
DESCRIPTORS: PRESSURE;STABBING

## 2024-12-13 NOTE — CARE COORDINATION
Received call from Negar at Dr. Field's office this morning.  She confirmed that the patient will not be required to bring co-pay at the visit.  If the Medicaid is not approved, he will be billed.

## 2024-12-13 NOTE — PROGRESS NOTES
0700- Received shift report from ANDERA Brown- Assumed care    0741- Vitals and assessment completed    0800- Breakfast tray provided    0836- Medications administered per MAR protocol. Prn Tylenol given per patient request for left shoulder pain    1030- Patient alert, walking hallway

## 2024-12-13 NOTE — PLAN OF CARE
Problem: Discharge Planning  Goal: Discharge to home or other facility with appropriate resources  12/13/2024 0034 by Stephanie Rees RN  Outcome: Progressing  12/13/2024 0034 by Stephanie Rees RN  Outcome: Progressing  Flowsheets (Taken 12/13/2024 0034)  Discharge to home or other facility with appropriate resources:   Identify barriers to discharge with patient and caregiver   Arrange for needed discharge resources and transportation as appropriate     Problem: Safety - Adult  Goal: Free from fall injury  12/13/2024 0034 by Stephanie Rees RN  Outcome: Progressing  12/13/2024 0034 by Stephanie Rees RN  Outcome: Progressing  Flowsheets (Taken 12/13/2024 0034)  Free From Fall Injury: Instruct family/caregiver on patient safety     Problem: Pain  Goal: Verbalizes/displays adequate comfort level or baseline comfort level  12/13/2024 0034 by Stephanie Rees RN  Outcome: Progressing  12/13/2024 0034 by Stephanie Rees RN  Outcome: Progressing  Flowsheets (Taken 12/13/2024 0034)  Verbalizes/displays adequate comfort level or baseline comfort level:   Encourage patient to monitor pain and request assistance   Assess pain using appropriate pain scale   Administer analgesics based on type and severity of pain and evaluate response   Implement non-pharmacological measures as appropriate and evaluate response   Consider cultural and social influences on pain and pain management   Notify Licensed Independent Practitioner if interventions unsuccessful or patient reports new pain     Problem: Nutrition Deficit:  Goal: Optimize nutritional status  12/13/2024 0034 by Stephanie Rees RN  Outcome: Progressing  Flowsheets (Taken 12/12/2024 0141)  Nutrient intake appropriate for improving, restoring, or maintaining nutritional needs:   Assess nutritional status and recommend course of action   Monitor oral intake, labs, and treatment plans  12/13/2024 0034 by Stephanie Rees RN  Outcome: Progressing  Flowsheets (Taken

## 2024-12-13 NOTE — PLAN OF CARE
Problem: Discharge Planning  Goal: Discharge to home or other facility with appropriate resources  12/13/2024 0751 by Kathy Kincaid RN  Outcome: Progressing  12/13/2024 0751 by Kathy Kincaid RN  Outcome: Progressing  12/13/2024 0034 by Stephanie Rees RN  Outcome: Progressing  12/13/2024 0034 by Stephanie Rees RN  Outcome: Progressing  Flowsheets (Taken 12/13/2024 0034)  Discharge to home or other facility with appropriate resources:   Identify barriers to discharge with patient and caregiver   Arrange for needed discharge resources and transportation as appropriate     Problem: Safety - Adult  Goal: Free from fall injury  12/13/2024 0751 by Kathy Kincaid RN  Outcome: Progressing  12/13/2024 0751 by Kathy Kincaid RN  Outcome: Progressing  12/13/2024 0034 by Stephanie Rees RN  Outcome: Progressing  12/13/2024 0034 by Stephanie Rees RN  Outcome: Progressing  Flowsheets (Taken 12/13/2024 0034)  Free From Fall Injury: Instruct family/caregiver on patient safety     Problem: Pain  Goal: Verbalizes/displays adequate comfort level or baseline comfort level  12/13/2024 0751 by Kathy Kincaid RN  Outcome: Progressing  12/13/2024 0751 by Kathy Kincaid RN  Outcome: Progressing  12/13/2024 0034 by Stephanie Rees RN  Outcome: Progressing  12/13/2024 0034 by Stephanie Rees RN  Outcome: Progressing  Flowsheets (Taken 12/13/2024 0034)  Verbalizes/displays adequate comfort level or baseline comfort level:   Encourage patient to monitor pain and request assistance   Assess pain using appropriate pain scale   Administer analgesics based on type and severity of pain and evaluate response   Implement non-pharmacological measures as appropriate and evaluate response   Consider cultural and social influences on pain and pain management   Notify Licensed Independent Practitioner if interventions unsuccessful or patient reports new pain     Problem: Nutrition Deficit:  Goal: Optimize nutritional status  12/13/2024

## 2024-12-13 NOTE — PROGRESS NOTES
1200 - Patient ambulating in hallway. Denied needs.    1500 - In on rounds. Patient up in chair. He denied needs. Will continue to monitor.    1800 - Patient remains up in chair. He continued to deny needs. No complaints voiced. Encouraged to call for needs. Verbalized understanding.

## 2024-12-14 PROCEDURE — 6370000000 HC RX 637 (ALT 250 FOR IP): Performed by: NURSE PRACTITIONER

## 2024-12-14 PROCEDURE — 6360000002 HC RX W HCPCS: Performed by: INTERNAL MEDICINE

## 2024-12-14 PROCEDURE — 2580000003 HC RX 258: Performed by: INTERNAL MEDICINE

## 2024-12-14 PROCEDURE — 2580000003 HC RX 258: Performed by: NURSE PRACTITIONER

## 2024-12-14 PROCEDURE — 1100000000 HC RM PRIVATE

## 2024-12-14 RX ADMIN — FOLIC ACID 1 MG: 1 TABLET ORAL at 09:22

## 2024-12-14 RX ADMIN — Medication 100 MG: at 09:22

## 2024-12-14 RX ADMIN — SODIUM CHLORIDE, PRESERVATIVE FREE 10 ML: 5 INJECTION INTRAVENOUS at 20:46

## 2024-12-14 RX ADMIN — SODIUM CHLORIDE, PRESERVATIVE FREE 10 ML: 5 INJECTION INTRAVENOUS at 09:35

## 2024-12-14 RX ADMIN — ACETAMINOPHEN 325MG 650 MG: 325 TABLET ORAL at 20:46

## 2024-12-14 RX ADMIN — CEFTRIAXONE SODIUM 2000 MG: 2 INJECTION, POWDER, FOR SOLUTION INTRAMUSCULAR; INTRAVENOUS at 09:24

## 2024-12-14 RX ADMIN — AMLODIPINE BESYLATE 10 MG: 5 TABLET ORAL at 09:22

## 2024-12-14 ASSESSMENT — PAIN SCALES - GENERAL
PAINLEVEL_OUTOF10: 0
PAINLEVEL_OUTOF10: 4

## 2024-12-14 ASSESSMENT — PAIN DESCRIPTION - LOCATION: LOCATION: SHOULDER

## 2024-12-14 ASSESSMENT — PAIN SCALES - WONG BAKER: WONGBAKER_NUMERICALRESPONSE: NO HURT

## 2024-12-14 ASSESSMENT — PAIN DESCRIPTION - ORIENTATION: ORIENTATION: LEFT

## 2024-12-14 NOTE — PROGRESS NOTES
INTERNAL MEDICINE PROGRESS NOTE      Patient: Dora Frazier   YOB: 1960   MRN: [id number]      Hospital course / Assessment and Plans   Principle Problems:  #T-Spine Discitis/Phlegmon, Group B strep Bacteremia, Left Septic Joint Arthritis  --Continue IV Rocephin via PICC to RUE through 1/9/25.  --Continue Weekly CBC/CMP while on antibiotics.  --Follow left SC joint cultures from 11/18 - [facility].  --F/U with Spine Surgery in 4 weeks re: thoracic spine phlegmon. Dr. Agapito Jacobs.  --F/U with Cardiothoracic Surgery in 4 weeks re: left sternoclavicular joint infection. Dr. Hector Field.   --F/U with Infectious Disease in 4 weeks. Dr. Noni Solis.  --Repeat MRI T Spine, Chest in 4 weeks to determine switch to prolonged oral antibiotics versus need for drainage/surgical intervention. Re-image sooner if symptoms re-demonstrate. - Ordered for 12/18/24.  --Continue Lidocaine patch to left upper chest.  --C/S case management for coordination of care, f/u's.     #HTN  --Continue Amlodipine 10mg daily.  --VS per routine.     #Tobacco Use  --Declined Nicotine patch.     #Alcohol Use  --Continue thiamine and folic acid daily.       Full Code     Disposition / Plans   Disposition: Home    Subjective / ROS:   Patient states he is doing well today. Denies pain. Patient denies fevers, chills, nausea, vomiting, dizziness, lightheadedness, cough, shortness of breath, chest pain, constipation, diarrhea. Pt tolerating antibiotic therapy. PICC LALA dressing c/d/I. Continue with current plan of care. Follow up as ordered.       Medical Decision Making   Chart, Images and Lab data reviewed, necessary medical Orders placed   Discussed with nursing staff     Vitals:    12/12/24 0742 12/12/24 2058 12/13/24 0741 12/13/24 1922   BP: 122/75 (!) 128/102 125/83 126/80   Pulse: 63 59 79 52   Resp: 18 18 18    Temp: 98.4 °F (36.9 °C) 97.9 °F (36.6 °C) 97.9 °F (36.6 °C) 98.4 °F (36.9 °C)   TempSrc: Oral Oral Oral    SpO2: 100% 100%

## 2024-12-14 NOTE — PROGRESS NOTES
0700 - Bedside and Verbal shift change report given to CharisRN and ANDREA Jaimes (oncoming nurse) by ANDREA You (offgoing nurse). Report included the following information Nurse Handoff Report and Adult Overview.

## 2024-12-14 NOTE — PROGRESS NOTES
1900  assume care on pt.    1922 vitals obtained    2040 prn tylenol given for left shoulder pain    2112 pain improved pt satisfied    0041 rounded on pt. Pt denies nay needs at this time    0430 rounded on pt. Pt in bed eyes closed. Ping; Respiratory patterns noted.    0700 report given to lou Wheeler rn and freeman ellsworth

## 2024-12-14 NOTE — PLAN OF CARE
Problem: Discharge Planning  Goal: Discharge to home or other facility with appropriate resources  12/14/2024 0812 by Gege Wheeler RN  Outcome: Progressing  12/14/2024 0042 by Susanne Bower RN  Outcome: Progressing     Problem: Safety - Adult  Goal: Free from fall injury  12/14/2024 0812 by Gege Wheeler RN  Outcome: Progressing  12/14/2024 0042 by Susanne Bower RN  Outcome: Progressing     Problem: Pain  Goal: Verbalizes/displays adequate comfort level or baseline comfort level  12/14/2024 0812 by Gege Wheeler RN  Outcome: Progressing  12/14/2024 0042 by Susanne Bower RN  Outcome: Progressing     Problem: Nutrition Deficit:  Goal: Optimize nutritional status  12/14/2024 0812 by Gege Wheeler RN  Outcome: Progressing  12/14/2024 0042 by Susanne Bower RN  Outcome: Progressing

## 2024-12-15 PROCEDURE — 6360000002 HC RX W HCPCS: Performed by: INTERNAL MEDICINE

## 2024-12-15 PROCEDURE — 1100000000 HC RM PRIVATE

## 2024-12-15 PROCEDURE — 6370000000 HC RX 637 (ALT 250 FOR IP): Performed by: NURSE PRACTITIONER

## 2024-12-15 PROCEDURE — 2580000003 HC RX 258: Performed by: INTERNAL MEDICINE

## 2024-12-15 PROCEDURE — 2580000003 HC RX 258: Performed by: NURSE PRACTITIONER

## 2024-12-15 RX ADMIN — SODIUM CHLORIDE, PRESERVATIVE FREE 10 ML: 5 INJECTION INTRAVENOUS at 21:27

## 2024-12-15 RX ADMIN — ACETAMINOPHEN 325MG 650 MG: 325 TABLET ORAL at 09:01

## 2024-12-15 RX ADMIN — CEFTRIAXONE SODIUM 2000 MG: 2 INJECTION, POWDER, FOR SOLUTION INTRAMUSCULAR; INTRAVENOUS at 09:10

## 2024-12-15 RX ADMIN — Medication 100 MG: at 09:02

## 2024-12-15 RX ADMIN — FOLIC ACID 1 MG: 1 TABLET ORAL at 09:02

## 2024-12-15 RX ADMIN — AMLODIPINE BESYLATE 10 MG: 5 TABLET ORAL at 09:01

## 2024-12-15 RX ADMIN — SODIUM CHLORIDE, PRESERVATIVE FREE 10 ML: 5 INJECTION INTRAVENOUS at 09:14

## 2024-12-15 RX ADMIN — ACETAMINOPHEN 325MG 650 MG: 325 TABLET ORAL at 21:27

## 2024-12-15 ASSESSMENT — PAIN DESCRIPTION - LOCATION
LOCATION: SHOULDER
LOCATION: SHOULDER

## 2024-12-15 ASSESSMENT — PAIN SCALES - WONG BAKER: WONGBAKER_NUMERICALRESPONSE: NO HURT

## 2024-12-15 ASSESSMENT — PAIN SCALES - GENERAL
PAINLEVEL_OUTOF10: 5
PAINLEVEL_OUTOF10: 3
PAINLEVEL_OUTOF10: 0
PAINLEVEL_OUTOF10: 3

## 2024-12-15 ASSESSMENT — PAIN DESCRIPTION - ORIENTATION
ORIENTATION: LEFT
ORIENTATION: LEFT

## 2024-12-15 ASSESSMENT — PAIN DESCRIPTION - DESCRIPTORS
DESCRIPTORS: DISCOMFORT
DESCRIPTORS: PRESSURE

## 2024-12-15 NOTE — PROGRESS NOTES
1900 report received on pt. Vitals obtained    2046 prn tylenol given for 4/10 left shoulder pain    2116 pain improved. Pt satisfied    0330 rounded on pt. Pt in bed with eyes closed. Normal respiratory patterns noted. Pt easily aroused.    0700- report given to RICHIE Wheeler rn and POPPY Kincaid rn

## 2024-12-15 NOTE — PLAN OF CARE
Problem: Discharge Planning  Goal: Discharge to home or other facility with appropriate resources  12/14/2024 2117 by Susanne Bower RN  Outcome: Progressing  12/14/2024 0812 by Gege Wheeler RN  Outcome: Progressing     Problem: Safety - Adult  Goal: Free from fall injury  12/14/2024 2117 by Susanne Bower RN  Outcome: Progressing  12/14/2024 0812 by Gege Wheeler RN  Outcome: Progressing     Problem: Pain  Goal: Verbalizes/displays adequate comfort level or baseline comfort level  12/14/2024 2117 by Susanne Bower RN  Outcome: Progressing  12/14/2024 0812 by Gege Wheeler, RN  Outcome: Progressing     Problem: Nutrition Deficit:  Goal: Optimize nutritional status  12/14/2024 2117 by Susanne Bower RN  Outcome: Progressing  12/14/2024 0812 by Gege Wheeler, RN  Outcome: Progressing

## 2024-12-15 NOTE — PROGRESS NOTES
0700- Bedside shift change report given to ANDREA Cruz and ANDREA De Guzman (oncoming nurse) by ANDREA Skinner (offgoing nurse). Report included the following information Nurse Handoff Report and Adult Overview.     1848- Bedside shift change report given to ANDREA Skinner (oncoming nurse) by ANDREA Cruz and ANDREA De Guzman (offgoing nurse). Report included the following information Nurse Handoff Report and Adult Overview.

## 2024-12-15 NOTE — PLAN OF CARE
Problem: Discharge Planning  Goal: Discharge to home or other facility with appropriate resources  12/15/2024 0807 by Kathy Kincaid RN  Outcome: Progressing  12/14/2024 2117 by Susanne Bower RN  Outcome: Progressing     Problem: Safety - Adult  Goal: Free from fall injury  12/15/2024 0807 by Kathy Kincaid RN  Outcome: Progressing  12/14/2024 2117 by Susanne Bower RN  Outcome: Progressing     Problem: Pain  Goal: Verbalizes/displays adequate comfort level or baseline comfort level  12/15/2024 0807 by Kathy Kincaid RN  Outcome: Progressing  12/14/2024 2117 by Susanne Bower RN  Outcome: Progressing     Problem: Nutrition Deficit:  Goal: Optimize nutritional status  12/15/2024 0807 by Kathy Kincaid RN  Outcome: Progressing  12/14/2024 2117 by Susanne Bower RN  Outcome: Progressing

## 2024-12-16 PROCEDURE — 6360000002 HC RX W HCPCS: Performed by: INTERNAL MEDICINE

## 2024-12-16 PROCEDURE — 2580000003 HC RX 258: Performed by: INTERNAL MEDICINE

## 2024-12-16 PROCEDURE — 1100000000 HC RM PRIVATE

## 2024-12-16 PROCEDURE — 6370000000 HC RX 637 (ALT 250 FOR IP): Performed by: NURSE PRACTITIONER

## 2024-12-16 PROCEDURE — 2580000003 HC RX 258: Performed by: NURSE PRACTITIONER

## 2024-12-16 RX ADMIN — SODIUM CHLORIDE, PRESERVATIVE FREE 10 ML: 5 INJECTION INTRAVENOUS at 09:12

## 2024-12-16 RX ADMIN — SODIUM CHLORIDE, PRESERVATIVE FREE 10 ML: 5 INJECTION INTRAVENOUS at 20:45

## 2024-12-16 RX ADMIN — ACETAMINOPHEN 325MG 650 MG: 325 TABLET ORAL at 20:44

## 2024-12-16 RX ADMIN — CEFTRIAXONE SODIUM 2000 MG: 2 INJECTION, POWDER, FOR SOLUTION INTRAMUSCULAR; INTRAVENOUS at 09:19

## 2024-12-16 RX ADMIN — FOLIC ACID 1 MG: 1 TABLET ORAL at 09:12

## 2024-12-16 RX ADMIN — Medication 100 MG: at 09:12

## 2024-12-16 RX ADMIN — AMLODIPINE BESYLATE 10 MG: 5 TABLET ORAL at 09:12

## 2024-12-16 ASSESSMENT — PAIN SCALES - GENERAL
PAINLEVEL_OUTOF10: 4
PAINLEVEL_OUTOF10: 4
PAINLEVEL_OUTOF10: 3

## 2024-12-16 ASSESSMENT — PAIN DESCRIPTION - LOCATION: LOCATION: CHEST

## 2024-12-16 NOTE — PROGRESS NOTES
0645 - Report received from night nurse Susanne Bower RN. No issues or concerns noted overnight.    0719 - AM vitals completed. Head to toe assessment completed.     0730 - Patient in shower.     0912 - AM medication given.    No issues or concerns noted this AM.     1135 - Lunch delivered.     MRI screening form completed.     1500 - [facility] called to setup transportation. Fax sent with all information. Waiting for call back to confirm.     Report given to night nurse Stephanie BAILEY RN.

## 2024-12-16 NOTE — PROGRESS NOTES
1900 report received on pt. pt wake in bed and denies any further needs at this  time    2127 meds administered per mar pt tolerated well. prn tylenol given for 5/10  shoulder pain    2157 pain improved pt satisfied    0047 rounded on pt. Pt in bed eyes closed. Normal respiratory pattern noted    0651 Report given to Karen MENDEZ

## 2024-12-16 NOTE — PLAN OF CARE
Problem: Discharge Planning  Goal: Discharge to home or other facility with appropriate resources  12/16/2024 0930 by Dominga Queen RN  Outcome: Progressing  12/16/2024 0154 by Susanne Bower RN  Outcome: Progressing     Problem: Safety - Adult  Goal: Free from fall injury  12/16/2024 0930 by Dominga Queen RN  Outcome: Progressing  12/16/2024 0154 by Susanne Bower RN  Outcome: Progressing     Problem: Pain  Goal: Verbalizes/displays adequate comfort level or baseline comfort level  12/16/2024 0930 by Dominga Queen RN  Outcome: Progressing  12/16/2024 0154 by Susanne Bower RN  Outcome: Progressing     Problem: Nutrition Deficit:  Goal: Optimize nutritional status  12/16/2024 0930 by Dominga Queen RN  Outcome: Progressing  12/16/2024 0154 by Susanne Bower RN  Outcome: Progressing

## 2024-12-17 PROCEDURE — 1100000000 HC RM PRIVATE

## 2024-12-17 PROCEDURE — 6360000002 HC RX W HCPCS: Performed by: INTERNAL MEDICINE

## 2024-12-17 PROCEDURE — 2580000003 HC RX 258: Performed by: INTERNAL MEDICINE

## 2024-12-17 PROCEDURE — 6370000000 HC RX 637 (ALT 250 FOR IP): Performed by: NURSE PRACTITIONER

## 2024-12-17 PROCEDURE — 2500000003 HC RX 250 WO HCPCS: Performed by: NURSE PRACTITIONER

## 2024-12-17 PROCEDURE — 2580000003 HC RX 258: Performed by: NURSE PRACTITIONER

## 2024-12-17 RX ADMIN — SODIUM CHLORIDE, PRESERVATIVE FREE 10 ML: 5 INJECTION INTRAVENOUS at 19:43

## 2024-12-17 RX ADMIN — CEFTRIAXONE SODIUM 2000 MG: 2 INJECTION, POWDER, FOR SOLUTION INTRAMUSCULAR; INTRAVENOUS at 08:56

## 2024-12-17 RX ADMIN — Medication 100 MG: at 08:50

## 2024-12-17 RX ADMIN — FOLIC ACID 1 MG: 1 TABLET ORAL at 08:50

## 2024-12-17 RX ADMIN — ACETAMINOPHEN 325MG 650 MG: 325 TABLET ORAL at 19:41

## 2024-12-17 RX ADMIN — AMLODIPINE BESYLATE 10 MG: 5 TABLET ORAL at 08:49

## 2024-12-17 RX ADMIN — SODIUM CHLORIDE, PRESERVATIVE FREE 10 ML: 5 INJECTION INTRAVENOUS at 08:50

## 2024-12-17 RX ADMIN — ACETAMINOPHEN 325MG 650 MG: 325 TABLET ORAL at 08:49

## 2024-12-17 ASSESSMENT — PAIN DESCRIPTION - LOCATION
LOCATION: CHEST
LOCATION: SHOULDER

## 2024-12-17 ASSESSMENT — PAIN DESCRIPTION - DESCRIPTORS
DESCRIPTORS: ACHING
DESCRIPTORS: PRESSURE

## 2024-12-17 ASSESSMENT — PAIN DESCRIPTION - ORIENTATION
ORIENTATION: LEFT
ORIENTATION: RIGHT

## 2024-12-17 ASSESSMENT — PAIN SCALES - GENERAL
PAINLEVEL_OUTOF10: 0
PAINLEVEL_OUTOF10: 4
PAINLEVEL_OUTOF10: 1
PAINLEVEL_OUTOF10: 3
PAINLEVEL_OUTOF10: 4

## 2024-12-17 ASSESSMENT — PAIN - FUNCTIONAL ASSESSMENT: PAIN_FUNCTIONAL_ASSESSMENT: ACTIVITIES ARE NOT PREVENTED

## 2024-12-17 NOTE — PLAN OF CARE
Problem: Discharge Planning  Goal: Discharge to home or other facility with appropriate resources  12/17/2024 0833 by Kathy Kincaid RN  Outcome: Progressing  12/16/2024 2150 by Stephanie Rees RN  Outcome: Progressing  Flowsheets (Taken 12/16/2024 2150)  Discharge to home or other facility with appropriate resources:   Identify barriers to discharge with patient and caregiver   Arrange for needed discharge resources and transportation as appropriate   Identify discharge learning needs (meds, wound care, etc)     Problem: Safety - Adult  Goal: Free from fall injury  12/17/2024 0833 by Kathy Kincaid RN  Outcome: Progressing  12/16/2024 2150 by Stephanie Rees RN  Outcome: Progressing  Flowsheets (Taken 12/13/2024 0034)  Free From Fall Injury: Instruct family/caregiver on patient safety     Problem: Pain  Goal: Verbalizes/displays adequate comfort level or baseline comfort level  12/17/2024 0833 by Kathy Kincaid RN  Outcome: Progressing  12/16/2024 2150 by Stephanie Rees RN  Outcome: Progressing  Flowsheets (Taken 12/13/2024 0034)  Verbalizes/displays adequate comfort level or baseline comfort level:   Encourage patient to monitor pain and request assistance   Assess pain using appropriate pain scale   Administer analgesics based on type and severity of pain and evaluate response   Implement non-pharmacological measures as appropriate and evaluate response   Consider cultural and social influences on pain and pain management   Notify Licensed Independent Practitioner if interventions unsuccessful or patient reports new pain     Problem: Nutrition Deficit:  Goal: Optimize nutritional status  12/17/2024 0833 by Kathy Kincaid RN  Outcome: Progressing  12/16/2024 2150 by Stephanie Rees RN  Outcome: Progressing  Flowsheets (Taken 12/12/2024 0141)  Nutrient intake appropriate for improving, restoring, or maintaining nutritional needs:   Assess nutritional status and recommend course of action   Monitor oral

## 2024-12-17 NOTE — PROGRESS NOTES
0645- Bedside shift change report given to ANDREA Cruz and ANDREA De Guzman (oncoming nurse) by ANDREA Brown (offgoing nurse). Report included the following information Nurse Handoff Report and Adult Overview.     0747- Vitals signs obtained- Client sitting in chair eating breakfast    0849- Medications administered per MAR protocol- Patient tolerated well- No questions or concerns at this time    1400- Client sitting in chair alert and awake- No concerns at this time    1600- Snacks provided to patient. No concerns at this time    1800- Patient sitting in chair- Ice provided per request- No concerns at this time    1900- Bedside shift change report given to ANDREA Kenny (oncoming nurse) by Gege (offgoing nurse). Report included the following information Nurse Handoff Report, Adult Overview, and MAR.

## 2024-12-17 NOTE — PROGRESS NOTES
1850: Bedside shift change report given to GRETA Rees RN (oncoming nurse) by DIANA Queen RN (offgoing nurse). Report included the following information Nurse Handoff Report, Recent Results, Neuro Assessment, and Event Log.   Assumed care of patient. Bedside shift discussion done. Whiteboard updated. Reviewed MRI checklist. [facility] has been consulted for  Thursday for follow up appointment. No  time available. Patient is resting in bed with eyes open, participating in bedside shift discussion. Bed is in lowest position and call bell is within reach.     2045: VSS. Patient is alert and oriented. PICC line assessed, flushes without issue and blood return present. Snacks provided at patient's request. PRN Tylenol administered for chronic chest pain, 4/10 on number scale.     0000: Patient resting in bed with eyes closed.     0400: Patient resting in bed with eyes closed.

## 2024-12-17 NOTE — PROGRESS NOTES
0800- called and spoke with Nathaniel with [facility] regarding pt's transportation needs to and from his appointments. Transportation has been arranged for the patient on 12/19/24 with a p/u time of 1330 and 12/24/24 with a p/u time of 0800.

## 2024-12-17 NOTE — PLAN OF CARE
Problem: Discharge Planning  Goal: Discharge to home or other facility with appropriate resources  12/16/2024 2150 by Stephanie Rees RN  Outcome: Progressing  Flowsheets (Taken 12/16/2024 2150)  Discharge to home or other facility with appropriate resources:   Identify barriers to discharge with patient and caregiver   Arrange for needed discharge resources and transportation as appropriate   Identify discharge learning needs (meds, wound care, etc)  12/16/2024 0930 by Dominga Queen RN  Outcome: Progressing     Problem: Safety - Adult  Goal: Free from fall injury  12/16/2024 2150 by Stephanie Rees RN  Outcome: Progressing  Flowsheets (Taken 12/13/2024 0034)  Free From Fall Injury: Instruct family/caregiver on patient safety  12/16/2024 0930 by Dominga Queen RN  Outcome: Progressing     Problem: Pain  Goal: Verbalizes/displays adequate comfort level or baseline comfort level  12/16/2024 2150 by Stephanie Rees RN  Outcome: Progressing  Flowsheets (Taken 12/13/2024 0034)  Verbalizes/displays adequate comfort level or baseline comfort level:   Encourage patient to monitor pain and request assistance   Assess pain using appropriate pain scale   Administer analgesics based on type and severity of pain and evaluate response   Implement non-pharmacological measures as appropriate and evaluate response   Consider cultural and social influences on pain and pain management   Notify Licensed Independent Practitioner if interventions unsuccessful or patient reports new pain  12/16/2024 0930 by Dominga Queen RN  Outcome: Progressing     Problem: Nutrition Deficit:  Goal: Optimize nutritional status  12/16/2024 2150 by Stephanie Rees RN  Outcome: Progressing  Flowsheets (Taken 12/12/2024 0141)  Nutrient intake appropriate for improving, restoring, or maintaining nutritional needs:   Assess nutritional status and recommend course of action   Monitor oral intake, labs, and treatment plans  12/16/2024 0930 by

## 2024-12-17 NOTE — PROGRESS NOTES
0800 - Call received from Cardiovascular and thoracic surgery, appointment needs to be changed d/t provider having meetings. New appointment will be on Dec.24th @1000, confirmed this as correct d/t being Hermelinda Strong. [employer] transportation has been changed by [facility].

## 2024-12-18 ENCOUNTER — APPOINTMENT (OUTPATIENT)
Age: 64
DRG: 344 | End: 2024-12-18
Attending: INTERNAL MEDICINE
Payer: MEDICAID

## 2024-12-18 LAB
ALBUMIN SERPL-MCNC: 2.9 G/DL (ref 3.4–5)
ALBUMIN/GLOB SERPL: 0.6 (ref 0.8–1.7)
ALP SERPL-CCNC: 86 U/L (ref 45–117)
ALT SERPL-CCNC: 32 U/L (ref 16–61)
ANION GAP SERPL CALC-SCNC: 4 MMOL/L (ref 3–18)
AST SERPL W P-5'-P-CCNC: 21 U/L (ref 10–38)
BASOPHILS # BLD: 0.1 K/UL (ref 0–0.1)
BASOPHILS NFR BLD: 1 % (ref 0–2)
BILIRUB SERPL-MCNC: 0.2 MG/DL (ref 0.2–1)
BUN SERPL-MCNC: 10 MG/DL (ref 7–18)
BUN/CREAT SERPL: 11 (ref 12–20)
CA-I BLD-MCNC: 8.9 MG/DL (ref 8.5–10.1)
CHLORIDE SERPL-SCNC: 107 MMOL/L (ref 100–111)
CO2 SERPL-SCNC: 29 MMOL/L (ref 21–32)
CREAT SERPL-MCNC: 0.87 MG/DL (ref 0.6–1.3)
DIFFERENTIAL METHOD BLD: ABNORMAL
EOSINOPHIL # BLD: 0.3 K/UL (ref 0–0.4)
EOSINOPHIL NFR BLD: 7 % (ref 0–5)
ERYTHROCYTE [DISTWIDTH] IN BLOOD BY AUTOMATED COUNT: 14.3 % (ref 11.6–14.5)
GLOBULIN SER CALC-MCNC: 4.6 G/DL (ref 2–4)
GLUCOSE SERPL-MCNC: 97 MG/DL (ref 74–99)
HCT VFR BLD AUTO: 38.8 % (ref 36–48)
HGB BLD-MCNC: 12.1 G/DL (ref 13–16)
IMM GRANULOCYTES # BLD AUTO: 0 K/UL (ref 0–0.04)
IMM GRANULOCYTES NFR BLD AUTO: 1 % (ref 0–0.5)
LYMPHOCYTES # BLD: 1.9 K/UL (ref 0.9–3.6)
LYMPHOCYTES NFR BLD: 43 % (ref 21–52)
MCH RBC QN AUTO: 27.3 PG (ref 24–34)
MCHC RBC AUTO-ENTMCNC: 31.2 G/DL (ref 31–37)
MCV RBC AUTO: 87.6 FL (ref 78–100)
MONOCYTES # BLD: 0.8 K/UL (ref 0.05–1.2)
MONOCYTES NFR BLD: 18 % (ref 3–10)
NEUTS SEG # BLD: 1.4 K/UL (ref 1.8–8)
NEUTS SEG NFR BLD: 30 % (ref 40–73)
NRBC # BLD: 0 K/UL (ref 0–0.01)
NRBC BLD-RTO: 0 PER 100 WBC
PLATELET # BLD AUTO: 194 K/UL (ref 135–420)
PMV BLD AUTO: 9 FL (ref 9.2–11.8)
POTASSIUM SERPL-SCNC: 4 MMOL/L (ref 3.5–5.5)
PROT SERPL-MCNC: 7.5 G/DL (ref 6.4–8.2)
RBC # BLD AUTO: 4.43 M/UL (ref 4.35–5.65)
SODIUM SERPL-SCNC: 140 MMOL/L (ref 136–145)
WBC # BLD AUTO: 4.6 K/UL (ref 4.6–13.2)

## 2024-12-18 PROCEDURE — 72157 MRI CHEST SPINE W/O & W/DYE: CPT

## 2024-12-18 PROCEDURE — 80053 COMPREHEN METABOLIC PANEL: CPT

## 2024-12-18 PROCEDURE — 2580000003 HC RX 258: Performed by: INTERNAL MEDICINE

## 2024-12-18 PROCEDURE — 71552 MRI CHEST W/O & W/DYE: CPT

## 2024-12-18 PROCEDURE — 6360000002 HC RX W HCPCS: Performed by: INTERNAL MEDICINE

## 2024-12-18 PROCEDURE — 2500000003 HC RX 250 WO HCPCS: Performed by: NURSE PRACTITIONER

## 2024-12-18 PROCEDURE — 6360000004 HC RX CONTRAST MEDICATION: Performed by: INTERNAL MEDICINE

## 2024-12-18 PROCEDURE — 36415 COLL VENOUS BLD VENIPUNCTURE: CPT

## 2024-12-18 PROCEDURE — 1100000000 HC RM PRIVATE

## 2024-12-18 PROCEDURE — 85025 COMPLETE CBC W/AUTO DIFF WBC: CPT

## 2024-12-18 PROCEDURE — A9579 GAD-BASE MR CONTRAST NOS,1ML: HCPCS | Performed by: INTERNAL MEDICINE

## 2024-12-18 PROCEDURE — 6370000000 HC RX 637 (ALT 250 FOR IP): Performed by: NURSE PRACTITIONER

## 2024-12-18 RX ADMIN — Medication 100 MG: at 08:59

## 2024-12-18 RX ADMIN — ACETAMINOPHEN 325MG 650 MG: 325 TABLET ORAL at 20:33

## 2024-12-18 RX ADMIN — ACETAMINOPHEN 325MG 650 MG: 325 TABLET ORAL at 08:59

## 2024-12-18 RX ADMIN — SODIUM CHLORIDE, PRESERVATIVE FREE 10 ML: 5 INJECTION INTRAVENOUS at 20:39

## 2024-12-18 RX ADMIN — FOLIC ACID 1 MG: 1 TABLET ORAL at 08:59

## 2024-12-18 RX ADMIN — SODIUM CHLORIDE, PRESERVATIVE FREE 10 ML: 5 INJECTION INTRAVENOUS at 09:08

## 2024-12-18 RX ADMIN — CEFTRIAXONE SODIUM 2000 MG: 2 INJECTION, POWDER, FOR SOLUTION INTRAMUSCULAR; INTRAVENOUS at 09:05

## 2024-12-18 RX ADMIN — GADOTERIDOL 17 ML: 279.3 INJECTION, SOLUTION INTRAVENOUS at 12:03

## 2024-12-18 RX ADMIN — AMLODIPINE BESYLATE 10 MG: 5 TABLET ORAL at 08:59

## 2024-12-18 ASSESSMENT — PAIN SCALES - GENERAL
PAINLEVEL_OUTOF10: 3
PAINLEVEL_OUTOF10: 0
PAINLEVEL_OUTOF10: 3
PAINLEVEL_OUTOF10: 2
PAINLEVEL_OUTOF10: 4

## 2024-12-18 ASSESSMENT — PAIN DESCRIPTION - DESCRIPTORS: DESCRIPTORS: PRESSURE

## 2024-12-18 ASSESSMENT — PAIN DESCRIPTION - LOCATION
LOCATION: CHEST
LOCATION: SHOULDER

## 2024-12-18 ASSESSMENT — PAIN DESCRIPTION - ORIENTATION
ORIENTATION: LEFT
ORIENTATION: LEFT

## 2024-12-18 NOTE — PLAN OF CARE
Problem: Discharge Planning  Goal: Discharge to home or other facility with appropriate resources  12/17/2024 2000 by Zoya Schulz RN  Outcome: Progressing  12/17/2024 0833 by Kathy Kincaid RN  Outcome: Progressing     Problem: Safety - Adult  Goal: Free from fall injury  12/17/2024 2000 by Zoya Schulz RN  Outcome: Progressing  12/17/2024 0833 by Kathy Kincaid RN  Outcome: Progressing     Problem: Pain  Goal: Verbalizes/displays adequate comfort level or baseline comfort level  12/17/2024 2000 by Zoya Schulz RN  Outcome: Progressing  12/17/2024 0833 by Kathy Kincaid RN  Outcome: Progressing     Problem: Nutrition Deficit:  Goal: Optimize nutritional status  12/17/2024 2000 by Zoya Schulz RN  Outcome: Progressing  12/17/2024 0833 by Kathy Kincaid RN  Outcome: Progressing

## 2024-12-18 NOTE — PROGRESS NOTES
0645- Bedside shift change report given to Nancy RN and Gege RN (oncoming nurse) by ANDREA Kenny (offgoing nurse). Report included the following information Nurse Handoff Report and Adult Overview - Assumed care    0746- Patient vitals obtained- Patient in chair - Breakfast provided- No concerns at this time    0800-Patient assessment completed- Changed dressing on PICC line- tolerated well Concerns addressed and acknowledge understanding    1025- Patient gone off unit for MRI testing.    1315- Patient ambulating the guerra- Safety measures in place

## 2024-12-18 NOTE — PROGRESS NOTES
1930  Received care of pt sitting up on side of bed.  Pt is alert and oriented x 4.  Routine assessment and VS completed.  Pt requested and received tylenol and sandwich and crackers and soda. Call bell in reach    2200  Pt resting in bed watching TV.  No complaints verbalized.    0200  Pt sleeping and arouses on rounds. Ice given as requested.    0415  Pt sleeping and awakened for am labs and is complaint free.    0600  Pt sleeping and no distress noted.

## 2024-12-18 NOTE — PROGRESS NOTES
0800 - PICC line dressing changed. Arm circumference 30cm at insertion site. Patient tolerated well. Dressing dated and initialed.    1025 - Patient gone to MRI via wheelchair by MRI staff.    1207 - Patient returned to room via wheelchair.    1415 - Patient ambulating in hallway.    1705 - Snack given. Denied needs.    1900 - Report given to ChepeRN

## 2024-12-18 NOTE — PLAN OF CARE
Problem: Discharge Planning  Goal: Discharge to home or other facility with appropriate resources  12/18/2024 0753 by Kathy Kincaid RN  Outcome: Progressing  12/18/2024 0753 by Kathy Kincaid RN  Outcome: Progressing  12/17/2024 2000 by Zoya Schulz RN  Outcome: Progressing     Problem: Safety - Adult  Goal: Free from fall injury  12/18/2024 0753 by Kathy Kincaid RN  Outcome: Progressing  12/18/2024 0753 by Kathy Kincaid RN  Outcome: Progressing  12/17/2024 2000 by Zoya Schulz RN  Outcome: Progressing     Problem: Pain  Goal: Verbalizes/displays adequate comfort level or baseline comfort level  12/18/2024 0753 by Kathy Kincaid RN  Outcome: Progressing  12/18/2024 0753 by Kathy Kincaid RN  Outcome: Progressing  12/17/2024 2000 by Zoya Schulz RN  Outcome: Progressing     Problem: Nutrition Deficit:  Goal: Optimize nutritional status  12/18/2024 0753 by Kathy Kincaid RN  Outcome: Progressing  12/18/2024 0753 by Kathy Kincaid RN  Outcome: Progressing  12/17/2024 2000 by Zoya Schulz RN  Outcome: Progressing

## 2024-12-19 ENCOUNTER — HOSPITAL ENCOUNTER (EMERGENCY)
Facility: HOSPITAL | Age: 64
Discharge: ANOTHER HOSPITAL | End: 2024-12-20
Attending: STUDENT IN AN ORGANIZED HEALTH CARE EDUCATION/TRAINING PROGRAM
Payer: MEDICAID

## 2024-12-19 ENCOUNTER — APPOINTMENT (OUTPATIENT)
Facility: HOSPITAL | Age: 64
End: 2024-12-19
Payer: MEDICAID

## 2024-12-19 VITALS
BODY MASS INDEX: 25.9 KG/M2 | OXYGEN SATURATION: 100 % | TEMPERATURE: 97.9 F | RESPIRATION RATE: 19 BRPM | WEIGHT: 185 LBS | HEART RATE: 74 BPM | DIASTOLIC BLOOD PRESSURE: 90 MMHG | SYSTOLIC BLOOD PRESSURE: 133 MMHG | HEIGHT: 71 IN

## 2024-12-19 VITALS
HEART RATE: 52 BPM | BODY MASS INDEX: 25.9 KG/M2 | TEMPERATURE: 97.9 F | WEIGHT: 185 LBS | DIASTOLIC BLOOD PRESSURE: 76 MMHG | HEIGHT: 71 IN | OXYGEN SATURATION: 97 % | RESPIRATION RATE: 18 BRPM | SYSTOLIC BLOOD PRESSURE: 139 MMHG

## 2024-12-19 DIAGNOSIS — Z00.8 ENCOUNTER FOR MEDICAL ASSESSMENT: Primary | ICD-10-CM

## 2024-12-19 LAB
ALBUMIN SERPL-MCNC: 3.3 G/DL (ref 3.4–5)
ALBUMIN/GLOB SERPL: 0.8 (ref 0.8–1.7)
ALP SERPL-CCNC: 91 U/L (ref 45–117)
ALT SERPL-CCNC: 35 U/L (ref 16–61)
ANION GAP SERPL CALC-SCNC: 4 MMOL/L (ref 3–18)
AST SERPL-CCNC: 25 U/L (ref 10–38)
BASOPHILS # BLD: 0 K/UL (ref 0–0.1)
BASOPHILS NFR BLD: 1 % (ref 0–2)
BILIRUB SERPL-MCNC: 0.3 MG/DL (ref 0.2–1)
BUN SERPL-MCNC: 8 MG/DL (ref 7–18)
BUN/CREAT SERPL: 8 (ref 12–20)
CALCIUM SERPL-MCNC: 9.2 MG/DL (ref 8.5–10.1)
CHLORIDE SERPL-SCNC: 105 MMOL/L (ref 100–111)
CO2 SERPL-SCNC: 29 MMOL/L (ref 21–32)
CREAT SERPL-MCNC: 0.97 MG/DL (ref 0.6–1.3)
DIFFERENTIAL METHOD BLD: ABNORMAL
EOSINOPHIL # BLD: 0.3 K/UL (ref 0–0.4)
EOSINOPHIL NFR BLD: 6 % (ref 0–5)
ERYTHROCYTE [DISTWIDTH] IN BLOOD BY AUTOMATED COUNT: 14.1 % (ref 11.6–14.5)
GLOBULIN SER CALC-MCNC: 4.4 G/DL (ref 2–4)
GLUCOSE SERPL-MCNC: 95 MG/DL (ref 74–99)
HCT VFR BLD AUTO: 40 % (ref 36–48)
HGB BLD-MCNC: 12.6 G/DL (ref 13–16)
IMM GRANULOCYTES # BLD AUTO: 0 K/UL (ref 0–0.04)
IMM GRANULOCYTES NFR BLD AUTO: 1 % (ref 0–0.5)
LYMPHOCYTES # BLD: 2 K/UL (ref 0.9–3.6)
LYMPHOCYTES NFR BLD: 40 % (ref 21–52)
MAGNESIUM SERPL-MCNC: 2.1 MG/DL (ref 1.6–2.6)
MCH RBC QN AUTO: 27.8 PG (ref 24–34)
MCHC RBC AUTO-ENTMCNC: 31.5 G/DL (ref 31–37)
MCV RBC AUTO: 88.3 FL (ref 78–100)
MONOCYTES # BLD: 0.7 K/UL (ref 0.05–1.2)
MONOCYTES NFR BLD: 15 % (ref 3–10)
NEUTS SEG # BLD: 1.8 K/UL (ref 1.8–8)
NEUTS SEG NFR BLD: 37 % (ref 40–73)
NRBC # BLD: 0 K/UL (ref 0–0.01)
NRBC BLD-RTO: 0 PER 100 WBC
NT PRO BNP: 59 PG/ML (ref 0–900)
PLATELET # BLD AUTO: 193 K/UL (ref 135–420)
PMV BLD AUTO: 8.8 FL (ref 9.2–11.8)
POTASSIUM SERPL-SCNC: 4 MMOL/L (ref 3.5–5.5)
PROT SERPL-MCNC: 7.7 G/DL (ref 6.4–8.2)
RBC # BLD AUTO: 4.53 M/UL (ref 4.35–5.65)
SODIUM SERPL-SCNC: 138 MMOL/L (ref 136–145)
TROPONIN I SERPL HS-MCNC: 7 NG/L (ref 0–78)
WBC # BLD AUTO: 5 K/UL (ref 4.6–13.2)

## 2024-12-19 PROCEDURE — 83735 ASSAY OF MAGNESIUM: CPT

## 2024-12-19 PROCEDURE — 93005 ELECTROCARDIOGRAM TRACING: CPT | Performed by: STUDENT IN AN ORGANIZED HEALTH CARE EDUCATION/TRAINING PROGRAM

## 2024-12-19 PROCEDURE — 2500000003 HC RX 250 WO HCPCS: Performed by: NURSE PRACTITIONER

## 2024-12-19 PROCEDURE — 71260 CT THORAX DX C+: CPT

## 2024-12-19 PROCEDURE — 71045 X-RAY EXAM CHEST 1 VIEW: CPT

## 2024-12-19 PROCEDURE — 6370000000 HC RX 637 (ALT 250 FOR IP): Performed by: NURSE PRACTITIONER

## 2024-12-19 PROCEDURE — 83880 ASSAY OF NATRIURETIC PEPTIDE: CPT

## 2024-12-19 PROCEDURE — 85025 COMPLETE CBC W/AUTO DIFF WBC: CPT

## 2024-12-19 PROCEDURE — 6360000004 HC RX CONTRAST MEDICATION: Performed by: STUDENT IN AN ORGANIZED HEALTH CARE EDUCATION/TRAINING PROGRAM

## 2024-12-19 PROCEDURE — 99285 EMERGENCY DEPT VISIT HI MDM: CPT

## 2024-12-19 PROCEDURE — 6360000002 HC RX W HCPCS: Performed by: INTERNAL MEDICINE

## 2024-12-19 PROCEDURE — 2580000003 HC RX 258: Performed by: INTERNAL MEDICINE

## 2024-12-19 PROCEDURE — 80053 COMPREHEN METABOLIC PANEL: CPT

## 2024-12-19 PROCEDURE — 84484 ASSAY OF TROPONIN QUANT: CPT

## 2024-12-19 RX ORDER — IOPAMIDOL 612 MG/ML
80 INJECTION, SOLUTION INTRAVASCULAR
Status: COMPLETED | OUTPATIENT
Start: 2024-12-19 | End: 2024-12-19

## 2024-12-19 RX ADMIN — CEFTRIAXONE SODIUM 2000 MG: 2 INJECTION, POWDER, FOR SOLUTION INTRAMUSCULAR; INTRAVENOUS at 08:42

## 2024-12-19 RX ADMIN — AMLODIPINE BESYLATE 10 MG: 5 TABLET ORAL at 08:36

## 2024-12-19 RX ADMIN — SODIUM CHLORIDE, PRESERVATIVE FREE 10 ML: 5 INJECTION INTRAVENOUS at 08:37

## 2024-12-19 RX ADMIN — Medication 100 MG: at 08:36

## 2024-12-19 RX ADMIN — IOPAMIDOL 80 ML: 612 INJECTION, SOLUTION INTRAVENOUS at 18:28

## 2024-12-19 RX ADMIN — FOLIC ACID 1 MG: 1 TABLET ORAL at 08:36

## 2024-12-19 ASSESSMENT — PAIN SCALES - GENERAL: PAINLEVEL_OUTOF10: 3

## 2024-12-19 NOTE — PROGRESS NOTES
1900: Bedside shift change report given to GRETA Rees RN (oncoming nurse) by RICHIE Wheeler RN (offgoing nurse). Report included the following information Nurse Handoff Report, Adult Overview, MAR, Neuro Assessment, and Event Log.   Assumed care of patient. Bedside shift discussion done. Whiteboard updated. Patient resting in bed, participating in bedside shift discussion. Plan of care reviewed. Patient has appointment tomorrow with ortho/spine at 1500 with a  time of 1330. Bed is in lowest position and call bell is within reach.     2033: VSS. Assessment performed. PRN medication administered for L sided chest pain, 4/10 on numerical scale. Snacks provided at patient request. No other needs expressed at this time.     0000: Patient resting in bed with eyes closed. Respirations even and unlabored.    0302: Patient resting in bed with eyes closed. Respirations even and unlabored.

## 2024-12-19 NOTE — PLAN OF CARE
Problem: Discharge Planning  Goal: Discharge to home or other facility with appropriate resources  12/18/2024 2107 by Stephanie Rees RN  Outcome: Progressing  Flowsheets (Taken 12/16/2024 2150)  Discharge to home or other facility with appropriate resources:   Identify barriers to discharge with patient and caregiver   Arrange for needed discharge resources and transportation as appropriate   Identify discharge learning needs (meds, wound care, etc)  12/18/2024 0753 by Kathy Kincaid RN  Outcome: Progressing  12/18/2024 0753 by Kathy Kincaid RN  Outcome: Progressing     Problem: Safety - Adult  Goal: Free from fall injury  12/18/2024 2107 by Stephanie Rees RN  Outcome: Progressing  Flowsheets (Taken 12/13/2024 0034)  Free From Fall Injury: Instruct family/caregiver on patient safety  12/18/2024 0753 by Kathy Kincaid RN  Outcome: Progressing  12/18/2024 0753 by Kathy Kincaid RN  Outcome: Progressing     Problem: Pain  Goal: Verbalizes/displays adequate comfort level or baseline comfort level  12/18/2024 2107 by Stephanie Rees RN  Outcome: Progressing  Flowsheets (Taken 12/13/2024 0034)  Verbalizes/displays adequate comfort level or baseline comfort level:   Encourage patient to monitor pain and request assistance   Assess pain using appropriate pain scale   Administer analgesics based on type and severity of pain and evaluate response   Implement non-pharmacological measures as appropriate and evaluate response   Consider cultural and social influences on pain and pain management   Notify Licensed Independent Practitioner if interventions unsuccessful or patient reports new pain  12/18/2024 0753 by Kathy Kincaid RN  Outcome: Progressing  12/18/2024 0753 by Kathy Kincaid RN  Outcome: Progressing     Problem: Nutrition Deficit:  Goal: Optimize nutritional status  12/18/2024 2107 by Stephanie Rees RN  Outcome: Progressing  Flowsheets (Taken 12/12/2024 0141)  Nutrient intake appropriate for

## 2024-12-19 NOTE — PROGRESS NOTES
0650- Assumed care of the patient from off going nurse via bedside shift report. Patient up in shower.    Assessment complete. Vitals obtained. Coffee requested and provided. CBWR    0836- Administered scheduled medications per MAR, patient tolerated    0915- IV ABX complete, PICC flushed and new alcohol cap placed   No needs voiced at this time    0930- Spoke with Marie to confirm pickup time of 1330 for appt     1320-[facility]  Patient out of facility for appointment

## 2024-12-19 NOTE — PROGRESS NOTES
Received phone call from spine surgery Deanne HENLEY about patient's MRI findings. MRI thoracic spine reveals improvement in infection.  MRI chest reveals worsening appearance of left first costochondral joint.  Patient was previously evaluated by thoracic surgery at [facility].  Apparently no outpatient follow-up scheduled. Patient will need surgical I&D left SC joint if clinical worsening noted while on iv antibiotics. Patient is currently receiving outpatient IV ceftriaxone at a nursing facility. I have recommended to send patient to  [facility] ED for thoracic surgery evaluation, possible surgery.

## 2024-12-19 NOTE — PROGRESS NOTES
Received call from [facility] ED. Pt sent to ED by Dr Solis for further evaluation for possible thoracic surgery. Will discharge patient.

## 2024-12-19 NOTE — ED PROVIDER NOTES
EMERGENCY DEPARTMENT HISTORY AND PHYSICAL EXAM    12:50 AM      Date: 12/19/2024  Patient Name: Dora Frazier    History of Presenting Illness     Chief Complaint   Patient presents with    Infected Sternum.       History From: Patient    Dora Frazier is a 64 y.o. 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  As per patient he was told by Dr. Solis to come to the ED to be evaluated by cardiothoracic surgery for possible surgical I&D of the left SC joint.  He denies any changes in meds, sick contacts, or any other change.  Patient is on daily IV ceftriaxone.  When assessing ROS he has no headache, fever, chest pain, abdominal pain, change in bowel movements, or any other changes.    Nursing Notes were all reviewed and agreed with or any disagreements were addressed in the HPI.    PCP: No, Pcp    No current facility-administered medications for this encounter.     No current outpatient medications on file.     Facility-Administered Medications Ordered in Other Encounters   Medication Dose Route Frequency Provider Last Rate Last Admin    amLODIPine (NORVASC) tablet 10 mg  10 mg Oral Daily Brooklyn Moralez APRN - CNP   10 mg at 12/22/24 0953    folic acid (FOLVITE) tablet 1 mg  1 mg Oral Daily Brooklyn Moralez APRN - CNP   1 mg at 12/22/24 0952    lidocaine 4 % external patch 1 patch  1 patch TransDERmal Daily Brooklyn Moralez APRN - CNP   1 patch at 12/22/24 0952    nicotine (NICODERM CQ) 14 MG/24HR 1 patch  1 patch TransDERmal Daily Brooklyn Moralez APRN - CNP        polyethylene glycol (GLYCOLAX) packet 17 g  17 g Oral Daily Brooklyn Moralez APRN - CNP        sennosides-docusate sodium (SENOKOT-S) 8.6-50 MG tablet 1 tablet  1 tablet Oral Daily Brooklyn Moralez APRN - CNP        thiamine  Refill: Capillary refill takes less than 2 seconds.   Neurological:      General: No focal deficit present.      Mental Status: He is alert. Mental status is at baseline.           Diagnostic Study Results     Labs -  No results found for this or any previous visit (from the past 12 hour(s)).    Radiologic Studies -   No orders to display         Medical Decision Making   I am the first provider for this patient.    I reviewed the vital signs, available nursing notes, past medical history, past surgical history, family history and social history.    Vital Signs-Reviewed the patient's vital signs.      EKG: ***      ED Course: Progress Notes, Reevaluation, and Consults:    Provider Notes (Medical Decision Making):     MDM  64-year-old male who presents with concerns for infected sternum.  Will consider bacteremia however patient vital stable.  Low suspicion for ACS.  Low suspicion for pneumonia.  Will obtain labs and imaging.         Patient was given the following medications:  Medications - No data to display    CONSULTS: (Who and What was discussed)  None    Chronic Conditions: ***    Social Determinants affecting Dx or Tx: {Social Barriers (Optional):25689}    Records Reviewed (source and summary of external notes): {CDIRECORDSREVIEWED:4855622893}    Procedures    Critical Care Time: ***    SCREENING TOOLS:  {Screening Tools:95738::\"None\"}    CLINICAL MANAGEMENT TOOLS:  {CMT List:61582::\"Not Applicable\"}      Diagnosis     Clinical Impression: No diagnosis found.    Disposition: ***    No follow-up provider specified.     Disclaimer: Sections of this note are dictated using utilizing voice recognition software.  Minor typographical errors may be present. If questions arise, please do not hesitate to contact me or call our department.

## 2024-12-19 NOTE — PLAN OF CARE
Problem: Discharge Planning  Goal: Discharge to home or other facility with appropriate resources  12/19/2024 0755 by Reyna Wheeler RN  Outcome: Progressing  12/18/2024 2107 by Stephanie Rees RN  Outcome: Progressing  Flowsheets (Taken 12/16/2024 2150)  Discharge to home or other facility with appropriate resources:   Identify barriers to discharge with patient and caregiver   Arrange for needed discharge resources and transportation as appropriate   Identify discharge learning needs (meds, wound care, etc)     Problem: Safety - Adult  Goal: Free from fall injury  12/19/2024 0755 by Reyna Wheeler RN  Outcome: Progressing  12/18/2024 2107 by Stephanie Rees RN  Outcome: Progressing  Flowsheets (Taken 12/13/2024 0034)  Free From Fall Injury: Instruct family/caregiver on patient safety     Problem: Pain  Goal: Verbalizes/displays adequate comfort level or baseline comfort level  12/19/2024 0755 by Reyna Wheeler RN  Outcome: Progressing  12/18/2024 2107 by Stephanie Rees RN  Outcome: Progressing  Flowsheets (Taken 12/13/2024 0034)  Verbalizes/displays adequate comfort level or baseline comfort level:   Encourage patient to monitor pain and request assistance   Assess pain using appropriate pain scale   Administer analgesics based on type and severity of pain and evaluate response   Implement non-pharmacological measures as appropriate and evaluate response   Consider cultural and social influences on pain and pain management   Notify Licensed Independent Practitioner if interventions unsuccessful or patient reports new pain     Problem: Nutrition Deficit:  Goal: Optimize nutritional status  12/19/2024 0755 by Reyna Wheeler RN  Outcome: Progressing  12/18/2024 2107 by Stephanie Rees RN  Outcome: Progressing  Flowsheets (Taken 12/12/2024 0141)  Nutrient intake appropriate for improving, restoring, or maintaining nutritional needs:   Assess nutritional status and recommend course of action

## 2024-12-19 NOTE — PROGRESS NOTES
Spiritual Health History and Assessment/Progress Note  [facility]    (P) Follow-up,  ,  ,      Name: Dora Frazier MRN: [id number]    Age: 64 y.o.     Sex: male   Language: English   Christian: [employer]   Septic arthritis     Date: 12/19/2024            Total Time Calculated: (P) 5 min              Spiritual Assessment began in [address]        Referral/Consult From: (P) Nursing Supervisor/Manager   Encounter Overview/Reason: (P) Follow-up  Service Provided For: (P) Patient and family together    Carol, Belief, Meaning:   Patient identifies as spiritual, is connected with a carol tradition or spiritual practice, and has beliefs or practices that help with coping during difficult times  Family/Friends identify as spiritual      Importance and Influence:  Patient has spiritual/personal beliefs that influence decisions regarding their health  Family/Friends have spiritual/personal beliefs that influence decisions regarding the patient's health    Community:  Patient is connected with a spiritual community  Family/Friends are connected with a spiritual community:    Assessment and Plan of Care:     Patient Interventions include: Facilitated expression of thoughts and feelings, Explored spiritual coping/struggle/distress, Affirmed coping skills/support systems, and Facilitated life review and/ or legacy  Family/Friends Interventions include: Facilitated expression of thoughts and feelings, Explored spiritual coping/struggle/distress, and Affirmed coping skills/support systems    Patient Plan of Care: Spiritual Care available upon further referral  Family/Friends Plan of Care: No future visits per patient/family request    Electronically signed by Chaplain Jose on 12/19/2024 at 1:20 PM

## 2024-12-20 ENCOUNTER — HOSPITAL ENCOUNTER (OUTPATIENT)
Age: 64
Discharge: HOME | End: 2024-12-24
Attending: INTERNAL MEDICINE | Admitting: INTERNAL MEDICINE
Payer: MEDICAID

## 2024-12-20 LAB
EKG ATRIAL RATE: 49 BPM
EKG DIAGNOSIS: NORMAL
EKG P AXIS: 74 DEGREES
EKG P-R INTERVAL: 192 MS
EKG Q-T INTERVAL: 426 MS
EKG QRS DURATION: 92 MS
EKG QTC CALCULATION (BAZETT): 384 MS
EKG R AXIS: 21 DEGREES
EKG T AXIS: 49 DEGREES
EKG VENTRICULAR RATE: 49 BPM

## 2024-12-20 PROCEDURE — 93010 ELECTROCARDIOGRAM REPORT: CPT | Performed by: INTERNAL MEDICINE

## 2024-12-20 PROCEDURE — 2580000003 HC RX 258: Performed by: INTERNAL MEDICINE

## 2024-12-20 PROCEDURE — 96365 THER/PROPH/DIAG IV INF INIT: CPT

## 2024-12-20 PROCEDURE — 94761 N-INVAS EAR/PLS OXIMETRY MLT: CPT

## 2024-12-20 PROCEDURE — 6370000000 HC RX 637 (ALT 250 FOR IP): Performed by: NURSE PRACTITIONER

## 2024-12-20 PROCEDURE — 6360000002 HC RX W HCPCS: Performed by: INTERNAL MEDICINE

## 2024-12-20 PROCEDURE — 2500000003 HC RX 250 WO HCPCS: Performed by: NURSE PRACTITIONER

## 2024-12-20 RX ORDER — SODIUM CHLORIDE 9 MG/ML
INJECTION, SOLUTION INTRAVENOUS PRN
Status: DISCONTINUED | OUTPATIENT
Start: 2024-12-20 | End: 2024-12-24 | Stop reason: HOSPADM

## 2024-12-20 RX ORDER — LIDOCAINE 4 G/G
1 PATCH TOPICAL DAILY
Status: DISCONTINUED | OUTPATIENT
Start: 2024-12-20 | End: 2024-12-24 | Stop reason: HOSPADM

## 2024-12-20 RX ORDER — SODIUM CHLORIDE 0.9 % (FLUSH) 0.9 %
5-40 SYRINGE (ML) INJECTION EVERY 12 HOURS SCHEDULED
Status: DISCONTINUED | OUTPATIENT
Start: 2024-12-20 | End: 2024-12-24 | Stop reason: HOSPADM

## 2024-12-20 RX ORDER — GAUZE BANDAGE 2" X 2"
100 BANDAGE TOPICAL DAILY
Status: DISCONTINUED | OUTPATIENT
Start: 2024-12-20 | End: 2024-12-24 | Stop reason: HOSPADM

## 2024-12-20 RX ORDER — ACETAMINOPHEN 650 MG/1
650 SUPPOSITORY RECTAL EVERY 6 HOURS PRN
Status: DISCONTINUED | OUTPATIENT
Start: 2024-12-20 | End: 2024-12-24 | Stop reason: HOSPADM

## 2024-12-20 RX ORDER — POLYETHYLENE GLYCOL 3350 17 G/17G
17 POWDER, FOR SOLUTION ORAL DAILY
Status: DISCONTINUED | OUTPATIENT
Start: 2024-12-20 | End: 2024-12-24 | Stop reason: HOSPADM

## 2024-12-20 RX ORDER — SENNA AND DOCUSATE SODIUM 50; 8.6 MG/1; MG/1
1 TABLET, FILM COATED ORAL DAILY
Status: DISCONTINUED | OUTPATIENT
Start: 2024-12-20 | End: 2024-12-24 | Stop reason: HOSPADM

## 2024-12-20 RX ORDER — CEFTRIAXONE 2 G/1
2000 INJECTION, POWDER, FOR SOLUTION INTRAMUSCULAR; INTRAVENOUS EVERY 24 HOURS
Status: DISCONTINUED | OUTPATIENT
Start: 2024-12-20 | End: 2024-12-20

## 2024-12-20 RX ORDER — NICOTINE 21 MG/24HR
1 PATCH, TRANSDERMAL 24 HOURS TRANSDERMAL DAILY
Status: DISCONTINUED | OUTPATIENT
Start: 2024-12-20 | End: 2024-12-24 | Stop reason: HOSPADM

## 2024-12-20 RX ORDER — AMLODIPINE BESYLATE 5 MG/1
10 TABLET ORAL DAILY
Status: DISCONTINUED | OUTPATIENT
Start: 2024-12-20 | End: 2024-12-24 | Stop reason: HOSPADM

## 2024-12-20 RX ORDER — ONDANSETRON 4 MG/1
4 TABLET, ORALLY DISINTEGRATING ORAL EVERY 8 HOURS PRN
Status: DISCONTINUED | OUTPATIENT
Start: 2024-12-20 | End: 2024-12-24 | Stop reason: HOSPADM

## 2024-12-20 RX ORDER — FOLIC ACID 1 MG/1
1 TABLET ORAL DAILY
Status: DISCONTINUED | OUTPATIENT
Start: 2024-12-20 | End: 2024-12-24 | Stop reason: HOSPADM

## 2024-12-20 RX ORDER — ACETAMINOPHEN 325 MG/1
650 TABLET ORAL EVERY 6 HOURS PRN
Status: DISCONTINUED | OUTPATIENT
Start: 2024-12-20 | End: 2024-12-24 | Stop reason: HOSPADM

## 2024-12-20 RX ORDER — SODIUM CHLORIDE 0.9 % (FLUSH) 0.9 %
5-40 SYRINGE (ML) INJECTION PRN
Status: DISCONTINUED | OUTPATIENT
Start: 2024-12-20 | End: 2024-12-24 | Stop reason: HOSPADM

## 2024-12-20 RX ORDER — ONDANSETRON 2 MG/ML
4 INJECTION INTRAMUSCULAR; INTRAVENOUS EVERY 6 HOURS PRN
Status: DISCONTINUED | OUTPATIENT
Start: 2024-12-20 | End: 2024-12-24 | Stop reason: HOSPADM

## 2024-12-20 RX ADMIN — ACETAMINOPHEN 650 MG: 325 TABLET ORAL at 20:13

## 2024-12-20 RX ADMIN — AMLODIPINE BESYLATE 10 MG: 5 TABLET ORAL at 09:25

## 2024-12-20 RX ADMIN — SODIUM CHLORIDE, PRESERVATIVE FREE 10 ML: 5 INJECTION INTRAVENOUS at 20:17

## 2024-12-20 RX ADMIN — FOLIC ACID 1 MG: 1 TABLET ORAL at 09:25

## 2024-12-20 RX ADMIN — CEFTRIAXONE SODIUM 2000 MG: 2 INJECTION, POWDER, FOR SOLUTION INTRAMUSCULAR; INTRAVENOUS at 10:16

## 2024-12-20 RX ADMIN — ACETAMINOPHEN 650 MG: 325 TABLET ORAL at 02:43

## 2024-12-20 RX ADMIN — ACETAMINOPHEN 650 MG: 325 TABLET ORAL at 10:17

## 2024-12-20 RX ADMIN — Medication 100 MG: at 09:25

## 2024-12-20 RX ADMIN — SODIUM CHLORIDE, PRESERVATIVE FREE 10 ML: 5 INJECTION INTRAVENOUS at 09:26

## 2024-12-20 ASSESSMENT — PAIN DESCRIPTION - LOCATION
LOCATION: CHEST
LOCATION: CHEST
LOCATION: SHOULDER
LOCATION: CHEST

## 2024-12-20 ASSESSMENT — PAIN DESCRIPTION - ORIENTATION
ORIENTATION: LEFT
ORIENTATION: LEFT

## 2024-12-20 ASSESSMENT — PAIN SCALES - GENERAL
PAINLEVEL_OUTOF10: 0
PAINLEVEL_OUTOF10: 0
PAINLEVEL_OUTOF10: 3
PAINLEVEL_OUTOF10: 2
PAINLEVEL_OUTOF10: 5
PAINLEVEL_OUTOF10: 0
PAINLEVEL_OUTOF10: 0
PAINLEVEL_OUTOF10: 4

## 2024-12-20 ASSESSMENT — PAIN DESCRIPTION - DESCRIPTORS
DESCRIPTORS: ACHING
DESCRIPTORS: ACHING;DISCOMFORT

## 2024-12-20 ASSESSMENT — PAIN SCALES - WONG BAKER: WONGBAKER_NUMERICALRESPONSE: NO HURT

## 2024-12-20 NOTE — PROGRESS NOTES
Assumed care of the patient from off going nurse via bedside shift report. Patient up in chair.     Assessment complete. Vitals obtained. No needs voiced at this time. CBWR    Administered scheduled medications per MAR, patient tolerated.  Patient stated BMs are regular and does not need glycolax or senokot  PRN tylenol administered for L sided chest pain; effective    ABX corrected and administered per MAR    1900- Report given to oncoming nurse ELAINE Finnegan RN

## 2024-12-20 NOTE — PLAN OF CARE
Problem: Discharge Planning  Goal: Discharge to home or other facility with appropriate resources  12/20/2024 0740 by Reyna Wheeler RN  Outcome: Progressing  12/20/2024 0306 by Stephanie Rees RN  Outcome: Progressing  Flowsheets (Taken 12/20/2024 0306)  Discharge to home or other facility with appropriate resources:   Identify barriers to discharge with patient and caregiver   Arrange for needed discharge resources and transportation as appropriate   Identify discharge learning needs (meds, wound care, etc)     Problem: Pain  Goal: Verbalizes/displays adequate comfort level or baseline comfort level  12/20/2024 0740 by Reyna Wheeler RN  Outcome: Progressing  12/20/2024 0306 by Stephanie Rees RN  Outcome: Progressing  Flowsheets (Taken 12/20/2024 0306)  Verbalizes/displays adequate comfort level or baseline comfort level:   Encourage patient to monitor pain and request assistance   Assess pain using appropriate pain scale   Administer analgesics based on type and severity of pain and evaluate response   Implement non-pharmacological measures as appropriate and evaluate response   Consider cultural and social influences on pain and pain management   Notify Licensed Independent Practitioner if interventions unsuccessful or patient reports new pain     Problem: Safety - Adult  Goal: Free from fall injury  12/20/2024 0740 by Reyna Wheeler RN  Outcome: Progressing  12/20/2024 0306 by Stephanie Rees RN  Outcome: Progressing  Flowsheets (Taken 12/20/2024 0306)  Free From Fall Injury: Instruct family/caregiver on patient safety     Problem: Chronic Conditions and Co-morbidities  Goal: Patient's chronic conditions and co-morbidity symptoms are monitored and maintained or improved  12/20/2024 0740 by Reyna Wheeler RN  Outcome: Progressing  12/20/2024 0306 by Stephanie Rees RN  Outcome: Progressing  Flowsheets (Taken 12/20/2024 0306)  Care Plan - Patient's Chronic Conditions and Co-Morbidity Symptoms

## 2024-12-20 NOTE — H&P
and following contrast administration. CONTRAST: 17 mL of ProHance. FINDINGS: There is normal alignment of the thoracic spine. Vertebral body heights are maintained. Marrow signal is normal. The course, caliber, and signal intensity of the spinal cord are normal. There is no pathologic intrathecal enhancement. T1-T4 dorsal epidural phlegmon seen on prior exam has resolved. Ongoing T2-T4 bilateral facet periarticular marrow and soft tissue edema and enhancement. 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 Multilevel degenerative endplate osteophytes and facet arthropathy. Multilevel mild to moderate spinal canal stenosis, worst at T10-T11 and T11-T12. Multilevel foraminal stenosis, most severe from T2-T5 on the right and at T2-T3 on the left. Cardiomegaly.     1.  Ongoing T2-T4 bilateral facet marrow and soft tissue edema and enhancement. Consider inflammatory or septic arthritis. 2.  T1-T4 dorsal epidural phlegmon seen on prior exam has resolved. 3.  Multilevel spinal canal and neuroforaminal stenoses as outlined above. 4.  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 Please see separately dictated MRI chest report from the same day for complete findings. Electronically signed by OVIDIO BOSTON    MRI CHEST W WO CONTRAST    Result Date: 12/18/2024  EXAM:  MRI CHEST W WO CONTRAST INDICATION: regarding left sternoclavicular joint infection COMPARISON: MRI chest 11/14/2024, CTA chest 11/12/2024 TECHNIQUE: Axial, coronal, and sagittal pre and postcontrast MRI of the chest wall with attention to the sternum in the T1, T2, and inversion-recovery pulse sequences with and without fat saturation . CONTRAST: 17 mL Prohance FINDINGS: Bone marrow, articulations, joint fluid, and soft tissues: Increasing effusion, worsening synovitis, progressive aggressive erosions, new patchy confluent marrow

## 2024-12-20 NOTE — ED NOTES
Patient ambulatory to bath with steady gait and no signs of distress noted.    Per Dr. Son, patient allowed to eat.   Patient given a sandwich, crackers and peanut butter and soda.

## 2024-12-20 NOTE — ED NOTES
Patient will be going back to [facility] but not as a transfer, he will be going back to his IP room

## 2024-12-20 NOTE — DISCHARGE INSTRUCTIONS
You were evaluated for medical evaluation for possible infection of your chest.  Based on your work-up it was deemed that she was stable for discharge.    Please follow-up with your primary care physician if you have any further concerns and go over your work-up.  If you experience any chest pain, shortness of breath, worsening abdominal pain, vomiting blood, worsening headache, seizures, or any worsening of your symptoms please return to the emergency department immediately.  If you have any pending results or any further questions please contact the emergency department at [phone].

## 2024-12-20 NOTE — PLAN OF CARE
Problem: Discharge Planning  Goal: Discharge to home or other facility with appropriate resources  Outcome: Progressing  Flowsheets (Taken 12/20/2024 0306)  Discharge to home or other facility with appropriate resources:   Identify barriers to discharge with patient and caregiver   Arrange for needed discharge resources and transportation as appropriate   Identify discharge learning needs (meds, wound care, etc)     Problem: Pain  Goal: Verbalizes/displays adequate comfort level or baseline comfort level  Outcome: Progressing  Flowsheets (Taken 12/20/2024 0306)  Verbalizes/displays adequate comfort level or baseline comfort level:   Encourage patient to monitor pain and request assistance   Assess pain using appropriate pain scale   Administer analgesics based on type and severity of pain and evaluate response   Implement non-pharmacological measures as appropriate and evaluate response   Consider cultural and social influences on pain and pain management   Notify Licensed Independent Practitioner if interventions unsuccessful or patient reports new pain     Problem: Safety - Adult  Goal: Free from fall injury  Outcome: Progressing  Flowsheets (Taken 12/20/2024 0306)  Free From Fall Injury: Instruct family/caregiver on patient safety     Problem: Chronic Conditions and Co-morbidities  Goal: Patient's chronic conditions and co-morbidity symptoms are monitored and maintained or improved  Outcome: Progressing  Flowsheets (Taken 12/20/2024 0306)  Care Plan - Patient's Chronic Conditions and Co-Morbidity Symptoms are Monitored and Maintained or Improved:   Monitor and assess patient's chronic conditions and comorbid symptoms for stability, deterioration, or improvement   Update acute care plan with appropriate goals if chronic or comorbid symptoms are exacerbated and prevent overall improvement and discharge   Collaborate with multidisciplinary team to address chronic and comorbid conditions and prevent exacerbation or

## 2024-12-20 NOTE — ED NOTES
Assumed care of patient. Bedside and verbal shift report given by ANDREA Chamberlain (off going nurse) to ANDREA Anglin (oncoming nurse). Report included the following information SBAR and ED Summary.

## 2024-12-20 NOTE — PROGRESS NOTES
0153: Patient arrived to floor, room 253, via stretcher. Patient has returned to his original room. Personal belongings given back to him. Acclimated to room. Provided with snack. LEMUEL Moralez NP, at bedside.     0243: PRN Tylenol administered for L sided chest pain.    0516: Patient resting in bed with eyes closed.     0659: Bedside shift report given to KENDALL Wheeler RN

## 2024-12-21 PROCEDURE — 96366 THER/PROPH/DIAG IV INF ADDON: CPT

## 2024-12-21 PROCEDURE — 6370000000 HC RX 637 (ALT 250 FOR IP): Performed by: NURSE PRACTITIONER

## 2024-12-21 PROCEDURE — 6360000002 HC RX W HCPCS: Performed by: INTERNAL MEDICINE

## 2024-12-21 PROCEDURE — 2500000003 HC RX 250 WO HCPCS: Performed by: NURSE PRACTITIONER

## 2024-12-21 PROCEDURE — 2580000003 HC RX 258: Performed by: INTERNAL MEDICINE

## 2024-12-21 RX ADMIN — ACETAMINOPHEN 650 MG: 325 TABLET ORAL at 19:21

## 2024-12-21 RX ADMIN — FOLIC ACID 1 MG: 1 TABLET ORAL at 08:18

## 2024-12-21 RX ADMIN — ACETAMINOPHEN 650 MG: 325 TABLET ORAL at 08:17

## 2024-12-21 RX ADMIN — SODIUM CHLORIDE, PRESERVATIVE FREE 10 ML: 5 INJECTION INTRAVENOUS at 08:19

## 2024-12-21 RX ADMIN — SODIUM CHLORIDE, PRESERVATIVE FREE 10 ML: 5 INJECTION INTRAVENOUS at 19:21

## 2024-12-21 RX ADMIN — AMLODIPINE BESYLATE 10 MG: 5 TABLET ORAL at 08:17

## 2024-12-21 RX ADMIN — Medication 100 MG: at 08:18

## 2024-12-21 RX ADMIN — CEFTRIAXONE SODIUM 2000 MG: 2 INJECTION, POWDER, FOR SOLUTION INTRAMUSCULAR; INTRAVENOUS at 10:51

## 2024-12-21 ASSESSMENT — PAIN SCALES - GENERAL
PAINLEVEL_OUTOF10: 3
PAINLEVEL_OUTOF10: 4
PAINLEVEL_OUTOF10: 4
PAINLEVEL_OUTOF10: 1

## 2024-12-21 ASSESSMENT — PAIN DESCRIPTION - ORIENTATION
ORIENTATION: LEFT
ORIENTATION: LEFT

## 2024-12-21 ASSESSMENT — PAIN DESCRIPTION - LOCATION
LOCATION: SHOULDER
LOCATION: SHOULDER;HEAD

## 2024-12-21 ASSESSMENT — PAIN DESCRIPTION - DESCRIPTORS: DESCRIPTORS: ACHING

## 2024-12-21 ASSESSMENT — PAIN - FUNCTIONAL ASSESSMENT: PAIN_FUNCTIONAL_ASSESSMENT: ACTIVITIES ARE NOT PREVENTED

## 2024-12-21 NOTE — PLAN OF CARE
Problem: Discharge Planning  Goal: Discharge to home or other facility with appropriate resources  12/21/2024 0426 by Natalya Aguirre RN  Outcome: Progressing  Flowsheets (Taken 12/21/2024 0426)  Discharge to home or other facility with appropriate resources:   Identify barriers to discharge with patient and caregiver   Identify discharge learning needs (meds, wound care, etc)  12/21/2024 0426 by Natalya Aguirre RN  Outcome: Progressing  Flowsheets (Taken 12/21/2024 0426)  Discharge to home or other facility with appropriate resources:   Identify barriers to discharge with patient and caregiver   Identify discharge learning needs (meds, wound care, etc)     Problem: Pain  Goal: Verbalizes/displays adequate comfort level or baseline comfort level  12/21/2024 0426 by Natalya Aguirre RN  Outcome: Progressing  Flowsheets (Taken 12/21/2024 0426)  Verbalizes/displays adequate comfort level or baseline comfort level:   Assess pain using appropriate pain scale   Implement non-pharmacological measures as appropriate and evaluate response  12/21/2024 0426 by Natalya Aguirre RN  Outcome: Progressing  Flowsheets (Taken 12/21/2024 0426)  Verbalizes/displays adequate comfort level or baseline comfort level:   Assess pain using appropriate pain scale   Implement non-pharmacological measures as appropriate and evaluate response

## 2024-12-21 NOTE — PLAN OF CARE
Problem: Discharge Planning  Goal: Discharge to home or other facility with appropriate resources  12/21/2024 0751 by Ab Guevara RN  Outcome: Progressing  Flowsheets (Taken 12/21/2024 0727)  Discharge to home or other facility with appropriate resources:   Identify barriers to discharge with patient and caregiver   Arrange for needed discharge resources and transportation as appropriate   Identify discharge learning needs (meds, wound care, etc)  12/21/2024 0426 by Natalya Aguirre RN  Outcome: Progressing  Flowsheets (Taken 12/21/2024 0426)  Discharge to home or other facility with appropriate resources:   Identify barriers to discharge with patient and caregiver   Identify discharge learning needs (meds, wound care, etc)  12/21/2024 0426 by Natalya Aguirre RN  Outcome: Progressing  Flowsheets (Taken 12/21/2024 0426)  Discharge to home or other facility with appropriate resources:   Identify barriers to discharge with patient and caregiver   Identify discharge learning needs (meds, wound care, etc)     Problem: Pain  Goal: Verbalizes/displays adequate comfort level or baseline comfort level  12/21/2024 0751 by Ab Guevara RN  Outcome: Progressing  Flowsheets (Taken 12/21/2024 0426 by Natalya Aguirre RN)  Verbalizes/displays adequate comfort level or baseline comfort level:   Assess pain using appropriate pain scale   Implement non-pharmacological measures as appropriate and evaluate response  12/21/2024 0426 by Natalya Aguirre RN  Outcome: Progressing  Flowsheets (Taken 12/21/2024 0426)  Verbalizes/displays adequate comfort level or baseline comfort level:   Assess pain using appropriate pain scale   Implement non-pharmacological measures as appropriate and evaluate response  12/21/2024 0426 by Natalya Aguirre RN  Outcome: Progressing  Flowsheets (Taken 12/21/2024 0426)  Verbalizes/displays adequate comfort level or baseline comfort level:   Assess pain using appropriate

## 2024-12-22 PROCEDURE — 2500000003 HC RX 250 WO HCPCS: Performed by: NURSE PRACTITIONER

## 2024-12-22 PROCEDURE — 96366 THER/PROPH/DIAG IV INF ADDON: CPT

## 2024-12-22 PROCEDURE — 6360000002 HC RX W HCPCS: Performed by: INTERNAL MEDICINE

## 2024-12-22 PROCEDURE — 2580000003 HC RX 258: Performed by: INTERNAL MEDICINE

## 2024-12-22 PROCEDURE — 6370000000 HC RX 637 (ALT 250 FOR IP): Performed by: NURSE PRACTITIONER

## 2024-12-22 RX ADMIN — ACETAMINOPHEN 650 MG: 325 TABLET ORAL at 09:53

## 2024-12-22 RX ADMIN — CEFTRIAXONE SODIUM 2000 MG: 2 INJECTION, POWDER, FOR SOLUTION INTRAMUSCULAR; INTRAVENOUS at 09:57

## 2024-12-22 RX ADMIN — ACETAMINOPHEN 650 MG: 325 TABLET ORAL at 20:59

## 2024-12-22 RX ADMIN — FOLIC ACID 1 MG: 1 TABLET ORAL at 09:52

## 2024-12-22 RX ADMIN — AMLODIPINE BESYLATE 10 MG: 5 TABLET ORAL at 09:53

## 2024-12-22 RX ADMIN — SODIUM CHLORIDE, PRESERVATIVE FREE 10 ML: 5 INJECTION INTRAVENOUS at 09:58

## 2024-12-22 RX ADMIN — Medication 100 MG: at 09:53

## 2024-12-22 RX ADMIN — SODIUM CHLORIDE, PRESERVATIVE FREE 10 ML: 5 INJECTION INTRAVENOUS at 20:42

## 2024-12-22 ASSESSMENT — PAIN SCALES - GENERAL
PAINLEVEL_OUTOF10: 4
PAINLEVEL_OUTOF10: 0
PAINLEVEL_OUTOF10: 0
PAINLEVEL_OUTOF10: 4
PAINLEVEL_OUTOF10: 0
PAINLEVEL_OUTOF10: 0
PAINLEVEL_OUTOF10: 3
PAINLEVEL_OUTOF10: 0
PAINLEVEL_OUTOF10: 0
PAINLEVEL_OUTOF10: 1

## 2024-12-22 ASSESSMENT — PAIN DESCRIPTION - DESCRIPTORS
DESCRIPTORS: ACHING
DESCRIPTORS: ACHING
DESCRIPTORS: DISCOMFORT;HEAVINESS

## 2024-12-22 ASSESSMENT — PAIN DESCRIPTION - ORIENTATION
ORIENTATION: LEFT
ORIENTATION: MID
ORIENTATION: LEFT

## 2024-12-22 ASSESSMENT — PAIN DESCRIPTION - LOCATION
LOCATION: SHOULDER
LOCATION: BACK
LOCATION: SHOULDER

## 2024-12-22 NOTE — PLAN OF CARE
Problem: Discharge Planning  Goal: Discharge to home or other facility with appropriate resources  12/21/2024 1940 by Adam Dee RN  Outcome: Progressing  12/21/2024 0751 by Ab Guevara RN  Outcome: Progressing  Flowsheets (Taken 12/21/2024 0727)  Discharge to home or other facility with appropriate resources:   Identify barriers to discharge with patient and caregiver   Arrange for needed discharge resources and transportation as appropriate   Identify discharge learning needs (meds, wound care, etc)     Problem: Pain  Goal: Verbalizes/displays adequate comfort level or baseline comfort level  12/21/2024 1940 by Adam Dee RN  Outcome: Progressing  12/21/2024 0751 by Ab Guevara RN  Outcome: Progressing  Flowsheets (Taken 12/21/2024 0426 by Natalya Aguirre, RN)  Verbalizes/displays adequate comfort level or baseline comfort level:   Assess pain using appropriate pain scale   Implement non-pharmacological measures as appropriate and evaluate response     Problem: Safety - Adult  Goal: Free from fall injury  12/21/2024 1940 by Adam Dee RN  Outcome: Progressing  12/21/2024 0751 by Ab Guevara RN  Outcome: Progressing  Flowsheets (Taken 12/20/2024 0306 by Stephanie Rees RN)  Free From Fall Injury: Instruct family/caregiver on patient safety     Problem: Chronic Conditions and Co-morbidities  Goal: Patient's chronic conditions and co-morbidity symptoms are monitored and maintained or improved  12/21/2024 1940 by Adam Dee RN  Outcome: Progressing  12/21/2024 0751 by Ab Guevara RN  Outcome: Progressing  Flowsheets (Taken 12/21/2024 0727)  Care Plan - Patient's Chronic Conditions and Co-Morbidity Symptoms are Monitored and Maintained or Improved:   Monitor and assess patient's chronic conditions and comorbid symptoms for stability, deterioration, or improvement   Collaborate with multidisciplinary team to address chronic and comorbid conditions and prevent

## 2024-12-22 NOTE — PROGRESS NOTES
0700- Assumed care of patient. Bedside shift report received.     0737- Vital signs obtained. VSS. Assessment completed. Patient eating breakfast.     0815- Patient up to shower.     0953- Patient medicated per MAR. Abx completed. PRN tylenol given for L chest/shoulder pain. Patient refused senna/miralax due to having normal BM's. Nicotine patch refused. Patient up in chair playing cards. Coffee given to pt. CBWR.     1300- Rounded on pt. Denies needs at this time. CBWR.     1530- Vital signs obtained. VSS. CBWR.     1700- Rounded on patient filled ice and coffee cup. Denies any other needs at this time. CBWR.     Bedside shift report given to oncoming nurse

## 2024-12-22 NOTE — PROGRESS NOTES
1900 Assumed care of patient after shift report. Patient sitting up in chair watching TV.     1921 Assessment and VS completed. Patient c/o left shoulder 4/10 PSR. Tylenol was given for his complaint. Patient was given snack of sandwich and soda. PICC line was flushed without difficulty.     2300 Patient resting quietly with  eyes closed, noted regular, non labored respirations.    0200 Patient resting quietly with  eyes closed, noted regular, non labored respirations.    0430 Patient resting quietly with  eyes closed, noted regular, non labored respirations.     0645 Report given to day shift nurse.

## 2024-12-22 NOTE — PLAN OF CARE
Problem: Discharge Planning  Goal: Discharge to home or other facility with appropriate resources  12/22/2024 0721 by Aditi Regalado LPN  Outcome: Progressing  Flowsheets (Taken 12/21/2024 1942 by Adam Dee RN)  Discharge to home or other facility with appropriate resources: Identify barriers to discharge with patient and caregiver  12/21/2024 1940 by Adam Dee RN  Outcome: Progressing     Problem: Pain  Goal: Verbalizes/displays adequate comfort level or baseline comfort level  12/22/2024 0721 by Aditi Regalado LPN  Outcome: Progressing  12/21/2024 1940 by Adam Dee RN  Outcome: Progressing     Problem: Safety - Adult  Goal: Free from fall injury  12/22/2024 0721 by Aditi Regalado LPN  Outcome: Progressing  12/21/2024 1940 by Adam Dee RN  Outcome: Progressing     Problem: Chronic Conditions and Co-morbidities  Goal: Patient's chronic conditions and co-morbidity symptoms are monitored and maintained or improved  Recent Flowsheet Documentation  Taken 12/21/2024 1942 by Adam Dee, RN  Care Plan - Patient's Chronic Conditions and Co-Morbidity Symptoms are Monitored and Maintained or Improved: Monitor and assess patient's chronic conditions and comorbid symptoms for stability, deterioration, or improvement  12/21/2024 1940 by Adam Dee, RN  Outcome: Progressing

## 2024-12-23 PROCEDURE — 6360000002 HC RX W HCPCS: Performed by: INTERNAL MEDICINE

## 2024-12-23 PROCEDURE — 6370000000 HC RX 637 (ALT 250 FOR IP): Performed by: NURSE PRACTITIONER

## 2024-12-23 PROCEDURE — 2580000003 HC RX 258: Performed by: INTERNAL MEDICINE

## 2024-12-23 PROCEDURE — 2500000003 HC RX 250 WO HCPCS: Performed by: NURSE PRACTITIONER

## 2024-12-23 PROCEDURE — 96366 THER/PROPH/DIAG IV INF ADDON: CPT

## 2024-12-23 RX ADMIN — SENNOSIDES AND DOCUSATE SODIUM 1 TABLET: 50; 8.6 TABLET ORAL at 09:28

## 2024-12-23 RX ADMIN — SODIUM CHLORIDE, PRESERVATIVE FREE 10 ML: 5 INJECTION INTRAVENOUS at 19:51

## 2024-12-23 RX ADMIN — SODIUM CHLORIDE, PRESERVATIVE FREE 10 ML: 5 INJECTION INTRAVENOUS at 09:34

## 2024-12-23 RX ADMIN — ACETAMINOPHEN 650 MG: 325 TABLET ORAL at 19:51

## 2024-12-23 RX ADMIN — Medication 100 MG: at 09:28

## 2024-12-23 RX ADMIN — FOLIC ACID 1 MG: 1 TABLET ORAL at 09:28

## 2024-12-23 RX ADMIN — AMLODIPINE BESYLATE 10 MG: 5 TABLET ORAL at 09:28

## 2024-12-23 RX ADMIN — CEFTRIAXONE SODIUM 2000 MG: 2 INJECTION, POWDER, FOR SOLUTION INTRAMUSCULAR; INTRAVENOUS at 10:31

## 2024-12-23 ASSESSMENT — PAIN SCALES - GENERAL
PAINLEVEL_OUTOF10: 0
PAINLEVEL_OUTOF10: 3
PAINLEVEL_OUTOF10: 0

## 2024-12-23 ASSESSMENT — PAIN - FUNCTIONAL ASSESSMENT: PAIN_FUNCTIONAL_ASSESSMENT: ACTIVITIES ARE NOT PREVENTED

## 2024-12-23 ASSESSMENT — PAIN DESCRIPTION - DESCRIPTORS: DESCRIPTORS: ACHING

## 2024-12-23 ASSESSMENT — PAIN DESCRIPTION - LOCATION: LOCATION: HEAD

## 2024-12-23 NOTE — PROGRESS NOTES
Spiritual Health History and Assessment/Progress Note  [facility]    (P) Initial Encounter,  ,  ,      Name: Dora Frazier MRN: [id number]    Age: 64 y.o.     Sex: male   Language: English   Taoist: Congregational   <principal problem not specified>     Date: 12/23/2024            Total Time Calculated: (P) 6 min              Spiritual Assessment began in [address]        Referral/Consult From: (P) Rounding   Encounter Overview/Reason: (P) Initial Encounter  Service Provided For: (P) Patient    Carol, Belief, Meaning:   Patient identifies as spiritual  Family/Friends No family/friends present      Importance and Influence:  Patient has spiritual/personal beliefs that influence decisions regarding their health  Family/Friends No family/friends present    Community:  Patient feels well-supported. Support system includes: Spouse/Partner  Family/Friends No family/friends present    Assessment and Plan of Care:     Patient Interventions include: Facilitated expression of thoughts and feelings  Family/Friends Interventions include: No family/friends present    Patient Plan of Care: Spiritual Care available upon further referral  Family/Friends Plan of Care: Spiritual Care available upon further referral    Electronically signed by YULIA Florence on 12/23/2024 at 2:57 PM

## 2024-12-23 NOTE — PLAN OF CARE
Problem: Discharge Planning  Goal: Discharge to home or other facility with appropriate resources  12/23/2024 0756 by Nimo Mckeon RN  Outcome: Progressing  12/22/2024 2216 by Margie Serra RN  Outcome: Progressing  Flowsheets (Taken 12/22/2024 2216)  Discharge to home or other facility with appropriate resources:   Identify barriers to discharge with patient and caregiver   Arrange for needed discharge resources and transportation as appropriate   Identify discharge learning needs (meds, wound care, etc)   Refer to discharge planning if patient needs post-hospital services based on physician order or complex needs related to functional status, cognitive ability or social support system  Note: Picc line placement and is receiving long term antibiotics that have to be given here at the hospital for infection     Problem: Pain  Goal: Verbalizes/displays adequate comfort level or baseline comfort level  12/23/2024 0756 by Nimo Mckeon RN  Outcome: Progressing  12/22/2024 2216 by Margie Serra RN  Outcome: Progressing  Flowsheets  Taken 12/22/2024 2216  Verbalizes/displays adequate comfort level or baseline comfort level:   Encourage patient to monitor pain and request assistance   Assess pain using appropriate pain scale   Administer analgesics based on type and severity of pain and evaluate response   Implement non-pharmacological measures as appropriate and evaluate response  Taken 12/22/2024 2059  Verbalizes/displays adequate comfort level or baseline comfort level:   Encourage patient to monitor pain and request assistance   Assess pain using appropriate pain scale   Administer analgesics based on type and severity of pain and evaluate response   Implement non-pharmacological measures as appropriate and evaluate response  Note: Pt states \"Lidocaine patch and tylenol are working for his pain at this time\"     Problem: Safety - Adult  Goal: Free from fall injury  12/23/2024 0756 by Nimo Mckeon, ANDREA  Outcome:

## 2024-12-23 NOTE — PROGRESS NOTES
65 Y/o male full code no allergies  Admitted on 11/12/24 c/o chest, back pain shortness of breath and like he can't raise his arms with pain and numbness in his axillary area. Also c/o weakness and not steady, fever    History: CHF, HTN    Diagnosis: discitis of thoracic region, septicemia, hypomagnesemia, elevate LFTs and Phlegmon (infection and inflammation of soft tissue that is under the skin on the inside of the body and it spreads rapidly    Pt asks for a few snacks before bed and watched tv and played some cards.      Plan: IV Rocephin through picc line until January 9th

## 2024-12-23 NOTE — PLAN OF CARE
Problem: Discharge Planning  Goal: Discharge to home or other facility with appropriate resources  Outcome: Progressing  Flowsheets (Taken 12/22/2024 2216)  Discharge to home or other facility with appropriate resources:   Identify barriers to discharge with patient and caregiver   Arrange for needed discharge resources and transportation as appropriate   Identify discharge learning needs (meds, wound care, etc)   Refer to discharge planning if patient needs post-hospital services based on physician order or complex needs related to functional status, cognitive ability or social support system  Note: Picc line placement and is receiving long term antibiotics that have to be given here at the hospital for infection     Problem: Pain  Goal: Verbalizes/displays adequate comfort level or baseline comfort level  Outcome: Progressing  Flowsheets  Taken 12/22/2024 2216  Verbalizes/displays adequate comfort level or baseline comfort level:   Encourage patient to monitor pain and request assistance   Assess pain using appropriate pain scale   Administer analgesics based on type and severity of pain and evaluate response   Implement non-pharmacological measures as appropriate and evaluate response  Taken 12/22/2024 2059  Verbalizes/displays adequate comfort level or baseline comfort level:   Encourage patient to monitor pain and request assistance   Assess pain using appropriate pain scale   Administer analgesics based on type and severity of pain and evaluate response   Implement non-pharmacological measures as appropriate and evaluate response  Note: Pt states \"Lidocaine patch and tylenol are working for his pain at this time\"     Problem: Safety - Adult  Goal: Free from fall injury  Outcome: Progressing  Flowsheets (Taken 12/22/2024 2216)  Free From Fall Injury:   Instruct family/caregiver on patient safety   Based on caregiver fall risk screen, instruct family/caregiver to ask for assistance with transferring infant if

## 2024-12-24 ENCOUNTER — HOSPITAL ENCOUNTER (INPATIENT)
Age: 64
LOS: 16 days | Discharge: HOME | DRG: 347 | End: 2025-01-09
Attending: INTERNAL MEDICINE | Admitting: INTERNAL MEDICINE
Payer: MEDICAID

## 2024-12-24 ENCOUNTER — HOSPITAL ENCOUNTER (EMERGENCY)
Facility: HOSPITAL | Age: 64
Discharge: ANOTHER HOSPITAL | End: 2024-12-24
Payer: MEDICAID

## 2024-12-24 VITALS
HEIGHT: 71 IN | HEART RATE: 84 BPM | OXYGEN SATURATION: 99 % | WEIGHT: 185 LBS | TEMPERATURE: 98.8 F | SYSTOLIC BLOOD PRESSURE: 126 MMHG | RESPIRATION RATE: 16 BRPM | DIASTOLIC BLOOD PRESSURE: 86 MMHG | BODY MASS INDEX: 25.9 KG/M2

## 2024-12-24 VITALS
TEMPERATURE: 98 F | DIASTOLIC BLOOD PRESSURE: 92 MMHG | HEART RATE: 69 BPM | RESPIRATION RATE: 15 BRPM | SYSTOLIC BLOOD PRESSURE: 145 MMHG | OXYGEN SATURATION: 99 %

## 2024-12-24 DIAGNOSIS — M00.9 SEPTIC ARTHRITIS, DUE TO UNSPECIFIED ORGANISM, SEPTIC ARTHRITIS OF UNSPECIFIED LOCATION: Primary | ICD-10-CM

## 2024-12-24 PROCEDURE — 99285 EMERGENCY DEPT VISIT HI MDM: CPT

## 2024-12-24 PROCEDURE — 1100000000 HC RM PRIVATE

## 2024-12-24 PROCEDURE — 2500000003 HC RX 250 WO HCPCS: Performed by: NURSE PRACTITIONER

## 2024-12-24 PROCEDURE — 6360000002 HC RX W HCPCS: Performed by: NURSE PRACTITIONER

## 2024-12-24 PROCEDURE — 2580000003 HC RX 258: Performed by: NURSE PRACTITIONER

## 2024-12-24 PROCEDURE — 6370000000 HC RX 637 (ALT 250 FOR IP): Performed by: NURSE PRACTITIONER

## 2024-12-24 PROCEDURE — 6370000000 HC RX 637 (ALT 250 FOR IP): Performed by: INTERNAL MEDICINE

## 2024-12-24 RX ORDER — ONDANSETRON 4 MG/1
4 TABLET, ORALLY DISINTEGRATING ORAL EVERY 8 HOURS PRN
Status: DISCONTINUED | OUTPATIENT
Start: 2024-12-24 | End: 2025-01-09 | Stop reason: HOSPADM

## 2024-12-24 RX ORDER — ACETAMINOPHEN 325 MG/1
650 TABLET ORAL EVERY 6 HOURS PRN
Status: DISCONTINUED | OUTPATIENT
Start: 2024-12-24 | End: 2025-01-09 | Stop reason: HOSPADM

## 2024-12-24 RX ORDER — AMLODIPINE BESYLATE 5 MG/1
10 TABLET ORAL DAILY
Status: DISCONTINUED | OUTPATIENT
Start: 2024-12-25 | End: 2025-01-09 | Stop reason: HOSPADM

## 2024-12-24 RX ORDER — FOLIC ACID 1 MG/1
1 TABLET ORAL DAILY
Status: DISCONTINUED | OUTPATIENT
Start: 2024-12-25 | End: 2025-01-09 | Stop reason: HOSPADM

## 2024-12-24 RX ORDER — LIDOCAINE 4 G/G
1 PATCH TOPICAL DAILY
Status: DISCONTINUED | OUTPATIENT
Start: 2024-12-25 | End: 2025-01-09 | Stop reason: HOSPADM

## 2024-12-24 RX ORDER — GAUZE BANDAGE 2" X 2"
100 BANDAGE TOPICAL DAILY
Status: DISCONTINUED | OUTPATIENT
Start: 2024-12-25 | End: 2025-01-09 | Stop reason: HOSPADM

## 2024-12-24 RX ADMIN — CEFTRIAXONE SODIUM 2000 MG: 2 INJECTION, POWDER, FOR SOLUTION INTRAMUSCULAR; INTRAVENOUS at 17:54

## 2024-12-24 RX ADMIN — SODIUM CHLORIDE, PRESERVATIVE FREE 10 ML: 5 INJECTION INTRAVENOUS at 08:02

## 2024-12-24 RX ADMIN — FOLIC ACID 1 MG: 1 TABLET ORAL at 07:58

## 2024-12-24 RX ADMIN — Medication 100 MG: at 07:58

## 2024-12-24 RX ADMIN — AMLODIPINE BESYLATE 10 MG: 5 TABLET ORAL at 07:58

## 2024-12-24 RX ADMIN — ACETAMINOPHEN 650 MG: 325 TABLET ORAL at 21:06

## 2024-12-24 ASSESSMENT — PAIN - FUNCTIONAL ASSESSMENT
PAIN_FUNCTIONAL_ASSESSMENT: ACTIVITIES ARE NOT PREVENTED
PAIN_FUNCTIONAL_ASSESSMENT: 0-10
PAIN_FUNCTIONAL_ASSESSMENT: 0-10

## 2024-12-24 ASSESSMENT — PAIN DESCRIPTION - ORIENTATION: ORIENTATION: LEFT

## 2024-12-24 ASSESSMENT — PAIN SCALES - GENERAL
PAINLEVEL_OUTOF10: 2
PAINLEVEL_OUTOF10: 0
PAINLEVEL_OUTOF10: 4
PAINLEVEL_OUTOF10: 2
PAINLEVEL_OUTOF10: 4
PAINLEVEL_OUTOF10: 0

## 2024-12-24 ASSESSMENT — ENCOUNTER SYMPTOMS
ABDOMINAL PAIN: 0
EYES NEGATIVE: 1
ALLERGIC/IMMUNOLOGIC NEGATIVE: 1
SHORTNESS OF BREATH: 0

## 2024-12-24 ASSESSMENT — PAIN DESCRIPTION - LOCATION: LOCATION: CHEST;HEAD

## 2024-12-24 ASSESSMENT — PAIN SCALES - WONG BAKER: WONGBAKER_NUMERICALRESPONSE: NO HURT

## 2024-12-24 NOTE — PROGRESS NOTES
1535- patient back to room 253 from appointment     ABX dose missed today d/t appt; order adjusted to administer dose now    1753- administered scheduled medication per MAR    1900- Report given to oncoming nurse GRETA Rees RN

## 2024-12-24 NOTE — PLAN OF CARE
Problem: Discharge Planning  Goal: Discharge to home or other facility with appropriate resources  12/24/2024 0744 by Reyna Wheeler RN  Outcome: Progressing  12/23/2024 2115 by Adam Dee RN  Outcome: Progressing  Flowsheets (Taken 12/23/2024 2000)  Discharge to home or other facility with appropriate resources: Arrange for needed discharge resources and transportation as appropriate     Problem: Pain  Goal: Verbalizes/displays adequate comfort level or baseline comfort level  12/24/2024 0744 by Reyna Wheeler RN  Outcome: Progressing  12/23/2024 2115 by Adam Dee RN  Outcome: Progressing     Problem: Safety - Adult  Goal: Free from fall injury  12/24/2024 0744 by Reyna Wheeler RN  Outcome: Progressing  12/23/2024 2115 by Adam Dee RN  Outcome: Progressing     Problem: Chronic Conditions and Co-morbidities  Goal: Patient's chronic conditions and co-morbidity symptoms are monitored and maintained or improved  12/24/2024 0744 by Reyna Wheeler RN  Outcome: Progressing  12/23/2024 2115 by Adam Dee RN  Outcome: Progressing  Flowsheets (Taken 12/23/2024 2000)  Care Plan - Patient's Chronic Conditions and Co-Morbidity Symptoms are Monitored and Maintained or Improved: Monitor and assess patient's chronic conditions and comorbid symptoms for stability, deterioration, or improvement

## 2024-12-24 NOTE — PLAN OF CARE
Problem: Discharge Planning  Goal: Discharge to home or other facility with appropriate resources  12/23/2024 2115 by Adam Dee RN  Outcome: Progressing  12/23/2024 0756 by Nimo Mckeon RN  Outcome: Progressing     Problem: Pain  Goal: Verbalizes/displays adequate comfort level or baseline comfort level  12/23/2024 2115 by Adam Dee RN  Outcome: Progressing  12/23/2024 0756 by Nimo Mckeon RN  Outcome: Progressing     Problem: Safety - Adult  Goal: Free from fall injury  12/23/2024 2115 by Adam Dee RN  Outcome: Progressing  12/23/2024 0756 by Nimo Mckeon RN  Outcome: Progressing     Problem: Chronic Conditions and Co-morbidities  Goal: Patient's chronic conditions and co-morbidity symptoms are monitored and maintained or improved  Outcome: Progressing

## 2024-12-24 NOTE — ED TRIAGE NOTES
Pt arrived via [facility] to see Dr. Yo.  Per Dr. Yo patient is admitted at [facility] for abx ans was pt was transported to Dr. Yo office for visit. Dr. Yo office closed earlier today therefore patient was told to come to the ED to meet Dr. Yo in the ED.

## 2024-12-24 NOTE — PROGRESS NOTES
0700- Assumed care of the patient from off going nurse via bedside shift report. Patient up in shower for appt.     Assessment complete. Vitals obtained. No needs voiced at this time. CBWR    0730- PICC dressing changed by secondary nurse and orientee     Call to [facility] to confirm     0915- called [facility] - informed they were running a little late but should be arriving soon     0934- [facility] arrived- report given to staff    Roxann notified dr office patient would be late  Dr in surgery patient to report to [facility]  [facility] notified    0945- patient out of facility for appointment

## 2024-12-24 NOTE — ED NOTES
TRANSFER - OUT REPORT:    Verbal [facility]  [facility] to [facility] for routine progression of patient care       Report consisted of patient's Situation, Background, Assessment and   Recommendations(SBAR).     Information from the following report(s) Nurse Handoff Report, ED Encounter Summary, ED SBAR, and MAR was reviewed with the receiving nurse.    Kinder Fall Assessment:                           Lines:   PICC 11/21/24 Right Basilic (Active)        Opportunity for questions and clarification was provided.      Patient transported with [facility]

## 2024-12-24 NOTE — ED PROVIDER NOTES
[facility] EMERGENCY DEPT  EMERGENCY DEPARTMENT ENCOUNTER      Pt Name: Dora Frazier  MRN: [id number]  Birthdate 1960  Date of evaluation: 12/24/2024  Provider: KALE Manzanares  1:45 PM    CHIEF COMPLAINT       Chief Complaint   Patient presents with    See Dr. Yo         HISTORY OF PRESENT ILLNESS    Dora Frazier is a 64 y.o. male who presents to the emergency department with thickened Thurn for having an evaluation by his cardiothoracic surgeon.  He had an infected sternum now with septic arthritis.  He was supposed to leave the [facility] and meet Dr. Read at his office but transport team did not arrive in time before the office closed for the holiday so he was instructed to be here which required him to come off the board and be discharged at [facility].  He met with Dr. Field who has no changes.  Patient has been accepted back at [facility] by Gris Bridges for Dr. Ricky Alvarado.    HPI    Nursing Notes were reviewed.    REVIEW OF SYSTEMS       Review of Systems   Constitutional: Negative.    HENT: Negative.     Eyes: Negative.    Respiratory:  Negative for shortness of breath.    Cardiovascular:  Negative for chest pain.   Gastrointestinal:  Negative for abdominal pain.   Endocrine: Negative.    Genitourinary: Negative.    Musculoskeletal: Negative.    Skin: Negative.    Allergic/Immunologic: Negative.    Neurological: Negative.    Hematological:  Bruises/bleeds easily.        Right AC IV       Except as noted above the remainder of the review of systems was reviewed and negative.       PAST MEDICAL HISTORY     Past Medical History:   Diagnosis Date    Primary hypertension          SURGICAL HISTORY       Past Surgical History:   Procedure Laterality Date    BACK SURGERY      FOOT DEBRIDEMENT Right 9/8/2024    RIGHT ANKLE WOUND  INCISION AND DRAINAGE performed by Redd Bowser DPM at [facility] MAIN OR         CURRENT MEDICATIONS       Previous Medications    AMLODIPINE

## 2024-12-24 NOTE — PROGRESS NOTES
1900 Assumed care of patient after shift report. Patient sitting up in chair watching TV. Denies distress at this time.    2010 VS and assessment completed. Patient was given a snack and Tylenol for c/o headache 3/10  PSR.    0000 Resting quietly with eyes closed; noted unlabored, regular respirations.    0400 Resting quietly with eyes closed; noted unlabored, regular respirations.    0645 Report given to day shift nurse.

## 2024-12-25 PROCEDURE — 2580000003 HC RX 258: Performed by: NURSE PRACTITIONER

## 2024-12-25 PROCEDURE — 6370000000 HC RX 637 (ALT 250 FOR IP): Performed by: INTERNAL MEDICINE

## 2024-12-25 PROCEDURE — 6360000002 HC RX W HCPCS: Performed by: NURSE PRACTITIONER

## 2024-12-25 PROCEDURE — 1100000000 HC RM PRIVATE

## 2024-12-25 RX ADMIN — CEFTRIAXONE SODIUM 2000 MG: 2 INJECTION, POWDER, FOR SOLUTION INTRAMUSCULAR; INTRAVENOUS at 17:42

## 2024-12-25 RX ADMIN — ACETAMINOPHEN 650 MG: 325 TABLET ORAL at 19:34

## 2024-12-25 RX ADMIN — Medication 100 MG: at 08:40

## 2024-12-25 RX ADMIN — AMLODIPINE BESYLATE 10 MG: 5 TABLET ORAL at 08:40

## 2024-12-25 RX ADMIN — FOLIC ACID 1 MG: 1 TABLET ORAL at 08:40

## 2024-12-25 RX ADMIN — ACETAMINOPHEN 650 MG: 325 TABLET ORAL at 08:40

## 2024-12-25 ASSESSMENT — PAIN SCALES - GENERAL
PAINLEVEL_OUTOF10: 4
PAINLEVEL_OUTOF10: 3
PAINLEVEL_OUTOF10: 4
PAINLEVEL_OUTOF10: 0

## 2024-12-25 ASSESSMENT — PAIN DESCRIPTION - ORIENTATION
ORIENTATION: LEFT
ORIENTATION: LEFT

## 2024-12-25 ASSESSMENT — PAIN DESCRIPTION - LOCATION
LOCATION: SHOULDER
LOCATION: SHOULDER

## 2024-12-25 ASSESSMENT — PAIN DESCRIPTION - DESCRIPTORS
DESCRIPTORS: PRESSURE
DESCRIPTORS: PRESSURE

## 2024-12-25 ASSESSMENT — PAIN DESCRIPTION - PAIN TYPE
TYPE: CHRONIC PAIN
TYPE: CHRONIC PAIN

## 2024-12-25 ASSESSMENT — PAIN SCALES - WONG BAKER: WONGBAKER_NUMERICALRESPONSE: NO HURT

## 2024-12-25 NOTE — PROGRESS NOTES
1900: Bedside shift change report given to GRETA Rees RN (oncoming nurse) by KENDALL Wheeler RN (offgoing nurse). Report included the following information Nurse Handoff Report, Adult Overview, Neuro Assessment, and Event Log.   Assumed care of patient. Bedside shift discussion done. Whiteboard updated. Patient is resting in bed comfortably and participating in bedside shift discussion. Recent appointment reviewed. PICC still in place in [facility] with dressing dated 12/24/2024. Bed is in lowest position and call bell is within reach.     2107: VSS. Patient sitting on side of bed. PRN Tylenol administered for pain 4/10 for head and L chest pain. Snacks and ice provided at patient's request. No other concerns expressed at this time.     0000: Patient resting in bed with eyes closed.    0500: Patient resting in bed with eyes closed.     Bedside shift report given to KENDALL Guevara RN.

## 2024-12-26 PROCEDURE — 1100000000 HC RM PRIVATE

## 2024-12-26 PROCEDURE — 2580000003 HC RX 258: Performed by: NURSE PRACTITIONER

## 2024-12-26 PROCEDURE — 6370000000 HC RX 637 (ALT 250 FOR IP): Performed by: INTERNAL MEDICINE

## 2024-12-26 PROCEDURE — 6360000002 HC RX W HCPCS: Performed by: NURSE PRACTITIONER

## 2024-12-26 RX ORDER — SODIUM CHLORIDE 0.9 % (FLUSH) 0.9 %
5-40 SYRINGE (ML) INJECTION 2 TIMES DAILY
Status: DISCONTINUED | OUTPATIENT
Start: 2024-12-26 | End: 2025-01-09 | Stop reason: HOSPADM

## 2024-12-26 RX ADMIN — CEFTRIAXONE SODIUM 2000 MG: 2 INJECTION, POWDER, FOR SOLUTION INTRAMUSCULAR; INTRAVENOUS at 17:42

## 2024-12-26 RX ADMIN — Medication 100 MG: at 08:16

## 2024-12-26 RX ADMIN — ACETAMINOPHEN 650 MG: 325 TABLET ORAL at 19:53

## 2024-12-26 RX ADMIN — AMLODIPINE BESYLATE 10 MG: 5 TABLET ORAL at 08:16

## 2024-12-26 RX ADMIN — ACETAMINOPHEN 650 MG: 325 TABLET ORAL at 08:17

## 2024-12-26 RX ADMIN — FOLIC ACID 1 MG: 1 TABLET ORAL at 08:16

## 2024-12-26 ASSESSMENT — PAIN SCALES - GENERAL
PAINLEVEL_OUTOF10: 2
PAINLEVEL_OUTOF10: 5
PAINLEVEL_OUTOF10: 4

## 2024-12-26 ASSESSMENT — PAIN DESCRIPTION - ORIENTATION
ORIENTATION: LEFT
ORIENTATION: LEFT

## 2024-12-26 ASSESSMENT — PAIN DESCRIPTION - LOCATION
LOCATION: SHOULDER
LOCATION: CHEST

## 2024-12-26 ASSESSMENT — PAIN DESCRIPTION - DESCRIPTORS
DESCRIPTORS: DULL;TENDER
DESCRIPTORS: ACHING

## 2024-12-26 ASSESSMENT — PAIN - FUNCTIONAL ASSESSMENT: PAIN_FUNCTIONAL_ASSESSMENT: ACTIVITIES ARE NOT PREVENTED

## 2024-12-26 NOTE — PROGRESS NOTES
0700- CARE OF THE PATIENT ASSUMED  0822- SCHEDULED MEDICAITONS ADMINSITERED, PATIENT UP ON THE SIDE OF THE BED AMBULATORY THROUGHOUT ROOM AND IN HALLWAY. PEPSI AND CRACKERS PROVIDED. NO FURTHER NEEDS  1130- eating lunch.   1500- watching tv no needs  1743- scheduled iv antibiotics administered

## 2024-12-26 NOTE — PROGRESS NOTES
1923 - VS obtained and shift assessment performed. PICC line to right upper arm present with dressing clean, dry, & intact. No site abnormalities noted. Patient c/o chronic pain in left shoulder described as \"pressure.\" Patient removed lidocaine patch at this time.    1934 - PRN acetaminophen administered for left shoulder pain as mentioned above. Patient provided with sandwich, peanut butter crackers, ice cream, and Pepsi per request. No additional concerns or needs voiced at this time.    2004 - Patient denies pain when asked on reassessment.    0100 - Patient resting quietly in bed, resp. Even & unlabored, no acute distress noted.    0635 - Shift I&O documented, Patient up and in bathroom performing AM care, provided with liquid shampoo, shaving cream, and razors per request.    0647 - Bedside report given to JOSTIN Puri RN.

## 2024-12-26 NOTE — PROGRESS NOTES
Spiritual Health History and Assessment/Progress Note  [facility]    (P) Follow-up,  ,  ,      Name: Dora Frazier MRN: [id number]    Age: 64 y.o.     Sex: male   Language: English   Voodoo: Latter-day   <principal problem not specified>     Date: 12/26/2024            Total Time Calculated: (P) 5 min              Spiritual Assessment began in [address]        Referral/Consult From: (P) Rounding   Encounter Overview/Reason: (P) Follow-up  Service Provided For: (P) Patient    Carol, Belief, Meaning:   Patient identifies as spiritual  Family/Friends No family/friends present      Importance and Influence:  Patient has spiritual/personal beliefs that influence decisions regarding their health  Family/Friends No family/friends present    Community:  Patient is connected with a spiritual community  Family/Friends No family/friends present    Assessment and Plan of Care:     Patient Interventions include: Facilitated expression of thoughts and feelings, Explored spiritual coping/struggle/distress, Affirmed coping skills/support systems, Provided sacramental/Gnosticism ritual, and Facilitated life review and/ or legacy  Family/Friends Interventions include: No family/friends present    Patient Plan of Care: Spiritual Care available upon further referral  Family/Friends Plan of Care: No family/friends present    Electronically signed by Chaplain Jose on 12/26/2024 at 12:17 PM

## 2024-12-27 PROCEDURE — 6370000000 HC RX 637 (ALT 250 FOR IP): Performed by: INTERNAL MEDICINE

## 2024-12-27 PROCEDURE — 2500000003 HC RX 250 WO HCPCS: Performed by: INTERNAL MEDICINE

## 2024-12-27 PROCEDURE — 2580000003 HC RX 258: Performed by: NURSE PRACTITIONER

## 2024-12-27 PROCEDURE — 6360000002 HC RX W HCPCS: Performed by: NURSE PRACTITIONER

## 2024-12-27 PROCEDURE — 1100000000 HC RM PRIVATE

## 2024-12-27 RX ADMIN — AMLODIPINE BESYLATE 10 MG: 5 TABLET ORAL at 08:44

## 2024-12-27 RX ADMIN — SODIUM CHLORIDE, PRESERVATIVE FREE 10 ML: 5 INJECTION INTRAVENOUS at 21:56

## 2024-12-27 RX ADMIN — FOLIC ACID 1 MG: 1 TABLET ORAL at 08:44

## 2024-12-27 RX ADMIN — CEFTRIAXONE SODIUM 2000 MG: 2 INJECTION, POWDER, FOR SOLUTION INTRAMUSCULAR; INTRAVENOUS at 17:41

## 2024-12-27 RX ADMIN — ACETAMINOPHEN 650 MG: 325 TABLET ORAL at 09:06

## 2024-12-27 RX ADMIN — SODIUM CHLORIDE, PRESERVATIVE FREE 10 ML: 5 INJECTION INTRAVENOUS at 08:47

## 2024-12-27 RX ADMIN — Medication 100 MG: at 08:44

## 2024-12-27 ASSESSMENT — PAIN SCALES - GENERAL
PAINLEVEL_OUTOF10: 0
PAINLEVEL_OUTOF10: 4
PAINLEVEL_OUTOF10: 0

## 2024-12-27 ASSESSMENT — PAIN DESCRIPTION - DESCRIPTORS: DESCRIPTORS: ACHING

## 2024-12-27 ASSESSMENT — PAIN SCALES - WONG BAKER
WONGBAKER_NUMERICALRESPONSE: NO HURT
WONGBAKER_NUMERICALRESPONSE: NO HURT

## 2024-12-27 ASSESSMENT — PAIN DESCRIPTION - LOCATION: LOCATION: SHOULDER

## 2024-12-27 ASSESSMENT — PAIN DESCRIPTION - ORIENTATION: ORIENTATION: LEFT

## 2024-12-27 NOTE — PROGRESS NOTES
1900 Assumed care of patient after shift report. Patient presented sitting up on side of bed; denies distress at his time.    2000 VS and assessment completed. Patient was given Tylenol for c/o left shoulder pain 5/10 PSR. Patient was also given a bedtime snack of sandwich and juice.     0000 Resting quietly with eyes closed; Patient's breathing is even and unlabored.    0645 Report given to oncoming shift nurse.

## 2024-12-28 PROCEDURE — 1100000000 HC RM PRIVATE

## 2024-12-28 PROCEDURE — 2580000003 HC RX 258: Performed by: NURSE PRACTITIONER

## 2024-12-28 PROCEDURE — 2500000003 HC RX 250 WO HCPCS: Performed by: INTERNAL MEDICINE

## 2024-12-28 PROCEDURE — 6360000002 HC RX W HCPCS: Performed by: NURSE PRACTITIONER

## 2024-12-28 PROCEDURE — 6370000000 HC RX 637 (ALT 250 FOR IP): Performed by: INTERNAL MEDICINE

## 2024-12-28 RX ADMIN — FOLIC ACID 1 MG: 1 TABLET ORAL at 09:16

## 2024-12-28 RX ADMIN — SODIUM CHLORIDE, PRESERVATIVE FREE 10 ML: 5 INJECTION INTRAVENOUS at 09:16

## 2024-12-28 RX ADMIN — SODIUM CHLORIDE, PRESERVATIVE FREE 10 ML: 5 INJECTION INTRAVENOUS at 20:29

## 2024-12-28 RX ADMIN — Medication 100 MG: at 09:16

## 2024-12-28 RX ADMIN — CEFTRIAXONE SODIUM 2000 MG: 2 INJECTION, POWDER, FOR SOLUTION INTRAMUSCULAR; INTRAVENOUS at 17:59

## 2024-12-28 RX ADMIN — ACETAMINOPHEN 650 MG: 325 TABLET ORAL at 09:15

## 2024-12-28 RX ADMIN — AMLODIPINE BESYLATE 10 MG: 5 TABLET ORAL at 09:16

## 2024-12-28 RX ADMIN — ACETAMINOPHEN 650 MG: 325 TABLET ORAL at 20:28

## 2024-12-28 ASSESSMENT — PAIN DESCRIPTION - DESCRIPTORS
DESCRIPTORS: DISCOMFORT
DESCRIPTORS: PRESSURE

## 2024-12-28 ASSESSMENT — PAIN DESCRIPTION - LOCATION
LOCATION: CHEST
LOCATION: SHOULDER

## 2024-12-28 ASSESSMENT — PAIN DESCRIPTION - ORIENTATION
ORIENTATION: LEFT
ORIENTATION: LEFT

## 2024-12-28 ASSESSMENT — PAIN SCALES - GENERAL
PAINLEVEL_OUTOF10: 3
PAINLEVEL_OUTOF10: 0
PAINLEVEL_OUTOF10: 4

## 2024-12-28 ASSESSMENT — PAIN SCALES - WONG BAKER: WONGBAKER_NUMERICALRESPONSE: NO HURT

## 2024-12-29 PROCEDURE — 6370000000 HC RX 637 (ALT 250 FOR IP): Performed by: INTERNAL MEDICINE

## 2024-12-29 PROCEDURE — 6360000002 HC RX W HCPCS: Performed by: NURSE PRACTITIONER

## 2024-12-29 PROCEDURE — 2500000003 HC RX 250 WO HCPCS: Performed by: INTERNAL MEDICINE

## 2024-12-29 PROCEDURE — 1100000000 HC RM PRIVATE

## 2024-12-29 PROCEDURE — 2580000003 HC RX 258: Performed by: NURSE PRACTITIONER

## 2024-12-29 RX ADMIN — CEFTRIAXONE SODIUM 2000 MG: 2 INJECTION, POWDER, FOR SOLUTION INTRAMUSCULAR; INTRAVENOUS at 18:01

## 2024-12-29 RX ADMIN — SODIUM CHLORIDE, PRESERVATIVE FREE 10 ML: 5 INJECTION INTRAVENOUS at 20:38

## 2024-12-29 RX ADMIN — Medication 100 MG: at 08:23

## 2024-12-29 RX ADMIN — AMLODIPINE BESYLATE 10 MG: 5 TABLET ORAL at 08:23

## 2024-12-29 RX ADMIN — SODIUM CHLORIDE, PRESERVATIVE FREE 10 ML: 5 INJECTION INTRAVENOUS at 08:23

## 2024-12-29 RX ADMIN — ACETAMINOPHEN 650 MG: 325 TABLET ORAL at 20:39

## 2024-12-29 RX ADMIN — FOLIC ACID 1 MG: 1 TABLET ORAL at 08:23

## 2024-12-29 ASSESSMENT — PAIN SCALES - GENERAL
PAINLEVEL_OUTOF10: 3
PAINLEVEL_OUTOF10: 3

## 2024-12-29 ASSESSMENT — PAIN DESCRIPTION - LOCATION
LOCATION: SHOULDER
LOCATION: SHOULDER

## 2024-12-29 ASSESSMENT — PAIN DESCRIPTION - DESCRIPTORS
DESCRIPTORS: DISCOMFORT
DESCRIPTORS: DISCOMFORT

## 2024-12-29 ASSESSMENT — PAIN DESCRIPTION - ORIENTATION
ORIENTATION: LEFT
ORIENTATION: LEFT

## 2024-12-29 ASSESSMENT — PAIN SCALES - WONG BAKER: WONGBAKER_NUMERICALRESPONSE: NO HURT

## 2024-12-29 NOTE — PROGRESS NOTES
Hospitalist Progress Note    Daily Progress Note: 2024 9:39 PM      Dora Frazier                                            MRN: [id number]                                  :1960    Hospital course / Assessment and Plans   Principle Problems:  #T-Spine Discitis/Phlegmon, Group B strep Bacteremia, Left Septic Joint Arthritis  --Continue IV Rocephin via PICC to RUE through 25.  --Continue Weekly CBC/CMP while on antibiotics.  --Follow left SC joint cultures from  - [facility].  --F/U with Spine Surgery in 4 weeks re: thoracic spine phlegmon. Dr. Agapito Jacobs.  --F/U with Cardiothoracic Surgery 24 at 10am re: left sternoclavicular joint infection. Dr. Hector Field.   --F/U with Infectious Disease in 4 weeks. Dr. Noni Solis.  --Repeat MRI T Spine, Chest done 24 to determine switch to prolonged oral antibiotics versus need for drainage/surgical intervention.   --Continue Lidocaine patch to left upper chest.  --C/S case management for coordination of care, f/u's.     #HTN  --Continue Amlodipine 10mg daily.  --VS per routine.     #Tobacco Use  --Declined Nicotine patch.     #Alcohol Use  --Continue thiamine and folic acid daily.       Full Code       Subjective:     Pt examined and seen at bedside. No acute events reported overnight. Lying in bed awake watching tv. States pain is manageable. Patient denies fevers, chills, nausea, vomiting, dizziness, lightheadedness, cough, shortness of breath, chest pain, constipation, diarrhea. States does get out of the room from time to time. Pt offers no new complaints. PICC LALA with dressing c/d/I. Continue with current plan of care.     Labs and Vitals:    /88   Pulse 65   Temp 98.6 °F (37 °C) (Oral)   Resp 18   Ht 1.803 m (5' 11\")   Wt 83.9 kg (185 lb)   SpO2 99%   BMI 25.80 kg/m²         Temp (24hrs), Av.4 °F (36.9 °C), Min:98.2 °F (36.8 °C), Max:98.6 °F (37 °C)      No intake/output data recorded.   07 -

## 2024-12-29 NOTE — PROGRESS NOTES
1900 bedside report received on pt    1912 vitals obtained    20299 PRN tylenol given for 4/ 10 left shoulder pain    2058 pain improved. Pt satisfied.    0040 pt in bed eyes closed normal respiratory patterns noted

## 2024-12-29 NOTE — H&P
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 Multilevel degenerative endplate osteophytes and facet arthropathy. Multilevel mild to moderate spinal canal stenosis, worst at T10-T11 and T11-T12. Multilevel foraminal stenosis, most severe from T2-T5 on the right and at T2-T3 on the left. Cardiomegaly.     1.  Ongoing T2-T4 bilateral facet marrow and soft tissue edema and enhancement. Consider inflammatory or septic arthritis. 2.  T1-T4 dorsal epidural phlegmon seen on prior exam has resolved. 3.  Multilevel spinal canal and neuroforaminal stenoses as outlined above. 4.  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 Please see separately dictated MRI chest report from the same day for complete findings. Electronically signed by OVIDIO BOSTON    MRI CHEST W WO CONTRAST    Result Date: 12/18/2024  EXAM:  MRI CHEST W WO CONTRAST INDICATION: regarding left sternoclavicular joint infection COMPARISON: MRI chest 11/14/2024, CTA chest 11/12/2024 TECHNIQUE: Axial, coronal, and sagittal pre and postcontrast MRI of the chest wall with attention to the sternum in the T1, T2, and inversion-recovery pulse sequences with and without fat saturation . CONTRAST: 17 mL Prohance FINDINGS: Bone marrow, articulations, joint fluid, and soft tissues: Increasing effusion, worsening synovitis, progressive aggressive erosions, new patchy confluent marrow replacement, and worsening periarticular soft tissue edema and enhancement involving the left first costochondral junction, compatible with worsening septic or inflammatory arthritis. No rim-enhancing fluid collection to suggest abscess. Tendons: Intact. Muscles: Feathery edema signal and enhancement in the medial left pectoralis musculature, compatible with myositis. Neurovascular bundles: Within normal limits.     Worsening appearance of left first costochondral

## 2024-12-29 NOTE — PROGRESS NOTES
Assumed care of the patient from off going nurse via bedside shift report. Patient resting in recliner, alert.    Assessment complete. Vitals obtained. No needs voiced at this time. CBWR    Uneventful shift    Needs met through out shift

## 2024-12-30 PROCEDURE — 6370000000 HC RX 637 (ALT 250 FOR IP): Performed by: INTERNAL MEDICINE

## 2024-12-30 PROCEDURE — 2500000003 HC RX 250 WO HCPCS: Performed by: INTERNAL MEDICINE

## 2024-12-30 PROCEDURE — 1100000000 HC RM PRIVATE

## 2024-12-30 PROCEDURE — 6360000002 HC RX W HCPCS: Performed by: NURSE PRACTITIONER

## 2024-12-30 PROCEDURE — 2580000003 HC RX 258: Performed by: NURSE PRACTITIONER

## 2024-12-30 RX ADMIN — FOLIC ACID 1 MG: 1 TABLET ORAL at 08:45

## 2024-12-30 RX ADMIN — CEFTRIAXONE SODIUM 2000 MG: 2 INJECTION, POWDER, FOR SOLUTION INTRAMUSCULAR; INTRAVENOUS at 17:31

## 2024-12-30 RX ADMIN — SODIUM CHLORIDE, PRESERVATIVE FREE 10 ML: 5 INJECTION INTRAVENOUS at 08:45

## 2024-12-30 RX ADMIN — SODIUM CHLORIDE, PRESERVATIVE FREE 10 ML: 5 INJECTION INTRAVENOUS at 20:53

## 2024-12-30 RX ADMIN — AMLODIPINE BESYLATE 10 MG: 5 TABLET ORAL at 08:45

## 2024-12-30 RX ADMIN — Medication 100 MG: at 08:45

## 2024-12-30 RX ADMIN — ACETAMINOPHEN 650 MG: 325 TABLET ORAL at 20:50

## 2024-12-30 ASSESSMENT — PAIN SCALES - GENERAL
PAINLEVEL_OUTOF10: 3
PAINLEVEL_OUTOF10: 0
PAINLEVEL_OUTOF10: 0

## 2024-12-30 ASSESSMENT — PAIN DESCRIPTION - DESCRIPTORS: DESCRIPTORS: DISCOMFORT

## 2024-12-30 ASSESSMENT — PAIN DESCRIPTION - LOCATION: LOCATION: BACK

## 2024-12-30 ASSESSMENT — PAIN DESCRIPTION - ORIENTATION: ORIENTATION: MID

## 2024-12-30 ASSESSMENT — PAIN SCALES - WONG BAKER: WONGBAKER_NUMERICALRESPONSE: NO HURT

## 2024-12-30 NOTE — CARE COORDINATION
Called Dr. Field's office to review next steps on the case @ [phone]. Left voicemail for return call.

## 2024-12-30 NOTE — PROGRESS NOTES
1900 assume care of pt    2039 prn tylenol given for 3/ 10 shoulder pain    Pain improved. Pt satisfied    Uneventful shift    0700 report given to benjamin MENDEZ

## 2024-12-30 NOTE — PROGRESS NOTES
Dora Boucherlindsay Frazier  1960      Shift report received from off going primary nurse on 12/30/24  :  Susanne Bower RN     Bedside report received  Medications reviewed   Plan of care reviewed  White Board updated      Breakfast tray provided and set up. Tolerated well.       Lunch tray provided and set up. Tolerated well.     Follow up appt arranged with [facility] for 1/23/24 at 10 AM    Dinner tray provided and set up. Tolerated well.     Shift report given to incoming primary nurse, : Susnane Bower RN     Bedside report given  Medications reviewed  Plan of care reviewed  White Board updated

## 2024-12-30 NOTE — CARE COORDINATION
This writer spoke with Dr. Field.  He will place a note to order repeat imaging.  He would like the patient to follow up in his office 2 weeks after completion of the antibiotics.  He should seek medical advice for fever, chills, pain in the area, swelling or redness if it occurs prior to 2 week appointment.  Dr. Field advised that ID is following the antibiotic treatment.    Please schedule follow up with the office.

## 2024-12-31 PROCEDURE — 2580000003 HC RX 258: Performed by: NURSE PRACTITIONER

## 2024-12-31 PROCEDURE — 6360000002 HC RX W HCPCS: Performed by: NURSE PRACTITIONER

## 2024-12-31 PROCEDURE — 1100000000 HC RM PRIVATE

## 2024-12-31 PROCEDURE — 2500000003 HC RX 250 WO HCPCS: Performed by: INTERNAL MEDICINE

## 2024-12-31 PROCEDURE — 6370000000 HC RX 637 (ALT 250 FOR IP): Performed by: INTERNAL MEDICINE

## 2024-12-31 RX ADMIN — CEFTRIAXONE SODIUM 2000 MG: 2 INJECTION, POWDER, FOR SOLUTION INTRAMUSCULAR; INTRAVENOUS at 17:50

## 2024-12-31 RX ADMIN — SODIUM CHLORIDE, PRESERVATIVE FREE 10 ML: 5 INJECTION INTRAVENOUS at 20:00

## 2024-12-31 RX ADMIN — SODIUM CHLORIDE, PRESERVATIVE FREE 10 ML: 5 INJECTION INTRAVENOUS at 08:53

## 2024-12-31 RX ADMIN — AMLODIPINE BESYLATE 10 MG: 5 TABLET ORAL at 08:53

## 2024-12-31 RX ADMIN — Medication 100 MG: at 08:53

## 2024-12-31 RX ADMIN — FOLIC ACID 1 MG: 1 TABLET ORAL at 08:53

## 2024-12-31 RX ADMIN — ACETAMINOPHEN 650 MG: 325 TABLET ORAL at 19:59

## 2024-12-31 ASSESSMENT — PAIN SCALES - WONG BAKER: WONGBAKER_NUMERICALRESPONSE: NO HURT

## 2024-12-31 ASSESSMENT — PAIN DESCRIPTION - DESCRIPTORS: DESCRIPTORS: DISCOMFORT

## 2024-12-31 ASSESSMENT — PAIN DESCRIPTION - LOCATION: LOCATION: BACK

## 2024-12-31 ASSESSMENT — PAIN SCALES - GENERAL: PAINLEVEL_OUTOF10: 4

## 2024-12-31 NOTE — PROGRESS NOTES
1900 assume care of pt    2050 prn tylenol given for 3/ 10 shoulder pain    Pain improved. Pt satisfied    Uneventful shift

## 2025-01-01 PROCEDURE — 1100000000 HC RM PRIVATE

## 2025-01-01 PROCEDURE — 2500000003 HC RX 250 WO HCPCS: Performed by: INTERNAL MEDICINE

## 2025-01-01 PROCEDURE — 6370000000 HC RX 637 (ALT 250 FOR IP): Performed by: INTERNAL MEDICINE

## 2025-01-01 PROCEDURE — 6360000002 HC RX W HCPCS: Performed by: NURSE PRACTITIONER

## 2025-01-01 PROCEDURE — 2580000003 HC RX 258: Performed by: NURSE PRACTITIONER

## 2025-01-01 RX ADMIN — Medication 100 MG: at 08:03

## 2025-01-01 RX ADMIN — SODIUM CHLORIDE, PRESERVATIVE FREE 10 ML: 5 INJECTION INTRAVENOUS at 19:19

## 2025-01-01 RX ADMIN — FOLIC ACID 1 MG: 1 TABLET ORAL at 08:05

## 2025-01-01 RX ADMIN — ACETAMINOPHEN 650 MG: 325 TABLET ORAL at 08:03

## 2025-01-01 RX ADMIN — SODIUM CHLORIDE, PRESERVATIVE FREE 10 ML: 5 INJECTION INTRAVENOUS at 08:09

## 2025-01-01 RX ADMIN — CEFTRIAXONE SODIUM 2000 MG: 2 INJECTION, POWDER, FOR SOLUTION INTRAMUSCULAR; INTRAVENOUS at 18:01

## 2025-01-01 RX ADMIN — AMLODIPINE BESYLATE 10 MG: 5 TABLET ORAL at 08:05

## 2025-01-01 RX ADMIN — ACETAMINOPHEN 650 MG: 325 TABLET ORAL at 19:16

## 2025-01-01 ASSESSMENT — PAIN DESCRIPTION - DESCRIPTORS: DESCRIPTORS: THROBBING

## 2025-01-01 ASSESSMENT — PAIN DESCRIPTION - ORIENTATION: ORIENTATION: LEFT

## 2025-01-01 ASSESSMENT — PAIN SCALES - GENERAL
PAINLEVEL_OUTOF10: 2
PAINLEVEL_OUTOF10: 4
PAINLEVEL_OUTOF10: 0
PAINLEVEL_OUTOF10: 5

## 2025-01-01 ASSESSMENT — PAIN DESCRIPTION - LOCATION
LOCATION: HEAD
LOCATION: SHOULDER;BACK

## 2025-01-01 ASSESSMENT — PAIN SCALES - WONG BAKER: WONGBAKER_NUMERICALRESPONSE: NO HURT

## 2025-01-01 NOTE — PROGRESS NOTES
0645- Bedside shift change report given to ANDREA Cruz (oncoming nurse) by ANDREA Skinner (offgoing nurse). Report included the following information Nurse Handoff Report, Adult Overview, MAR, and Event Log- Assumed care    0751- Vitals obtained- Assessment completed- Safety measures implemented- Patient expresses no concerns at this time    0803- Patient medications administered per MAR protocol- PRN pain medication administered for headache- Patient tolerated well- No needs at this time    1146- Patient in chair enjoying lunch. No concerns expressed at this time    1530- Provided patient with afternoon snacks    1550- Patient PICC dressing changed- Patient tolerated well    1801- IV antibiotics started    1853- IV antibiotics stopped    1900- Bedside shift change report given to ANDREA Skinner (oncoming nurse) by ANDREA Cruz (offgoing nurse). Report included the following information Nurse Handoff Report, Adult Overview, MAR, and Event Log.

## 2025-01-01 NOTE — PROGRESS NOTES
1900 assume care of pt    1959 prn tylenol given for 4/10 back pain    Pain improved. Pt satisfied    Uneventful shift

## 2025-01-02 PROCEDURE — 6370000000 HC RX 637 (ALT 250 FOR IP): Performed by: INTERNAL MEDICINE

## 2025-01-02 PROCEDURE — 1100000000 HC RM PRIVATE

## 2025-01-02 PROCEDURE — 2500000003 HC RX 250 WO HCPCS: Performed by: INTERNAL MEDICINE

## 2025-01-02 PROCEDURE — 6360000002 HC RX W HCPCS: Performed by: NURSE PRACTITIONER

## 2025-01-02 PROCEDURE — 2580000003 HC RX 258: Performed by: NURSE PRACTITIONER

## 2025-01-02 RX ADMIN — ACETAMINOPHEN 650 MG: 325 TABLET ORAL at 21:25

## 2025-01-02 RX ADMIN — SODIUM CHLORIDE, PRESERVATIVE FREE 10 ML: 5 INJECTION INTRAVENOUS at 08:55

## 2025-01-02 RX ADMIN — Medication 100 MG: at 08:54

## 2025-01-02 RX ADMIN — AMLODIPINE BESYLATE 10 MG: 5 TABLET ORAL at 08:54

## 2025-01-02 RX ADMIN — FOLIC ACID 1 MG: 1 TABLET ORAL at 08:54

## 2025-01-02 RX ADMIN — SODIUM CHLORIDE, PRESERVATIVE FREE 10 ML: 5 INJECTION INTRAVENOUS at 21:26

## 2025-01-02 RX ADMIN — CEFTRIAXONE SODIUM 2000 MG: 2 INJECTION, POWDER, FOR SOLUTION INTRAMUSCULAR; INTRAVENOUS at 17:44

## 2025-01-02 ASSESSMENT — PAIN SCALES - GENERAL
PAINLEVEL_OUTOF10: 0
PAINLEVEL_OUTOF10: 3
PAINLEVEL_OUTOF10: 1
PAINLEVEL_OUTOF10: 3

## 2025-01-02 ASSESSMENT — PAIN DESCRIPTION - LOCATION: LOCATION: CHEST

## 2025-01-02 NOTE — PROGRESS NOTES
1900 assumed care of pt     1916 prn tylenol given for 4/10 shoulder and back pain     Pain improved. Pt satisfied     Uneventful shift

## 2025-01-02 NOTE — PROGRESS NOTES
0700- Assumed care of patient. Received bedside report from ANDREA Skinner.     0725- Shift assessment and vital signs assessed.     0854- Medications administered per MAR. Tolerated well.     1030- Patient walked halls.     1230- Patient in chair eating lunch.     1450- Snack provided to patient.    1745- Antibiotics started.      1845- Report given to oncoming nurse.

## 2025-01-02 NOTE — PROGRESS NOTES
Reviewed plan from CT surgery, cont current path, finish abx, see him 2 weeks after iv abx completed.

## 2025-01-02 NOTE — DISCHARGE INSTRUCTIONS
Patient will need a follow-up with Dr. Field 2 weeks post discharge, call [phone]. CT scan of the chest with contrast for follow-up prior to discharge per Dr. Field.

## 2025-01-03 PROCEDURE — 6370000000 HC RX 637 (ALT 250 FOR IP): Performed by: INTERNAL MEDICINE

## 2025-01-03 PROCEDURE — 6360000002 HC RX W HCPCS: Performed by: NURSE PRACTITIONER

## 2025-01-03 PROCEDURE — 2500000003 HC RX 250 WO HCPCS: Performed by: INTERNAL MEDICINE

## 2025-01-03 PROCEDURE — 2580000003 HC RX 258: Performed by: NURSE PRACTITIONER

## 2025-01-03 PROCEDURE — 1100000000 HC RM PRIVATE

## 2025-01-03 RX ADMIN — SODIUM CHLORIDE, PRESERVATIVE FREE 10 ML: 5 INJECTION INTRAVENOUS at 08:58

## 2025-01-03 RX ADMIN — SODIUM CHLORIDE, PRESERVATIVE FREE 10 ML: 5 INJECTION INTRAVENOUS at 20:41

## 2025-01-03 RX ADMIN — AMLODIPINE BESYLATE 10 MG: 5 TABLET ORAL at 08:57

## 2025-01-03 RX ADMIN — FOLIC ACID 1 MG: 1 TABLET ORAL at 08:57

## 2025-01-03 RX ADMIN — CEFTRIAXONE SODIUM 2000 MG: 2 INJECTION, POWDER, FOR SOLUTION INTRAMUSCULAR; INTRAVENOUS at 17:36

## 2025-01-03 RX ADMIN — Medication 100 MG: at 08:57

## 2025-01-03 ASSESSMENT — PAIN SCALES - GENERAL
PAINLEVEL_OUTOF10: 0
PAINLEVEL_OUTOF10: 0

## 2025-01-03 NOTE — PROGRESS NOTES
1900: Bedside shift change report given to GRETA Rees RN (oncoming nurse) by JOSTIN Carranza LPN (offgoing nurse). Report included the following information Nurse Handoff Report, Adult Overview, MAR, Neuro Assessment, and Event Log.   Assumed care of patient. Bedside shift discussion done. Whiteboard updated. Plan of care discussed. Bed is in lowest position and call bell is within reach.     2125: Administered PRN Tylenol for L sided chest pain. Provided with snacks. No other needs expressed at this time.

## 2025-01-03 NOTE — PROGRESS NOTES
0700 - Bedside shift change report given to Charis,RN and ANDREA Jaimes (oncoming nurse) by ANDREA Mo (offgoing nurse). Report included the following information Nurse Handoff Report.     0730 - Assessment completed. Patient has completed ADLs. Toiletries replenished. Denied further needs.      1120 - Patient in chair eating lunch. He denied needs.    1430 - Patient ambulating in hallway. Denied needs.    1645 - Patient out ambulating in hallway. No concerns voiced. No complications noted.    1900 - Patient sitting on bed alert. VSS. He denied needs.    2030 - Evening snack given.     2230 - In on rounds. Patient resting quietly.

## 2025-01-04 PROCEDURE — 6360000002 HC RX W HCPCS: Performed by: NURSE PRACTITIONER

## 2025-01-04 PROCEDURE — 1100000000 HC RM PRIVATE

## 2025-01-04 PROCEDURE — 2500000003 HC RX 250 WO HCPCS: Performed by: INTERNAL MEDICINE

## 2025-01-04 PROCEDURE — 2580000003 HC RX 258: Performed by: NURSE PRACTITIONER

## 2025-01-04 PROCEDURE — 6370000000 HC RX 637 (ALT 250 FOR IP): Performed by: INTERNAL MEDICINE

## 2025-01-04 RX ADMIN — FOLIC ACID 1 MG: 1 TABLET ORAL at 08:49

## 2025-01-04 RX ADMIN — Medication 100 MG: at 08:49

## 2025-01-04 RX ADMIN — ACETAMINOPHEN 650 MG: 325 TABLET ORAL at 21:03

## 2025-01-04 RX ADMIN — SODIUM CHLORIDE, PRESERVATIVE FREE 10 ML: 5 INJECTION INTRAVENOUS at 08:49

## 2025-01-04 RX ADMIN — CEFTRIAXONE SODIUM 2000 MG: 2 INJECTION, POWDER, FOR SOLUTION INTRAMUSCULAR; INTRAVENOUS at 17:56

## 2025-01-04 RX ADMIN — AMLODIPINE BESYLATE 10 MG: 5 TABLET ORAL at 08:49

## 2025-01-04 RX ADMIN — SODIUM CHLORIDE, PRESERVATIVE FREE 10 ML: 5 INJECTION INTRAVENOUS at 21:03

## 2025-01-04 ASSESSMENT — PAIN SCALES - GENERAL
PAINLEVEL_OUTOF10: 1
PAINLEVEL_OUTOF10: 3

## 2025-01-04 ASSESSMENT — PAIN DESCRIPTION - DESCRIPTORS: DESCRIPTORS: DISCOMFORT

## 2025-01-04 ASSESSMENT — PAIN DESCRIPTION - ORIENTATION: ORIENTATION: LEFT

## 2025-01-04 ASSESSMENT — PAIN DESCRIPTION - LOCATION: LOCATION: CHEST

## 2025-01-04 NOTE — PROGRESS NOTES
2315 - Report received from RICHIE Wheeler RN.    2330 - Patient resting quietly in bed, no acute distress noted.    0330 - Patient continues resting quietly in bed.    0545 - Patient sitting up on side of bed, wrapping protective plastic around PICC line site in preparation for shower. Attending Hospitalist,  RICHIE Lozano, NP on unit and in room to assess and speak with patient.    0766 - Report given to KENDALL Guevara RN.

## 2025-01-04 NOTE — PROGRESS NOTES
Progress Note      Date:1/4/2025       Room:[facility]  Patient Name:Dora Frazier     YOB: 1960     Age:64 y.o.      Subjective    Dora Frazier is a 64 y.o. male  has a past medical history   Primary hypertension  who was transferred from [facility] on 11/22/24 for continued care with IV antibiotics. Pt found to have Group B streptococcus bacteremia, septic arthritis of the left SC joint and concern for thoracic spine phlegmon T1-T4. Was seen by ID, Spine surgery and cardiothoracic surgery during hospitalization. Did have IR guided drainage of SC joint on 11/18/24. Cultures show no growth to date. Recommended to continue IV Rocephin until 1/9/2025. Had PICC placed 11/21/24 to right upper arm. Pt returns from [facility] ED after being sent to be evaluated by cardiothoracic surgery for possible surgical I&D of the left SC joint per recommendations of ID related to MRI results from 12/18/24.   Patient has been getting his IV antibiotics without any event.  Patient is up and around in room taking care of self showering self.  Patient is tolerating the IV antibiotics well no complications continue care plan.         Objective           Vitals Last 24 Hours:  Patient Vitals for the past 24 hrs:   Temp Pulse Resp BP SpO2   01/03/25 1927 98.6 °F (37 °C) 60 16 132/81 --   01/03/25 1520 -- 56 -- (!) 147/106 100 %   01/03/25 0742 99 °F (37.2 °C) 82 16 (!) 119/92 98 %        I/O (24Hr):  No intake or output data in the 24 hours ending 01/04/25 0621         PHYSICAL EXAM:  Positive=RED  Constitutional: 64 y.o.  male Alert and oriented x 3 and no noted acute distress appears to be stated age.   HENT: Atraumatic, nose midline, oropharynx clear and moist, trachea midline   Eyes: Conjunctiva normal, PERRL   Skin: Dry, intact, warm   Cardiovascular: Regular rate and regular rhythm, normal heart sounds, no murmurs, pulses palpable, no edema   Respiratory: Lungs CTAB, no wheezes,

## 2025-01-05 PROCEDURE — 2500000003 HC RX 250 WO HCPCS: Performed by: INTERNAL MEDICINE

## 2025-01-05 PROCEDURE — 6360000002 HC RX W HCPCS: Performed by: NURSE PRACTITIONER

## 2025-01-05 PROCEDURE — 1100000000 HC RM PRIVATE

## 2025-01-05 PROCEDURE — 2580000003 HC RX 258: Performed by: NURSE PRACTITIONER

## 2025-01-05 PROCEDURE — 6370000000 HC RX 637 (ALT 250 FOR IP): Performed by: INTERNAL MEDICINE

## 2025-01-05 RX ADMIN — ACETAMINOPHEN 650 MG: 325 TABLET ORAL at 20:42

## 2025-01-05 RX ADMIN — SODIUM CHLORIDE, PRESERVATIVE FREE 10 ML: 5 INJECTION INTRAVENOUS at 20:42

## 2025-01-05 RX ADMIN — CEFTRIAXONE SODIUM 2000 MG: 2 INJECTION, POWDER, FOR SOLUTION INTRAMUSCULAR; INTRAVENOUS at 17:17

## 2025-01-05 RX ADMIN — FOLIC ACID 1 MG: 1 TABLET ORAL at 09:44

## 2025-01-05 RX ADMIN — Medication 100 MG: at 09:44

## 2025-01-05 ASSESSMENT — PAIN DESCRIPTION - DESCRIPTORS: DESCRIPTORS: DISCOMFORT

## 2025-01-05 ASSESSMENT — PAIN SCALES - GENERAL
PAINLEVEL_OUTOF10: 3
PAINLEVEL_OUTOF10: 2
PAINLEVEL_OUTOF10: 0
PAINLEVEL_OUTOF10: 3

## 2025-01-05 ASSESSMENT — PAIN SCALES - WONG BAKER: WONGBAKER_NUMERICALRESPONSE: NO HURT

## 2025-01-05 ASSESSMENT — PAIN DESCRIPTION - LOCATION: LOCATION: CHEST

## 2025-01-05 ASSESSMENT — PAIN DESCRIPTION - ORIENTATION: ORIENTATION: LEFT

## 2025-01-05 NOTE — PROGRESS NOTES
1900: Bedside shift change report given to GRETA Rees RN (oncoming nurse) by JOSTIN Carranza LPN (offgoing nurse). Report included the following information Nurse Handoff Report, Adult Overview, MAR, Neuro Assessment, and Event Log.   Assumed care of patient. Bedside shift discussion done. Whiteboard updated. Patient is resting in bed and participating in discussion. PICC in place in [facility]. No needs expressed at this time. Bed is in lowest position and call bell is within reach.     2103: VSS. Provided patient with requested towels, washcloths, and toiletries. Lidocaine patch removed. Administered PRN Tylenol for chest discomfort. PICC flushed without issue, blood return present. Snacks provided at patient's request. No further needs expressed at this time.

## 2025-01-05 NOTE — PROGRESS NOTES
0700- care of the patient assumed  0944- am medications administered  1130- lunch provided  1400- coffee provided  1630- dinner provided

## 2025-01-06 PROCEDURE — 6370000000 HC RX 637 (ALT 250 FOR IP): Performed by: INTERNAL MEDICINE

## 2025-01-06 PROCEDURE — 2500000003 HC RX 250 WO HCPCS: Performed by: INTERNAL MEDICINE

## 2025-01-06 PROCEDURE — 2580000003 HC RX 258: Performed by: NURSE PRACTITIONER

## 2025-01-06 PROCEDURE — 6360000002 HC RX W HCPCS: Performed by: NURSE PRACTITIONER

## 2025-01-06 PROCEDURE — 1100000000 HC RM PRIVATE

## 2025-01-06 RX ADMIN — SODIUM CHLORIDE, PRESERVATIVE FREE 10 ML: 5 INJECTION INTRAVENOUS at 20:29

## 2025-01-06 RX ADMIN — SODIUM CHLORIDE, PRESERVATIVE FREE 10 ML: 5 INJECTION INTRAVENOUS at 08:42

## 2025-01-06 RX ADMIN — ACETAMINOPHEN 650 MG: 325 TABLET ORAL at 20:32

## 2025-01-06 RX ADMIN — FOLIC ACID 1 MG: 1 TABLET ORAL at 08:42

## 2025-01-06 RX ADMIN — Medication 100 MG: at 08:42

## 2025-01-06 RX ADMIN — AMLODIPINE BESYLATE 10 MG: 5 TABLET ORAL at 08:42

## 2025-01-06 RX ADMIN — CEFTRIAXONE SODIUM 2000 MG: 2 INJECTION, POWDER, FOR SOLUTION INTRAMUSCULAR; INTRAVENOUS at 18:10

## 2025-01-06 ASSESSMENT — PAIN SCALES - GENERAL
PAINLEVEL_OUTOF10: 3
PAINLEVEL_OUTOF10: 2

## 2025-01-06 ASSESSMENT — PAIN DESCRIPTION - FREQUENCY: FREQUENCY: INTERMITTENT

## 2025-01-06 ASSESSMENT — PAIN DESCRIPTION - PAIN TYPE: TYPE: CHRONIC PAIN

## 2025-01-06 ASSESSMENT — PAIN DESCRIPTION - DIRECTION: RADIATING_TOWARDS: HEADACHE

## 2025-01-06 ASSESSMENT — PAIN DESCRIPTION - LOCATION: LOCATION: HEAD

## 2025-01-06 ASSESSMENT — PAIN - FUNCTIONAL ASSESSMENT: PAIN_FUNCTIONAL_ASSESSMENT: ACTIVITIES ARE NOT PREVENTED

## 2025-01-06 ASSESSMENT — PAIN DESCRIPTION - ONSET: ONSET: GRADUAL

## 2025-01-06 ASSESSMENT — PAIN DESCRIPTION - ORIENTATION: ORIENTATION: OTHER (COMMENT)

## 2025-01-06 ASSESSMENT — PAIN DESCRIPTION - DESCRIPTORS: DESCRIPTORS: TENDER;THROBBING

## 2025-01-06 NOTE — PROGRESS NOTES
0700- Assumed care of the patient from off going nurse via bedside shift report. Patient up in room finishing with ADLs.     Assessment complete. Vitals obtained. No needs voiced at this time. CBWR    0842- administered scheduled medications per MAR, patient tolerated     Melani at bedside  Patient will DC 1/9/25 pickup has been arranged for 1600  Patient needs IV ABX BEFORE discharge 1/9/25    Patient to nurses station requesting assistance with phone call  Maggie Kelly with equitable insurance for short term disability requesting info since she shows patient d/c 12/23 NOT currently SNF  Faxed Paperwork from Mgagie Kelly provided to Melani

## 2025-01-07 PROCEDURE — 6360000002 HC RX W HCPCS: Performed by: NURSE PRACTITIONER

## 2025-01-07 PROCEDURE — 2580000003 HC RX 258: Performed by: NURSE PRACTITIONER

## 2025-01-07 PROCEDURE — 2500000003 HC RX 250 WO HCPCS: Performed by: INTERNAL MEDICINE

## 2025-01-07 PROCEDURE — 6370000000 HC RX 637 (ALT 250 FOR IP): Performed by: INTERNAL MEDICINE

## 2025-01-07 PROCEDURE — 1100000000 HC RM PRIVATE

## 2025-01-07 RX ADMIN — FOLIC ACID 1 MG: 1 TABLET ORAL at 08:57

## 2025-01-07 RX ADMIN — ACETAMINOPHEN 650 MG: 325 TABLET ORAL at 20:17

## 2025-01-07 RX ADMIN — SODIUM CHLORIDE, PRESERVATIVE FREE 10 ML: 5 INJECTION INTRAVENOUS at 08:59

## 2025-01-07 RX ADMIN — SODIUM CHLORIDE, PRESERVATIVE FREE 10 ML: 5 INJECTION INTRAVENOUS at 20:17

## 2025-01-07 RX ADMIN — Medication 100 MG: at 08:57

## 2025-01-07 RX ADMIN — CEFTRIAXONE SODIUM 2000 MG: 2 INJECTION, POWDER, FOR SOLUTION INTRAMUSCULAR; INTRAVENOUS at 17:48

## 2025-01-07 RX ADMIN — AMLODIPINE BESYLATE 10 MG: 5 TABLET ORAL at 08:57

## 2025-01-07 ASSESSMENT — PAIN SCALES - WONG BAKER
WONGBAKER_NUMERICALRESPONSE: NO HURT
WONGBAKER_NUMERICALRESPONSE: NO HURT

## 2025-01-07 ASSESSMENT — PAIN DESCRIPTION - LOCATION: LOCATION: CHEST

## 2025-01-07 ASSESSMENT — PAIN SCALES - GENERAL
PAINLEVEL_OUTOF10: 3
PAINLEVEL_OUTOF10: 1
PAINLEVEL_OUTOF10: 0
PAINLEVEL_OUTOF10: 3

## 2025-01-07 ASSESSMENT — PAIN DESCRIPTION - ORIENTATION: ORIENTATION: LEFT

## 2025-01-07 ASSESSMENT — PAIN DESCRIPTION - DESCRIPTORS: DESCRIPTORS: DISCOMFORT

## 2025-01-07 NOTE — PROGRESS NOTES
0700- Assumed care of the patient from off going nurse via bedside shift report. Patient resting in chair.    Assessment complete.  No needs voiced at this time. CBWR    Administered scheduled medications per MAR

## 2025-01-08 PROCEDURE — 6370000000 HC RX 637 (ALT 250 FOR IP): Performed by: INTERNAL MEDICINE

## 2025-01-08 PROCEDURE — 2500000003 HC RX 250 WO HCPCS: Performed by: INTERNAL MEDICINE

## 2025-01-08 PROCEDURE — 6360000002 HC RX W HCPCS: Performed by: NURSE PRACTITIONER

## 2025-01-08 PROCEDURE — 2580000003 HC RX 258: Performed by: NURSE PRACTITIONER

## 2025-01-08 PROCEDURE — 1100000000 HC RM PRIVATE

## 2025-01-08 RX ADMIN — Medication 100 MG: at 09:29

## 2025-01-08 RX ADMIN — ACETAMINOPHEN 650 MG: 325 TABLET ORAL at 21:14

## 2025-01-08 RX ADMIN — SODIUM CHLORIDE, PRESERVATIVE FREE 10 ML: 5 INJECTION INTRAVENOUS at 21:15

## 2025-01-08 RX ADMIN — FOLIC ACID 1 MG: 1 TABLET ORAL at 09:29

## 2025-01-08 RX ADMIN — SODIUM CHLORIDE, PRESERVATIVE FREE 10 ML: 5 INJECTION INTRAVENOUS at 09:31

## 2025-01-08 RX ADMIN — CEFTRIAXONE SODIUM 2000 MG: 2 INJECTION, POWDER, FOR SOLUTION INTRAMUSCULAR; INTRAVENOUS at 18:04

## 2025-01-08 RX ADMIN — AMLODIPINE BESYLATE 10 MG: 5 TABLET ORAL at 09:30

## 2025-01-08 ASSESSMENT — PAIN DESCRIPTION - DESCRIPTORS: DESCRIPTORS: DISCOMFORT

## 2025-01-08 ASSESSMENT — PAIN DESCRIPTION - LOCATION: LOCATION: CHEST

## 2025-01-08 ASSESSMENT — PAIN DESCRIPTION - ORIENTATION: ORIENTATION: LEFT

## 2025-01-08 ASSESSMENT — PAIN SCALES - GENERAL
PAINLEVEL_OUTOF10: 2
PAINLEVEL_OUTOF10: 3
PAINLEVEL_OUTOF10: 3
PAINLEVEL_OUTOF10: 0

## 2025-01-08 NOTE — PROGRESS NOTES
1900: Bedside shift change report given to GRETA Rees RN (oncoming nurse) by KENDALL Wheeler RN (offgoing nurse). Report included the following information Nurse Handoff Report, Adult Overview, Neuro Assessment, and Event Log.   Assumed care of patient. Bedside shift discussion done. Whiteboard updated. Patient is resting in bed. Discussed with patient the need to speak with hospitalist in order to obtain a work note. Bed is in lowest position and call bell is within reach.     2016: VSS. Administered PRN Tylenol for chest pain 3/10 on number scale. Snack provided at patient's request. No other needs expressed at this time.

## 2025-01-08 NOTE — PROGRESS NOTES
0700 - Bedside shift change report given to ANDREA Vizcaino (oncoming nurse) by ANDREA Mo (offgoing nurse). Report included the following information Nurse Handoff Report.     0730 - Assessment completed. Patient up in chair A&O. Denied needs.    0930 - Scheduled meds given. No needs/ concerns voiced.    1150 - Patient ambulating in [place]. Denied needs.     1400 - afternoon snack given.     1640 - Patient eating dinner. Denied needs.    1840 - ABX stopped. Patient sitting on bed alert. Denied needs.

## 2025-01-09 VITALS
OXYGEN SATURATION: 99 % | WEIGHT: 185 LBS | BODY MASS INDEX: 25.9 KG/M2 | RESPIRATION RATE: 17 BRPM | HEIGHT: 71 IN | HEART RATE: 74 BPM | TEMPERATURE: 98.6 F | DIASTOLIC BLOOD PRESSURE: 91 MMHG | SYSTOLIC BLOOD PRESSURE: 133 MMHG

## 2025-01-09 PROCEDURE — 6370000000 HC RX 637 (ALT 250 FOR IP): Performed by: INTERNAL MEDICINE

## 2025-01-09 PROCEDURE — 2580000003 HC RX 258: Performed by: INTERNAL MEDICINE

## 2025-01-09 PROCEDURE — 2500000003 HC RX 250 WO HCPCS: Performed by: INTERNAL MEDICINE

## 2025-01-09 PROCEDURE — 6360000002 HC RX W HCPCS: Performed by: INTERNAL MEDICINE

## 2025-01-09 RX ORDER — AMLODIPINE BESYLATE 10 MG/1
10 TABLET ORAL DAILY
Qty: 30 TABLET | Refills: 3 | Status: SHIPPED | OUTPATIENT
Start: 2025-01-09

## 2025-01-09 RX ADMIN — Medication 100 MG: at 08:07

## 2025-01-09 RX ADMIN — SODIUM CHLORIDE, PRESERVATIVE FREE 10 ML: 5 INJECTION INTRAVENOUS at 08:07

## 2025-01-09 RX ADMIN — CEFTRIAXONE SODIUM 2000 MG: 2 INJECTION, POWDER, FOR SOLUTION INTRAMUSCULAR; INTRAVENOUS at 13:49

## 2025-01-09 RX ADMIN — FOLIC ACID 1 MG: 1 TABLET ORAL at 08:07

## 2025-01-09 RX ADMIN — AMLODIPINE BESYLATE 10 MG: 5 TABLET ORAL at 08:07

## 2025-01-09 ASSESSMENT — PAIN SCALES - GENERAL: PAINLEVEL_OUTOF10: 2

## 2025-01-09 NOTE — PROGRESS NOTES
0700- Assumed care of the patient from off going nurse via bedside shift report. Patient resting in recliner, alert.    Assessment complete. Vitals obtained. No needs voiced at this time. CBWR    0807- administered scheduled medications per MAR, patient tolerated    Last ABX dose ordered for 2pm    1349- scheduled medications administered per MAR    PICC removed per order for d/c  [place] wrapped     DC ORDER RECEIVED    DC INSTRUCTIONS REVIEWED WITH PATIENT  All items given to patient    Pending  at 1600    Notified transportation was here for    Patient transported off of unit by secondary nurse

## 2025-01-09 NOTE — PLAN OF CARE
Problem: Discharge Planning  Goal: Discharge to home or other facility with appropriate resources  1/1/2025 0812 by Kathy Kincaid RN  Outcome: Progressing  1/1/2025 0020 by Susanne Bower RN  Outcome: Progressing     Problem: Pain  Goal: Verbalizes/displays adequate comfort level or baseline comfort level  1/1/2025 0812 by Katyh Kincaid RN  Outcome: Progressing  1/1/2025 0020 by Susanne Bower RN  Outcome: Progressing     Problem: Safety - Adult  Goal: Free from fall injury  1/1/2025 0812 by Kathy Kincaid RN  Outcome: Progressing  1/1/2025 0020 by Susanne Bower RN  Outcome: Progressing     Problem: Musculoskeletal - Adult  Goal: Return mobility to safest level of function  1/1/2025 0812 by Kathy Kincaid RN  Outcome: Progressing  1/1/2025 0020 by Susanne Bower RN  Outcome: Progressing  Goal: Maintain proper alignment of affected body part  1/1/2025 0812 by Kathy Kincaid RN  Outcome: Progressing  1/1/2025 0020 by Susanne Bower RN  Outcome: Progressing  Goal: Return ADL status to a safe level of function  1/1/2025 0812 by Kathy Kincaid RN  Outcome: Progressing  1/1/2025 0020 by Susanne Bower RN  Outcome: Progressing     Problem: Nutrition Deficit:  Goal: Optimize nutritional status  1/1/2025 0812 by Kathy Kincaid RN  Outcome: Progressing  1/1/2025 0020 by Susanne Bower RN  Outcome: Progressing     
  Problem: Discharge Planning  Goal: Discharge to home or other facility with appropriate resources  1/1/2025 1955 by Susanne Bower RN  Outcome: Progressing  1/1/2025 0812 by Kathy Kincaid RN  Outcome: Progressing  Flowsheets (Taken 1/1/2025 0751)  Discharge to home or other facility with appropriate resources:   Identify barriers to discharge with patient and caregiver   Arrange for needed discharge resources and transportation as appropriate   Identify discharge learning needs (meds, wound care, etc)     Problem: Pain  Goal: Verbalizes/displays adequate comfort level or baseline comfort level  1/1/2025 1955 by Susanne Bower RN  Outcome: Progressing  1/1/2025 0812 by Kathy Kincaid RN  Outcome: Progressing     Problem: Safety - Adult  Goal: Free from fall injury  1/1/2025 1955 by Susanne Bower RN  Outcome: Progressing  1/1/2025 0812 by Kathy Kincaid RN  Outcome: Progressing     Problem: Musculoskeletal - Adult  Goal: Return mobility to safest level of function  1/1/2025 1955 by Susanne Bower RN  Outcome: Progressing  1/1/2025 0812 by Kathy Kincaid RN  Outcome: Progressing  Flowsheets (Taken 1/1/2025 0751)  Return Mobility to Safest Level of Function: Assess patient stability and activity tolerance for standing, transferring and ambulating with or without assistive devices  Goal: Maintain proper alignment of affected body part  1/1/2025 1955 by Susanne Bower RN  Outcome: Progressing  1/1/2025 0812 by Kathy Kincaid RN  Outcome: Progressing  Goal: Return ADL status to a safe level of function  1/1/2025 1955 by Susanne Bower RN  Outcome: Progressing  1/1/2025 0812 by Kathy Kincaid RN  Outcome: Progressing     Problem: Nutrition Deficit:  Goal: Optimize nutritional status  1/1/2025 1955 by Susanne Bower RN  Outcome: Progressing  1/1/2025 0812 by Kathy Kincaid RN  Outcome: Progressing     
  Problem: Discharge Planning  Goal: Discharge to home or other facility with appropriate resources  1/2/2025 0758 by Leora Zarate RN  Outcome: Progressing  1/1/2025 1955 by Susanne Bower RN  Outcome: Progressing     Problem: Pain  Goal: Verbalizes/displays adequate comfort level or baseline comfort level  1/2/2025 0758 by Leora Zarate RN  Outcome: Progressing  1/1/2025 1955 by Susanne Bower RN  Outcome: Progressing     Problem: Safety - Adult  Goal: Free from fall injury  1/2/2025 0758 by Leora Zarate RN  Outcome: Progressing  1/1/2025 1955 by Susanne Bower RN  Outcome: Progressing     Problem: Musculoskeletal - Adult  Goal: Return mobility to safest level of function  1/2/2025 0758 by Leora Zarate RN  Outcome: Progressing  1/1/2025 1955 by Susanne Bower RN  Outcome: Progressing  Goal: Maintain proper alignment of affected body part  1/2/2025 0758 by Leora Zarate RN  Outcome: Progressing  1/1/2025 1955 by Susanne Bower RN  Outcome: Progressing  Goal: Return ADL status to a safe level of function  1/2/2025 0758 by Leora Zarate RN  Outcome: Progressing  1/1/2025 1955 by Susanne Bower RN  Outcome: Progressing     
  Problem: Discharge Planning  Goal: Discharge to home or other facility with appropriate resources  1/3/2025 0804 by Gege Wheeler RN  Outcome: Progressing  1/3/2025 0124 by Stephanie Rees RN  Outcome: Progressing  Flowsheets (Taken 1/3/2025 0124)  Discharge to home or other facility with appropriate resources:   Identify barriers to discharge with patient and caregiver   Arrange for needed discharge resources and transportation as appropriate   Identify discharge learning needs (meds, wound care, etc)     Problem: Pain  Goal: Verbalizes/displays adequate comfort level or baseline comfort level  1/3/2025 0804 by Gege Wheeler RN  Outcome: Progressing  1/3/2025 0124 by Stephanie Rees RN  Outcome: Progressing  Flowsheets (Taken 12/27/2024 1302 by Jignesh Cerda RN)  Verbalizes/displays adequate comfort level or baseline comfort level:   Encourage patient to monitor pain and request assistance   Administer analgesics based on type and severity of pain and evaluate response   Consider cultural and social influences on pain and pain management   Assess pain using appropriate pain scale   Implement non-pharmacological measures as appropriate and evaluate response   Notify Licensed Independent Practitioner if interventions unsuccessful or patient reports new pain     Problem: Safety - Adult  Goal: Free from fall injury  1/3/2025 0804 by Gege Wheeler RN  Outcome: Progressing  1/3/2025 0124 by Stephanie Rees RN  Outcome: Progressing  Flowsheets (Taken 1/3/2025 0124)  Free From Fall Injury: Instruct family/caregiver on patient safety     Problem: Musculoskeletal - Adult  Goal: Return mobility to safest level of function  1/3/2025 0804 by Gege Wheeler RN  Outcome: Progressing  1/3/2025 0124 by Stephanie Rees RN  Outcome: Progressing  Flowsheets (Taken 1/3/2025 0124)  Return Mobility to Safest Level of Function:   Assess patient stability and activity tolerance for standing, transferring and 
  Problem: Discharge Planning  Goal: Discharge to home or other facility with appropriate resources  1/3/2025 1951 by Gege Wheeler RN  Outcome: Progressing  1/3/2025 0804 by Gege Wheeler RN  Outcome: Progressing  Flowsheets (Taken 1/3/2025 0730)  Discharge to home or other facility with appropriate resources: Identify barriers to discharge with patient and caregiver     Problem: Pain  Goal: Verbalizes/displays adequate comfort level or baseline comfort level  1/3/2025 1951 by Gege Wheeler RN  Outcome: Progressing  1/3/2025 0804 by Gege Wheeler RN  Outcome: Progressing     Problem: Safety - Adult  Goal: Free from fall injury  1/3/2025 1951 by Gege Wheeler RN  Outcome: Progressing  1/3/2025 0804 by Gege Wheeler RN  Outcome: Progressing     Problem: Musculoskeletal - Adult  Goal: Return mobility to safest level of function  1/3/2025 1951 by Gege Wheeler RN  Outcome: Progressing  1/3/2025 0804 by Gege Wheeler RN  Outcome: Progressing  Flowsheets (Taken 1/3/2025 0730)  Return Mobility to Safest Level of Function: (goal met) Assess patient stability and activity tolerance for standing, transferring and ambulating with or without assistive devices  Goal: Maintain proper alignment of affected body part  1/3/2025 1951 by Gege Wheeler RN  Outcome: Progressing  1/3/2025 0804 by Gege Wheeler RN  Outcome: Progressing  Goal: Return ADL status to a safe level of function  1/3/2025 1951 by Gege Wheeler RN  Outcome: Progressing  1/3/2025 0804 by Gege Wheeler RN  Outcome: Progressing  Flowsheets (Taken 1/3/2025 0730)  Return ADL Status to a Safe Level of Function: (goal met) Assess activities of daily living deficits and provide assistive devices as needed     Problem: Nutrition Deficit:  Goal: Optimize nutritional status  1/3/2025 1951 by Gege Wheeler RN  Outcome: Progressing  1/3/2025 0804 by Gege Wheeler RN  Outcome: Progressing     
  Problem: Discharge Planning  Goal: Discharge to home or other facility with appropriate resources  1/4/2025 2154 by Stephanie Rees RN  Outcome: Progressing  Flowsheets (Taken 1/4/2025 2154)  Discharge to home or other facility with appropriate resources:   Identify barriers to discharge with patient and caregiver   Arrange for needed discharge resources and transportation as appropriate   Identify discharge learning needs (meds, wound care, etc)  1/4/2025 1041 by Ab Guevara RN  Outcome: Progressing  Flowsheets (Taken 1/4/2025 0849)  Discharge to home or other facility with appropriate resources:   Identify barriers to discharge with patient and caregiver   Arrange for needed discharge resources and transportation as appropriate   Identify discharge learning needs (meds, wound care, etc)     Problem: Pain  Goal: Verbalizes/displays adequate comfort level or baseline comfort level  1/4/2025 2154 by Stephanie Rees RN  Outcome: Progressing  Flowsheets (Taken 12/27/2024 1302 by Jignesh Cerda, RN)  Verbalizes/displays adequate comfort level or baseline comfort level:   Encourage patient to monitor pain and request assistance   Administer analgesics based on type and severity of pain and evaluate response   Consider cultural and social influences on pain and pain management   Assess pain using appropriate pain scale   Implement non-pharmacological measures as appropriate and evaluate response   Notify Licensed Independent Practitioner if interventions unsuccessful or patient reports new pain  1/4/2025 1041 by Ab Guevara RN  Outcome: Progressing  Flowsheets (Taken 12/27/2024 1302 by Jignesh Cerda, RN)  Verbalizes/displays adequate comfort level or baseline comfort level:   Encourage patient to monitor pain and request assistance   Administer analgesics based on type and severity of pain and evaluate response   Consider cultural and social influences on pain and pain management   Assess pain using 
  Problem: Discharge Planning  Goal: Discharge to home or other facility with appropriate resources  1/5/2025 0726 by Leora Zarate RN  Outcome: Progressing  1/4/2025 2154 by Stephanie Rees RN  Outcome: Progressing  Flowsheets (Taken 1/4/2025 2154)  Discharge to home or other facility with appropriate resources:   Identify barriers to discharge with patient and caregiver   Arrange for needed discharge resources and transportation as appropriate   Identify discharge learning needs (meds, wound care, etc)     Problem: Pain  Goal: Verbalizes/displays adequate comfort level or baseline comfort level  1/5/2025 0726 by Leora Zarate RN  Outcome: Progressing  1/4/2025 2154 by Stephanie Rees RN  Outcome: Progressing  Flowsheets (Taken 12/27/2024 1302 by Jignesh Cerda RN)  Verbalizes/displays adequate comfort level or baseline comfort level:   Encourage patient to monitor pain and request assistance   Administer analgesics based on type and severity of pain and evaluate response   Consider cultural and social influences on pain and pain management   Assess pain using appropriate pain scale   Implement non-pharmacological measures as appropriate and evaluate response   Notify Licensed Independent Practitioner if interventions unsuccessful or patient reports new pain     Problem: Safety - Adult  Goal: Free from fall injury  1/5/2025 0726 by Leora Zarate RN  Outcome: Progressing  1/4/2025 2154 by Stephanie Rees RN  Outcome: Progressing  Flowsheets (Taken 1/4/2025 2154)  Free From Fall Injury: Instruct family/caregiver on patient safety     Problem: Musculoskeletal - Adult  Goal: Return mobility to safest level of function  Outcome: Progressing  Goal: Maintain proper alignment of affected body part  Outcome: Progressing  Goal: Return ADL status to a safe level of function  Outcome: Progressing     Problem: Nutrition Deficit:  Goal: Optimize nutritional status  Outcome: Progressing     
  Problem: Discharge Planning  Goal: Discharge to home or other facility with appropriate resources  1/6/2025 0932 by Reyna Wheeler RN  Outcome: Progressing  1/5/2025 2318 by Stephanie Rees RN  Outcome: Progressing  Flowsheets (Taken 1/5/2025 2318)  Discharge to home or other facility with appropriate resources:   Identify barriers to discharge with patient and caregiver   Arrange for needed discharge resources and transportation as appropriate   Identify discharge learning needs (meds, wound care, etc)     Problem: Pain  Goal: Verbalizes/displays adequate comfort level or baseline comfort level  1/6/2025 0932 by Reyna Wheeler RN  Outcome: Progressing  1/5/2025 2318 by Stephanie Rees RN  Outcome: Progressing  Flowsheets (Taken 12/27/2024 1302 by Jignesh Cerda RN)  Verbalizes/displays adequate comfort level or baseline comfort level:   Encourage patient to monitor pain and request assistance   Administer analgesics based on type and severity of pain and evaluate response   Consider cultural and social influences on pain and pain management   Assess pain using appropriate pain scale   Implement non-pharmacological measures as appropriate and evaluate response   Notify Licensed Independent Practitioner if interventions unsuccessful or patient reports new pain     Problem: Safety - Adult  Goal: Free from fall injury  1/6/2025 0932 by Reyna Wheeler RN  Outcome: Progressing  1/5/2025 2318 by Stephanie Rees RN  Outcome: Progressing  Flowsheets (Taken 1/5/2025 2318)  Free From Fall Injury: Instruct family/caregiver on patient safety     Problem: Musculoskeletal - Adult  Goal: Return mobility to safest level of function  1/6/2025 0932 by Reyna Wheeler RN  Outcome: Progressing  1/5/2025 2318 by Stephanie Rees RN  Outcome: Progressing  Flowsheets (Taken 1/5/2025 2318)  Return Mobility to Safest Level of Function:   Assess patient stability and activity tolerance for standing, transferring and 
  Problem: Discharge Planning  Goal: Discharge to home or other facility with appropriate resources  1/7/2025 2315 by Stephanie Rees RN  Outcome: Progressing  Flowsheets (Taken 1/7/2025 2315)  Discharge to home or other facility with appropriate resources:   Identify barriers to discharge with patient and caregiver   Arrange for needed discharge resources and transportation as appropriate   Identify discharge learning needs (meds, wound care, etc)  1/7/2025 1104 by Reyna Wheeler RN  Outcome: Progressing     Problem: Pain  Goal: Verbalizes/displays adequate comfort level or baseline comfort level  1/7/2025 2315 by Stephanie Rees RN  Outcome: Progressing  Flowsheets (Taken 12/27/2024 1302 by Jignesh Cerda RN)  Verbalizes/displays adequate comfort level or baseline comfort level:   Encourage patient to monitor pain and request assistance   Administer analgesics based on type and severity of pain and evaluate response   Consider cultural and social influences on pain and pain management   Assess pain using appropriate pain scale   Implement non-pharmacological measures as appropriate and evaluate response   Notify Licensed Independent Practitioner if interventions unsuccessful or patient reports new pain  1/7/2025 1104 by Reyna Wheeler RN  Outcome: Progressing     Problem: Safety - Adult  Goal: Free from fall injury  1/7/2025 2315 by Stephanie Rees RN  Outcome: Progressing  Flowsheets (Taken 1/7/2025 2315)  Free From Fall Injury: Instruct family/caregiver on patient safety  1/7/2025 1104 by Reyna Wheleer RN  Outcome: Progressing     Problem: Musculoskeletal - Adult  Goal: Return mobility to safest level of function  1/7/2025 2315 by Stephanie Rees RN  Outcome: Progressing  1/7/2025 1104 by Reyna Wheeler RN  Outcome: Progressing  Goal: Maintain proper alignment of affected body part  1/7/2025 2315 by Stephanie Rees RN  Outcome: Progressing  1/7/2025 1104 by Reyna Wheeler RN  Outcome: 
  Problem: Discharge Planning  Goal: Discharge to home or other facility with appropriate resources  1/9/2025 0009 by Stephanie Rees RN  Outcome: Progressing  Flowsheets (Taken 1/9/2025 0009)  Discharge to home or other facility with appropriate resources:   Identify barriers to discharge with patient and caregiver   Arrange for needed discharge resources and transportation as appropriate   Identify discharge learning needs (meds, wound care, etc)  1/8/2025 1106 by Gege Wheeler RN  Outcome: Progressing     Problem: Pain  Goal: Verbalizes/displays adequate comfort level or baseline comfort level  1/9/2025 0009 by Stephanie Rees RN  Outcome: Progressing  Flowsheets (Taken 12/27/2024 1302 by Jignesh Cerda RN)  Verbalizes/displays adequate comfort level or baseline comfort level:   Encourage patient to monitor pain and request assistance   Administer analgesics based on type and severity of pain and evaluate response   Consider cultural and social influences on pain and pain management   Assess pain using appropriate pain scale   Implement non-pharmacological measures as appropriate and evaluate response   Notify Licensed Independent Practitioner if interventions unsuccessful or patient reports new pain  1/8/2025 1106 by Gege Wheeler RN  Outcome: Progressing     Problem: Safety - Adult  Goal: Free from fall injury  1/9/2025 0009 by Stephanie Rees RN  Outcome: Progressing  Flowsheets (Taken 1/7/2025 2315)  Free From Fall Injury: Instruct family/caregiver on patient safety  1/8/2025 1106 by Gege Wheeler RN  Outcome: Progressing     Problem: Musculoskeletal - Adult  Goal: Return mobility to safest level of function  1/9/2025 0009 by Stephanie Rees RN  Outcome: Progressing  1/8/2025 1106 by Gege Wheeler RN  Outcome: Progressing  Goal: Maintain proper alignment of affected body part  1/9/2025 0009 by Stephanie Rees RN  Outcome: Progressing  1/8/2025 1106 by Gege Wheeler 
  Problem: Discharge Planning  Goal: Discharge to home or other facility with appropriate resources  1/9/2025 0804 by Reyna Wheeler RN  Outcome: Progressing  1/9/2025 0009 by Stephanie Rees RN  Outcome: Progressing  Flowsheets (Taken 1/9/2025 0009)  Discharge to home or other facility with appropriate resources:   Identify barriers to discharge with patient and caregiver   Arrange for needed discharge resources and transportation as appropriate   Identify discharge learning needs (meds, wound care, etc)     Problem: Pain  Goal: Verbalizes/displays adequate comfort level or baseline comfort level  1/9/2025 0804 by Reyna Wheeler RN  Outcome: Progressing  1/9/2025 0009 by Stephanie Rees RN  Outcome: Progressing  Flowsheets (Taken 12/27/2024 1302 by Jignesh Cerda RN)  Verbalizes/displays adequate comfort level or baseline comfort level:   Encourage patient to monitor pain and request assistance   Administer analgesics based on type and severity of pain and evaluate response   Consider cultural and social influences on pain and pain management   Assess pain using appropriate pain scale   Implement non-pharmacological measures as appropriate and evaluate response   Notify Licensed Independent Practitioner if interventions unsuccessful or patient reports new pain     Problem: Safety - Adult  Goal: Free from fall injury  1/9/2025 0804 by Reyna Wheeler RN  Outcome: Progressing  1/9/2025 0009 by Stephanie Rees RN  Outcome: Progressing  Flowsheets (Taken 1/7/2025 2315)  Free From Fall Injury: Instruct family/caregiver on patient safety     Problem: Musculoskeletal - Adult  Goal: Return mobility to safest level of function  1/9/2025 0804 by Reyna Wheeler RN  Outcome: Progressing  1/9/2025 0009 by Stephanie Rees RN  Outcome: Progressing  Goal: Maintain proper alignment of affected body part  1/9/2025 0804 by Reyna Wheeler RN  Outcome: Progressing  1/9/2025 0009 by Stephanie Rees RN  Outcome: 
  Problem: Discharge Planning  Goal: Discharge to home or other facility with appropriate resources  12/24/2024 2113 by Stephanie Rees, RN  Outcome: Progressing  Flowsheets (Taken 12/24/2024 2113)  Discharge to home or other facility with appropriate resources:   Identify barriers to discharge with patient and caregiver   Arrange for needed discharge resources and transportation as appropriate   Identify discharge learning needs (meds, wound care, etc)  12/24/2024 1606 by Reyna Wheeler, RN  Outcome: Progressing     Problem: Pain  Goal: Verbalizes/displays adequate comfort level or baseline comfort level  Outcome: Progressing  Flowsheets (Taken 12/24/2024 2113)  Verbalizes/displays adequate comfort level or baseline comfort level:   Encourage patient to monitor pain and request assistance   Assess pain using appropriate pain scale   Administer analgesics based on type and severity of pain and evaluate response   Implement non-pharmacological measures as appropriate and evaluate response   Consider cultural and social influences on pain and pain management   Notify Licensed Independent Practitioner if interventions unsuccessful or patient reports new pain     Problem: Safety - Adult  Goal: Free from fall injury  Outcome: Progressing  Flowsheets (Taken 12/24/2024 2113)  Free From Fall Injury: Instruct family/caregiver on patient safety     
  Problem: Discharge Planning  Goal: Discharge to home or other facility with appropriate resources  12/25/2024 1027 by Ab Guevara RN  Outcome: Progressing  Flowsheets (Taken 12/25/2024 0841)  Discharge to home or other facility with appropriate resources:   Identify barriers to discharge with patient and caregiver   Arrange for needed discharge resources and transportation as appropriate   Identify discharge learning needs (meds, wound care, etc)  12/24/2024 2113 by Stephanie Rees RN  Outcome: Progressing  Flowsheets (Taken 12/24/2024 2113)  Discharge to home or other facility with appropriate resources:   Identify barriers to discharge with patient and caregiver   Arrange for needed discharge resources and transportation as appropriate   Identify discharge learning needs (meds, wound care, etc)     Problem: Pain  Goal: Verbalizes/displays adequate comfort level or baseline comfort level  12/25/2024 1027 by Ab Guevara RN  Outcome: Progressing  Flowsheets (Taken 12/24/2024 2113 by Stephanie Rees, RN)  Verbalizes/displays adequate comfort level or baseline comfort level:   Encourage patient to monitor pain and request assistance   Assess pain using appropriate pain scale   Administer analgesics based on type and severity of pain and evaluate response   Implement non-pharmacological measures as appropriate and evaluate response   Consider cultural and social influences on pain and pain management   Notify Licensed Independent Practitioner if interventions unsuccessful or patient reports new pain  12/24/2024 2113 by Stephanie Rees, RN  Outcome: Progressing  Flowsheets (Taken 12/24/2024 2113)  Verbalizes/displays adequate comfort level or baseline comfort level:   Encourage patient to monitor pain and request assistance   Assess pain using appropriate pain scale   Administer analgesics based on type and severity of pain and evaluate response   Implement non-pharmacological measures as appropriate and 
  Problem: Discharge Planning  Goal: Discharge to home or other facility with appropriate resources  12/26/2024 0822 by Shannon Puri RN  Outcome: Progressing  12/25/2024 1956 by Kishore Yanez RN  Outcome: Progressing  Flowsheets (Taken 12/25/2024 1934)  Discharge to home or other facility with appropriate resources:   Identify barriers to discharge with patient and caregiver   Arrange for needed discharge resources and transportation as appropriate   Identify discharge learning needs (meds, wound care, etc)   Refer to discharge planning if patient needs post-hospital services based on physician order or complex needs related to functional status, cognitive ability or social support system     Problem: Pain  Goal: Verbalizes/displays adequate comfort level or baseline comfort level  12/26/2024 0822 by Shannon Puri RN  Outcome: Progressing  12/25/2024 1956 by Kishore Yanez RN  Outcome: Progressing     Problem: Safety - Adult  Goal: Free from fall injury  12/26/2024 0822 by Shannon Puri RN  Outcome: Progressing  12/25/2024 1956 by Kishore Yanez RN  Outcome: Progressing     
  Problem: Discharge Planning  Goal: Discharge to home or other facility with appropriate resources  12/27/2024 1302 by Jignesh Cerda, RN  Outcome: Adequate for Discharge  Flowsheets (Taken 12/27/2024 1302)  Discharge to home or other facility with appropriate resources:   Identify barriers to discharge with patient and caregiver   Identify discharge learning needs (meds, wound care, etc)   Refer to discharge planning if patient needs post-hospital services based on physician order or complex needs related to functional status, cognitive ability or social support system   Arrange for needed discharge resources and transportation as appropriate   Arrange for interpreters to assist at discharge as needed  12/27/2024 0022 by Adam Dee, RN  Outcome: Progressing     Problem: Pain  Goal: Verbalizes/displays adequate comfort level or baseline comfort level  12/27/2024 1302 by Jignesh Cerda RN  Outcome: Adequate for Discharge  Flowsheets (Taken 12/27/2024 1302)  Verbalizes/displays adequate comfort level or baseline comfort level:   Encourage patient to monitor pain and request assistance   Administer analgesics based on type and severity of pain and evaluate response   Consider cultural and social influences on pain and pain management   Assess pain using appropriate pain scale   Implement non-pharmacological measures as appropriate and evaluate response   Notify Licensed Independent Practitioner if interventions unsuccessful or patient reports new pain  12/27/2024 0022 by Adam Dee, RN  Outcome: Progressing     Problem: Safety - Adult  Goal: Free from fall injury  12/27/2024 1302 by Jignesh Cerda, RN  Outcome: Adequate for Discharge  Flowsheets (Taken 12/27/2024 1302)  Free From Fall Injury:   Instruct family/caregiver on patient safety   Based on caregiver fall risk screen, instruct family/caregiver to ask for assistance with transferring infant if caregiver noted to have fall risk factors  12/27/2024 
  Problem: Discharge Planning  Goal: Discharge to home or other facility with appropriate resources  12/29/2024 0805 by Reyna Wheeler RN  Outcome: Progressing  12/29/2024 0243 by Susanne Bower RN  Outcome: Progressing     Problem: Pain  Goal: Verbalizes/displays adequate comfort level or baseline comfort level  12/29/2024 0805 by Reyna Wheeler RN  Outcome: Progressing  12/29/2024 0243 by Susanne Bower RN  Outcome: Progressing     Problem: Safety - Adult  Goal: Free from fall injury  12/29/2024 0805 by Reyna Wheeler RN  Outcome: Progressing  12/29/2024 0243 by Susanne Bower RN  Outcome: Progressing     Problem: Musculoskeletal - Adult  Goal: Return mobility to safest level of function  12/29/2024 0805 by Reyna Wheeler RN  Outcome: Progressing  12/29/2024 0243 by Susanne Bower RN  Outcome: Progressing  Goal: Maintain proper alignment of affected body part  12/29/2024 0805 by Reyna Wheeler RN  Outcome: Progressing  12/29/2024 0243 by Susanne Bower RN  Outcome: Progressing  Goal: Return ADL status to a safe level of function  12/29/2024 0805 by Reyna Wheeler RN  Outcome: Progressing  12/29/2024 0243 by Susanne Bower RN  Outcome: Progressing     
  Problem: Discharge Planning  Goal: Discharge to home or other facility with appropriate resources  12/29/2024 1944 by Susanne Bower RN  Outcome: Progressing  12/29/2024 0805 by Reyna Wheeler RN  Outcome: Progressing     Problem: Pain  Goal: Verbalizes/displays adequate comfort level or baseline comfort level  12/29/2024 1944 by Susanne Bower RN  Outcome: Progressing  12/29/2024 0805 by Reyna Wheeler RN  Outcome: Progressing     Problem: Safety - Adult  Goal: Free from fall injury  12/29/2024 1944 by Susanne Bower RN  Outcome: Progressing  12/29/2024 0805 by Reyna Wheeler RN  Outcome: Progressing     Problem: Musculoskeletal - Adult  Goal: Return mobility to safest level of function  12/29/2024 1944 by Susanne Bower RN  Outcome: Progressing  12/29/2024 0805 by Reyna Wheeler RN  Outcome: Progressing  Goal: Maintain proper alignment of affected body part  12/29/2024 1944 by Susanne Bower RN  Outcome: Progressing  12/29/2024 0805 by Reyna Wheeler RN  Outcome: Progressing  Goal: Return ADL status to a safe level of function  12/29/2024 1944 by Susanne Bower RN  Outcome: Progressing  12/29/2024 0805 by Reyna Wheeler RN  Outcome: Progressing     Problem: Nutrition Deficit:  Goal: Optimize nutritional status  Outcome: Progressing  Flowsheets (Taken 12/29/2024 1533 by Robin Ramirez)  Nutrient intake appropriate for improving, restoring, or maintaining nutritional needs:   Assess nutritional status and recommend course of action   Monitor oral intake, labs, and treatment plans   Recommend appropriate diets, oral nutritional supplements, and vitamin/mineral supplements   Provide specific nutrition education to patient or family as appropriate     
  Problem: Discharge Planning  Goal: Discharge to home or other facility with appropriate resources  12/30/2024 0751 by Luz Marina Kaye RN  Outcome: Progressing  12/29/2024 1944 by Susanne Bower RN  Outcome: Progressing     Problem: Pain  Goal: Verbalizes/displays adequate comfort level or baseline comfort level  12/30/2024 0751 by Luz Marina Kaye RN  Outcome: Progressing  12/29/2024 1944 by Susanne Bower RN  Outcome: Progressing     Problem: Safety - Adult  Goal: Free from fall injury  12/30/2024 0751 by Luz Marina Kaye RN  Outcome: Progressing  12/29/2024 1944 by Susanne Bower RN  Outcome: Progressing     Problem: Musculoskeletal - Adult  Goal: Return mobility to safest level of function  12/30/2024 0751 by Luz Marina Kaye RN  Outcome: Progressing  12/29/2024 1944 by Susanne Bower RN  Outcome: Progressing  Goal: Maintain proper alignment of affected body part  12/30/2024 0751 by Luz Marina Kaye RN  Outcome: Progressing  12/29/2024 1944 by Susanne Bower RN  Outcome: Progressing  Goal: Return ADL status to a safe level of function  12/30/2024 0751 by Luz Marina Kaye RN  Outcome: Progressing  12/29/2024 1944 by Susanne Bower RN  Outcome: Progressing     Problem: Nutrition Deficit:  Goal: Optimize nutritional status  12/30/2024 0751 by Luz Marina Kaye RN  Outcome: Progressing  12/29/2024 1944 by Susanne Bower RN  Outcome: Progressing  Flowsheets (Taken 12/29/2024 1533 by Robin Ramirez)  Nutrient intake appropriate for improving, restoring, or maintaining nutritional needs:   Assess nutritional status and recommend course of action   Monitor oral intake, labs, and treatment plans   Recommend appropriate diets, oral nutritional supplements, and vitamin/mineral supplements   Provide specific nutrition education to patient or family as appropriate     
  Problem: Discharge Planning  Goal: Discharge to home or other facility with appropriate resources  12/31/2024 0948 by Ab Guevara RN  Outcome: Progressing  Flowsheets (Taken 12/31/2024 0850)  Discharge to home or other facility with appropriate resources:   Identify barriers to discharge with patient and caregiver   Identify discharge learning needs (meds, wound care, etc)   Arrange for needed discharge resources and transportation as appropriate  12/31/2024 0511 by Susanne Bower RN  Outcome: Progressing     Problem: Pain  Goal: Verbalizes/displays adequate comfort level or baseline comfort level  12/31/2024 0948 by Ab Guevara RN  Outcome: Progressing  Flowsheets (Taken 12/27/2024 1302 by Jignesh Cerda, RN)  Verbalizes/displays adequate comfort level or baseline comfort level:   Encourage patient to monitor pain and request assistance   Administer analgesics based on type and severity of pain and evaluate response   Consider cultural and social influences on pain and pain management   Assess pain using appropriate pain scale   Implement non-pharmacological measures as appropriate and evaluate response   Notify Licensed Independent Practitioner if interventions unsuccessful or patient reports new pain  12/31/2024 0511 by Susanne Bower RN  Outcome: Progressing     Problem: Safety - Adult  Goal: Free from fall injury  12/31/2024 0948 by Ab Guevara RN  Outcome: Progressing  Flowsheets (Taken 12/27/2024 1302 by Jignesh Cerda, RN)  Free From Fall Injury:   Instruct family/caregiver on patient safety   Based on caregiver fall risk screen, instruct family/caregiver to ask for assistance with transferring infant if caregiver noted to have fall risk factors  12/31/2024 0511 by Susanne Bower RN  Outcome: Progressing     Problem: Musculoskeletal - Adult  Goal: Return mobility to safest level of function  12/31/2024 0948 by Ab Guevara RN  Outcome: Progressing  Flowsheets (Taken 12/31/2024 
  Problem: Discharge Planning  Goal: Discharge to home or other facility with appropriate resources  Outcome: Progressing     
  Problem: Discharge Planning  Goal: Discharge to home or other facility with appropriate resources  Outcome: Progressing     Problem: Pain  Goal: Verbalizes/displays adequate comfort level or baseline comfort level  12/28/2024 1049 by Kasandra Kilgore RN  Outcome: Progressing  12/28/2024 0505 by Mariusz Subramanian RN  Outcome: Progressing     Problem: Safety - Adult  Goal: Free from fall injury  12/28/2024 1049 by Kasandra Kilgore RN  Outcome: Progressing  12/28/2024 0505 by Mariusz Subramanian RN  Outcome: Progressing     Problem: Musculoskeletal - Adult  Goal: Return mobility to safest level of function  12/28/2024 1049 by Kasandra Kilgore RN  Outcome: Progressing  12/28/2024 0505 by Mariusz Subramanian RN  Outcome: Progressing  Goal: Maintain proper alignment of affected body part  12/28/2024 1049 by Kasandra Kilgore RN  Outcome: Progressing  12/28/2024 0505 by Mariusz Subramanian RN  Outcome: Progressing  Goal: Return ADL status to a safe level of function  Outcome: Progressing     
  Problem: Discharge Planning  Goal: Discharge to home or other facility with appropriate resources  Outcome: Progressing     Problem: Pain  Goal: Verbalizes/displays adequate comfort level or baseline comfort level  Outcome: Progressing     Problem: Safety - Adult  Goal: Free from fall injury  Outcome: Progressing     
  Problem: Discharge Planning  Goal: Discharge to home or other facility with appropriate resources  Outcome: Progressing     Problem: Pain  Goal: Verbalizes/displays adequate comfort level or baseline comfort level  Outcome: Progressing     Problem: Safety - Adult  Goal: Free from fall injury  Outcome: Progressing     Problem: Musculoskeletal - Adult  Goal: Return mobility to safest level of function  Outcome: Progressing  Goal: Maintain proper alignment of affected body part  Outcome: Progressing  Goal: Return ADL status to a safe level of function  Outcome: Progressing     Problem: Nutrition Deficit:  Goal: Optimize nutritional status  Outcome: Progressing     
  Problem: Discharge Planning  Goal: Discharge to home or other facility with appropriate resources  Outcome: Progressing     Problem: Pain  Goal: Verbalizes/displays adequate comfort level or baseline comfort level  Outcome: Progressing     Problem: Safety - Adult  Goal: Free from fall injury  Outcome: Progressing     Problem: Musculoskeletal - Adult  Goal: Return mobility to safest level of function  Outcome: Progressing  Goal: Maintain proper alignment of affected body part  Outcome: Progressing  Goal: Return ADL status to a safe level of function  Outcome: Progressing     Problem: Safety - Adult  Goal: Free from fall injury  Outcome: Progressing     
  Problem: Discharge Planning  Goal: Discharge to home or other facility with appropriate resources  Outcome: Progressing  Flowsheets (Taken 1/3/2025 0124)  Discharge to home or other facility with appropriate resources:   Identify barriers to discharge with patient and caregiver   Arrange for needed discharge resources and transportation as appropriate   Identify discharge learning needs (meds, wound care, etc)     Problem: Pain  Goal: Verbalizes/displays adequate comfort level or baseline comfort level  Outcome: Progressing  Flowsheets (Taken 12/27/2024 1302 by Jignesh Cerda RN)  Verbalizes/displays adequate comfort level or baseline comfort level:   Encourage patient to monitor pain and request assistance   Administer analgesics based on type and severity of pain and evaluate response   Consider cultural and social influences on pain and pain management   Assess pain using appropriate pain scale   Implement non-pharmacological measures as appropriate and evaluate response   Notify Licensed Independent Practitioner if interventions unsuccessful or patient reports new pain     Problem: Safety - Adult  Goal: Free from fall injury  Outcome: Progressing  Flowsheets (Taken 1/3/2025 0124)  Free From Fall Injury: Instruct family/caregiver on patient safety     Problem: Musculoskeletal - Adult  Goal: Return mobility to safest level of function  Outcome: Progressing  Flowsheets (Taken 1/3/2025 0124)  Return Mobility to Safest Level of Function:   Assess patient stability and activity tolerance for standing, transferring and ambulating with or without assistive devices   Assist with transfers and ambulation using safe patient handling equipment as needed  Goal: Maintain proper alignment of affected body part  Outcome: Progressing  Goal: Return ADL status to a safe level of function  Outcome: Progressing     Problem: Nutrition Deficit:  Goal: Optimize nutritional status  Outcome: Progressing     
  Problem: Discharge Planning  Goal: Discharge to home or other facility with appropriate resources  Outcome: Progressing  Flowsheets (Taken 1/4/2025 0849)  Discharge to home or other facility with appropriate resources:   Identify barriers to discharge with patient and caregiver   Arrange for needed discharge resources and transportation as appropriate   Identify discharge learning needs (meds, wound care, etc)     Problem: Pain  Goal: Verbalizes/displays adequate comfort level or baseline comfort level  Outcome: Progressing  Flowsheets (Taken 12/27/2024 1302 by Jignesh Cerda, RN)  Verbalizes/displays adequate comfort level or baseline comfort level:   Encourage patient to monitor pain and request assistance   Administer analgesics based on type and severity of pain and evaluate response   Consider cultural and social influences on pain and pain management   Assess pain using appropriate pain scale   Implement non-pharmacological measures as appropriate and evaluate response   Notify Licensed Independent Practitioner if interventions unsuccessful or patient reports new pain     Problem: Safety - Adult  Goal: Free from fall injury  Outcome: Progressing  Flowsheets (Taken 1/3/2025 0124 by Stephanie Rees, RN)  Free From Fall Injury: Instruct family/caregiver on patient safety     Problem: Musculoskeletal - Adult  Goal: Return mobility to safest level of function  Outcome: Progressing  Flowsheets (Taken 1/4/2025 0849)  Return Mobility to Safest Level of Function: Assess patient stability and activity tolerance for standing, transferring and ambulating with or without assistive devices  Goal: Maintain proper alignment of affected body part  Outcome: Progressing  Goal: Return ADL status to a safe level of function  Outcome: Progressing     Problem: Nutrition Deficit:  Goal: Optimize nutritional status  Outcome: Progressing     
  Problem: Discharge Planning  Goal: Discharge to home or other facility with appropriate resources  Outcome: Progressing  Flowsheets (Taken 1/5/2025 2318)  Discharge to home or other facility with appropriate resources:   Identify barriers to discharge with patient and caregiver   Arrange for needed discharge resources and transportation as appropriate   Identify discharge learning needs (meds, wound care, etc)     Problem: Pain  Goal: Verbalizes/displays adequate comfort level or baseline comfort level  Outcome: Progressing  Flowsheets (Taken 12/27/2024 1302 by Jignesh Cerda RN)  Verbalizes/displays adequate comfort level or baseline comfort level:   Encourage patient to monitor pain and request assistance   Administer analgesics based on type and severity of pain and evaluate response   Consider cultural and social influences on pain and pain management   Assess pain using appropriate pain scale   Implement non-pharmacological measures as appropriate and evaluate response   Notify Licensed Independent Practitioner if interventions unsuccessful or patient reports new pain     Problem: Safety - Adult  Goal: Free from fall injury  Outcome: Progressing  Flowsheets (Taken 1/5/2025 2318)  Free From Fall Injury: Instruct family/caregiver on patient safety     Problem: Musculoskeletal - Adult  Goal: Return mobility to safest level of function  Outcome: Progressing  Flowsheets (Taken 1/5/2025 2318)  Return Mobility to Safest Level of Function:   Assess patient stability and activity tolerance for standing, transferring and ambulating with or without assistive devices   Assist with transfers and ambulation using safe patient handling equipment as needed   Ensure adequate protection for wounds/incisions during mobilization  Goal: Maintain proper alignment of affected body part  Outcome: Progressing  Goal: Return ADL status to a safe level of function  Outcome: Progressing     Problem: Nutrition Deficit:  Goal: Optimize 
  Problem: Pain  Goal: Verbalizes/displays adequate comfort level or baseline comfort level  Outcome: Progressing     Problem: Safety - Adult  Goal: Free from fall injury  Outcome: Progressing     Problem: Musculoskeletal - Adult  Goal: Return mobility to safest level of function  Outcome: Progressing  Goal: Maintain proper alignment of affected body part  Outcome: Progressing     
  Problem: Safety - Adult  Goal: Free from fall injury  1/6/2025 2022 by Carlito Pitts, RN  Outcome: Progressing  Flowsheets (Taken 1/6/2025 2022)  Free From Fall Injury: Instruct family/caregiver on patient safety  Note: Pt. Educated on call bell when in need of help and assistance.  Pt. Verbalized understanding able to use call bell.   1/6/2025 0932 by Reyna Wheeler, RN  Outcome: Progressing     
Nutrition Assessment     Type and Reason for Visit: Initial    Nutrition Recommendations/Plan:   Continue current diet order     Malnutrition Assessment:  Malnutrition Status: No malnutrition    Nutrition Assessment:  63 yo with past medical history significant for hypertension presented to hospital [id number] ago. Current diet order appropriate, no sig wt changes, no nutritional needs at this time.    Estimated Daily Nutrient Needs:  Energy (kcal):  1727-5146 kcal (25-30 kcal/kg) Weight Used for Energy Requirements: Current     Protein (g):  84--101 g (1-1.2 g/kg) Weight Used for Protein Requirements: Current        Fluid (ml/day):  3215-2458 mL or per MD Method Used for Fluid Requirements: 1 ml/kcal    Nutrition Related Findings:     Wound Type: None    Current Nutrition Therapies:    ADULT DIET; Regular; Low Fat/Low Chol/High Fiber/2 gm Na    Anthropometric Measures:  Height: 180.3 cm (5' 11\")  Current Body Wt: 83.9 kg (185 lb)   BMI: 25.8        Nutrition Diagnosis:   No nutrition diagnosis at this time     Nutrition Interventions:   Food and/or Nutrient Delivery: Continue Current Diet  Nutrition Education/Counseling: No recommendation at this time  Coordination of Nutrition Care: Continue to monitor while inpatient       Goals:  Goals: Meet at least 75% of estimated needs  Type of Goal: New goal  Previous Goal Met: New Goal    Nutrition Monitoring and Evaluation:   Behavioral-Environmental Outcomes: None Identified  Food/Nutrient Intake Outcomes: Food and Nutrient Intake  Physical Signs/Symptoms Outcomes: Biochemical Data, Nutrition Focused Physical Findings, Weight, Meal Time Behavior    Discharge Planning:    No discharge needs at this time     Robin Ramirez MS RDN  Contact: [phone]    
Ab RN  Outcome: Progressing  Flowsheets (Taken 12/24/2024 2113 by Stephanie Rees, RN)  Free From Fall Injury: Instruct family/caregiver on patient safety     
RN  Outcome: Progressing  Goal: Return ADL status to a safe level of function  1/8/2025 1106 by Gege Wheeler RN  Outcome: Progressing  1/7/2025 2315 by Stephanie Rees, RN  Outcome: Progressing     Problem: Nutrition Deficit:  Goal: Optimize nutritional status  1/8/2025 1106 by Gege Wheeler, RN  Outcome: Progressing  1/7/2025 2315 by Stephanie Rees, RN  Outcome: Progressing

## 2025-01-09 NOTE — PROGRESS NOTES
1900: Bedside shift change report given to GRETA Rees RN (oncoming nurse) by RICHIE Wheeler RN (offgoing nurse). Report included the following information Nurse Handoff Report, Adult Overview, Neuro Assessment, and Event Log.   Assumed care of patient. Bedside shift discussion done. Whiteboard updated. Patient is resting in bed. States that he is nervous for discharge tomorrow. PICC flushed with 10 cc NS without complication. Bed is in lowest position and call bell is within reach.     1919: VSS.     2114: PRN Tylenol administered for chest pain of 3/10 on number scale. Lidocaine patch removed. No needs expressed at this time.

## 2025-01-10 DIAGNOSIS — I70.0 AORTIC ATHEROSCLEROSIS (HCC): Primary | ICD-10-CM

## 2025-01-17 ENCOUNTER — TELEPHONE (OUTPATIENT)
Dept: CARDIOTHORACIC SURGERY | Age: 65
End: 2025-01-17

## 2025-01-24 NOTE — TELEPHONE ENCOUNTER
Called and spoke with patient, ready to schedule CT at [facility]. Patient states preferably any day after 1:30 pm.

## 2025-01-27 NOTE — TELEPHONE ENCOUNTER
Called and scheduled patient for CT on 1/31/25.    Called and spoke with patient. Patient states understanding of date and time for CT.  Will call if any concerns.

## 2025-01-31 ENCOUNTER — HOSPITAL ENCOUNTER (OUTPATIENT)
Facility: HOSPITAL | Age: 65
Discharge: HOME | End: 2025-01-31
Attending: THORACIC SURGERY (CARDIOTHORACIC VASCULAR SURGERY)
Payer: MEDICAID

## 2025-01-31 DIAGNOSIS — I70.0 AORTIC ATHEROSCLEROSIS (HCC): ICD-10-CM

## 2025-01-31 LAB — CREAT UR-MCNC: 1.2 MG/DL (ref 0.6–1.3)

## 2025-01-31 PROCEDURE — 82565 ASSAY OF CREATININE: CPT

## 2025-01-31 PROCEDURE — 6360000004 HC RX CONTRAST MEDICATION: Performed by: THORACIC SURGERY (CARDIOTHORACIC VASCULAR SURGERY)

## 2025-01-31 PROCEDURE — 71260 CT THORAX DX C+: CPT

## 2025-01-31 RX ORDER — IOPAMIDOL 612 MG/ML
80 INJECTION, SOLUTION INTRAVASCULAR
Status: COMPLETED | OUTPATIENT
Start: 2025-01-31 | End: 2025-01-31

## 2025-01-31 RX ADMIN — IOPAMIDOL 80 ML: 612 INJECTION, SOLUTION INTRAVENOUS at 15:45

## 2025-03-12 ENCOUNTER — TELEPHONE (OUTPATIENT)
Dept: CARDIOTHORACIC SURGERY | Age: 65
End: 2025-03-12

## 2025-03-12 NOTE — TELEPHONE ENCOUNTER
Per Dr Field after reviewing CT :  Reviewed call as needed.   Unable to reach patient number not in service

## (undated) DEVICE — THREE-QUARTER SHEET: Brand: CONVERTORS

## (undated) DEVICE — ZIMMER® STERILE DISPOSABLE TOURNIQUET CUFF WITH PROTECTIVE SLEEVE AND PLC, DUAL PORT, SINGLE BLADDER, 18 IN. (46 CM)

## (undated) DEVICE — DRAPE,EXTREMITY,89X128,STERILE: Brand: MEDLINE

## (undated) DEVICE — SUTURE ETHLN SZ 2-0 L30IN NONABSORBABLE BLK L36MM PSLX 3/8 1697H

## (undated) DEVICE — APPLICATOR MEDICATED 26 CC SOLUTION HI LT ORNG CHLORAPREP

## (undated) DEVICE — STERILE LATEX POWDER-FREE SURGICAL GLOVESWITH NITRILE COATING: Brand: PROTEXIS

## (undated) DEVICE — PAD,ABDOMINAL,8"X10",ST,LF: Brand: MEDLINE

## (undated) DEVICE — BLADE,STAINLESS-STEEL,15,STRL,DISPOSABLE: Brand: MEDLINE

## (undated) DEVICE — COVER LT HNDL FLX

## (undated) DEVICE — CLEAN UP KIT: Brand: MEDLINE INDUSTRIES, INC.

## (undated) DEVICE — 4-PORT MANIFOLD: Brand: NEPTUNE 2

## (undated) DEVICE — SOLUTION IV 1000ML 0.9% SOD CHL

## (undated) DEVICE — 450 ML BOTTLE OF 0.05% CHLORHEXIDINE GLUCONATE IN 99.95% STERILE WATER FOR IRRIGATION, USP AND APPLICATOR.: Brand: IRRISEPT ANTIMICROBIAL WOUND LAVAGE

## (undated) DEVICE — GAUZE,SPONGE,4"X4",16PLY,STRL,LF,10/TRAY: Brand: MEDLINE

## (undated) DEVICE — Device

## (undated) DEVICE — KIT CLN UP BON SECOURS MARYV

## (undated) DEVICE — GLOVE SURG SZ 75 CRM LTX FREE POLYISOPRENE POLYMER BEAD ANTI

## (undated) DEVICE — SOLUTION SCRB 4OZ 10% PVP I POVIDONE IOD TOP PAINT EXIDINE

## (undated) DEVICE — SOLUTION IRRIG 1000ML 0.9% SOD CHL USP POUR PLAS BTL

## (undated) DEVICE — CURITY NON-ADHERENT STRIPS: Brand: CURITY

## (undated) DEVICE — PACK PROCEDURE SURG MAJ W/ BASIN LF

## (undated) DEVICE — BLADE,STAINLESS-STEEL,10,STRL,DISPOSABLE: Brand: MEDLINE

## (undated) DEVICE — CANNULA PERF L2IN BLNT TIP 2MM VES CLR RADPQ BODY FEM LUER

## (undated) DEVICE — REM POLYHESIVE ADULT PATIENT RETURN ELECTRODE: Brand: VALLEYLAB

## (undated) DEVICE — INTENDED FOR TISSUE SEPARATION, AND OTHER PROCEDURES THAT REQUIRE A SHARP SURGICAL BLADE TO PUNCTURE OR CUT.: Brand: BARD-PARKER SAFETY BLADES SIZE 15, STERILE

## (undated) DEVICE — BANDAGE,GAUZE,BULKEE II,4.5"X4.1YD,STRL: Brand: MEDLINE

## (undated) DEVICE — NEEDLE HYPO 22GA L1.5IN BLK S STL HUB POLYPR SHLD REG BVL

## (undated) DEVICE — SPONGE LAP 18X18IN STRL -- 5/PK

## (undated) DEVICE — STERILE POLYISOPRENE POWDER-FREE SURGICAL GLOVES: Brand: PROTEXIS

## (undated) DEVICE — ELECTRODE PT RET AD L9FT HI MOIST COND ADH HYDRGEL CORDED

## (undated) DEVICE — SWAB CULT LIQ STUART AGR AERB MOD IN BRK SGL RAYON TIP PLAS 220099] BECTON DICKINSON MICRO]